# Patient Record
Sex: MALE | Race: BLACK OR AFRICAN AMERICAN | NOT HISPANIC OR LATINO | Employment: OTHER | URBAN - METROPOLITAN AREA
[De-identification: names, ages, dates, MRNs, and addresses within clinical notes are randomized per-mention and may not be internally consistent; named-entity substitution may affect disease eponyms.]

---

## 2017-06-02 ENCOUNTER — TELEPHONE (OUTPATIENT)
Dept: FAMILY MEDICINE | Facility: CLINIC | Age: 42
End: 2017-06-02

## 2017-06-02 NOTE — TELEPHONE ENCOUNTER
Reason for call:  Form  Reason for Call:  Form, our goal is to have forms completed with 72 hours, however, some forms may require a visit or additional information.    Type of letter, form or note:  medical    Who is the form from?: Rx form    Where did the form come from: form was faxed in    What clinic location was the form placed at?: [facility] [address] - [phone]    Where the form was placed: [facility]    What number is listed as a contact on the form?:  N/a        Additional comments:  Fax to [phone]    created by Jennifer Brookstravis

## 2017-06-02 NOTE — TELEPHONE ENCOUNTER
Received forms to sign for various medications containing butalbital for headaches. Multiple topical agents for neuropathy with no definite diagnosis currently in place.      Declined due to non-compliance.    Sudeep Short MD

## 2017-09-21 NOTE — PROGRESS NOTES
SUBJECTIVE:   Archie Wallace is a 42 year old male who presents to clinic today for the following health issues:      Diabetes Follow-up--    Has essentially been off any diabetic medications. Metformin was begun in the past but the patient could not tolerate due to gastrointestinal upset. He has morbid obesity. Last A1c was 7% approximately 1 year ago. He has not been compliant with follow-up visits.      Patient is checking blood sugars: not at all    Diabetic concerns: Eye exam     Symptoms of hypoglycemia (low blood sugar): shaky, dizzy, weak, sometimes     Paresthesias (numbness or burning in feet) or sores: No     Date of last diabetic eye exam: unknown    Hyperlipidemia Follow-Up- Previously prescribed Atorvastatin for hyperlipidemia and cardiovascular protection. Patient has apparently taken this. He did not bring his medications in today and it does possibly questionable lead is not taking it.      Rate your low fat/cholesterol diet?: not monitoring fat    Taking statin?  Yes, no muscle aches from statin    Other lipid medications/supplements?:  none    Hypertension Follow-up- only taking one combination medication at this time. We'll be adding Amlodipine to Losartan/hydrochlorothiazide. Blood pressure not under control.      Outpatient blood pressures are not being checked.    Low Salt Diet: not monitoring salt    Vascular Disease Follow-up:  Coronary Artery Disease (CAD)- by history and some MI potential number of years ago. He is not seeing a cardiologist regularly. Denies any anginal type symptoms. Has some atypical chest discomfort but more likely from asthma.      Chest pain or pressure, left side neck or arm pain: Yes and tingling in right arm    Shortness of breath/increased sweats/nausea with exertion: Yes     Pain in calves walking 1-2 blocks: No    Worsened or new symptoms since last visit: Yes right arm    Nitroglycerin use: no    Daily aspirin use: 4 times a week    Depression Follow up- not on  medication.    Status since last visit: Improved     See PHQ-9 for current symptoms.  Other associated symptoms: None    Complicating factors:   Significant life event:  No   Current substance abuse:  None  Anxiety or Panic symptoms:  Yes-    PHQ-9  English  PHQ-9   Any Language  Asthma Follow-Up- reports that he is using Advair on a regular basis. Has some coughing and wheezing. Not requiring Albuterol regularly. Obesity causing shortness of breath as well.    Was ACT completed today?    Yes    ACT Total Scores 9/22/2017   ACT TOTAL SCORE -   ASTHMA ER VISITS -   ASTHMA HOSPITALIZATIONS -   ACT TOTAL SCORE (Goal Greater than or Equal to 20) 9   In the past 12 months, how many times did you visit the emergency room for your asthma without being admitted to the hospital? 0   In the past 12 months, how many times were you hospitalized overnight because of your asthma? 0       Recent asthma triggers that patient is dealing with: Patient is unaware of triggers                Amount of exercise or physical activity: 6-7 days/week for an average of 15-30 minutes    Problems taking medications regularly: No    Medication side effects: none  Diet: regular (no restrictions)    Joint Pain -right arm tingling --mainly the right hand--symptoms about one month    Onset: 1 month    Description:   Location: right arm  Character: tingling    Intensity: moderate    Progression of Symptoms: worse    Accompanying Signs & Symptoms:  Other symptoms: tingling    History:   Previous similar pain: no       Precipitating factors:   Trauma or overuse: no     Alleviating factors:  Improved by: holding arm up    Therapies Tried and outcome: None                Problem list and histories reviewed & adjusted, as indicated.  Additional history: as documented        Reviewed and updated as needed this visit by clinical staffTobacco  Allergies  Med Hx  Surg Hx  Fam Hx  Soc Hx      Reviewed and updated as needed this visit by Provider      "    ROS:  C: NEGATIVE for fever, chills, change in weight  CONSTITUTIONAL: Morbidly obese  I: NEGATIVE for worrisome rashes, moles or lesions  E: NEGATIVE for vision changes or irritation  E/M: NEGATIVE for ear, mouth and throat problems  RESP: An ongoing asthma management with Advair. Obesity also compromises respiratory status.  CV: NEGATIVE for chest pain, palpitations or peripheral edema  CV: Past history of apparent acute MI. History of coronary artery disease. On aspirin therapy. Will improve blood pressure control with additional medication. Appears to be taking Atorvastatin.  GI: NEGATIVE for nausea, abdominal pain, heartburn, or change in bowel habits  : NEGATIVE for frequency, dysuria, or hematuria  M: NEGATIVE for significant arthralgias or myalgia  NEURO: Over the last month, the patient was noted what appears to be paresthesias involving his hand. There may possibly be some radicular pain from the neck distally to the right hand. He is right-hand dominant. No definite loss of motor function. No neck pain or injury.  ENDOCRINE: Type 2 diabetes with morbid obesity, currently not on medication. Noncompliant with follow-ups. No apparent polyuria or polydipsia. No foot symptoms of neuropathy.  H: NEGATIVE for bleeding problems  P: NEGATIVE for changes in mood or affect  PSYCHIATRIC: History of depression, currently not on medication and the patient feels he is \"doing all right\".    OBJECTIVE:                                                    /88  Pulse 94  Temp 97.4  F (36.3  C) (Temporal)  Resp 20  Ht 5' 10.28\" (1.785 m)  Wt (!) 353 lb 8 oz (160.3 kg)  SpO2 96%  BMI 50.32 kg/m2  Body mass index is 50.32 kg/(m^2).  GENERAL: alert, no distress, cooperative and morbidly obese  EYES: Eyes grossly normal to inspection, extraocular movements - intact, and PERRL  HENT: ear canals- normal; TMs- normal; Nose- normal; Mouth- no ulcers, no lesions  NECK: no tenderness, no adenopathy, no asymmetry, no " masses, no stiffness; thyroid- normal to palpation  RESP: lungs clear to auscultation - no rales, no rhonchi, no wheezes  CV: regular rates and rhythm, normal S1 S2, no S3 or S4 and no murmur, no click or rub -  ABDOMEN: soft, no tenderness, no  hepatosplenomegaly, no masses, normal bowel sounds  ABDOMEN: Significant truncal obesity.  MS: Mild edema both lower extremities. Examination of the right upper extremity shows negative Tinel sign, negative compression test of the median nerve on the right hand. Motor function appears intact. No tenderness or limitations at the elbow or shoulder. Good range of motion of the cervical spine.  SKIN: no suspicious lesions, no rashes  NEURO: strength and tone- normal, sensory exam- grossly normal, mentation- intact, speech- normal, reflexes- symmetric  NEURO: Normal strength and tone, sensory exam grossly normal, mentation intact, speech normal, mentation intact and normal strength throughout  BACK: no CVA tenderness, no paralumbar tenderness  PSYCH: Alert and oriented times 3; speech- coherent , normal rate and volume; able to articulate logical thoughts, able to abstract reason, no tangential thoughts, no hallucinations or delusions, affect- normal  LYMPHATICS: ant. cervical- normal, post. cervical- normal, axillary- normal, supraclavicular- normal, inguinal- normal    Diagnostic test results:  Diagnostic Test Results:  Patient is to have fasting blood studies done within the next 7-10 days.       ASSESSMENT/PLAN:                                                    1. Right hand paresthesia   At his current weight, the patient is on a candidate for an MRI of the cervical spine. CT scan may be considered. Patient appears to be possibly claustrophobic as well. We'll begin with a neurologic consultation and possible consideration of EMG/nerve consumption studies of the right upper extremity to better define the cause of the paresthesias. Suspect not related to diabetes. Possibly  carpal tunnel syndrome but also could be related to cervical radiculopathy. Does not appear to be significantly painful.  - DEPRESSION ACTION PLAN (DAP)  - Asthma Action Plan (AAP)  - DIABETES EDUCATOR REFERRAL  - NEUROLOGY ADULT REFERRAL  - amLODIPine (NORVASC) 10 MG tablet; Take 1 tablet (10 mg) by mouth daily  Dispense: 90 tablet; Refill: 1  - BARIATRIC ADULT REFERRAL  - CBC with platelets; Future  - Lipid panel reflex to direct LDL; Future  - Comprehensive metabolic panel (BMP + Alb, Alk Phos, ALT, AST, Total. Bili, TP); Future  - Hemoglobin A1c; Future  - Albumin Random Urine Quantitative with Creat Ratio; Future  - TSH with free T4 reflex; Future    2. Hypertension goal BP (blood pressure) < 140/90   Not adequately controlled. Adding Amlodipine 10 mg daily with follow-up in 4 months.  - DEPRESSION ACTION PLAN (DAP)  - Asthma Action Plan (AAP)  - DIABETES EDUCATOR REFERRAL  - NEUROLOGY ADULT REFERRAL  - amLODIPine (NORVASC) 10 MG tablet; Take 1 tablet (10 mg) by mouth daily  Dispense: 90 tablet; Refill: 1  - BARIATRIC ADULT REFERRAL  - CBC with platelets; Future  - Lipid panel reflex to direct LDL; Future  - Comprehensive metabolic panel (BMP + Alb, Alk Phos, ALT, AST, Total. Bili, TP); Future  - Hemoglobin A1c; Future  - Albumin Random Urine Quantitative with Creat Ratio; Future  - TSH with free T4 reflex; Future    3. Hyperlipidemia with target LDL less than 100   Rechecking labs. To continue Atorvastatin 10 mg daily.  - DEPRESSION ACTION PLAN (DAP)  - Asthma Action Plan (AAP)  - DIABETES EDUCATOR REFERRAL  - NEUROLOGY ADULT REFERRAL  - amLODIPine (NORVASC) 10 MG tablet; Take 1 tablet (10 mg) by mouth daily  Dispense: 90 tablet; Refill: 1  - BARIATRIC ADULT REFERRAL  - CBC with platelets; Future  - Lipid panel reflex to direct LDL; Future  - Comprehensive metabolic panel (BMP + Alb, Alk Phos, ALT, AST, Total. Bili, TP); Future  - Hemoglobin A1c; Future  - Albumin Random Urine Quantitative with Creat Ratio;  Future  - TSH with free T4 reflex; Future    4. Type 2 diabetes mellitus with hyperglycemia, without long-term current use of insulin (H)   Checking labs for current status. Needs to be seen through diabetic education for assistance in management. Patient needs good deal of education. Fasting labs pending  - DEPRESSION ACTION PLAN (DAP)  - Asthma Action Plan (AAP)  - DIABETES EDUCATOR REFERRAL  - NEUROLOGY ADULT REFERRAL  - amLODIPine (NORVASC) 10 MG tablet; Take 1 tablet (10 mg) by mouth daily  Dispense: 90 tablet; Refill: 1  - BARIATRIC ADULT REFERRAL  - CBC with platelets; Future  - Lipid panel reflex to direct LDL; Future  - Comprehensive metabolic panel (BMP + Alb, Alk Phos, ALT, AST, Total. Bili, TP); Future  - Hemoglobin A1c; Future  - Albumin Random Urine Quantitative with Creat Ratio; Future  - TSH with free T4 reflex; Future    5. Morbid obesity due to excess calories (H)   Patient is off until late management referral for assistance in weight loss and possible bariatric surgery.  - DEPRESSION ACTION PLAN (DAP)  - Asthma Action Plan (AAP)  - DIABETES EDUCATOR REFERRAL  - NEUROLOGY ADULT REFERRAL  - amLODIPine (NORVASC) 10 MG tablet; Take 1 tablet (10 mg) by mouth daily  Dispense: 90 tablet; Refill: 1  - BARIATRIC ADULT REFERRAL  - CBC with platelets; Future  - Lipid panel reflex to direct LDL; Future  - Comprehensive metabolic panel (BMP + Alb, Alk Phos, ALT, AST, Total. Bili, TP); Future  - Hemoglobin A1c; Future  - Albumin Random Urine Quantitative with Creat Ratio; Future  - TSH with free T4 reflex; Future    6. Moderate persistent asthma without complication  Continue current medications for asthma.  - DEPRESSION ACTION PLAN (DAP)  - Asthma Action Plan (AAP)  - DIABETES EDUCATOR REFERRAL  - NEUROLOGY ADULT REFERRAL  - amLODIPine (NORVASC) 10 MG tablet; Take 1 tablet (10 mg) by mouth daily  Dispense: 90 tablet; Refill: 1  - BARIATRIC ADULT REFERRAL  - CBC with platelets; Future  - Lipid panel reflex to  direct LDL; Future  - Comprehensive metabolic panel (BMP + Alb, Alk Phos, ALT, AST, Total. Bili, TP); Future  - Hemoglobin A1c; Future  - Albumin Random Urine Quantitative with Creat Ratio; Future  - TSH with free T4 reflex; Future      Follow up with Provider - Follow-up in 4 months. He is to have fasting blood studies done in the near future.   See Patient Instructions    The patient understood the rational for the diagnosis and treatment plan. All questions were answered to best of my ability and the patient's satisfaction.    Note: Chart documentation done in part with Dragon Voice Recognition software. Although reviewed after completion, some word and grammatical errors may remain.  Please consider this when interpreting information in this chart.      Sudeep Short MD  [facility]    Answers for HPI/ROS submitted by the patient on 9/22/2017   If you checked off any problems, how difficult have these problems made it for you to do your work, take care of things at home, or get along with other people?: Somewhat difficult  PHQ9 TOTAL SCORE: 11  MEGAN 7 TOTAL SCORE: 11

## 2017-09-22 ENCOUNTER — OFFICE VISIT (OUTPATIENT)
Dept: FAMILY MEDICINE | Facility: CLINIC | Age: 42
End: 2017-09-22

## 2017-09-22 VITALS
WEIGHT: 315 LBS | SYSTOLIC BLOOD PRESSURE: 158 MMHG | OXYGEN SATURATION: 96 % | RESPIRATION RATE: 20 BRPM | TEMPERATURE: 97.4 F | BODY MASS INDEX: 45.1 KG/M2 | DIASTOLIC BLOOD PRESSURE: 88 MMHG | HEART RATE: 94 BPM | HEIGHT: 70 IN

## 2017-09-22 DIAGNOSIS — E78.5 HYPERLIPIDEMIA WITH TARGET LDL LESS THAN 100: ICD-10-CM

## 2017-09-22 DIAGNOSIS — E11.65 TYPE 2 DIABETES MELLITUS WITH HYPERGLYCEMIA, WITHOUT LONG-TERM CURRENT USE OF INSULIN (H): ICD-10-CM

## 2017-09-22 DIAGNOSIS — E66.01 MORBID OBESITY DUE TO EXCESS CALORIES (H): ICD-10-CM

## 2017-09-22 DIAGNOSIS — J45.40 MODERATE PERSISTENT ASTHMA WITHOUT COMPLICATION: ICD-10-CM

## 2017-09-22 DIAGNOSIS — I10 HYPERTENSION GOAL BP (BLOOD PRESSURE) < 140/90: ICD-10-CM

## 2017-09-22 DIAGNOSIS — R20.2 RIGHT HAND PARESTHESIA: Primary | ICD-10-CM

## 2017-09-22 PROCEDURE — 99207 C FOOT EXAM  NO CHARGE: CPT | Performed by: FAMILY MEDICINE

## 2017-09-22 PROCEDURE — 99214 OFFICE O/P EST MOD 30 MIN: CPT | Performed by: FAMILY MEDICINE

## 2017-09-22 RX ORDER — AMLODIPINE BESYLATE 10 MG/1
10 TABLET ORAL DAILY
Qty: 90 TABLET | Refills: 1 | Status: SHIPPED | OUTPATIENT
Start: 2017-09-22 | End: 2020-04-01

## 2017-09-22 ASSESSMENT — PAIN SCALES - GENERAL: PAINLEVEL: NO PAIN (0)

## 2017-09-22 ASSESSMENT — PATIENT HEALTH QUESTIONNAIRE - PHQ9
10. IF YOU CHECKED OFF ANY PROBLEMS, HOW DIFFICULT HAVE THESE PROBLEMS MADE IT FOR YOU TO DO YOUR WORK, TAKE CARE OF THINGS AT HOME, OR GET ALONG WITH OTHER PEOPLE: SOMEWHAT DIFFICULT
SUM OF ALL RESPONSES TO PHQ QUESTIONS 1-9: 11
SUM OF ALL RESPONSES TO PHQ QUESTIONS 1-9: 11

## 2017-09-22 ASSESSMENT — ANXIETY QUESTIONNAIRES
1. FEELING NERVOUS, ANXIOUS, OR ON EDGE: SEVERAL DAYS
7. FEELING AFRAID AS IF SOMETHING AWFUL MIGHT HAPPEN: MORE THAN HALF THE DAYS
GAD7 TOTAL SCORE: 11
7. FEELING AFRAID AS IF SOMETHING AWFUL MIGHT HAPPEN: MORE THAN HALF THE DAYS
4. TROUBLE RELAXING: MORE THAN HALF THE DAYS
3. WORRYING TOO MUCH ABOUT DIFFERENT THINGS: SEVERAL DAYS
GAD7 TOTAL SCORE: 11
5. BEING SO RESTLESS THAT IT IS HARD TO SIT STILL: MORE THAN HALF THE DAYS
GAD7 TOTAL SCORE: 11
2. NOT BEING ABLE TO STOP OR CONTROL WORRYING: SEVERAL DAYS
6. BECOMING EASILY ANNOYED OR IRRITABLE: MORE THAN HALF THE DAYS

## 2017-09-22 NOTE — LETTER
My Depression Action Plan  Name: Archie Wallace   Date of Birth 1975  Date: 9/21/2017    My doctor: Sudeep Short   [facility]  [id number] [place] [address], [address]  [place] [id number] [phone]          GREEN    ZONE   Good Control    What it looks like:     Things are going generally well. You have normal up s and down s. You may even feel depressed from time to time, but bad moods usually last less than a day.   What you need to do:  1. Continue to care for yourself (see self care plan)  2. Check your depression survival kit and update it as needed  3. Follow your physician s recommendations including any medication.  4. Do not stop taking medication unless you consult with your physician first.           YELLOW         ZONE Getting Worse    What it looks like:     Depression is starting to interfere with your life.     It may be hard to get out of bed; you may be starting to isolate yourself from others.    Symptoms of depression are starting to last most all day and this has happened for several days.     You may have suicidal thoughts but they are not constant.   What you need to do:     1. Call your care team, your response to treatment will improve if you keep your care team informed of your progress. Yellow periods are signs an adjustment may need to be made.     2. Continue your self-care, even if you have to fake it!    3. Talk to someone in your support network    4. Open up your depression survival kit           RED    ZONE Medical Alert - Get Help    What it looks like:     Depression is seriously interfering with your life.     You may experience these or other symptoms: You can t get out of bed most days, can t work or engage in other necessary activities, you have trouble taking care of basic hygiene, or basic responsibilities, thoughts of suicide or death that will not go away, self-injurious behavior.     What you need to do:  1. Call your care team  and request a same-day appointment. If they are not available (weekends or after hours) call your local crisis line, emergency room or 911.      Electronically signed by: Elías Luther, September 21, 2017    Depression Self Care Plan / Survival Kit    Self-Care for Depression  Here s the deal. Your body and mind are really not as separate as most people think.  What you do and think affects how you feel and how you feel influences what you do and think. This means if you do things that people who feel good do, it will help you feel better.  Sometimes this is all it takes.  There is also a place for medication and therapy depending on how severe your depression is, so be sure to consult with your medical provider and/ or Behavioral Health Consultant if your symptoms are worsening or not improving.     In order to better manage my stress, I will:    Exercise  Get some form of exercise, every day. This will help reduce pain and release endorphins, the  feel good  chemicals in your brain. This is almost as good as taking antidepressants!  This is not the same as joining a gym and then never going! (they count on that by the way ) It can be as simple as just going for a walk or doing some gardening, anything that will get you moving.      Hygiene   Maintain good hygiene (Get out of bed in the morning, Make your bed, Brush your teeth, Take a shower, and Get dressed like you were going to work, even if you are unemployed).  If your clothes don't fit try to get ones that do.    Diet  I will strive to eat foods that are good for me, drink plenty of water, and avoid excessive sugar, caffeine, alcohol, and other mood-altering substances.  Some foods that are helpful in depression are: complex carbohydrates, B vitamins, flaxseed, fish or fish oil, fresh fruits and vegetables.    Psychotherapy  I agree to participate in Individual Therapy (if recommended).    Medication  If prescribed medications, I agree to take them.  Missing  doses can result in serious side effects.  I understand that drinking alcohol, or other illicit drug use, may cause potential side effects.  I will not stop my medication abruptly without first discussing it with my provider.    Staying Connected With Others  I will stay in touch with my friends, family members, and my primary care provider/team.    Use your imagination  Be creative.  We all have a creative side; it doesn t matter if it s oil painting, sand castles, or mud pies! This will also kick up the endorphins.    Witness Beauty  (AKA stop and smell the roses) Take a look outside, even in mid-winter. Notice colors, textures. Watch the squirrels and birds.     Service to others  Be of service to others.  There is always someone else in need.  By helping others we can  get out of ourselves  and remember the really important things.  This also provides opportunities for practicing all the other parts of the program.    Humor  Laugh and be silly!  Adjust your TV habits for less news and crime-drama and more comedy.    Control your stress  Try breathing deep, massage therapy, biofeedback, and meditation. Find time to relax each day.     My support system    Clinic Contact:  Phone number:    Contact 1:  Phone number:    Contact 2:  Phone number:    Jainism/:  Phone number:    Therapist:  Phone number:    Local crisis center:    Phone number:    Other community support:  Phone number:

## 2017-09-22 NOTE — MR AVS SNAPSHOT
After Visit Summary   9/22/2017    Archie Wallace    MRN: [id number]           Patient Information     Date Of Birth          1975        Visit Information        Provider Department      9/22/2017 11:20 AM Sudeep Short MD [facility]        Today's Diagnoses     Right hand paresthesia    -  1    Hypertension goal BP (blood pressure) < 140/90        Hyperlipidemia with target LDL less than 100        Type 2 diabetes mellitus with hyperglycemia, without long-term current use of insulin (H)        Morbid obesity due to excess calories (H)        Moderate persistent asthma without complication          Care Instructions    These are new changes to your current plan of care based on today's visit:    Medications stopped    Medications to be started Amlodipine 10 mg daily for hypertension    Diabetic medications eventually   Change dose of this medication    New treatments        Follow up appointments:    1.  FOLLOW UP WITH YOUR PRIMARY CARE PROVIDER: 4 months    2. FOLLOW UP WITH SPECIALIST: Diabetic educator, Neurologist, Weight management spcialist    3. FOLLOW UP WITH NURSE VISIT:     4. IMAGING STUDIES TO BE SCHEDULED:     5. PROCEDURES TO BE SCHEDULED:     6. LABS TO BE COMPLETED PRIOR TO NEXT VISIT: fasting lab work to be done    Sudeep Short MD                Follow-ups after your visit        Additional Services     BARIATRIC ADULT REFERRAL       Your provider has referred you to: [facility]: [facility] [phone]   [email or web address]    Please be aware that coverage of these services is subject to the terms and limitations of your health insurance plan.  Call member services at your health plan with any benefit or coverage questions.      Please bring the following with you to your appointment:      (1) List of current medications   (2) This referral request   (3) Any documents/labs given to you  for this referral            DIABETES EDUCATOR REFERRAL       DIABETES SELF MANAGEMENT TRAINING (DSMT)      Your provider has referred you to Diabetes Education: [facility]: Diabetes Education - All [facility] [phone]   [email or web address]    Type of training and number of hours: New Diagnosis: Initial group DSMT - 10 hours.      Medicare covers: 10 hours of initial DSMT in 12 month period from the time of first visit, plus 2 hours of follow-up DSMT annually, and additional hours as requested for insulin training.    Diabetes Type: Type 2 with morbid obesity             Diabetes Co-Morbidities: atherosclerotic cardiovascular disease, hypertension and obesity               A1C Goal:  <7.0       A1C is: Lab Results       Component                Value               Date                       A1C                      7.0                 10/07/2016              If an urgent visit is needed or A1C is above 12, Care Team to call the Diabetes Education Team at [phone] or send an In Basket message to the Diabetes Education Pool (P DIAB ED-PATIENT CARE).    Diabetes Education Topics: Comprehensive Knowledge Assessment and Instruction, Blood glucose meter instruction  and Medication Start: GLP-1: Type/Dose/Timing: Victoza with increasing doses    Special Educational Needs Requiring Individual DSMT: None       MEDICAL NUTRITION THERAPY (MNT) for Diabetes    Medical Nutrition Therapy with a Registered Dietitian can be provided in coordination with Diabetes Self-Management Training to assist in achieving optimal diabetes management.    MNT Type and Hours: New diagnosis: Initial MNT - 3 hours                       Medicare will cover: 3 hours initial MNT in 12 month period after first visit, plus 2 hours of follow-up MNT annually    Please be aware that coverage of these services is subject to the terms and limitations of your health insurance plan.  Call member services  at your health plan to determine Diabetes Self-Management Training benefits and ask which blood glucose monitor brands are covered by your plan.      Please bring the following with you to your appointment:    (1)  List of current medications   (2)  List of Blood Glucose Monitor brands that are covered by your insurance plan  (3)  Blood Glucose Monitor and log book  (4)   Food records for the 3 days prior to your visit    The Certified Diabetes Educator may make diabetes medication adjustments per the CDE Protocol and Collaborative Practice Agreement.            NEUROLOGY ADULT REFERRAL       Your provider has referred you for the following:   Consult at [facility]: [facility] [phone]   [email or web address]    Please be aware that coverage of these services is subject to the terms and limitations of your health insurance plan.  Call member services at your health plan with any benefit or coverage questions.      Please bring the following with you to your appointment:    (1) Any X-Rays, CTs or MRIs which have been performed.  Contact the facility where they were done to arrange for  prior to your scheduled appointment.    (2) List of current medications  (3) This referral request   (4) Any documents/labs given to you for this referral                  Who to contact     If you have questions or need follow up information about today's clinic visit or your schedule please contact [facility] directly at [phone].  Normal or non-critical lab and imaging results will be communicated to you by MyChart, letter or phone within 4 business days after the clinic has received the results. If you do not hear from us within 7 days, please contact the clinic through MyChart or phone. If you have a critical or abnormal lab result, we will notify you by phone as soon as possible.  Submit refill requests through [employer] or call your pharmacy and they  "will forward the refill request to us. Please allow 3 business days for your refill to be completed.          Additional Information About Your Visit        [employer] Information     [employer] lets you send messages to your doctor, view your test results, renew your prescriptions, schedule appointments and more. To sign up, go to [email or web address] . Click on \"Log in\" on the left side of the screen, which will take you to the Welcome page. Then click on \"Sign up Now\" on the right side of the page.     You will be asked to enter the access code listed below, as well as some personal information. Please follow the directions to create your username and password.     Your access code is: [email or web address]  Expires: 2017 11:56 AM     Your access code will  in 90 days. If you need help or a new code, please call your [facility] clinic or [phone].        Care EveryWhere ID     This is your Care EveryWhere ID. This could be used by other organizations to access your [facility] medical records  [id number]        Your Vitals Were     Pulse Temperature Respirations Height Pulse Oximetry BMI (Body Mass Index)    94 97.4  F (36.3  C) (Temporal) 20 5' 10.28\" (1.785 m) 96% 50.32 kg/m2       Blood Pressure from Last 3 Encounters:   17 158/88   10/10/16 (!) 190/110   06/10/15 (!) 150/94    Weight from Last 3 Encounters:   17 (!) 353 lb 8 oz (160.3 kg)   10/10/16 (!) 346 lb 3.2 oz (157 kg)   06/10/15 (!) 349 lb 9.6 oz (158.6 kg)              We Performed the Following     Albumin Random Urine Quantitative with Creat Ratio     Asthma Action Plan (AAP)     BARIATRIC ADULT REFERRAL     CBC with platelets     Comprehensive metabolic panel     DEPRESSION ACTION PLAN (DAP)     DIABETES EDUCATOR REFERRAL     Hemoglobin A1c     Lipid panel reflex to direct LDL     NEUROLOGY ADULT REFERRAL     TSH with free T4 reflex          Today's Medication Changes          These changes are accurate as of: 17 11:56 AM.  If " you have any questions, ask your nurse or doctor.               Start taking these medicines.        Dose/Directions    amLODIPine 10 MG tablet   Commonly known as:  NORVASC   Used for:  Hypertension goal BP (blood pressure) < 140/90   Started by:  Sudeep Sohrt MD        Dose:  10 mg   Take 1 tablet (10 mg) by mouth daily   Quantity:  90 tablet   Refills:  1         Stop taking these medicines if you haven't already. Please contact your care team if you have questions.     triamcinolone 0.5 % cream   Commonly known as:  KENALOG   Stopped by:  Sudeep Short MD                Where to get your medicines      [facility] [address] [facility], [place] [address] [facility]  [address] [facility] [address]     Phone:  [phone]     amLODIPine 10 MG tablet                Primary Care Provider Office Phone # Fax #    Sudeep Short [phone] [address] [facility] [address]        Equal Access to Services     [facility] AH: Hadii aad ku hadasho Soomaali, waaxda luqadaha, qaybta kaalmada adeegyada, lashay apodacain hayaan adesury sloan . So [facility] [phone].    ATENCIÓN: Si habla español, tiene a brooks disposición servicios gratuitos de asistencia lingüística. [facility] [phone].    We comply with applicable federal civil rights laws and [place] laws. We do not discriminate on the basis of race, color, national origin, age, disability sex, sexual orientation or gender identity.            Thank you!     Thank you for choosing [facility]  for your care. Our goal is always to provide you with excellent care. Hearing back from our patients is one way we can continue to improve our services. Please take a few minutes to complete the written survey that you may receive in the mail after your visit with us. Thank you!             Your Updated Medication List - Protect  others around you: Learn how to safely use, store and throw away your medicines at www.disposemymeds.org.          This list is accurate as of: 9/22/17 11:56 AM.  Always use your most recent med list.                   Brand Name Dispense Instructions for use Diagnosis    albuterol 108 (90 BASE) MCG/ACT Inhaler    PROAIR HFA    1 Inhaler    Inhale 2 puffs into the lungs every 6 hours as needed    Moderate persistent asthma, with acute exacerbation       amLODIPine 10 MG tablet    NORVASC    90 tablet    Take 1 tablet (10 mg) by mouth daily    Hypertension goal BP (blood pressure) < 140/90       aspirin 81 MG tablet      Take 1 tablet by mouth daily.    Type 2 diabetes, HbA1c goal < 7% (H), Hypertension goal BP (blood pressure) < 140/90, Hyperlipidemia LDL goal < 100       atorvastatin 10 MG tablet    LIPITOR    30 tablet    Take 1 tablet (10 mg) by mouth daily    Hyperlipidemia with target LDL less than 100       fluticasone-salmeterol 250-50 MCG/DOSE diskus inhaler    ADVAIR    1 Inhaler    Inhale 1 puff into the lungs 2 times daily        losartan-hydrochlorothiazide 100-12.5 MG per tablet    HYZAAR    30 tablet    Take 1 tablet by mouth daily    Essential hypertension with goal blood pressure less than 140/90       * order for DME     1 Device    CPAP    Obstructive sleep apnea       * order for DME     1 Units    CPAP setting 19 cm H2O        order for DME     1 Units    Equipment being ordered: CPAP--replacement with using prior settings. Additional supplies as needed with replacement machine.  Will need to contact the patient for information needed about the prior CPAP settings and use.    Obstructive sleep apnea       * Notice:  This list has 2 medication(s) that are the same as other medications prescribed for you. Read the directions carefully, and ask your doctor or other care provider to review them with you.

## 2017-09-22 NOTE — PATIENT INSTRUCTIONS
These are new changes to your current plan of care based on today's visit:    Medications stopped    Medications to be started Amlodipine 10 mg daily for hypertension    Diabetic medications eventually   Change dose of this medication    New treatments        Follow up appointments:    1.  FOLLOW UP WITH YOUR PRIMARY CARE PROVIDER: 4 months    2. FOLLOW UP WITH SPECIALIST: Diabetic educator, Neurologist, Weight management spcialist    3. FOLLOW UP WITH NURSE VISIT:     4. IMAGING STUDIES TO BE SCHEDULED:     5. PROCEDURES TO BE SCHEDULED:     6. LABS TO BE COMPLETED PRIOR TO NEXT VISIT: fasting lab work to be done    Sudeep Short MD

## 2017-09-22 NOTE — NURSING NOTE
"Chief Complaint   Patient presents with     Hypertension     Lipids     Diabetes     Panel Management     TSH, foot, microalbumin, ACT, AAP, flu shot, lipid        Initial /88  Pulse 94  Temp 97.4  F (36.3  C) (Temporal)  Resp 20  Ht 5' 10.28\" (1.785 m)  Wt (!) 353 lb 8 oz (160.3 kg)  SpO2 96%  BMI 50.32 kg/m2 Estimated body mass index is 50.32 kg/(m^2) as calculated from the following:    Height as of this encounter: 5' 10.28\" (1.785 m).    Weight as of this encounter: 353 lb 8 oz (160.3 kg).  Medication Reconciliation: complete   April ИВАН Jackson      "

## 2017-09-23 ASSESSMENT — ANXIETY QUESTIONNAIRES: GAD7 TOTAL SCORE: 11

## 2017-09-23 ASSESSMENT — ASTHMA QUESTIONNAIRES: ACT_TOTALSCORE: 9

## 2017-09-23 ASSESSMENT — PATIENT HEALTH QUESTIONNAIRE - PHQ9: SUM OF ALL RESPONSES TO PHQ QUESTIONS 1-9: 11

## 2017-10-11 ENCOUNTER — TELEPHONE (OUTPATIENT)
Dept: FAMILY MEDICINE | Facility: CLINIC | Age: 42
End: 2017-10-11

## 2017-10-11 NOTE — TELEPHONE ENCOUNTER
Summary:    Patient is due/failing the following:   BP CHECK    Action needed:   Patient needs nurse only appointment.    Type of outreach:    Sent letter.    Questions for provider review:    None                                                                                                                                    Yvrose Diallo       Chart routed to Care Team .    Panel Management Review      Patient has the following on his problem list:     Depression / Dysthymia review    Measure:  Needs PHQ-9 score of 4 or less during index window.  Administer PHQ-9 and if score is 5 or more, send encounter to provider for next steps.      PHQ-9 SCORE 6/10/2015 10/10/2016 9/22/2017   Total Score 22 - -   Total Score MyChart - - 11 (Moderate depression)   Total Score - 15 11       If PHQ-9 recheck is 5 or more, route to provider for next steps.    Patient is due for:  None    Asthma review     ACT Total Scores 9/22/2017   ACT TOTAL SCORE -   ASTHMA ER VISITS -   ASTHMA HOSPITALIZATIONS -   ACT TOTAL SCORE (Goal Greater than or Equal to 20) 9   In the past 12 months, how many times did you visit the emergency room for your asthma without being admitted to the hospital? 0   In the past 12 months, how many times were you hospitalized overnight because of your asthma? 0      1. Is Asthma diagnosis on the Problem List? Yes    2. Is Asthma listed on Health Maintenance? Yes    3. Patient is due for:  none    Diabetes    ASA: Passed    Last A1C  Lab Results   Component Value Date    A1C 7.0 10/07/2016    A1C 6.8 06/10/2015    A1C 6.8 07/21/2014    A1C 6.4 11/04/2011    A1C 6.7 07/18/2011     A1C tested: Passed    Last LDL:    Lab Results   Component Value Date    CHOL 170 10/07/2016     Lab Results   Component Value Date    HDL 51 10/07/2016     Lab Results   Component Value Date     10/07/2016     Lab Results   Component Value Date    TRIG 64 10/07/2016     Lab Results   Component Value Date    CHOLHDLRATIO 3.3  11/04/2011     Lab Results   Component Value Date    NHDL 119 10/07/2016       Is the patient on a Statin? YES             Is the patient on Aspirin? YES    Medications     HMG CoA Reductase Inhibitors    atorvastatin (LIPITOR) 10 MG tablet    Salicylates    aspirin 81 MG tablet          Last three blood pressure readings:  BP Readings from Last 3 Encounters:   09/22/17 158/88   10/10/16 (!) 190/110   06/10/15 (!) 150/94          Tobacco History:     History   Smoking Status     Former Smoker     Types: Cigars   Smokeless Tobacco     Never Used     Comment: Smokes intermittently         Hypertension   Last three blood pressure readings:  BP Readings from Last 3 Encounters:   09/22/17 158/88   10/10/16 (!) 190/110   06/10/15 (!) 150/94     Blood pressure: FAILED    HTN Guidelines:  Age 18-59 BP range:  Less than 140/90  Age 60-85 with Diabetes:  Less than 140/90  Age 60-85 without Diabetes:  less than 150/90              Composite cancer screening  Chart review shows that this patient is due/due soon for the following None

## 2017-10-11 NOTE — LETTER
[facility]  [address]  [place] [address]  Phone: [phone]  Fax: [phone]  October 11, 2017      Archie Wallace  [address]  [place] [address]      Dear Archie,    We care about your health and have reviewed your health plan including your medical conditions, medications, and lab results.  Based on this review, it is recommended that you follow up regarding the following health topic(s):  -High Blood Pressure    We recommend you take the following action(s):  -schedule a FREE FLOAT MA-ONLY BLOOD PRESSURE APPOINTMENT within the next 1-4 weeks.     Please call us at the [facility] - [phone] (or use [employer]¢) to address the above recommendations.     Thank you for trusting [facility] and we appreciate the opportunity to serve you.  We look forward to supporting your healthcare needs in the future.    Healthy Regards,    Your Health Care Team  [facility]

## 2017-10-18 ENCOUNTER — TELEPHONE (OUTPATIENT)
Dept: FAMILY MEDICINE | Facility: CLINIC | Age: 42
End: 2017-10-18

## 2017-10-18 NOTE — LETTER
[facility]  [address]  [place] [address]  [phone]      October 18, 2017    Archie Wallace                                                                                                                     [address] [facility] [facility] [address]          Dear Archie,    We received a notice that you are to be scheduled with a specialty clinic. The referral has been placed by your provider and you can call to schedule an appointment directly.     Enclosed, you will find the referral with the phone number to call to schedule an appointment.  If you have already scheduled this, you may disregard this letter.    Please call us if you have any questions or concerns.      Sincerely,   [facility] Support Staff / ozzie

## 2017-10-18 NOTE — TELEPHONE ENCOUNTER
You placed a referral for patient to Bariatrics on 9/22/17.  Patient has not scheduled as of yet.      Please review and forward to team if follow up with the patient is needed.     Thank you!  Edda/Clinic Referrals Dyad II

## 2017-12-07 ENCOUNTER — TELEPHONE (OUTPATIENT)
Dept: FAMILY MEDICINE | Facility: CLINIC | Age: 42
End: 2017-12-07

## 2017-12-07 NOTE — LETTER
[facility]  [address]  [place] [address]  Phone: [phone]  Fax: [phone]  December 7, 2017      Archie Wallace  [address] [place] [address]      Dear Archie,    We care about your health and have reviewed your health plan including your medical conditions, medications, and lab results.  Based on this review, it is recommended that you follow up regarding the following health topic(s):  -Asthma    We recommend you take the following action(s):   -Complete and return the attached ASTHMA CONTROL TEST.  If your total score is 19 or less or you have been to the ER or urgent care for your asthma, then please schedule an asthma followup appointment.     Please call us at the [facility] - [phone] (or use [employer]) to address the above recommendations.     Thank you for trusting [facility] and we appreciate the opportunity to serve you.  We look forward to supporting your healthcare needs in the future.    Healthy Regards,    Your Health Care Team  [facility]

## 2017-12-07 NOTE — TELEPHONE ENCOUNTER
Summary:    Patient is due/failing the following:   Eye exam, TSH, CMP, Microalbumin, A1C, ACT, FOLLOW UP and LDL in January     Action needed:   Patient needs office visit for Diabetic follow up and ACT update. and Patient needs fasting lab only appointment    Type of outreach:    Phone, left message for patient to call back.   Letter with attached ACT sent to patient.  Questions for provider review:    None                                                                                                                                    Yvrose Diallo     Chart routed to Care Team .  Panel Management Review      Patient has the following on his problem list:     Depression / Dysthymia review    Measure:  Needs PHQ-9 score of 4 or less during index window.  Administer PHQ-9 and if score is 5 or more, send encounter to provider for next steps.        PHQ-9 SCORE 6/10/2015 10/10/2016 9/22/2017   Total Score 22 - -   Total Score MyChart - - 11 (Moderate depression)   Total Score - 15 11       If PHQ-9 recheck is 5 or more, route to provider for next steps.    Patient is due for:  PHQ9    Diabetes    ASA: Passed    Last A1C  Lab Results   Component Value Date    A1C 7.0 10/07/2016    A1C 6.8 06/10/2015    A1C 6.8 07/21/2014    A1C 6.4 11/04/2011    A1C 6.7 07/18/2011     A1C tested: Passed    Last LDL:    Lab Results   Component Value Date    CHOL 170 10/07/2016     Lab Results   Component Value Date    HDL 51 10/07/2016     Lab Results   Component Value Date     10/07/2016     Lab Results   Component Value Date    TRIG 64 10/07/2016     Lab Results   Component Value Date    CHOLHDLRATIO 3.3 11/04/2011     Lab Results   Component Value Date    NHDL 119 10/07/2016       Is the patient on a Statin? YES             Is the patient on Aspirin? YES    Medications     HMG CoA Reductase Inhibitors    atorvastatin (LIPITOR) 10 MG tablet    Salicylates    aspirin 81 MG tablet          Last three blood pressure  readings:  BP Readings from Last 3 Encounters:   09/22/17 158/88   10/10/16 (!) 190/110   06/10/15 (!) 150/94          Tobacco History:     History   Smoking Status     Former Smoker     Types: Cigars   Smokeless Tobacco     Never Used     Comment: Smokes intermittently         Hypertension   Last three blood pressure readings:  BP Readings from Last 3 Encounters:   09/22/17 158/88   10/10/16 (!) 190/110   06/10/15 (!) 150/94     Blood pressure: FAILED    HTN Guidelines:  Age 18-59 BP range:  Less than 140/90  Age 60-85 with Diabetes:  Less than 140/90  Age 60-85 without Diabetes:  less than 150/90            Composite cancer screening  Chart review shows that this patient is due/due soon for the following None

## 2018-01-02 ENCOUNTER — TELEPHONE (OUTPATIENT)
Dept: FAMILY MEDICINE | Facility: CLINIC | Age: 43
End: 2018-01-02

## 2018-01-02 NOTE — TELEPHONE ENCOUNTER
Left message for pt. Please inform pt with following message below and help him schedule fasting lab.

## 2018-01-02 NOTE — TELEPHONE ENCOUNTER
Reviewing chart shows that this patient has not returned for fasting blood work ordered in September 2017-    Needing to be contacted and schedule for fasting blood work in the near future.    Addendum:    A request for topical and oral medications received form [facility]--signature requested. As not signed as these medications were not ordered when the patient was seen--denied until the patient is sen again and can be discussed.    Sudeep Short MD  Please close encounter when call/work completed.

## 2018-01-03 NOTE — TELEPHONE ENCOUNTER
Called pt to discuss 's message below. Pt stated that he would like to have these done at the  location. Pt was transferred to schedule at .

## 2018-03-27 ENCOUNTER — TELEPHONE (OUTPATIENT)
Dept: FAMILY MEDICINE | Facility: CLINIC | Age: 43
End: 2018-03-27

## 2018-03-27 NOTE — LETTER
[facility]  [address], [address]  [place] [address]  Phone: [phone]  Fax: [phone]  March 27, 2018      Archie Madison  [address]  [facility] [address]      Dear Archie,    We care about your health and have reviewed your health plan including your medical conditions, medications, and lab results.  Based on this review, it is recommended that you follow up regarding the following health topic(s):  -Wellness (Physical) Visit     We recommend you take the following action(s):  -schedule a WELLNESS (Physical) APPOINTMENT.  We will perform the following labs: A1c, Lipids (fasting cholesterol - nothing to eat except water and/or meds for 8-10 hours), BMP (basic metabolic panel), ALT/AST, Microablumin, TSH (thyroid test) and CBC (complete blood cell counts).     Please call us at the [facility] - [phone] (or use Dopplr) to address the above recommendations.     Thank you for trusting [facility] and we appreciate the opportunity to serve you.  We look forward to supporting your healthcare needs in the future.    Healthy Regards,    Your Health Care Team  [facility]

## 2018-03-27 NOTE — TELEPHONE ENCOUNTER
Panel Management Review      Patient has the following on his problem list:     Diabetes    ASA: Passed    Last A1C  Lab Results   Component Value Date    A1C 7.0 10/07/2016    A1C 6.8 06/10/2015    A1C 6.8 07/21/2014    A1C 6.4 11/04/2011    A1C 6.7 07/18/2011     A1C tested: Passed    Last LDL:    Lab Results   Component Value Date    CHOL 170 10/07/2016     Lab Results   Component Value Date    HDL 51 10/07/2016     Lab Results   Component Value Date     10/07/2016     Lab Results   Component Value Date    TRIG 64 10/07/2016     Lab Results   Component Value Date    CHOLHDLRATIO 3.3 11/04/2011     Lab Results   Component Value Date    NHDL 119 10/07/2016       Is the patient on a Statin? YES             Is the patient on Aspirin? YES    Medications     HMG CoA Reductase Inhibitors    atorvastatin (LIPITOR) 10 MG tablet    Salicylates    aspirin 81 MG tablet          Last three blood pressure readings:  BP Readings from Last 3 Encounters:   09/22/17 158/88   10/10/16 (!) 190/110   06/10/15 (!) 150/94       Date of last diabetes office visit: 10/10/16     Tobacco History:     History   Smoking Status     Former Smoker     Types: Cigars   Smokeless Tobacco     Never Used     Comment: Smokes intermittently         Hypertension   Last three blood pressure readings:  BP Readings from Last 3 Encounters:   09/22/17 158/88   10/10/16 (!) 190/110   06/10/15 (!) 150/94     Blood pressure: FAILED    HTN Guidelines:  Age 18-59 BP range:  Less than 140/90  Age 60-85 with Diabetes:  Less than 140/90  Age 60-85 without Diabetes:  less than 150/90      Composite cancer screening  Chart review shows that this patient is due/due soon for the following None  Summary:    Patient is due/failing the following:   TSH, A1C, ACT, PHQ9 and PHYSICAL    Action needed:   Patient needs office visit for physical.    Type of outreach:    Sent letter.    Questions for provider review:    None                                                                                                                                     [facility]       Chart routed to Care Team .

## 2018-06-07 ENCOUNTER — TELEPHONE (OUTPATIENT)
Dept: FAMILY MEDICINE | Facility: CLINIC | Age: 43
End: 2018-06-07

## 2018-06-07 NOTE — TELEPHONE ENCOUNTER
Patient is scheduled for physical and fasting lab work on 6/22/2018.      Reminder letter sent to patient.

## 2018-07-03 ENCOUNTER — TELEPHONE (OUTPATIENT)
Dept: FAMILY MEDICINE | Facility: CLINIC | Age: 43
End: 2018-07-03

## 2018-07-03 NOTE — LETTER
[facility]  [address], [address]  [place] [address]  Phone: [phone]  Fax: [phone]  July 3, 2018      Archie Wallace  [address] [place] [address]      Dear Archie,    We care about your health and have reviewed your health plan including your medical conditions, medications, and lab results.  Based on this review, it is recommended that you follow up regarding the following health topic(s):  -Asthma    We recommend you take the following action(s):   -Complete and return the attached ASTHMA CONTROL TEST.  If your total score is 19 or less or you have been to the ER or urgent care for your asthma, then please schedule an asthma followup appointment.     Please call us at the [facility] - [phone] (or use Sonocine) to address the above recommendations.     Thank you for trusting [facility] and we appreciate the opportunity to serve you.  We look forward to supporting your healthcare needs in the future.    Healthy Regards,    Your Health Care Team  [facility]

## 2018-07-03 NOTE — TELEPHONE ENCOUNTER
Summary:    Patient is due/failing the following:   Physical, TSH, CMP, Microalbumin, A1C, ACT, BP CHECK, LDL and PHQ9    Action needed:   Patient needs office visit for Physical., Patient needs to do ACT., Patient needs to do PHQ9. and Patient needs fasting lab only appointment    Type of outreach:    Phone, spoke to patient.  patient states he will have to call back .  Letter with attached ACT mailed to patient   Questions for provider review:    None                                                                                                                                    Yvrose Diallo       Chart routed to Care Team .      Panel Management Review      Patient has the following on his problem list:     Depression / Dysthymia review    Measure:  Needs PHQ-9 score of 4 or less during index window.  Administer PHQ-9 and if score is 5 or more, send encounter to provider for next steps.        PHQ-9 SCORE 6/10/2015 10/10/2016 9/22/2017   Total Score 22 - -   Total Score MyChart - - 11 (Moderate depression)   Total Score - 15 11       If PHQ-9 recheck is 5 or more, route to provider for next steps.    Patient is due for:  PHQ9    Asthma review     ACT Total Scores 9/22/2017   ACT TOTAL SCORE -   ASTHMA ER VISITS -   ASTHMA HOSPITALIZATIONS -   ACT TOTAL SCORE (Goal Greater than or Equal to 20) 9   In the past 12 months, how many times did you visit the emergency room for your asthma without being admitted to the hospital? 0   In the past 12 months, how many times were you hospitalized overnight because of your asthma? 0      1. Is Asthma diagnosis on the Problem List? Yes    2. Is Asthma listed on Health Maintenance? Yes    3. Patient is due for:  ACT    Diabetes    ASA:     Last A1C  Lab Results   Component Value Date    A1C 7.0 10/07/2016    A1C 6.8 06/10/2015    A1C 6.8 07/21/2014    A1C 6.4 11/04/2011    A1C 6.7 07/18/2011     A1C tested: Passed    Last LDL:    Lab Results   Component Value Date    CHOL  170 10/07/2016     Lab Results   Component Value Date    HDL 51 10/07/2016     Lab Results   Component Value Date     10/07/2016     Lab Results   Component Value Date    TRIG 64 10/07/2016     Lab Results   Component Value Date    CHOLHDLRATIO 3.3 11/04/2011     Lab Results   Component Value Date    NHDL 119 10/07/2016       Is the patient on a Statin? YES             Is the patient on Aspirin? YES    Medications     HMG CoA Reductase Inhibitors    atorvastatin (LIPITOR) 10 MG tablet    Salicylates    aspirin 81 MG tablet          Last three blood pressure readings:  BP Readings from Last 3 Encounters:   09/22/17 158/88   10/10/16 (!) 190/110   06/10/15 (!) 150/94            Tobacco History:     History   Smoking Status     Former Smoker     Types: Cigars   Smokeless Tobacco     Never Used     Comment: Smokes intermittently         Hypertension   Last three blood pressure readings:  BP Readings from Last 3 Encounters:   09/22/17 158/88   10/10/16 (!) 190/110   06/10/15 (!) 150/94     Blood pressure: FAILED    HTN Guidelines:  Age 18-59 BP range:  Less than 140/90  Age 60-85 with Diabetes:  Less than 140/90  Age 60-85 without Diabetes:  less than 150/90      Composite cancer screening  Chart review shows that this patient is due/due soon for the following None

## 2018-09-07 ENCOUNTER — TELEPHONE (OUTPATIENT)
Dept: FAMILY MEDICINE | Facility: CLINIC | Age: 43
End: 2018-09-07

## 2018-09-07 NOTE — LETTER
[facility]  [address]  [place] [address]  Phone: [phone]  Fax: [phone]  September 25, 2018      Archie Madison  [address] [place] [address]      Dear Archie,    We care about your health and have reviewed your health plan including your medical conditions, medications, and lab results.  Based on this review, it is recommended that you follow up regarding the following health topic(s):  -Asthma  -Diabetes    We recommend you take the following action(s):  -schedule a FOLLOWUP OFFICE APPOINTMENT.  We will perform the following labs:  A1c, Lipids (fasting cholesterol - nothing to eat except water and/or meds for 8-10 hours), CMP(complete metabolic panel) and Microablumin.   -Complete and return the attached ASTHMA CONTROL TEST.  If your total score is 19 or less or you have been to the ER or urgent care for your asthma, then please schedule an asthma followup appointment.     Please call us at the [facility] - [phone] (or use GetNotes) to address the above recommendations.     Thank you for trusting [facility] and we appreciate the opportunity to serve you.  We look forward to supporting your healthcare needs in the future.    Healthy Regards,    Your Health Care Team  [facility]

## 2018-09-07 NOTE — TELEPHONE ENCOUNTER
Summary:    Patient is due/failing the following:   ACT update, Diabetic follow up, CMP, microalbumin, TSH, A1C and LDL    Action needed:   Patient needs office visit for diabetic follow  up. and Patient needs fasting lab only appointment    Type of outreach:    Phone, spoke to patient.  patient will need a call back on monday.     Questions for provider review:    None                                                                                                                                    Yvrose Diallo     Chart routed to Care Team .          Panel Management Review      Patient has the following on his problem list:     Depression / Dysthymia review    Measure:  Needs PHQ-9 score of 4 or less during index window.  Administer PHQ-9 and if score is 5 or more, send encounter to provider for next steps.        PHQ-9 SCORE 6/10/2015 10/10/2016 9/22/2017   Total Score 22 - -   Total Score MyChart - - 11 (Moderate depression)   Total Score - 15 11       If PHQ-9 recheck is 5 or more, route to provider for next steps.    Patient is due for:  PHQ9    Asthma review     ACT Total Scores 9/22/2017   ACT TOTAL SCORE -   ASTHMA ER VISITS -   ASTHMA HOSPITALIZATIONS -   ACT TOTAL SCORE (Goal Greater than or Equal to 20) 9   In the past 12 months, how many times did you visit the emergency room for your asthma without being admitted to the hospital? 0   In the past 12 months, how many times were you hospitalized overnight because of your asthma? 0      1. Is Asthma diagnosis on the Problem List? Yes    2. Is Asthma listed on Health Maintenance? Yes    3. Patient is due for:  ACT    Diabetes    ASA: Passed    Last A1C  Lab Results   Component Value Date    A1C 7.0 10/07/2016    A1C 6.8 06/10/2015    A1C 6.8 07/21/2014    A1C 6.4 11/04/2011    A1C 6.7 07/18/2011     A1C tested: Passed    Last LDL:    Lab Results   Component Value Date    CHOL 170 10/07/2016     Lab Results   Component Value Date    HDL 51 10/07/2016      Lab Results   Component Value Date     10/07/2016     Lab Results   Component Value Date    TRIG 64 10/07/2016     Lab Results   Component Value Date    CHOLHDLRATIO 3.3 11/04/2011     Lab Results   Component Value Date    NHDL 119 10/07/2016       Is the patient on a Statin? YES             Is the patient on Aspirin? YES    Medications     HMG CoA Reductase Inhibitors    atorvastatin (LIPITOR) 10 MG tablet    Salicylates    aspirin 81 MG tablet          Last three blood pressure readings:  BP Readings from Last 3 Encounters:   09/22/17 158/88   10/10/16 (!) 190/110   06/10/15 (!) 150/94            Tobacco History:     History   Smoking Status     Former Smoker     Types: Cigars   Smokeless Tobacco     Never Used     Comment: Smokes intermittently         Hypertension   Last three blood pressure readings:  BP Readings from Last 3 Encounters:   09/22/17 158/88   10/10/16 (!) 190/110   06/10/15 (!) 150/94     Blood pressure: FAILED    HTN Guidelines:  Age 18-59 BP range:  Less than 140/90  Age 60-85 with Diabetes:  Less than 140/90  Age 60-85 without Diabetes:  less than 150/90      Composite cancer screening  Chart review shows that this patient is due/due soon for the following None

## 2018-11-01 ENCOUNTER — TELEPHONE (OUTPATIENT)
Dept: FAMILY MEDICINE | Facility: OTHER | Age: 43
End: 2018-11-01

## 2018-11-01 NOTE — LETTER
[facility]  [address] [place]   [address]  Tel. [phone]   Fax [phone]   [facility]  [address] [place]   [address]  Tel. [phone]    Fax [phone]   [facility]  [address] [place]   [address]  Tel. [phone]   Fax [phone]  [facility]  [address] [place]  [address]  [phone]    Fax [phone]   [facility]  [address]  [address]  Tel [phone]  Fax [phone]     January 11, 2019        Archie Wallace  [address]  [place] [address]      Dear Archie Wallace,    Your health is important to us! We have been trying to reach you regarding your future labs or cancer screenings that were ordered by Dr. Short, who is no longer with [facility].  These tests will need to be reordered under your new provider.   Please call your clinic to make an appointment to establish with a new provider.  [facility] [phone]  [facility]   [phone]  [facility] [phone]  For your convenience we also offer online appointment scheduling at [email or web address] or [email or web address].   Please inform us if you have established care with a provider outside of [facility].    Healthy Regards,    Your Health Care Team  [facility]

## 2018-11-01 NOTE — TELEPHONE ENCOUNTER
Panel Management Review      Patient has the following on his problem list:     Asthma review     ACT Total Scores 9/22/2017   ACT TOTAL SCORE -   ASTHMA ER VISITS -   ASTHMA HOSPITALIZATIONS -   ACT TOTAL SCORE (Goal Greater than or Equal to 20) 9   In the past 12 months, how many times did you visit the emergency room for your asthma without being admitted to the hospital? 0   In the past 12 months, how many times were you hospitalized overnight because of your asthma? 0      1. Is Asthma diagnosis on the Problem List? Yes    2. Is Asthma listed on Health Maintenance? Yes    3. Patient is due for:  ACT and AAP    Diabetes    ASA: Passed    Last A1C  Lab Results   Component Value Date    A1C 7.0 10/07/2016    A1C 6.8 06/10/2015    A1C 6.8 07/21/2014    A1C 6.4 11/04/2011    A1C 6.7 07/18/2011     A1C tested: Passed    Last LDL:    Lab Results   Component Value Date    CHOL 170 10/07/2016     Lab Results   Component Value Date    HDL 51 10/07/2016     Lab Results   Component Value Date     10/07/2016     Lab Results   Component Value Date    TRIG 64 10/07/2016     Lab Results   Component Value Date    CHOLHDLRATIO 3.3 11/04/2011     Lab Results   Component Value Date    NHDL 119 10/07/2016       Is the patient on a Statin? YES             Is the patient on Aspirin? YES    Medications     HMG CoA Reductase Inhibitors    atorvastatin (LIPITOR) 10 MG tablet    Salicylates    aspirin 81 MG tablet          Last three blood pressure readings:  BP Readings from Last 3 Encounters:   09/22/17 158/88   10/10/16 (!) 190/110   06/10/15 (!) 150/94       Date of last diabetes office visit: there were no diabetes follow up visit. Pt last OV was 9/25/17     Tobacco History:     History   Smoking Status     Former Smoker     Types: Cigars   Smokeless Tobacco     Never Used     Comment: Smokes intermittently         Hypertension   Last three blood pressure readings:  BP Readings from Last 3 Encounters:   09/22/17 158/88    10/10/16 (!) 190/110   06/10/15 (!) 150/94     Blood pressure: FAILED    HTN Guidelines:  Age 18-59 BP range:  Less than 140/90  Age 60-85 with Diabetes:  Less than 140/90  Age 60-85 without Diabetes:  less than 150/90      Composite cancer screening  Chart review shows that this patient is due/due soon for the following None  Summary:    Patient is due/failing the following:   AAp, ACT, albumin, LDL, a1c, cbc, cmp, LDL, tsh     Action needed:   Patient needs office visit for diabetes follow up and needs to est care with new provider., Patient needs to do ACT., Patient needs to do PHQ9. and Patient needs fasting lab only appointment    Type of outreach:    Phone, left message for patient to call back.  Letter also mailed to pt.     Questions for provider review:    None                                                                                                                                    Elías Luther MA       Chart routed to Care Team .

## 2020-04-01 ENCOUNTER — VIRTUAL VISIT (OUTPATIENT)
Dept: FAMILY MEDICINE | Facility: CLINIC | Age: 45
End: 2020-04-01
Payer: MEDICAID

## 2020-04-01 DIAGNOSIS — E78.5 HYPERLIPIDEMIA WITH TARGET LDL LESS THAN 100: ICD-10-CM

## 2020-04-01 DIAGNOSIS — G47.33 OBSTRUCTIVE SLEEP APNEA: ICD-10-CM

## 2020-04-01 DIAGNOSIS — E66.01 MORBID OBESITY (H): ICD-10-CM

## 2020-04-01 DIAGNOSIS — I10 HYPERTENSION GOAL BP (BLOOD PRESSURE) < 140/90: Primary | ICD-10-CM

## 2020-04-01 DIAGNOSIS — E11.65 TYPE 2 DIABETES MELLITUS WITH HYPERGLYCEMIA, WITHOUT LONG-TERM CURRENT USE OF INSULIN (H): ICD-10-CM

## 2020-04-01 DIAGNOSIS — F32.1 MODERATE MAJOR DEPRESSION (H): ICD-10-CM

## 2020-04-01 PROCEDURE — 99214 OFFICE O/P EST MOD 30 MIN: CPT | Mod: TEL | Performed by: NURSE PRACTITIONER

## 2020-04-01 RX ORDER — ATORVASTATIN CALCIUM 10 MG/1
10 TABLET, FILM COATED ORAL DAILY
Qty: 30 TABLET | Refills: 1 | Status: SHIPPED | OUTPATIENT
Start: 2020-04-01 | End: 2020-07-17

## 2020-04-01 RX ORDER — AMLODIPINE BESYLATE 10 MG/1
10 TABLET ORAL DAILY
Qty: 30 TABLET | Refills: 1 | Status: SHIPPED | OUTPATIENT
Start: 2020-04-01 | End: 2020-07-17

## 2020-04-01 RX ORDER — LOSARTAN POTASSIUM AND HYDROCHLOROTHIAZIDE 12.5; 1 MG/1; MG/1
1 TABLET ORAL DAILY
Qty: 30 TABLET | Refills: 1 | Status: SHIPPED | OUTPATIENT
Start: 2020-04-01 | End: 2020-07-17

## 2020-04-01 ASSESSMENT — PATIENT HEALTH QUESTIONNAIRE - PHQ9: SUM OF ALL RESPONSES TO PHQ QUESTIONS 1-9: 19

## 2020-04-01 NOTE — PROGRESS NOTES
"Subjective     Archie Wallace is a 44 year old male who is being evaluated via a billable telephone visit.      The patient has been notified of following:     \"This telephone visit will be conducted via a call between you and your physician/provider. We have found that certain health care needs can be provided without the need for a physical exam.  This service lets us provide the care you need with a short phone conversation.  If a prescription is necessary we can send it directly to your pharmacy.  If lab work is needed we can place an order for that and you can then stop by our lab to have the test done at a later time.    If during the course of the call the physician/provider feels a telephone visit is not appropriate, you will not be charged for this service.\"     Patient has given verbal consent for Telephone visit?  Yes    Archie Wallace complains of   Chief Complaint   Patient presents with     Medication Request     Blood Pressure & Diabetes patient has been off medication for a few years.       ALLERGIES  Biaxin [clarithromycin]; Metformin; Robafen dm cgh-chest [robitussin cough-chest dm]; and Tamiflu [oseltamivir]    44 year old male initiated virtual visit to establish care and restart his medications for his chronic disease management. He has a past medical history significant for HTN, type 2 diabetes, depression, hyperlipidemia, CAD, prolonged QT, BRONWYN and morbid obesity. He has been off of his medications for 3-4 years.    He was recently seen in the emergency department x2 for shoulder pain. His BP was quite elevated at that time - 180/112 and 169/118 on 2 separate visits over the last week. He does not check his BP at home. No chest pain. Reports shortness of breath - states it's normal for him and has had for several years. He has previously had a stress test and angiogram. It's present both at daytime and at night. Denies other cough or cold symptoms. No fever. No swelling in the legs. No palpitations, " "lightheadedness or headaches. No abdominal pain, nausea or vomiting.    Has a history of diabetes. Last a1c 7.0 in 2016. He was taking metformin but stopped it due to \"skin breakdown.\" Denies polyuria, polydipsia and polyphagia. He does not check his BG at home.    His PHQ9 was elevated today at 19 with several days in the last couple of weeks that he might be better off dead or hurting himself in some way. Upon further discussion, patient denies plan. He states he has been feeling down recently due to health concerns. States he would not hurt himself. Contracts for safety. He previously took medication which he didn't feel worked for him. He is agreeable to therapy. He has never been hospitalized for mental health.    He has a history of BRONWYN. He uses a CPAP but doesn't think it's working as well as it could. Snores with possible apnea if he doesn't use machine.    He reported his weight to be 317 lb today. He does not watch his diet or exercise.    Patient Active Problem List   Diagnosis     Atopic rhinitis     Low back pain     Obstructive sleep apnea     Morbid obesity (H)     Hypertension goal BP (blood pressure) < 140/90     Hyperlipidemia with target LDL less than 100     CAD (coronary artery disease)     Moderate persistent asthma     Long QT interval syndrome     Type 2 diabetes mellitus with hyperglycemia, without long-term current use of insulin (H)     Moderate major depression (H)     Past Surgical History:   Procedure Laterality Date     ANGIOGRAM       ANGIOPLASTY  2001    Coronary artery angioplasty//associated with mild AMI     TONSILLECTOMY  2005    With uvuloectomy       Social History     Tobacco Use     Smoking status: Former Smoker     Types: Cigars     Smokeless tobacco: Never Used     Tobacco comment: Smokes intermittently   Substance Use Topics     Alcohol use: Yes     Comment: occ/ mod     Family History   Problem Relation Age of Onset     Diabetes Mother      Hypertension Mother      " Diabetes Father      Diabetes Sister          Current Outpatient Medications   Medication Sig Dispense Refill     albuterol (ALBUTEROL) 108 (90 BASE) MCG/ACT inhaler Inhale 2 puffs into the lungs every 6 hours as needed 1 Inhaler 5     amLODIPine (NORVASC) 10 MG tablet Take 1 tablet (10 mg) by mouth daily 30 tablet 1     aspirin 81 MG tablet Take 1 tablet by mouth daily.  3     atorvastatin (LIPITOR) 10 MG tablet Take 1 tablet (10 mg) by mouth daily 30 tablet 1     fluticasone-salmeterol (ADVAIR) 250-50 MCG/DOSE diskus inhaler Inhale 1 puff into the lungs 2 times daily 1 Inhaler 1     losartan-hydrochlorothiazide (HYZAAR) 100-12.5 MG tablet Take 1 tablet by mouth daily 30 tablet 1     order for DME Equipment being ordered: CPAP--replacement with using prior settings. Additional supplies as needed with replacement machine.    Will need to contact the patient for information needed about the prior CPAP settings and use. 1 Units o     ORDER FOR DME CPAP setting 19 cm H2O 1 Units 0     ORDER FOR DME CPAP 1 Device 0     Allergies   Allergen Reactions     Biaxin [Clarithromycin]      Metformin      Reported breaking out with a skin reaction     Robafen Dm Cgh-Chest [Robitussin Cough-Chest Dm] Difficulty breathing     Tamiflu [Oseltamivir]      Recent Labs   Lab Test 10/07/16  0720 03/20/16 06/10/15  1501 07/21/14  1734   A1C 7.0*  --  6.8* 6.8*   *  --  96  --    HDL 51  --   --   --    TRIG 64  --   --   --    ALT 28 150 28 25   CR 0.67 0.7 0.69 0.83   GFRESTIMATED >90  Non  GFR Calc    --  >90  Non  GFR Calc   >90   GFRESTBLACK >90   GFR Calc    --  >90   GFR Calc   >90   POTASSIUM 3.4 3.8 3.3* 3.2*   TSH  --   --  0.68 0.76      BP Readings from Last 3 Encounters:   09/22/17 158/88   10/10/16 (!) 190/110   06/10/15 (!) 150/94    Wt Readings from Last 3 Encounters:   09/22/17 (!) 160.3 kg (353 lb 8 oz)   10/10/16 (!) 157 kg (346 lb 3.2 oz)   06/10/15  (!) 158.6 kg (349 lb 9.6 oz)                    Reviewed and updated as needed this visit by Provider         Review of Systems   ROS COMP: Constitutional, HEENT, cardiovascular, pulmonary, GI, , musculoskeletal, neuro, skin, endocrine and psych systems are negative, except as otherwise noted.       Objective   Reported vitals:  There were no vitals taken for this visit.   healthy, alert and no distress  Psych: Alert and oriented times 3; coherent speech, normal   rate and volume, able to articulate logical thoughts, able   to abstract reason, no tangential thoughts, no hallucinations   or delusions  His affect is normal.     Diagnostic Test Results:  Labs reviewed in Epic        Assessment/Plan:  1. Hypertension goal BP (blood pressure) < 140/90  Off medications for a couple of years and uncontrolled in the emergency department last week. Restarting previous regimen. Will get labs in 2 weeks. Had normal BMP last week.  - **Comprehensive metabolic panel FUTURE anytime; Future  - CBC with platelets differential; Future  - Albumin Random Urine Quantitative with Creat Ratio; Future  - **TSH with free T4 reflex FUTURE anytime; Future  - amLODIPine (NORVASC) 10 MG tablet; Take 1 tablet (10 mg) by mouth daily  Dispense: 30 tablet; Refill: 1  - losartan-hydrochlorothiazide (HYZAAR) 100-12.5 MG tablet; Take 1 tablet by mouth daily  Dispense: 30 tablet; Refill: 1    2. Type 2 diabetes mellitus with hyperglycemia, without long-term current use of insulin (H)  Off medications for a couple of years. He states he does not tolerate metformin. He needs labs - last a1c in 2016. He was going to come into clinic today but due to COVID 19, unable to get them today. We will get a1c in 2 weeks when he comes to get labs for BP medication. Discussed oral medication vs injectables vs insulin. He is hesitant about anything injectable right now. He is agreeable to diabetes educator referral.  - **Comprehensive metabolic panel FUTURE  anytime; Future  - CBC with platelets differential; Future  - **A1C FUTURE anytime; Future  - Albumin Random Urine Quantitative with Creat Ratio; Future  - **TSH with free T4 reflex FUTURE anytime; Future  - AMBULATORY ADULT DIABETES EDUCATOR REFERRAL    3. Hyperlipidemia with target LDL less than 100  He will do fasting labs in 2 weeks.  - **Comprehensive metabolic panel FUTURE anytime; Future  - CBC with platelets differential; Future  - Lipid panel reflex to direct LDL Fasting; Future  - amLODIPine (NORVASC) 10 MG tablet; Take 1 tablet (10 mg) by mouth daily  Dispense: 30 tablet; Refill: 1  - atorvastatin (LIPITOR) 10 MG tablet; Take 1 tablet (10 mg) by mouth daily  Dispense: 30 tablet; Refill: 1    4. Moderate major depression (H)  Will refer for therapy. Declines therapy at this time. Denies current thoughts to harm self. States more passive thoughts about what if he wasn't here. No plan. Contracts for safety.  - **TSH with free T4 reflex FUTURE anytime; Future  - MENTAL HEALTH REFERRAL  - Adult; Outpatient Treatment; Individual/Couples/Family/Group Therapy/Health Psychology; G: [facility] [phone]; We will contact you to schedule the appointment or please call with any questions    5. Obstructive sleep apnea  He is requesting new sleep study because he doesn't feel like it's   - SLEEP EVALUATION & MANAGEMENT REFERRAL - ADULT -[facility] [place]  [phone] (Age 15 and up); Future    6. Morbid obesity (H)  Diet/exercise.     There was initially some concern regarding possible COVID 19 due to shortness of breath; however this has been ongoing for several years. He denies other upper respiratory infection symptoms and I do not think he has COVID 19 infection at this time. Regardless, we will get labs in 2 weeks once he has restarted his BP medication and will be due for recheck at that time (had normal BMP last week, including glucose which was 92).     Return in  about 2 weeks (around 4/15/2020) for Lab Work, BP Recheck.      Phone call duration:  25 minutes    CECE Lopez CNP

## 2020-04-01 NOTE — PATIENT INSTRUCTIONS
At [facility], we strive to deliver an exceptional experience to you, every time we see you. If you receive a survey, please complete it as we do value your feedback.  If you have MyChart, you can expect to receive results automatically within 24 hours of their completion.  Your provider will send a note interpreting your results as well.   If you do not have MyChart, you should receive your results in about a week by mail.    Your care team:                            Family Medicine Internal Medicine   MD Tristan Gary MD Shantel Branch-Fleming, MD Katya Georgiev PA-C Megan Hill, APRLINDA Murguia, MD Pediatrics   Cheo Moreno, PAEMMANUELLE Rosales, MD Lillian Blankenship APRN CNP   MD No Waite MD Deborah Mielke, MD Kim Thein, APRN [facility]      Clinic hours: Monday - Thursday 7 am-7 pm; Fridays 7 am-5 pm.   Urgent care: Monday - Friday 11 am-9 pm; Saturday and Sunday 9 am-5 pm.    Clinic: [phone]       [facility]: Monday - Thursday 8 am - 7 pm; Friday 8 am - 6 pm  [facility]: [phone]     Use [email or web address] for 24/7 diagnosis and treatment of dozens of conditions.

## 2020-04-02 ASSESSMENT — ASTHMA QUESTIONNAIRES: ACT_TOTALSCORE: 5

## 2020-04-09 ENCOUNTER — VIRTUAL VISIT (OUTPATIENT)
Dept: EDUCATION SERVICES | Facility: CLINIC | Age: 45
End: 2020-04-09
Payer: MEDICAID

## 2020-04-09 DIAGNOSIS — Z53.9 NO SHOW: ICD-10-CM

## 2020-04-09 DIAGNOSIS — E11.65 TYPE 2 DIABETES MELLITUS WITH HYPERGLYCEMIA, WITHOUT LONG-TERM CURRENT USE OF INSULIN (H): Primary | ICD-10-CM

## 2020-04-09 NOTE — PROGRESS NOTES
Tried to call patient's cell phone x2 but no answer and VM is full so unable to leave a message and did not send a text.  There was another phone number listed, [phone] and left message trying to reach Archie and would call back at 11:15am.    At 11:15am, tried both phone numbers again.  The [phone] number picked up but denied any Archie at that number so removed it from his chart.    Will have [facility] try to reach out to see if Archie would like to reschedule since unable to leave him a message; he does not have [employer].    Will also route to Chantell so she is aware.    Stephanie Boss,  JEFF, LD, [facility]   Time: 0 minutes since unable to reach patient.

## 2020-04-09 NOTE — Clinical Note
Brian Abdul,   Just SAGRARIO- was not able to reach Archie today x 2 tries and was not able to leave a message on his preferred phone due to full VM box and I deleted other number as was told no Archie at that phone.    I know you had planned to touch base with him next week- if he is interested and not yet rescheduled, can you please give him our scheduling line at [phone] and let him know VM box full?    Thanks so much!  Stephanie Boss,  JEFF, LD, [facility]

## 2020-04-10 ENCOUNTER — TELEPHONE (OUTPATIENT)
Dept: FAMILY MEDICINE | Facility: CLINIC | Age: 45
End: 2020-04-10

## 2020-04-10 NOTE — TELEPHONE ENCOUNTER
Called, patient is scheduled for 4/19/2020 at 9am, told to come in fasting.  Anders Darnell,  For 1st Floor Primary Care ([facility])

## 2020-04-14 ENCOUNTER — PATIENT OUTREACH (OUTPATIENT)
Dept: EDUCATION SERVICES | Facility: CLINIC | Age: 45
End: 2020-04-14
Payer: MEDICAID

## 2020-04-14 DIAGNOSIS — E11.65 TYPE 2 DIABETES MELLITUS WITH HYPERGLYCEMIA, WITHOUT LONG-TERM CURRENT USE OF INSULIN (H): Primary | ICD-10-CM

## 2020-04-14 PROCEDURE — 98968 PH1 ASSMT&MGMT NQHP 21-30: CPT

## 2020-04-14 RX ORDER — BLOOD SUGAR DIAGNOSTIC
STRIP MISCELLANEOUS
Qty: 50 STRIP | Refills: 3 | Status: SHIPPED | OUTPATIENT
Start: 2020-04-14 | End: 2022-04-27

## 2020-04-14 RX ORDER — LANCETS
1 EACH MISCELLANEOUS DAILY
Qty: 102 EACH | Refills: 3 | Status: SHIPPED | OUTPATIENT
Start: 2020-04-14 | End: 2022-04-27

## 2020-04-14 RX ORDER — BLOOD-GLUCOSE METER
1 EACH MISCELLANEOUS DAILY
Qty: 1 KIT | Refills: 0 | Status: SHIPPED | OUTPATIENT
Start: 2020-04-14 | End: 2020-10-09

## 2020-04-14 NOTE — ADDENDUM NOTE
Addended by: GLENNY CARRILLO on: 4/14/2020 12:16 PM     Modules accepted: Level of Service, SmartSet

## 2020-04-14 NOTE — PATIENT INSTRUCTIONS
1. Call your insurance to let them know you did not receive your card and need this for medication and diabetes supplies.    2. Options for medications to discuss with Chantell if needed and A1C comes back above 7% on Monday:     Weekly injectable GLP-1: Bydureon Pen is the most common with the [facility] insurance plans  SGLT-2 Inhibitor: We need to make sure kidney function is good but this oral medication is taken every morning.  Sulfonylurea: this oral medication is taken 1-2 times daily with meals.  You would want to watch for low blood glucose     3. Will order in testing supplies for the Accu-chek Guide Me meter and supplies (Guide strips and Fast-Clix lancets).  Used the attached voucher for a free meter but this may be a way to check blood glucose until your insurance is ready.   Goal for blood glucose before meals:  mg/dL   Before bed:  mg/dL  Good to check at least 2-3 times a week (when you wake up, before dinner and before bed)      FOLLOW UP: Tuesday, July 7th at 10am at [facility].  Please bring your meter.    Stephanie Boss RDN, LD, [facility]   [phone]

## 2020-04-14 NOTE — ADDENDUM NOTE
Addended by: GLENNY CARRILLO on: 4/14/2020 10:42 AM     Modules accepted: Orders, Level of Service

## 2020-04-14 NOTE — PROGRESS NOTES
"Diabetes Self-Management Education & Support    Presents for: Individual review    Patient verbally consented to the telephone visit service today: yes      SUBJECTIVE/OBJECTIVE:  Presents for: Individual review  Present on call: Self  Diabetes education in the past 24mo: No  Focus of Visit: Taking Medication, Assistance w/ making life changes  Diabetes type: Type 2  Date of diagnosis: 2005  Diabetes management related comments/concerns: Says no questions, he was told to talk to CDE. Was only on Metformin in the past for diabetes but did not tolerate so it was stopped.  Says has insurance but waiting for his card.  Has One Touch meter at home but not currently check BG due to cost of suppies without insurance.    Willing to check blood glucose once has insurance.     Says not have card for insurance but says he was approved for insurance.  Open to once weekly injection.     Says he was on a diet on and off for 8 months.  Says did keto diet for awhile and now not eating as much.  Says not as hungry anymore so now only eats 1 main meal most days and tries to stop eating after 6pm.    Difficulty affording diabetes testing supplies?: Yes  Cultural Influences/Ethnic Background:  -AMERICAN    Diabetes Symptoms & Complications:     Complications assessed today?: Yes  Autonomic neuropathy: No  CVA: No  Heart disease: Yes  Nephropathy: No  Peripheral neuropathy: No  Retinopathy: No  Sexual dysfunction: No    Patient Problem List and Family Medical History reviewed for relevant medical history, current medical status, and diabetes risk factors.    Vitals:  There were no vitals taken for this visit.  Estimated body mass index is 50.32 kg/m  as calculated from the following:    Height as of 9/22/17: 1.785 m (5' 10.28\").    Weight as of 9/22/17: 160.3 kg (353 lb 8 oz).   Last 3 BP:   BP Readings from Last 3 Encounters:   09/22/17 158/88   10/10/16 (!) 190/110   06/10/15 (!) 150/94       History   Smoking Status     Former " Smoker     Types: Cigars   Smokeless Tobacco     Never Used     Comment: Smokes intermittently       Labs:  Lab Results   Component Value Date    A1C 7.0 10/07/2016     Lab Results   Component Value Date     10/07/2016     Lab Results   Component Value Date     10/07/2016     HDL Cholesterol   Date Value Ref Range Status   10/07/2016 51 >39 mg/dL Final   ]  GFR Estimate   Date Value Ref Range Status   10/07/2016 >90  Non  GFR Calc   >60 mL/min/1.7m2 Final     GFR Estimate If Black   Date Value Ref Range Status   10/07/2016 >90   GFR Calc   >60 mL/min/1.7m2 Final     Lab Results   Component Value Date    CR 0.67 10/07/2016     No results found for: MICROALBUMIN    Healthy Eating:  Healthy Eating Assessed Today: Yes  Cultural/Spiritism diet restrictions?: No  Meal planning/habits: Other, Smaller portions(Did keto in the past and tries to stop eating after 6pm.  Not as hungry anymore after following keto diet.)  How many times a week on average do you eat food made away from home (restaurant/take-out)?: 2  Meals include: Dinner, Afternoon Snack, Morning Snack  Breakfast: none or breakfast sandwich and coffee  Lunch: burger  Dinner: 2 chicken wings, couple of fries and water  Snacks: crackers  Beverages: Coffee, Water  Has patient met with a dietitian in the past?: Yes    Being Active:  Being Active Assessed Today: No    Monitoring:  Monitoring Assessed Today: Yes  Blood Glucose Meter: One Touch  Times checking blood sugar at home (number): Never      Taking Medications:  Taking Medication Assessed Today: Yes  Current Treatments: Diet(Not tolerate Metformin in the past)    Problem Solving:  Problem Solving Assessed Today: No              Reducing Risks:  Reducing Risks Assessed Today: No    Healthy Coping:  Healthy Coping Assessed Today: No  Patient Activation Measure Survey Score:  NUPUR Score (Last Two) 5/18/2011   NUPUR Raw Score 39   Activation Score 56.4   NUPUR Level 3        Diabetes knowledge and skills assessment:   Patient is knowledgeable in diabetes management concepts related to: Healthy Eating and Healthy Coping    Patient needs further education on the following diabetes management concepts: Healthy Eating, Being Active, Monitoring, Taking Medication, Problem Solving and Reducing Risks    Based on learning assessment above, most appropriate setting for further diabetes education would be: Group class or Individual setting.      INTERVENTIONS:    Education provided today on:  AADE Self-Care Behaviors:  Healthy Eating: carbohydrate counting, consistency in amount, composition, and timing of food intake, weight reduction, heart healthy diet and portion control  Monitoring: purpose, individual blood glucose targets and frequency of monitoring  Taking Medication: medication options if blood glucose uncontrolled    Opportunities for ongoing education and support in diabetes-self management were discussed.    Pt verbalized understanding of concepts discussed and recommendations provided today.       Education Materials Provided:  Accu-chek Guide Voucher (emailed)      ASSESSMENT:  Archie is not currently checking his blood glucose and there is no recent A1C but does plan to have labs on 4/19/20. Discussed purpose of checking blood glucose and target values to help reach A1C goal of <7%.  He seems open to checking blood glucose 2-3 x/week but not currently checking due to cost of supplies (no insurance).  Says he did apply and was approved but waiting for his card.  He is willing to call and check on this.  In the meantime, will order Accu-chek Guide Me supplies as patient is able to get a free meter (will email voucher) and cost of supplies is low incase patient wants to get started before able to get insurance card.     Reviewed diet recall but patient reports low calorie food intake and small portions for 1-2 meals/snacks a day.  Uncertain if diet changes may be helping with  blood glucose control.      Since Metformin not tolerated in the past, discussed some other options incase A1C comes back elevated.  Talked about weekly GLP-1's, daily SGLT-2 inhibitor or SANDOVAL.  Patient seems open to a once weekly injection so will email these medication options for him to check out, especially Bydureon Pen.  If patient has county insurance/Medicaid, they usually only cover Victoza, Byetta or Bydureon Pen so can try for this pen if patient feels comfortable using.  Pt verbalized understanding of concepts discussed and recommendations provided.        Patient's most recent   Lab Results   Component Value Date    A1C 7.0 10/07/2016    is not meeting goal of <7.0    PLAN  See Patient Instructions for co-developed, patient-stated behavior change goals.  AVS emailed per patient request to: [email or web address]   See Follow-Up section for recommended follow-up.  Appt: 7/7/20    Stephanie Boss RDN, KYRA, ONDINA   Time Spent: 35 minutes  Encounter Type: Individual TELEPHONE visit    Any diabetes medication dose changes were made via the CDE Protocol and Collaborative Practice Agreement with the patient's referring provider. A copy of this encounter was shared with the provider.    Email to patient:     Brian Almanza,    It was great to talk to you this morning.  Here is a summary of what was discussed:     1. Call your insurance to let them know you did not receive your card and need this for medication and diabetes supplies.    2. Options for medications to discuss with Chantell if needed and A1C comes back above 7% on Monday:     Weekly injectable GLP-1: Bydureon Pen is the most common with the [facility] insurance plans   Link to Bydureon Pen use: [email or web address]://www.[email or web address]/pen/taking-[email or web address]-first-[email or web address].html    SGLT-2 Inhibitor: We need to make sure kidney function is good but this oral medication is taken every morning (either Jardiance, Farxiga or Invokana).    Sulfonylurea: this oral medication is  taken 1-2 times daily with meals.  You would want to watch for low blood glucose (Glipizide or Glimepiride)    3. Will order in testing supplies for the Accu-chek Guide Me meter and supplies (Guide strips and Fast-Clix lancets).  Used the attached voucher for a free meter but this may be a way to check blood glucose until your insurance is ready.   Goal for blood glucose before meals:  mg/dL   Before bed:  mg/dL  Good to check at least 2-3 times a week (when you wake up, before dinner and before bed)  Sent this prescription to [facility] [address] in [place].    FOLLOW UP: Tuesday, July 7th at 10am at [facility].  Please bring your meter.      Please let me know if you have any questions or concerns.

## 2020-04-20 ENCOUNTER — TELEPHONE (OUTPATIENT)
Dept: FAMILY MEDICINE | Facility: CLINIC | Age: 45
End: 2020-04-20

## 2020-04-20 NOTE — TELEPHONE ENCOUNTER
Left message for patient to call in regards to 24 hour pre-appointment COVID screening. Patient has an appointment at 0915 lab on 04.21.2020. Please ask infection screening questions and update appointment notes on [facility] lab schedule.

## 2020-04-20 NOTE — TELEPHONE ENCOUNTER
Called, patient is scheduled for a lab only appointment.  Anders Darnell,  For 1st Floor Primary Care ([facility])

## 2020-04-21 ENCOUNTER — TELEPHONE (OUTPATIENT)
Dept: FAMILY MEDICINE | Facility: CLINIC | Age: 45
End: 2020-04-21

## 2020-04-21 DIAGNOSIS — E11.65 TYPE 2 DIABETES MELLITUS WITH HYPERGLYCEMIA, WITHOUT LONG-TERM CURRENT USE OF INSULIN (H): ICD-10-CM

## 2020-04-21 DIAGNOSIS — I10 HYPERTENSION GOAL BP (BLOOD PRESSURE) < 140/90: ICD-10-CM

## 2020-04-21 DIAGNOSIS — E78.5 HYPERLIPIDEMIA WITH TARGET LDL LESS THAN 100: ICD-10-CM

## 2020-04-21 DIAGNOSIS — F32.1 MODERATE MAJOR DEPRESSION (H): ICD-10-CM

## 2020-04-21 LAB
ALBUMIN SERPL-MCNC: 3.5 G/DL (ref 3.4–5)
ALP SERPL-CCNC: 48 U/L (ref 40–150)
ALT SERPL W P-5'-P-CCNC: 26 U/L (ref 0–70)
ANION GAP SERPL CALCULATED.3IONS-SCNC: 2 MMOL/L (ref 3–14)
AST SERPL W P-5'-P-CCNC: 15 U/L (ref 0–45)
BASOPHILS # BLD AUTO: 0 10E9/L (ref 0–0.2)
BASOPHILS NFR BLD AUTO: 0.4 %
BILIRUB SERPL-MCNC: 0.5 MG/DL (ref 0.2–1.3)
BUN SERPL-MCNC: 10 MG/DL (ref 7–30)
CALCIUM SERPL-MCNC: 8.8 MG/DL (ref 8.5–10.1)
CHLORIDE SERPL-SCNC: 103 MMOL/L (ref 94–109)
CHOLEST SERPL-MCNC: 158 MG/DL
CO2 SERPL-SCNC: 33 MMOL/L (ref 20–32)
CREAT SERPL-MCNC: 0.73 MG/DL (ref 0.66–1.25)
CREAT UR-MCNC: 78 MG/DL
DIFFERENTIAL METHOD BLD: NORMAL
EOSINOPHIL # BLD AUTO: 0.1 10E9/L (ref 0–0.7)
EOSINOPHIL NFR BLD AUTO: 2.8 %
ERYTHROCYTE [DISTWIDTH] IN BLOOD BY AUTOMATED COUNT: 12.4 % (ref 10–15)
GFR SERPL CREATININE-BSD FRML MDRD: >90 ML/MIN/{1.73_M2}
GLUCOSE SERPL-MCNC: 121 MG/DL (ref 70–99)
HBA1C MFR BLD: 6.6 % (ref 0–5.6)
HCT VFR BLD AUTO: 45.9 % (ref 40–53)
HDLC SERPL-MCNC: 64 MG/DL
HGB BLD-MCNC: 14.5 G/DL (ref 13.3–17.7)
LDLC SERPL CALC-MCNC: 85 MG/DL
LYMPHOCYTES # BLD AUTO: 1.6 10E9/L (ref 0.8–5.3)
LYMPHOCYTES NFR BLD AUTO: 32.3 %
MCH RBC QN AUTO: 27.8 PG (ref 26.5–33)
MCHC RBC AUTO-ENTMCNC: 31.6 G/DL (ref 31.5–36.5)
MCV RBC AUTO: 88 FL (ref 78–100)
MICROALBUMIN UR-MCNC: 8 MG/L
MICROALBUMIN/CREAT UR: 10.67 MG/G CR (ref 0–17)
MONOCYTES # BLD AUTO: 0.7 10E9/L (ref 0–1.3)
MONOCYTES NFR BLD AUTO: 13.9 %
NEUTROPHILS # BLD AUTO: 2.5 10E9/L (ref 1.6–8.3)
NEUTROPHILS NFR BLD AUTO: 50.6 %
NONHDLC SERPL-MCNC: 94 MG/DL
PLATELET # BLD AUTO: 294 10E9/L (ref 150–450)
POTASSIUM SERPL-SCNC: 3.6 MMOL/L (ref 3.4–5.3)
PROT SERPL-MCNC: 7.7 G/DL (ref 6.8–8.8)
RBC # BLD AUTO: 5.22 10E12/L (ref 4.4–5.9)
SODIUM SERPL-SCNC: 138 MMOL/L (ref 133–144)
TRIGL SERPL-MCNC: 45 MG/DL
TSH SERPL DL<=0.005 MIU/L-ACNC: 0.92 MU/L (ref 0.4–4)
WBC # BLD AUTO: 5 10E9/L (ref 4–11)

## 2020-04-21 PROCEDURE — 36415 COLL VENOUS BLD VENIPUNCTURE: CPT | Performed by: NURSE PRACTITIONER

## 2020-04-21 PROCEDURE — 80061 LIPID PANEL: CPT | Performed by: NURSE PRACTITIONER

## 2020-04-21 PROCEDURE — 83036 HEMOGLOBIN GLYCOSYLATED A1C: CPT | Performed by: NURSE PRACTITIONER

## 2020-04-21 PROCEDURE — 82043 UR ALBUMIN QUANTITATIVE: CPT | Performed by: NURSE PRACTITIONER

## 2020-04-21 PROCEDURE — 80050 GENERAL HEALTH PANEL: CPT | Performed by: NURSE PRACTITIONER

## 2020-04-21 NOTE — TELEPHONE ENCOUNTER
I called patient to discuss lab results from today with him.   No answer and mailbox is full.  Will send snail mail letter to him with results and recommendations.

## 2020-04-22 NOTE — RESULT ENCOUNTER NOTE
Please send letter:    Archie,  Your lab results are attached. They are normal except for your a1c, which is a little elevated; however, still below the goal of <7. I think you're doing a good job with diet and exercise control right now. Keep up the good work and let's check again in 3 months. Please let us know if you have any questions.  Thank you for allowing us to participate in your care.  CECE Lopez CNP

## 2020-04-24 ENCOUNTER — TELEPHONE (OUTPATIENT)
Dept: FAMILY MEDICINE | Facility: CLINIC | Age: 45
End: 2020-04-24

## 2020-04-24 ENCOUNTER — TELEPHONE (OUTPATIENT)
Dept: SLEEP MEDICINE | Facility: CLINIC | Age: 45
End: 2020-04-24

## 2020-04-24 DIAGNOSIS — J45.40 MODERATE PERSISTENT ASTHMA WITHOUT COMPLICATION: Primary | ICD-10-CM

## 2020-04-24 NOTE — TELEPHONE ENCOUNTER
Called and spoke with patient he is requesting new DME order for Cpap be sent to [facility], they are saying the old order has . I see this order was placed by Sudeep Short MD do you want us to roue this to him?    Chantell Baker MA on 2020 at 1:59 PM

## 2020-04-24 NOTE — TELEPHONE ENCOUNTER
Archie returned call.    Best number to reach caller: Cell number on file:    Telephone Information:   Mobile [phone]       Is it ok to leave a detailed message: YES

## 2020-04-24 NOTE — TELEPHONE ENCOUNTER
Unfortunately I do not know any of his setting or severity of his sleep apnea so I cannot place orders.  He needs to schedule with sleep center. I gave him a referral a couple of weeks ago

## 2020-04-24 NOTE — TELEPHONE ENCOUNTER
.Reason for Call:   requesting c pap supplies/[facility]    Detailed comments: has not been seen by a sleep center in 1 1/2 - 2 years. *advised and transferred to sleep center/Pt wanted to leave message regardless.     Phone Number Patient can be reached at: Home number on file [phone] (home)    Best Time: anytime    Can we leave a detailed message on this number? YES    Call taken on 4/24/2020 at 12:22 PM by Marjorie Magallon

## 2020-04-24 NOTE — TELEPHONE ENCOUNTER
This writer attempted to contact pt on 04/24/20      Reason for call cpap and left detailed message.      If patient calls back:   Inform pt of message below per Chantell Ross, CMA

## 2020-04-24 NOTE — TELEPHONE ENCOUNTER
Returned patients phone call and then had to leave him a message.    Patient wanted to get a prescription for his c-pap supplies, but he hasn't been seen here at least five years. I left him a message that he would have to schedule a consult appointment to establish care.     Ethel Powell

## 2020-04-27 ENCOUNTER — TELEPHONE (OUTPATIENT)
Dept: PSYCHOLOGY | Facility: CLINIC | Age: 45
End: 2020-04-27

## 2020-04-27 NOTE — TELEPHONE ENCOUNTER
Called patient at designated appt time. He was busy with work and not ready to have a session. We rescheduled at the next open session slot in 2 days.

## 2020-04-28 RX ORDER — ALBUTEROL SULFATE 90 UG/1
2 AEROSOL, METERED RESPIRATORY (INHALATION) EVERY 6 HOURS PRN
Qty: 1 INHALER | Refills: 5 | Status: SHIPPED | OUTPATIENT
Start: 2020-04-28

## 2020-04-28 NOTE — TELEPHONE ENCOUNTER
albuterol (ALBUTEROL) 108 (90 BASE) MCG/ACT inhaler     Last Written Prescription Date:  6/10/2015  Last Fill Quantity: 1,  # refills: 5   Last office visit: 4/1/2020 with prescribing provider:     Future Office Visit:   Next 5 appointments (look out 90 days)    Apr 29, 2020  1:30 PM CDT  Telephone Visit with Isabella Holloway LP  [facility] [address] [place] [id number]  [phone]   May 04, 2020  9:00 AM CDT  Telephone Visit with Isabella Holloway LP  [facility] ([facility]) [address] [place] [id number]  [phone]         Routing refill request to provider for review/approval because:  A break in medication: last written in 2015  Failing ACT score (less than 20)    ACT Total Scores 10/10/2016 9/22/2017 4/1/2020   ACT TOTAL SCORE - - -   ASTHMA ER VISITS - - -   ASTHMA HOSPITALIZATIONS - - -   ACT TOTAL SCORE (Goal Greater than or Equal to 20) 21 9 5   In the past 12 months, how many times did you visit the emergency room for your asthma without being admitted to the hospital? 0 0 0   In the past 12 months, how many times were you hospitalized overnight because of your asthma? 0 0 0

## 2020-04-29 ENCOUNTER — VIRTUAL VISIT (OUTPATIENT)
Dept: PSYCHOLOGY | Facility: CLINIC | Age: 45
End: 2020-04-29
Attending: NURSE PRACTITIONER

## 2020-04-29 DIAGNOSIS — F32.1 MODERATE MAJOR DEPRESSION (H): Primary | ICD-10-CM

## 2020-04-29 PROCEDURE — 99207 ZZC NO BILLABLE SERVICE THIS VISIT: CPT | Mod: TEL | Performed by: PSYCHOLOGIST

## 2020-04-29 ASSESSMENT — COLUMBIA-SUICIDE SEVERITY RATING SCALE - C-SSRS
REASONS FOR IDEATION LIFETIME: EQUALLY TO GET ATTENTION, REVENGE OR A REACTION FROM OTHERS AND TO END/STOP THE PAIN
4. HAVE YOU HAD THESE THOUGHTS AND HAD SOME INTENTION OF ACTING ON THEM?: NO
LETHALITY/MEDICAL DAMAGE CODE MOST RECENT POTENTIAL ATTEMPT: BEHAVIOR NOT LIKELY TO RESULT IN INJURY
REASONS FOR IDEATION PAST MONTH: DOES NOT APPLY
6. HAVE YOU EVER DONE ANYTHING, STARTED TO DO ANYTHING, OR PREPARED TO DO ANYTHING TO END YOUR LIFE?: NO
1. IN THE PAST MONTH, HAVE YOU WISHED YOU WERE DEAD OR WISHED YOU COULD GO TO SLEEP AND NOT WAKE UP?: YES
ATTEMPT PAST THREE MONTHS: NO
5. HAVE YOU STARTED TO WORK OUT OR WORKED OUT THE DETAILS OF HOW TO KILL YOURSELF? DO YOU INTEND TO CARRY OUT THIS PLAN?: YES
LETHALITY/MEDICAL DAMAGE CODE FIRST POTENTIAL ATTEMPT: BEHAVIOR NOT LIKELY TO RESULT IN INJURY
LETHALITY/MEDICAL DAMAGE CODE MOST LETHAL ACTUAL ATTEMPT: NO PHYSICAL DAMAGE OR VERY MINOR PHYSICAL DAMAGE
LETHALITY/MEDICAL DAMAGE CODE FIRST ACTUAL ATTEMPT: NO PHYSICAL DAMAGE OR VERY MINOR PHYSICAL DAMAGE
TOTAL  NUMBER OF INTERRUPTED ATTEMPTS PAST 3 MONTHS: NO
1. IN THE PAST MONTH, HAVE YOU WISHED YOU WERE DEAD OR WISHED YOU COULD GO TO SLEEP AND NOT WAKE UP?: NO
TOTAL  NUMBER OF INTERRUPTED ATTEMPTS LIFETIME: NO
2. HAVE YOU ACTUALLY HAD ANY THOUGHTS OF KILLING YOURSELF?: NO
LETHALITY/MEDICAL DAMAGE CODE FIRST PROTENTIAL ATTEMPT: BEHAVIOR NOT LIKELY TO RESULT IN INJURY
2. HAVE YOU ACTUALLY HAD ANY THOUGHTS OF KILLING YOURSELF LIFETIME?: YES
TOTAL  NUMBER OF ABORTED OR SELF INTERRUPTED ATTEMPTS PAST LIFETIME: NO
5. HAVE YOU STARTED TO WORK OUT OR WORKED OUT THE DETAILS OF HOW TO KILL YOURSELF? DO YOU INTEND TO CARRY OUT THIS PLAN?: NO
6. HAVE YOU EVER DONE ANYTHING, STARTED TO DO ANYTHING, OR PREPARED TO DO ANYTHING TO END YOUR LIFE?: NO
4. HAVE YOU HAD THESE THOUGHTS AND HAD SOME INTENTION OF ACTING ON THEM?: YES
TOTAL  NUMBER OF ABORTED OR SELF INTERRUPTED ATTEMPTS PAST 3 MONTHS: NO
LETHALITY/MEDICAL DAMAGE CODE MOST RECENT ACTUAL ATTEMPT: NO PHYSICAL DAMAGE OR VERY MINOR PHYSICAL DAMAGE
ATTEMPT LIFETIME: YES
TOTAL  NUMBER OF ACTUAL ATTEMPTS LIFETIME: 2

## 2020-04-29 ASSESSMENT — PATIENT HEALTH QUESTIONNAIRE - PHQ9: SUM OF ALL RESPONSES TO PHQ QUESTIONS 1-9: 17

## 2020-04-29 NOTE — PROGRESS NOTES
Progress Note - Initial Session    Client Name:  Archie Wallace Date: 4/29/2020       Service Type: Phone Visit     Session Start Time: 1:40  Session End Time: 2:00     Session Length: 20 min because his phone ran out of batteries.    Session #: 1    Attendees: Client attended alone and noise in the background    Telemedicine Visit: The patient's condition can be safely assessed and treated via synchronous audio and visual telemedicine encounter.      Reason for Telemedicine Visit: Services only offered telehealth    Originating Site (Patient Location): Patient's home    Distant Site (Provider Location):   Providers home office    Consent:  The patient/guardian has verbally consented to: the potential risks and benefits of telemedicine (video visit) versus in person care; bill my insurance or make self-payment for services provided; and responsibility for payment of non-covered services.      Mode of Communication:  Video Conference via [employer]oom[employer]    As the provider I attest to compliance with applicable laws and regulations related to telemedicine.     DATA:  Diagnostic Assessment in progress.  Unable to complete documentation at the conclusion of the first session due to Archie's phone running out of batteries      Interactive Complexity: No  Crisis: No    Intervention:  Interpersonal Therapy: Started building rapport andeducating about treatment and loss  Solution Focused: confirmed next appt and encourged thinking about what he wants to be connected to.    ASSESSMENT:  Mental Status Assessment:  Appearance:   on phone   Eye Contact:   on phone   Psychomotor Behavior: Retarded (Slowed)  on phone   Attitude:   Cooperative  Friendly disinterested, letheragic   Orientation:   Person Place Time  Speech   Rate / Production: Monotone  Mumbled   Volume:  Normal   Mood:    Depressed  Irritable  Anhedonia Grieving  Affect:    Blunted  Lethargic  unhappy   Thought Content:  Clear   Thought Form:  Coherent    Insight:    Fair       Safety Issues and Plan for Safety and Risk Management:     [place] Suicide Severity Rating Scale (Lifetime/Recent)  [place] Suicide Severity Rating (Lifetime/Recent) 4/29/2020   1. Wish to be Dead (Lifetime) Yes   Wish to be Dead Description (Lifetime) About 1 year ago after a break up.   1. Wish to be Dead (Recent) No   2. Non-Specific Active Suicidal Thoughts (Lifetime) Yes   Non-Specific Active Suicidal Thought Description (Lifetime) About 1 year ago after a break up. He took some pills.   2. Non-Specific Active Suicidal Thoughts (Recent) No   3. Active Sucidal Ideation with any Methods (Not Plan) Without Intent to Act (Recent) No   4. Active Suicidal Ideation with Some Intent to Act, Without Specific Plan (Lifetime) Yes   Active Suicidal Ideation with Some Intent to Act, Without Specific Plan Description (Lifetime) About 1 year ago after a break up. He took pills 2 times and they didn't work.   4. Active Suicidal Ideation with Some Intent to Act, Without Specific Plan (Recent) No   5. Active Suicidal Ideation with Specific Plan and Intent (Lifetime) Yes   Active Suicidal Ideation with Specific Plan and Intent Description (Lifetime) About 1 year ago after a break up. He took pills 2 times and they didn't work.   5. Active Suicidal Ideation with Specific Plan and Intent (Recent) No   Most Severe Ideation Rating (Lifetime) 4   Most Severe Ideation Description (Lifetime) About 1 year ago after a break up. He took pills 2 times and they didn't work. He didn't see areason to live.   Frequency (Lifetime) 1   Duration (Lifetime) 2   Controllability (Lifetime) 2   Protective Factors  (Lifetime) 4   Reasons for Ideation (Lifetime) 3   Most Severe Ideation Rating (Past Month) NA   Frequency (Past Month) NA   Duration (Past Month) NA   Controllability (Past Month) NA   Protective Factors (Past Month) NA   Reasons for Ideation (Past Month) 0   Actual Attempt (Lifetime) Yes   Actual Attempt  "Description (Lifetime) About 1 year ago after a break up. He \"took pills 2 times and they didn't work.\"   Total Number of Actual Attempts (Lifetime) 2   Actual Attempt (Past 3 Months) No   Has subject engaged in non-suicidal self-injurious behavior? (Lifetime) No   Has subject engaged in non-suicidal self-injurious behavior? (Past 3 Months) No   Interrupted Attempts (Lifetime) No   Interrupted Attempts (Past 3 Months) No   Aborted or Self-Interrupted Attempt (Lifetime) No   Aborted or Self-Interrupted Attempt (Past 3 Months) No   Preparatory Acts or Behavior (Lifetime) No   Preparatory Acts or Behavior (Past 3 Months) No   Most Recent Attempt Actual Lethality Code 0   Most Recent Attempt Potential Lethality Code 0   Most Lethal Attempt Actual Lethality Code 0   Most Lethal Attemplt Potential Lethality Code 0   Initial/First Attempt Actual Lethality Code 0   Initial/First Attempt Potential Lethality Code 0     Patient denies current fears or concerns for personal safety.  Patient denies current or recent suicidal ideation or behaviors.  Patient denies current or recent homicidal ideation or behaviors.  Patient denies current or recent self injurious behavior or ideation.  Patient denies other safety concerns.  Recommended that patient call 911 or go to the local ED should there be a change in any of these risk factors.  Patient reports there are no firearms in the house.     Diagnostic Criteria:  CRITERIA (A-C) REPRESENT A MAJOR DEPRESSIVE EPISODE - SELECT THESE CRITERIA  A) Recurrent episode(s) - symptoms have been present during the same 2-week period and represent a change from previous functioning 5 or more symptoms (required for diagnosis)   - Depressed mood. Note: In children and adolescents, can be irritable mood.     - Diminished interest or pleasure in all, or almost all, activities.    - Significant weight gaindecrease in appetite.    - Increased sleep.    - Psychomotor activity retardation.    - Fatigue or " "loss of energy.    - Feelings of worthlessness or inappropriate and excessive guilt.    - Diminished ability to think or concentrate, or indecisiveness.       DSM5 Diagnoses: (Sustained by DSM5 Criteria Listed Above)  Diagnoses: 296.32 (F33.1) Major Depressive Disorder, Recurrent Episode, Moderate _  Psychosocial & Contextual Factors: parents dies about 2-3 years ago. Obsese  WHODAS 2.0 (12 item): No flowsheet data found.    Collateral Reports Completed:  Routed note to PCP      PLAN: (Homework, other):  Patient stated that he may follow up for ongoing services with [facility].  We will continue there intake session. He will work on understanding how he want to be \"connected.\"      Isabella Holloway LP          "

## 2020-05-04 ENCOUNTER — VIRTUAL VISIT (OUTPATIENT)
Dept: PSYCHOLOGY | Facility: CLINIC | Age: 45
End: 2020-05-04
Attending: NURSE PRACTITIONER
Payer: MEDICAID

## 2020-05-04 VITALS — BODY MASS INDEX: 40.23 KG/M2 | HEIGHT: 72 IN | WEIGHT: 297 LBS

## 2020-05-04 DIAGNOSIS — F32.1 MODERATE MAJOR DEPRESSION (H): Primary | ICD-10-CM

## 2020-05-04 PROCEDURE — 90791 PSYCH DIAGNOSTIC EVALUATION: CPT | Mod: TEL | Performed by: PSYCHOLOGIST

## 2020-05-04 ASSESSMENT — MIFFLIN-ST. JEOR: SCORE: 2270.18
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
## 2020-05-04 NOTE — PATIENT INSTRUCTIONS
Your BMI is Body mass index is 40.28 kg/m .  Weight management is a personal decision.  If you are interested in exploring weight loss strategies, the following discussion covers the approaches that may be successful. Body mass index (BMI) is one way to tell whether you are at a healthy weight, overweight, or obese. It measures your weight in relation to your height.  A BMI of 18.5 to 24.9 is in the healthy range. A person with a BMI of 25 to 29.9 is considered overweight, and someone with a BMI of 30 or greater is considered obese. More than two-thirds of American adults are considered overweight or obese.  Being overweight or obese increases the risk for further weight gain. Excess weight may lead to heart disease and diabetes.  Creating and following plans for healthy eating and physical activity may help you improve your health.  Weight control is part of healthy lifestyle and includes exercise, emotional health, and healthy eating habits. Careful eating habits lifelong are the mainstay of weight control. Though there are significant health benefits from weight loss, long-term weight loss with diet alone may be very difficult to achieve- studies show long-term success with dietary management in less than 10% of people. Attaining a healthy weight may be especially difficult to achieve in those with severe obesity. In some cases, medications, devices and surgical management might be considered.  What can you do?  If you are overweight or obese and are interested in methods for weight loss, you should discuss this with your provider.     Consider reducing daily calorie intake by 500 calories.     Keep a food journal.     Avoiding skipping meals, consider cutting portions instead.    Diet combined with exercise helps maintain muscle while optimizing fat loss. Strength training is particularly important for building and maintaining muscle mass. Exercise helps reduce stress, increase energy, and improves fitness.  Increasing exercise without diet control, however, may not burn enough calories to loose weight.       Start walking three days a week 10-20 minutes at a time    Work towards walking thirty minutes five days a week     Eventually, increase the speed of your walking for 1-2 minutes at time    In addition, we recommend that you review healthy lifestyles and methods for weight loss available through the National Institutes of Health patient information sites:  http://win.niddk.nih.gov/publications/index.htm    And look into health and wellness programs that may be available through your health insurance provider, employer, local community center, or samra club.    Weight management plan: Patient was referred to their PCP to discuss a diet and exercise plan.
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
## 2020-05-05 ENCOUNTER — VIRTUAL VISIT (OUTPATIENT)
Dept: SLEEP MEDICINE | Facility: CLINIC | Age: 45
End: 2020-05-05
Attending: NURSE PRACTITIONER
Payer: MEDICAID

## 2020-05-05 DIAGNOSIS — G47.10 HYPERSOMNIA: ICD-10-CM

## 2020-05-05 DIAGNOSIS — I25.10 CORONARY ARTERY DISEASE INVOLVING NATIVE HEART, ANGINA PRESENCE UNSPECIFIED, UNSPECIFIED VESSEL OR LESION TYPE: ICD-10-CM

## 2020-05-05 DIAGNOSIS — I10 HYPERTENSION GOAL BP (BLOOD PRESSURE) < 140/90: ICD-10-CM

## 2020-05-05 DIAGNOSIS — E66.01 MORBID OBESITY (H): ICD-10-CM

## 2020-05-05 DIAGNOSIS — F32.1 MODERATE MAJOR DEPRESSION (H): ICD-10-CM

## 2020-05-05 DIAGNOSIS — G47.33 OBSTRUCTIVE SLEEP APNEA: Primary | ICD-10-CM

## 2020-05-05 PROCEDURE — 99443 ZZC PHYSICIAN TELEPHONE EVALUATION 21-30 MIN: CPT | Performed by: PHYSICIAN ASSISTANT

## 2020-05-12 ENCOUNTER — DOCUMENTATION ONLY (OUTPATIENT)
Dept: SLEEP MEDICINE | Facility: CLINIC | Age: 45
End: 2020-05-12

## 2020-05-12 NOTE — PROGRESS NOTES
5/12/2020-COLLIN- SPOKE WITH MANPREET AND PER HIS REQUEST NEW DREAMWEAR MASK/HEADGEAR MEDIUM CUSHION, TUBING, FILTERS AND WATER CHAMBER FOR DREAMSTATION WILL BE SENT OUT BY [facility]  PT ALSO REQUESTED DELUXE CHINSTRAP.  HE ISN'T ELIGIBLE FOR NEW CPAP UNTIL 2021.

## 2020-05-18 ENCOUNTER — VIRTUAL VISIT (OUTPATIENT)
Dept: PSYCHOLOGY | Facility: CLINIC | Age: 45
End: 2020-05-18
Payer: MEDICAID

## 2020-05-18 DIAGNOSIS — F32.1 MODERATE MAJOR DEPRESSION (H): Primary | ICD-10-CM

## 2020-05-18 PROCEDURE — 90832 PSYTX W PT 30 MINUTES: CPT | Mod: 95 | Performed by: PSYCHOLOGIST

## 2020-05-18 NOTE — PROGRESS NOTES
"                                           Progress Note    Patient Name: Archie Wallace  Date: 5/18/2020       Service Type: Individual      Session Start Time: Archie was scheduled at 9 am but he was sleeping.so we rescheduled for 1:00 pm. He didn't answer until 1:22  Session End Time: 1:57     Session Length: 35 min    Session #: 3    Attendees: Client attended alone    Telemedicine Visit: The patient's condition can be safely assessed and treated via synchronous audio and visual telemedicine encounter.      Reason for Telemedicine Visit: Services only offered telehealth and and he was only available for phone session    Originating Site (Patient Location): Patient's home    Distant Site (Provider Location): Provider's home office    The patient has been notified of the following:      \"We have found that certain health care needs can be provided without the need for a face to face visit.  This service lets us provide the care you need with a phone conversation.       I will have full access to your [facility] medical record during this entire phone call.   I will be taking notes for your medical record.      Since this is like an office visit, we will bill your insurance company for this service.       There are potential benefits and risks of telephone visits (e.g. limits to patient confidentiality) that differ from in-person visits.?  Confidentiality still applies for telephone services, and nobody will record the visit.  It is important to be in a quiet, private space that is free of distractions (including cell phone or other devices) during the visit.??      If during the course of the call I believe a telephone visit is not appropriate, you will not be charged for this service\"     Consent has been obtained for this service by care team member: Yes         Treatment Plan Last Reviewed: today  PHQ-9 / MEGAN-7 : 4/29/20 for PHQ-9    DATA  Interactive Complexity: No  Crisis: No       Progress Since Last Session " (Related to Symptoms / Goals / Homework):   Symptoms: he's trying to open us and snapped at woman and lies.    Homework: Partially completed      Episode of Care Goals: Minimal progress - PREPARATION (Decided to change - considering how); Intervened by negotiating a change plan and determining options / strategies for behavior change, identifying triggers, exploring social supports, and working towards setting a date to begin behavior change     Current / Ongoing Stressors and Concerns:   Grief of parents' death.  Relationship problems  Feels disconnected     Treatment Objective(s) Addressed in This Session:   identify trust strategies to more effectively address stressors  increase length and frequency of contact with others and tell about reaction  track and record at least daily pleasant exchanges with Marialuisa  Goals     Intervention:   Emotion Focused Therapy: how to build emotional conncetion, not money and words  Interpersonal Therapy: building trust and closeness  goals        ASSESSMENT: Current Emotional / Mental Status (status of significant symptoms):   Risk status (Self / Other harm or suicidal ideation)  Client reported a history of suicidal ideation.  Onset: 3 years ago and frequency: a couple of times.  Client identified the following triggers to suicidal ideation: taking pills  Client reported a history of suicide attempt(s): number of previous suicide attempts: 2, when were suicide attempt(s): 2 years ago, methods: pills, interventions for suicide attempts: 2.   Client denied a history of homicidal ideation.    Client denied a history of self-injurious ideation and behaviors.    Client denied a history of personal safety concerns.    Client denied a history of assaultive behaviors.          Current Safety Concerns:  Client denies current suicidal ideation.    Client denies current homicidal ideation and behaviors.  Client denies current self-injurious ideation and behaviors.    Client denies current  concerns for personal safety.    Client reports the following protective factors: positive relationships positive social network, dedication to family/friends, safe and stable environment, effectively controls impulses, regular physical activity, agreement to use safety plan, living with other people, daily obligations and positive social skills   Patient reports there has been no change in protective factors since their last session.     Recommended that patient call 911 or go to the local ED should there be a change in any of these risk factors.     Mental Status Assessment:  Appearance:                            on phone   Eye Contact:                           on phone   Psychomotor Behavior:          Retarded (Slowed)  on phone   Attitude:                                   Cooperative  Friendly disinterested, letheragic   Orientation:                             Person Place Time  Speech              Rate / Production:       Monotone  Mumbled              Volume:                       Normal   Mood:                                      Depressed  Irritable  Anhedonia Grieving  Affect:                                      Blunted  Lethargic  unhappy   Thought Content:                    Clear   Thought Form:                        Coherent   Insight:                                     Fair      Medication Review:   No current psychiatric medications prescribed     Medication Compliance:   NA     Changes in Health Issues:   Yes: asthma, high BP, Diabetes     Chemical Use Review:   Substance Use: Chemical use reviewed, no active concerns identified      Tobacco Use: No current tobacco use.      Diagnosis:  No diagnosis found.    Collateral Reports Completed:   Not Applicable    PLAN: (Patient Tasks / Therapist Tasks / Other)  Try to build a connection. Talk from the observer.        Isabella Holloway LP                                                          ______________________________________________________________________    Treatment Plan    Patient's Name: Archie Wallace  YOB: 1975    Date: 5/18/2020    DSM5 Diagnoses: 296.23 (F32.2) Major Depressive Disorder, Single Episode, Severe _  Psychosocial & Contextual Factors: Relationship stress, Financial stress  WHODAS 2.0 (12 item): No flowsheet data found.    Referral / Collaboration:  couples therapy may make sense.    Anticipated number of session or this episode of care: 8      MeasurableTreatment Goal(s) related to diagnosis / functional impairment(s)  Goal 1: Patient will feel more connected and build trust    I will know I've met my goal when I can clear up my head. I feel disconnected.      Objective #A (Patient Action)    Patient will identify feeling connected strategies to more effectively address stressors  initiate 2 social contact(s) per day for increasing lengths of time  track and record at least 2 pleasant exchanges with partner.  Status: New - Date: 5/14/20     Intervention(s)  Therapist will assign homework take steps to closer  role-play effective communication skills.    Objective #B  Patient will participate in calming activities to improve mood  Decrease frequency and intensity of feeling down, depressed, hopeless  Identify negative self-talk and behaviors: challenge core beliefs, myths, and actions  track and record at least 2 pleasant exchanges with partner.  Status: New - Date: 4/18/20     Intervention(s)  Therapist will assign homework calming and regulating her mood  teach emotional regulation skills. and communication skills, CBT.    Patient has reviewed and agreed to the above plan.    Isabella Holloway, CATRACHO  May 18, 2020

## 2020-06-03 ENCOUNTER — TELEPHONE (OUTPATIENT)
Dept: PSYCHOLOGY | Facility: CLINIC | Age: 45
End: 2020-06-03

## 2020-06-03 NOTE — TELEPHONE ENCOUNTER
Called back to schedule. No answer so I left a message for him to call the office and get an appt or get of wait list.

## 2020-06-03 NOTE — TELEPHONE ENCOUNTER
Called Archie at the start of his appt. He said it was not a good time. He is not feeling will. He asked to reschedule for tomorrow but no openings are available until 2 weeks. I said I would call back to reschedule at another time.    His voice was groggy like he was sleeping.

## 2020-07-07 ENCOUNTER — VIRTUAL VISIT (OUTPATIENT)
Dept: EDUCATION SERVICES | Facility: CLINIC | Age: 45
End: 2020-07-07
Payer: COMMERCIAL

## 2020-07-07 DIAGNOSIS — E11.65 TYPE 2 DIABETES MELLITUS WITH HYPERGLYCEMIA, WITHOUT LONG-TERM CURRENT USE OF INSULIN (H): Primary | ICD-10-CM

## 2020-07-07 PROCEDURE — G0108 DIAB MANAGE TRN  PER INDIV: HCPCS | Mod: 95

## 2020-07-07 NOTE — PATIENT INSTRUCTIONS
1.  new test strips and check blood glucose 2 times a day (morning and evening).  If you are seeing blood glucose below 130mg/dL before meals and below 180 mg/dL after meals, this is good blood glucose control.  If you see it above 200 mg/dL, this could cause some of your symptoms.     2. Make follow up with Chantell to check on your symptoms.  It is not normal to use the bathroom every 20 minutes.    3. Make your diabetes eye exam to check on the vision and ring around the eye.  Good to do this yearly.    4. Try to eat 3-5 smaller meals or snacks a day and try to keep healthy choices (fruits, vegetables, lean meat and whole grains).  Sometimes when trying to lose weight, going too lower in calories, often below 1200 a day, may limit weight loss.    5. Other helpful maintaince with diabetes and having insurance: dental cleaning every 6 months and daily foot checks- watch for any cuts, sores, redness or swelling that does not improve within a couple of days and reach out to your provider to assess.  Foot infections can sometimes spread.  Wear hard soled shoes and slippers at all times.     FOLLOW UP: Video Visit follow up on Tuesday, August 11th at 10am.  You can sign in by Tesaris to meet me. I will send you a text invite if I don't see you in there by 10:05am.    Stephanie Boss RDN, LD, [facility]   [phone]

## 2020-07-07 NOTE — Clinical Note
Brian Abdul,   Today Archie reports symptoms of hyperglycemia (urinating every 20 minutes, blurred vision, feeling more tired easily and drinking more fluids) but states seeing fasting blood glucose in the 120's.  Suggested he check in the evening and to make a follow up with you but was not sure if someone from your team can follow up with him as well to see if he'd like to schedule a visit with you to discuss?  Looks like he is also close to being due for an A1C so may be best way to tell if may be blood glucose related.    Thanks,  Stephanie Boss,  JUWANN, LD, [facility]

## 2020-07-07 NOTE — PROGRESS NOTES
Patient is not in room at 10:10am.  Phone call at 10:11am to see if he is having connection issues but no answer and mail box was full so unable to leave a message.    Will try calling again at 10:20pm if he does not reply to the text invite sent at 10:13am.      He picked up at 10:15am from text message.    Diabetes Self-Management Education & Support    Patient has given verbal consent for Video visit? Yes  Patient would like the video invitation sent by: Text to cell phone: on file       Type of service:  Video Visit  Originating Location (pt. Location): Other in car  Distant Location (provider location):  Home  Mode of Communication:  Video Conference via Connect [employer]  Video Start Time: 10:15am  Video End Time (time video stopped): 10:53am  Patient would like to obtain AVS? Josephine    Presents for: Follow-up    SUBJECTIVE/OBJECTIVE:  Presents for: Follow-up  Accompanied by: Self  Diabetes education in the past 24mo: Yes  Focus of Visit: Taking Medication, Assistance w/ making life changes  Diabetes type: Type 2  Date of diagnosis: 2005  Diabetes management related comments/concerns: Says having trouble with blurred vision and more frequent urination.    Says blood glucose in the 120's. Not have blood glucose meter with him to look at actual values from past 1-2 weeks.  Normally checks in the morning only.     Says  so always up and down, when he can work. Says Covid and using bathroom every 20 minutes is limiting his work ability.     Difficulty affording diabetes testing supplies?: No(Says cost was only $1-2)  Cultural Influences/Ethnic Background:  -AMERICAN    Diabetes Symptoms & Complications:  Fatigue: Sometimes  Neuropathy: No  Polydipsia: Yes  Polyphagia: No  Polyuria: Yes  Visual change: Yes  Slow healing wounds: No  Complications assessed today?: Yes  Autonomic neuropathy: No  CVA: No  Heart disease: Yes  Nephropathy: No  Peripheral neuropathy: No  Peripheral Vascular Disease:  No  Retinopathy: No  Sexual dysfunction: No    Patient Problem List and Family Medical History reviewed for relevant medical history, current medical status, and diabetes risk factors.    Vitals:  There were no vitals taken for this visit.  Estimated body mass index is 40.28 kg/m  as calculated from the following:    Height as of 5/4/20: 1.829 m (6').    Weight as of 5/4/20: 134.7 kg (297 lb).   Last 3 BP:   BP Readings from Last 3 Encounters:   09/22/17 158/88   10/10/16 (!) 190/110   06/10/15 (!) 150/94       History   Smoking Status     Former Smoker     Types: Cigars   Smokeless Tobacco     Never Used     Comment: Smokes intermittently       Labs:  Lab Results   Component Value Date    A1C 6.6 04/21/2020     Lab Results   Component Value Date     04/21/2020     Lab Results   Component Value Date    LDL 85 04/21/2020     HDL Cholesterol   Date Value Ref Range Status   04/21/2020 64 >39 mg/dL Final   ]  GFR Estimate   Date Value Ref Range Status   04/21/2020 >90 >60 mL/min/[1.73_m2] Final     Comment:     Non  GFR Calc  Starting 12/18/2018, serum creatinine based estimated GFR (eGFR) will be   calculated using the Chronic Kidney Disease Epidemiology Collaboration   (CKD-EPI) equation.       GFR Estimate If Black   Date Value Ref Range Status   04/21/2020 >90 >60 mL/min/[1.73_m2] Final     Comment:      GFR Calc  Starting 12/18/2018, serum creatinine based estimated GFR (eGFR) will be   calculated using the Chronic Kidney Disease Epidemiology Collaboration   (CKD-EPI) equation.       Lab Results   Component Value Date    CR 0.73 04/21/2020     No results found for: MICROALBUMIN    Healthy Eating:  Healthy Eating Assessed Today: Yes  Cultural/Presybeterian diet restrictions?: No  Meal planning/habits: Other, Smaller portions(Did keto in the past and tries to stop eating after 6pm.  Not as hungry anymore after following keto diet.)  How many times a week on average do you eat food  made away from home (restaurant/take-out)?: 2  Meals include: Afternoon Snack, Morning Snack, Breakfast  Breakfast: fruit OR breakfast sandwich and lemonade(Wakes: 8am; B: 9:45 am)  Lunch: chicken leg (1-2 pc) OR hot dog(11am)  Dinner: None OR sometimes meal with a wife  Snacks: crackers  Beverages: Coffee, Water, Other  Has patient met with a dietitian in the past?: Yes    Being Active:  Being Active Assessed Today: Yes  Exercise:: Currently not exercising    Monitoring:  Monitoring Assessed Today: Yes  Did patient bring glucose meter to appointment? : No  Blood Glucose Meter: One Touch(Thinks he has the Ultra. Got it 2 months ago)  Times checking blood sugar at home (number): 1  Times checking blood sugar at home (per): Day      Taking Medications:  Taking Medication Assessed Today: Yes  Current Treatments: Diet(Not tolerate Metformin in the past)    Problem Solving:  Problem Solving Assessed Today: Yes              Reducing Risks:  Reducing Risks Assessed Today: Yes  Has dilated eye exam at least once a year?: No  Sees dentist every 6 months?: No    Healthy Coping:  Healthy Coping Assessed Today: Yes  Emotional response to diabetes: Ready to learn  Informal Support system:: Spouse  Stage of change: ACTION (Actively working towards change)  Patient Activation Measure Survey Score:  NUPUR Score (Last Two) 5/18/2011   NUPUR Raw Score 39   Activation Score 56.4   NUPUR Level 3       Diabetes knowledge and skills assessment:   Patient is knowledgeable in diabetes management concepts related to: Healthy Eating, Being Active, Monitoring, Taking Medication, Problem Solving, Reducing Risks and Healthy Coping    Patient needs further education on the following diabetes management concepts: Healthy Eating, Monitoring and Reducing Risks    Based on learning assessment above, most appropriate setting for further diabetes education would be: Group class or Individual setting.      INTERVENTIONS:    Education provided today on:  MARIA VICTORIA  Self-Care Behaviors:  Healthy Eating: consistency in amount, composition, and timing of food intake, weight reduction, heart healthy diet, portion control and plate planning method  Being Active: relationship to blood glucose and describe appropriate activity program  Monitoring: purpose, log and interpret results, individual blood glucose targets and frequency of monitoring  Reducing Risks: major complications of diabetes, prevention, early diagnostic measures and treatment of complications, foot care, appropriate dental care, annual eye exam and A1C - goals, relating to blood glucose levels, how often to check    Opportunities for ongoing education and support in diabetes-self management were discussed.    Pt verbalized understanding of concepts discussed and recommendations provided today.       Education Materials Provided:  No new materials provided today      ASSESSMENT:  Patient mentions symptoms of hyperglycemia with blurred vision, urinating every 2 minutes, feeling tired faster and drinking more water, but reports blood glucose in the 120's when he checks in the morning.  He is currently in his car so reviewed making sure testing supplies not too hot, which may damage strips, but his meter is at home.  He is almost due for a refill so suggested he  new strips and check 2x/day, including both morning and evening to see if missing elevated blood glucose later in the day.  Also encouraged him to schedule a follow up with Chantell to discuss since may be other issues.    Reviewed healthy eating to help with weight loss and making healthier food choices with more fruits and vegetables, whole grains and lean meat and trying to eat more meals at home to reduce sodium intake.  Discussed how being too restrictive in calories ma hinder weight loss progress.  Reviewed reducing risks today since he mentions blurred vision and has been a few years since last eye and dental exams due to no insurance.  Encouraged him  to get these visits scheduled, especially eye exam if possible. Reviewed to get eye exam yearly and dental cleanings every 6 months ideally.  Also reviewed foot care and checking over feet daily for injury and wearing hard soled shoes/slippers at all times to protect feet.  Pt verbalized understanding of concepts discussed and recommendations provided.         Patient's most recent   Lab Results   Component Value Date    A1C 6.6 04/21/2020    is meeting goal of <7.0    PLAN  See Patient Instructions for co-developed, patient-stated behavior change goals.  AVS sent by Roamler to patient today. See Follow-Up section for recommended follow-up (8/11/20).    Stephanie Boss RDN, LD, [facility]   Time Spent: 38 minutes  Encounter Type: Individual    Any diabetes medication dose changes were made via the CDE Protocol and Collaborative Practice Agreement with the patient's referring provider. A copy of this encounter was shared with the provider.

## 2020-07-17 ENCOUNTER — OFFICE VISIT (OUTPATIENT)
Dept: FAMILY MEDICINE | Facility: CLINIC | Age: 45
End: 2020-07-17
Payer: COMMERCIAL

## 2020-07-17 VITALS
HEIGHT: 72 IN | TEMPERATURE: 96.8 F | WEIGHT: 315 LBS | BODY MASS INDEX: 42.66 KG/M2 | DIASTOLIC BLOOD PRESSURE: 102 MMHG | SYSTOLIC BLOOD PRESSURE: 166 MMHG | HEART RATE: 80 BPM | RESPIRATION RATE: 20 BRPM | OXYGEN SATURATION: 96 %

## 2020-07-17 DIAGNOSIS — E11.65 TYPE 2 DIABETES MELLITUS WITH HYPERGLYCEMIA, WITHOUT LONG-TERM CURRENT USE OF INSULIN (H): Primary | ICD-10-CM

## 2020-07-17 DIAGNOSIS — I25.2 HISTORY OF MI (MYOCARDIAL INFARCTION): ICD-10-CM

## 2020-07-17 DIAGNOSIS — G89.29 CHRONIC LOW BACK PAIN WITHOUT SCIATICA, UNSPECIFIED BACK PAIN LATERALITY: ICD-10-CM

## 2020-07-17 DIAGNOSIS — J45.40 MODERATE PERSISTENT ASTHMA WITHOUT COMPLICATION: ICD-10-CM

## 2020-07-17 DIAGNOSIS — E78.5 HYPERLIPIDEMIA WITH TARGET LDL LESS THAN 100: ICD-10-CM

## 2020-07-17 DIAGNOSIS — I10 HYPERTENSION GOAL BP (BLOOD PRESSURE) < 140/90: ICD-10-CM

## 2020-07-17 DIAGNOSIS — M54.50 CHRONIC LOW BACK PAIN WITHOUT SCIATICA, UNSPECIFIED BACK PAIN LATERALITY: ICD-10-CM

## 2020-07-17 LAB
ALBUMIN SERPL-MCNC: 3.4 G/DL (ref 3.4–5)
ALP SERPL-CCNC: 45 U/L (ref 40–150)
ALT SERPL W P-5'-P-CCNC: 22 U/L (ref 0–70)
ANION GAP SERPL CALCULATED.3IONS-SCNC: 4 MMOL/L (ref 3–14)
AST SERPL W P-5'-P-CCNC: 11 U/L (ref 0–45)
BILIRUB SERPL-MCNC: 0.6 MG/DL (ref 0.2–1.3)
BUN SERPL-MCNC: 10 MG/DL (ref 7–30)
CALCIUM SERPL-MCNC: 8.5 MG/DL (ref 8.5–10.1)
CHLORIDE SERPL-SCNC: 105 MMOL/L (ref 94–109)
CHOLEST SERPL-MCNC: 169 MG/DL
CO2 SERPL-SCNC: 30 MMOL/L (ref 20–32)
CREAT SERPL-MCNC: 0.75 MG/DL (ref 0.66–1.25)
GFR SERPL CREATININE-BSD FRML MDRD: >90 ML/MIN/{1.73_M2}
GLUCOSE SERPL-MCNC: 111 MG/DL (ref 70–99)
HBA1C MFR BLD: 6.6 % (ref 0–5.6)
HDLC SERPL-MCNC: 55 MG/DL
LDLC SERPL CALC-MCNC: 104 MG/DL
NONHDLC SERPL-MCNC: 114 MG/DL
POTASSIUM SERPL-SCNC: 3.6 MMOL/L (ref 3.4–5.3)
PROT SERPL-MCNC: 7.7 G/DL (ref 6.8–8.8)
SODIUM SERPL-SCNC: 139 MMOL/L (ref 133–144)
TRIGL SERPL-MCNC: 50 MG/DL

## 2020-07-17 PROCEDURE — 80053 COMPREHEN METABOLIC PANEL: CPT | Performed by: NURSE PRACTITIONER

## 2020-07-17 PROCEDURE — 80061 LIPID PANEL: CPT | Performed by: NURSE PRACTITIONER

## 2020-07-17 PROCEDURE — 36415 COLL VENOUS BLD VENIPUNCTURE: CPT | Performed by: NURSE PRACTITIONER

## 2020-07-17 PROCEDURE — 99214 OFFICE O/P EST MOD 30 MIN: CPT | Performed by: NURSE PRACTITIONER

## 2020-07-17 PROCEDURE — 99207 C FOOT EXAM  NO CHARGE: CPT | Performed by: NURSE PRACTITIONER

## 2020-07-17 PROCEDURE — 83036 HEMOGLOBIN GLYCOSYLATED A1C: CPT | Performed by: NURSE PRACTITIONER

## 2020-07-17 RX ORDER — LANCETS
EACH MISCELLANEOUS
Qty: 100 EACH | Refills: 6 | Status: SHIPPED | OUTPATIENT
Start: 2020-07-17 | End: 2022-04-27

## 2020-07-17 RX ORDER — METHOCARBAMOL 500 MG/1
500 TABLET, FILM COATED ORAL 3 TIMES DAILY PRN
Qty: 30 TABLET | Refills: 1 | Status: SHIPPED | OUTPATIENT
Start: 2020-07-17 | End: 2021-06-02

## 2020-07-17 RX ORDER — AMLODIPINE BESYLATE 10 MG/1
10 TABLET ORAL DAILY
Qty: 90 TABLET | Refills: 1 | Status: SHIPPED | OUTPATIENT
Start: 2020-07-17 | End: 2021-01-13

## 2020-07-17 RX ORDER — GLUCOSAMINE HCL/CHONDROITIN SU 500-400 MG
CAPSULE ORAL
Qty: 100 EACH | Refills: 3 | Status: SHIPPED | OUTPATIENT
Start: 2020-07-17 | End: 2022-04-27

## 2020-07-17 RX ORDER — ASPIRIN 81 MG/1
81 TABLET ORAL DAILY
Qty: 90 TABLET | Refills: 3 | Status: SHIPPED | OUTPATIENT
Start: 2020-07-17 | End: 2021-06-02

## 2020-07-17 RX ORDER — LOSARTAN POTASSIUM 100 MG/1
100 TABLET ORAL DAILY
Qty: 90 TABLET | Refills: 1 | Status: SHIPPED | OUTPATIENT
Start: 2020-07-17 | End: 2021-01-13

## 2020-07-17 RX ORDER — ATORVASTATIN CALCIUM 10 MG/1
10 TABLET, FILM COATED ORAL DAILY
Qty: 90 TABLET | Refills: 1 | Status: SHIPPED | OUTPATIENT
Start: 2020-07-17 | End: 2020-08-18 | Stop reason: DRUGHIGH

## 2020-07-17 ASSESSMENT — MIFFLIN-ST. JEOR: SCORE: 2464.32

## 2020-07-17 ASSESSMENT — PAIN SCALES - GENERAL: PAINLEVEL: MODERATE PAIN (4)

## 2020-07-17 ASSESSMENT — PATIENT HEALTH QUESTIONNAIRE - PHQ9: SUM OF ALL RESPONSES TO PHQ QUESTIONS 1-9: 21

## 2020-07-17 NOTE — PROGRESS NOTES
Subjective     Archie Wallace is a 44 year old male who presents to clinic today for the following health issues:    HPI       Diabetes Follow-up    How often are you checking your blood sugar? A few times a week  What time of day are you checking your blood sugars (select all that apply)?  in moring   Have you had any blood sugars above 200?  No  Have you had any blood sugars below 70?  No    What symptoms do you notice when your blood sugar is low?  Shaky, Dizzy, Weak and Confusion    What concerns do you have today about your diabetes?Vision      Do you have any of these symptoms? (Select all that apply)  Excessive thirst and Blurry vision    Have you had a diabetic eye exam in the last 12 months? No        BP Readings from Last 2 Encounters:   07/17/20 (!) 166/102   09/22/17 158/88     Hemoglobin A1C (%)   Date Value   04/21/2020 6.6 (H)   10/07/2016 7.0 (H)     LDL Cholesterol Calculated (mg/dL)   Date Value   04/21/2020 85   10/07/2016 106 (H)               Hypertension Follow-up      Do you check your blood pressure regularly outside of the clinic? Yes     Are you following a low salt diet? No    Are your blood pressures ever more than 140 on the top number (systolic) OR more   than 90 on the bottom number (diastolic), for example 140/90?  N/A       How many servings of fruits and vegetables do you eat daily?  0-1    On average, how many sweetened beverages do you drink each day (Examples: soda, juice, sweet tea, etc.  Do NOT count diet or artificially sweetened beverages)?   6    How many days per week do you exercise enough to make your heart beat faster? Yes     How many minutes a day do you exercise enough to make your heart beat faster? 4-6   How many days per week do you miss taking your medication? 2    What makes it hard for you to take your medications?  remembering to take      Didn't take BP medication this morning because in a hurry  Doesn't want to take losartan-hydrochlorothiazide because of the  "diuretic - it makes him go to the bathroom too much    Had MI when in 20s. Went to [facility]  \"had a stent and an angiogram\"  Denies chest pain. Sometimes has shortness of breath - states has asthma and thinks it's related to that. Not really exertional     Has low back pain at times with muscle spasms. No loss of bowel or bladder. No saddle anesthesia. Requesting muscle relaxer.     Went to diabetic educator and feels like it's helping  BG always less than 200  Trying to go to gym and watch diet but hard with COVID 19      Patient Active Problem List   Diagnosis     Atopic rhinitis     Low back pain     Obstructive sleep apnea     Morbid obesity (H)     Hypertension goal BP (blood pressure) < 140/90     Hyperlipidemia with target LDL less than 100     CAD (coronary artery disease)     Moderate persistent asthma     Long QT interval syndrome     Type 2 diabetes mellitus with hyperglycemia, without long-term current use of insulin (H)     Moderate major depression (H)     History of MI (myocardial infarction)     Past Surgical History:   Procedure Laterality Date     ANGIOGRAM       ANGIOPLASTY  2001    Coronary artery angioplasty//associated with mild AMI     TONSILLECTOMY  2005    With uvuloectomy       Social History     Tobacco Use     Smoking status: Former Smoker     Types: Cigars     Smokeless tobacco: Never Used     Tobacco comment: Smokes intermittently   Substance Use Topics     Alcohol use: Yes     Comment: occ/ mod     Family History   Problem Relation Age of Onset     Diabetes Mother      Hypertension Mother      Diabetes Father      Diabetes Sister          Current Outpatient Medications   Medication Sig Dispense Refill     albuterol (PROAIR HFA) 108 (90 Base) MCG/ACT inhaler Inhale 2 puffs into the lungs every 6 hours as needed 1 Inhaler 5     alcohol swab prep pads Use to swab area of injection/jessy as directed. 100 each 3     amLODIPine (NORVASC) 10 MG tablet Take 1 tablet (10 mg) by mouth daily 90 " tablet 1     aspirin 81 MG EC tablet Take 1 tablet (81 mg) by mouth daily 90 tablet 3     atorvastatin (LIPITOR) 10 MG tablet Take 1 tablet (10 mg) by mouth daily 90 tablet 1     blood glucose (ACCU-CHEK GUIDE) test strip Use to test blood sugar 1 time daily (vary the time before meals and bed). 50 strip 3     blood glucose (NO BRAND SPECIFIED) test strip Use to test blood sugar 1 times daily or as directed. To accompany: Blood Glucose Monitor Brands: per insurance. 100 strip 6     blood glucose calibration (NO BRAND SPECIFIED) solution To accompany: Blood Glucose Monitor Brands: per insurance. 1 Bottle 3     blood glucose monitoring (ACCU-CHEK FASTCLIX) lancets 1 each daily Use to test blood sugar 1 time daily. 102 each 3     blood glucose monitoring (NO BRAND SPECIFIED) meter device kit Use to test blood sugar 1 times daily or as directed. Preferred blood glucose meter OR supplies to accompany: Blood Glucose Monitor Brands: per insurance. 1 kit 0     Blood Glucose Monitoring Suppl (ACCU-CHEK GUIDE ME) w/Device KIT 1 Device daily 1 kit 0     fluticasone-salmeterol (ADVAIR) 250-50 MCG/DOSE diskus inhaler Inhale 1 puff into the lungs 2 times daily 1 Inhaler 1     losartan (COZAAR) 100 MG tablet Take 1 tablet (100 mg) by mouth daily 90 tablet 1     methocarbamol (ROBAXIN) 500 MG tablet Take 1 tablet (500 mg) by mouth 3 times daily as needed for muscle spasms 30 tablet 1     order for DME Equipment being ordered: CPAP--replacement with using prior settings. Additional supplies as needed with replacement machine.    Will need to contact the patient for information needed about the prior CPAP settings and use. 1 Units o     ORDER FOR DME CPAP setting 19 cm H2O 1 Units 0     ORDER FOR DME CPAP 1 Device 0     thin (NO BRAND SPECIFIED) lancets Use with lanceting device. To accompany: Blood Glucose Monitor Brands: per insurance. 100 each 6     Allergies   Allergen Reactions     Biaxin [Clarithromycin]      Metformin       Reported breaking out with a skin reaction     Robafen Dm Cgh-Chest [Robitussin Cough-Chest Dm] Difficulty breathing     Tamiflu [Oseltamivir]        Reviewed and updated as needed this visit by Provider         Review of Systems   Constitutional, HEENT, cardiovascular, pulmonary, GI, , musculoskeletal, neuro, skin, endocrine and psych systems are negative, except as otherwise noted.      Objective    BP (!) 166/102 (BP Location: Left arm, Patient Position: Chair, Cuff Size: Adult Large)   Pulse 80   Temp 96.8  F (36  C) (Oral)   Resp 20   Ht 1.829 m (6')   Wt (!) 154.1 kg (339 lb 12.8 oz)   SpO2 96%   BMI 46.09 kg/m    Body mass index is 46.09 kg/m .  Physical Exam   GENERAL: healthy, alert and no distress  EYES: Eyes grossly normal to inspection, PERRL and conjunctivae and sclerae normal  HENT: ear canals and TM's normal, nose and mouth without ulcers or lesions  NECK: no adenopathy, no asymmetry, masses, or scars and thyroid normal to palpation  RESP: lungs clear to auscultation - no rales, rhonchi or wheezes  CV: regular rate and rhythm, normal S1 S2, no S3 or S4, no murmur, click or rub, no peripheral edema and peripheral pulses strong  ABDOMEN: soft, nontender, no hepatosplenomegaly, no masses and bowel sounds normal  MS: no gross musculoskeletal defects noted, no edema  NEURO: Normal strength and tone, mentation intact and speech normal  PSYCH: mentation appears normal, affect normal/bright  Foot exam: WNL. Good sensation. CMS intact. DP and PD 2+. Normal monofilament test    Diagnostic Test Results:  Labs reviewed in Epic  Results for orders placed or performed in visit on 07/17/20 (from the past 24 hour(s))   Hemoglobin A1c   Result Value Ref Range    Hemoglobin A1C 6.6 (H) 0 - 5.6 %   Lipid panel reflex to direct LDL Fasting   Result Value Ref Range    Cholesterol 169 <200 mg/dL    Triglycerides 50 <150 mg/dL    HDL Cholesterol 55 >39 mg/dL    LDL Cholesterol Calculated 104 (H) <100 mg/dL    Non  HDL Cholesterol 114 <130 mg/dL   Comprehensive metabolic panel (BMP + Alb, Alk Phos, ALT, AST, Total. Bili, TP)   Result Value Ref Range    Sodium 139 133 - 144 mmol/L    Potassium 3.6 3.4 - 5.3 mmol/L    Chloride 105 94 - 109 mmol/L    Carbon Dioxide 30 20 - 32 mmol/L    Anion Gap 4 3 - 14 mmol/L    Glucose 111 (H) 70 - 99 mg/dL    Urea Nitrogen 10 7 - 30 mg/dL    Creatinine 0.75 0.66 - 1.25 mg/dL    GFR Estimate >90 >60 mL/min/[1.73_m2]    GFR Estimate If Black >90 >60 mL/min/[1.73_m2]    Calcium 8.5 8.5 - 10.1 mg/dL    Bilirubin Total 0.6 0.2 - 1.3 mg/dL    Albumin 3.4 3.4 - 5.0 g/dL    Protein Total 7.7 6.8 - 8.8 g/dL    Alkaline Phosphatase 45 40 - 150 U/L    ALT 22 0 - 70 U/L    AST 11 0 - 45 U/L           Assessment & Plan     1. Type 2 diabetes mellitus with hyperglycemia, without long-term current use of insulin (H)  Diet controlled. Doing well.   - amLODIPine (NORVASC) 10 MG tablet; Take 1 tablet (10 mg) by mouth daily  Dispense: 90 tablet; Refill: 1  - blood glucose monitoring (NO BRAND SPECIFIED) meter device kit; Use to test blood sugar 1 times daily or as directed. Preferred blood glucose meter OR supplies to accompany: Blood Glucose Monitor Brands: per insurance.  Dispense: 1 kit; Refill: 0  - blood glucose (NO BRAND SPECIFIED) test strip; Use to test blood sugar 1 times daily or as directed. To accompany: Blood Glucose Monitor Brands: per insurance.  Dispense: 100 strip; Refill: 6  - blood glucose calibration (NO BRAND SPECIFIED) solution; To accompany: Blood Glucose Monitor Brands: per insurance.  Dispense: 1 Bottle; Refill: 3  - thin (NO BRAND SPECIFIED) lancets; Use with lanceting device. To accompany: Blood Glucose Monitor Brands: per insurance.  Dispense: 100 each; Refill: 6  - alcohol swab prep pads; Use to swab area of injection/jessy as directed.  Dispense: 100 each; Refill: 3  - OPTOMETRY REFERRAL  - Hemoglobin A1c  - Comprehensive metabolic panel (BMP + Alb, Alk Phos, ALT, AST, Total. Bili,  TP)  - FOOT EXAM    2. Hypertension goal BP (blood pressure) < 140/90  Didn't take BP medication today. He will check BP at home and report back with readings for the next week.   - amLODIPine (NORVASC) 10 MG tablet; Take 1 tablet (10 mg) by mouth daily  Dispense: 90 tablet; Refill: 1  - losartan (COZAAR) 100 MG tablet; Take 1 tablet (100 mg) by mouth daily  Dispense: 90 tablet; Refill: 1  - aspirin 81 MG EC tablet; Take 1 tablet (81 mg) by mouth daily  Dispense: 90 tablet; Refill: 3  - Comprehensive metabolic panel (BMP + Alb, Alk Phos, ALT, AST, Total. Bili, TP)    3. Hyperlipidemia with target LDL less than 100  Fasting today  - amLODIPine (NORVASC) 10 MG tablet; Take 1 tablet (10 mg) by mouth daily  Dispense: 90 tablet; Refill: 1  - atorvastatin (LIPITOR) 10 MG tablet; Take 1 tablet (10 mg) by mouth daily  Dispense: 90 tablet; Refill: 1  - Lipid panel reflex to direct LDL Fasting  - Comprehensive metabolic panel (BMP + Alb, Alk Phos, ALT, AST, Total. Bili, TP)    4. History of MI (myocardial infarction)  He has a history of MI at age <29. He was supposed to follow up with cardiology but hasn't in several years. Will refer   - Lipid panel reflex to direct LDL Fasting    5. Moderate persistent asthma without complication  Controlled. Doing well.     6. Chronic low back pain without sciatica, unspecified back pain laterality  Neuro intact. No red flags.   - methocarbamol (ROBAXIN) 500 MG tablet; Take 1 tablet (500 mg) by mouth 3 times daily as needed for muscle spasms  Dispense: 30 tablet; Refill: 1       See Patient Instructions    Return in about 3 months (around 10/17/2020).     The benefits, risks and potential side effects were discussed in detail. Black box warnings discussed as relevant. All patient questions were answered. The patient was instructed to follow up immediately if any adverse reactions develop.    Return precautions discussed, including when to seek urgent/emergent care.    Patient verbalizes  understanding and agrees with plan of care. Patient stable for discharge.      CECE [facility]    This visit took place during the COVID 19 global pandemic.   PPE worn during the visit included: surgical mask and face shield by me and mask by patient

## 2020-07-17 NOTE — PATIENT INSTRUCTIONS
Take your blood pressure medication and check your blood pressure - let me know what you're running in the next week. If it's still >140/90, we will need to add another medication

## 2020-08-05 ENCOUNTER — TELEPHONE (OUTPATIENT)
Dept: CARDIOLOGY | Facility: CLINIC | Age: 45
End: 2020-08-05

## 2020-08-05 NOTE — TELEPHONE ENCOUNTER
Left message for pt to return call regarding appt with Dr. Rocha on 8/17. Pt is new to clinic so he needs to do a video visit or rescheduled to a Monday when Dr. Rocha is in clinic.     Also please find out if patient has ever seen a cardiologist in the past.     Alberta Owen CMA......August 5, 2020     3:08 PM

## 2020-08-11 ENCOUNTER — VIRTUAL VISIT (OUTPATIENT)
Dept: EDUCATION SERVICES | Facility: CLINIC | Age: 45
End: 2020-08-11
Payer: COMMERCIAL

## 2020-08-11 DIAGNOSIS — E11.65 TYPE 2 DIABETES MELLITUS WITH HYPERGLYCEMIA, WITHOUT LONG-TERM CURRENT USE OF INSULIN (H): Primary | ICD-10-CM

## 2020-08-11 NOTE — PROGRESS NOTES
Patient not in room by 10:05am. Will send text invite.  10:10am: Patient still not in room.  Will try to resend.  10:25am: patient has not answered text invite.  Will try to reach by phone since not join video room.    Was able to reach Archie by phone at 10:25am.  Sounds like he just woke up; says things are fine and wanted to reschedule today's follow up.    Was able to find a time next week Wed. 8/19 at 10am.  Instructed patient to sign in through [employer] invite.  Pt verbalized understanding of concepts discussed and recommendations provided.       Stephanie Boss RDN, LD, CDCES

## 2020-08-18 ENCOUNTER — VIRTUAL VISIT (OUTPATIENT)
Dept: CARDIOLOGY | Facility: CLINIC | Age: 45
End: 2020-08-18
Attending: INTERNAL MEDICINE
Payer: COMMERCIAL

## 2020-08-18 DIAGNOSIS — I25.118 CORONARY ARTERY DISEASE OF NATIVE ARTERY OF NATIVE HEART WITH STABLE ANGINA PECTORIS (H): Primary | ICD-10-CM

## 2020-08-18 PROCEDURE — 99203 OFFICE O/P NEW LOW 30 MIN: CPT | Mod: 95 | Performed by: INTERNAL MEDICINE

## 2020-08-18 RX ORDER — ATORVASTATIN CALCIUM 40 MG/1
40 TABLET, FILM COATED ORAL DAILY
Qty: 90 TABLET | Refills: 3 | Status: SHIPPED | OUTPATIENT
Start: 2020-08-18 | End: 2021-06-02

## 2020-08-18 NOTE — LETTER
"8/18/2020      RE: Archie Wallace  [address]  [place] [address]       Dear Colleague,    Thank you for the opportunity to participate in the care of your patient, Archie Wallace, at the [facility]. Please see a copy of my visit note below.    [facility] [place] CARDIOLOGY CONSULTATION VIDEO ENCOUNTER:    Referring Provider:  Chantell Olivera  Primary Care Provider:   No Ref-Primary, Physician  Indication for Consultation:  CAD    Archie Wallace is a 44 year old male who is being evaluated via a billable video visit.      The patient has been notified of following:   \"This video visit will be conducted via a call between you and your physician/provider. We have found that certain health care needs can be provided without the need for an in-person physical exam.  This service lets us provide the care you need with a video conversation.  If a prescription is necessary we can send it directly to your pharmacy.  If lab work is needed we can place an order for that and you can then stop by our lab to have the test done at a later time. Video visits are billed at different rates depending on your insurance coverage.  Please reach out to your insurance provider with any questions. If during the course of the call the physician/provider feels a video visit is not appropriate, you will not be charged for this service.\"    Patient has given verbal consent for Video visit? Yes    Video-Visit Details  Type of service:  Video Visit  Video start time: 3:32 PM  Video end time: 4:04 PM  Total video time: 32 minutes  Originating Location (pt. Location): Home  Distant Location (provider location):  Outpatient clinic: [facility] [place]  Mode of Communication: Video Conference via [email or web address]    HPI: Archie Wallace is a 44 year old male being seen today for evaluation of CAD.     The patient's risk factor profile is: (+) HTN, Type II DM [10 years], (+) hypercholesterolemia, (+) prior cigar use [quit 15 years " ago], (+) fam Hx premature CAD.    The patient has a history of cardiovascular disease.  He reports having an MI in 2004 and believes he had a coronary angiogram and PCI but does not recall having a stent placed.  He had a stress nuclear study in 2005 that showed no focal fixed or reversible perfusion defects.  The LVEF was 54%.  He had an ECHO in 2014 showing LVEF 60% with borderline concentric LVH.  He has not history of CHF, arrhythmia, valvular heart disease.  The patient has no Hx of PAD or cerebrovascular disease.  The patient has not had a cardiovascular study in the past 6 years.    The patient denies chest discomfort.  He reports exertional dyspnea as well as dyspnea while lying down. It typically lasts up to 30 minutes.  He awakens at night with dyspnea.  He has sleep apnea.  He uses a CPAP.  He notes pedal edema.  He reports palpitations which involves skipped beats and slowing and speeding of his heart. He has had occasional lightheadedness but denies syncope.  The palpitations only occur once or twice a months    He works as a .    He had lost 84 lbs but gained back 12 lbs.  Currently, he reports a weight of 317 lbs.  His diet is high in fruit but low in vegetables.        PAST MEDICAL HISTORY:  Past Medical History:   Diagnosis Date     CAD (coronary artery disease)     Premature artery disease noted     Hyperlipidaemia LDL goal <100      Hypertension 2007     Hypertension goal BP (blood pressure) < 140/90      Long QT interval syndrome      MEDICAL HISTORY OF -     History of rhabdomyolosis, from strenuous exercise     Mild persistent asthma      Sleep apnea syndrome      Type 2 diabetes, HbA1c goal < 7% (H)        CURRENT MEDICATIONS:  Current Outpatient Medications   Medication Sig     albuterol (PROAIR HFA) 108 (90 Base) MCG/ACT inhaler Inhale 2 puffs into the lungs every 6 hours as needed     alcohol swab prep pads Use to swab area of injection/jessy as directed.     amLODIPine (NORVASC)  10 MG tablet Take 1 tablet (10 mg) by mouth daily     aspirin 81 MG EC tablet Take 1 tablet (81 mg) by mouth daily     atorvastatin (LIPITOR) 10 MG tablet Take 1 tablet (10 mg) by mouth daily     blood glucose (ACCU-CHEK GUIDE) test strip Use to test blood sugar 1 time daily (vary the time before meals and bed).     blood glucose (NO BRAND SPECIFIED) test strip Use to test blood sugar 1 times daily or as directed. To accompany: Blood Glucose Monitor Brands: per insurance.     blood glucose calibration (NO BRAND SPECIFIED) solution To accompany: Blood Glucose Monitor Brands: per insurance.     blood glucose monitoring (ACCU-CHEK FASTCLIX) lancets 1 each daily Use to test blood sugar 1 time daily.     blood glucose monitoring (NO BRAND SPECIFIED) meter device kit Use to test blood sugar 1 times daily or as directed. Preferred blood glucose meter OR supplies to accompany: Blood Glucose Monitor Brands: per insurance.     Blood Glucose Monitoring Suppl (ACCU-CHEK GUIDE ME) w/Device KIT 1 Device daily     losartan (COZAAR) 100 MG tablet Take 1 tablet (100 mg) by mouth daily     methocarbamol (ROBAXIN) 500 MG tablet Take 1 tablet (500 mg) by mouth 3 times daily as needed for muscle spasms     order for DME Equipment being ordered: CPAP--replacement with using prior settings. Additional supplies as needed with replacement machine.    Will need to contact the patient for information needed about the prior CPAP settings and use.     ORDER FOR DME CPAP setting 19 cm H2O     ORDER FOR DME CPAP     thin (NO BRAND SPECIFIED) lancets Use with lanceting device. To accompany: Blood Glucose Monitor Brands: per insurance.     fluticasone-salmeterol (ADVAIR) 250-50 MCG/DOSE diskus inhaler Inhale 1 puff into the lungs 2 times daily     No current facility-administered medications for this visit.        PAST SURGICAL HISTORY:  Past Surgical History:   Procedure Laterality Date     ANGIOGRAM       ANGIOPLASTY  2001    Coronary artery  angioplasty//associated with mild AMI     TONSILLECTOMY  2005    With uvuloectomy       ALLERGIES  Biaxin [clarithromycin]; Metformin; Robafen dm cgh-chest [robitussin cough-chest dm]; and Tamiflu [oseltamivir]    FAMILY HX:  Family History   Problem Relation Age of Onset     Diabetes Mother      Hypertension Mother      Diabetes Father      Diabetes Sister        SOCIAL HX:  Social History     Socioeconomic History     Marital status:      Spouse name: Not on file     Number of children: Not on file     Years of education: Not on file     Highest education level: Not on file   Occupational History     Not on file   Social Needs     Financial resource strain: Not on file     Food insecurity     Worry: Not on file     Inability: Not on file     Transportation needs     Medical: Not on file     Non-medical: Not on file   Tobacco Use     Smoking status: Former Smoker     Types: Cigars     Smokeless tobacco: Never Used     Tobacco comment: Smokes intermittently   Substance and Sexual Activity     Alcohol use: Yes     Comment: occ/ mod     Drug use: No     Sexual activity: Not Currently     Partners: Female   Lifestyle     Physical activity     Days per week: Not on file     Minutes per session: Not on file     Stress: Not on file   Relationships     Social connections     Talks on phone: Not on file     Gets together: Not on file     Attends Hoahaoism service: Not on file     Active member of club or organization: Not on file     Attends meetings of clubs or organizations: Not on file     Relationship status: Not on file     Intimate partner violence     Fear of current or ex partner: Not on file     Emotionally abused: Not on file     Physically abused: Not on file     Forced sexual activity: Not on file   Other Topics Concern     Parent/sibling w/ CABG, MI or angioplasty before 65F 55M? No      Service No     Blood Transfusions No     Caffeine Concern No     Occupational Exposure No     Hobby Hazards No      Sleep Concern Yes     Comment: BRONWYN     Stress Concern No     Weight Concern Yes     Comment: Morbidly obese     Special Diet No     Back Care Yes     Exercise Yes     Comment: Active at work in maintenance     Bike Helmet Not Asked     Seat Belt Yes     Self-Exams Yes   Social History Narrative     Not on file       ROS:  Constitutional: No fever, chills, or sweats. No weight gain/loss.   HEENT: No visual disturbance, ear ache, epistaxis, sore throat.   Allergies/Immunologic: Negative.   Respiratory: No cough, hemoptysis.   Cardiovascular: As per HPI.   GI: No nausea, vomiting, hematemesis, melena, or hematochezia.   : No urinary frequency, dysuria, or hematuria.   Integument: No rash.   Psychiatric: No anxiety / depression.   Neuro: No speech disturbance, focal sensory or motor deficit.   Endocrinology: No polyuria / polyphagia.   Musculoskeletal: No myalgia.    VITAL SIGNS:  There were no vitals taken for this visit.  There is no height or weight on file to calculate BMI.  Wt Readings from Last 2 Encounters:   07/17/20 (!) 154.1 kg (339 lb 12.8 oz)   05/04/20 134.7 kg (297 lb)       PHYSICAL EXAM  GENERAL: Healthy, alert and no distress  EYES: Eyes grossly normal to inspection.  No discharge or erythema, or obvious scleral/conjunctival abnormalities.  RESP: No audible wheeze, cough, or visible cyanosis.  No visible retractions or increased work of breathing.  SKIN: Visible skin clear. No significant rash, abnormal pigmentation or lesions.  NEURO: Cranial nerves grossly intact.  Mentation and speech appropriate for age.  PSYCH: Mentation appears normal, affect normal/bright, judgement and insight intact, normal speech and appearance well-groomed.  The rest of a comprehensive physical examination is deferred due to PHE (public health emergency) video visit restrictions.      LABS  Recent Labs   Lab Test 04/21/20  0917 06/10/15  1501   WBC 5.0 7.0   HGB 14.5 14.1   HCT 45.9 43.8    303     Recent Labs    Lab Test 07/17/20  1035 04/21/20  0917    138   POTASSIUM 3.6 3.6   CHLORIDE 105 103   CO2 30 33*   * 121*   BUN 10 10   CR 0.75 0.73   JOSHUA 8.5 8.8     Recent Labs   Lab Test 07/17/20  1035 04/21/20  0917   CHOL 169 158   HDL 55 64   * 85   TRIG 50 45   NHDL 114 94        EKG:  None    ECHO:  1/20/2014  Summary:                                                                           1. LV function is normal. The visually estimated ejection fraction is 60%.        2. Borderline concentric left ventricular hypertrophy.                                                                                           PHYSICIAN INTERPRETATION: Left Ventricle: The left ventricular size is normal.    LV function is normal. The visually estimated ejection fraction is 60%. LV septal wall thickness is severely increased. LV posterior wall thickness is mildly increased. Borderline concentric left ventricular hypertrophy. Normal diastolic function.                                                               Left Atrium: The left atrium is normal in size.                                   Right Ventricle: The right ventricle was not well visualized. The right ventricular size is normal. Global RV systolic function is normal.                Right Atrium: The right atrium is normal in size.                                 Mitral Valve: Sclerotic mitral valve leaflets. Trace mitral valve regurgitation.  Aortic Valve: The aortic valve is trileaflet. No evidence of significant aortic  valve regurgitation.                             Tricuspid Valve: The tricuspid valve is not well seen. Trace tricuspid regurgitation. The estimated pulmonary artery systolic pressure is normal at 22.5 mmHg plus right atrial pressure. Right atrial pressure is estimated at 5 mmHg.  Pulmonic Valve: The pulmonic valve was not well visualized. Trace pulmonic valve regurgitation.                       Pericardium: No pericardial effusion.                                              Aorta: Normal aortic root size and normal proximal ascending aortic size.         Venous: The inferior vena cava is normal size, and collapses normally with respiration.                                         CARDIAC MRI: None    CORONARY CTA: None    ADENOSINE NUCLEAR STRESS TEST:  11/30/2005  Impression:   1. Abnormal study with a moderate degree of certainty.   2. There is mild globally decreased perfusion on the stress images compared with the rest images. There are no focal fixed or reversible perfusion defects.   3. Left ventricular size is dilated at rest.  Transient ischemic dilation of the left ventricle did occur to 1.3.   4. The left ventricular ejection fraction is 54% and there are no regional wall motion abnormalities.     CARDIAC CATH: 2004  Unknown    ASSESSMENT AND PLAN:   1. Coronary artery disease.   Mr. Wallace has multiple cardiac risk factors and a history of CAD with possible PCI.  His current symptoms, particularly the MACK, may reflect angina but I suspect exercise deconditioning is a major factor.   I would like the patient to come into clinic and have an ECG and an exercise ECHO.  Continue ASA and Norvasc.  Defer on beta blocker and nitrates until we have additional information.    2. HTN.  Check BP and report back to us.  Continue Losartan 100 mg every day and Norvasc 10 mg every day. Goal -120 / 60-70 mm Hg.    3. Hypercholesterolemia.  Increase Lipitor to 40 mg every day.    FOLLOW UP:  1 year      Pierre Rohca MD    Divisions of Cardiology  [facility]  Patient Care Team:  No Ref-Primary, Physician as PCP - General  Chantell Jara APRN CNP as Assigned PCP  Stephanie Boss RD as Diabetes Educator (Dietitian, Registered)  CHANTELL JARA

## 2020-08-18 NOTE — PROGRESS NOTES
"[facility] [place] CARDIOLOGY CONSULTATION VIDEO ENCOUNTER:    Referring Provider:  Chantell Olivera  Primary Care Provider:   No Ref-Primary, Physician  Indication for Consultation:  CAD    Archie Wallace is a 44 year old male who is being evaluated via a billable video visit.      The patient has been notified of following:   \"This video visit will be conducted via a call between you and your physician/provider. We have found that certain health care needs can be provided without the need for an in-person physical exam.  This service lets us provide the care you need with a video conversation.  If a prescription is necessary we can send it directly to your pharmacy.  If lab work is needed we can place an order for that and you can then stop by our lab to have the test done at a later time. Video visits are billed at different rates depending on your insurance coverage.  Please reach out to your insurance provider with any questions. If during the course of the call the physician/provider feels a video visit is not appropriate, you will not be charged for this service.\"    Patient has given verbal consent for Video visit? Yes    Video-Visit Details  Type of service:  Video Visit  Video start time: 3:32 PM  Video end time: 4:04 PM  Total video time: 32 minutes  Originating Location (pt. Location): Home  Distant Location (provider location):  Outpatient clinic: [facility] [place]  Mode of Communication: Video Conference via Doxy[employer]me    HPI: Archie Wallace is a 44 year old male being seen today for evaluation of CAD.     The patient's risk factor profile is: (+) HTN, Type II DM [10 years], (+) hypercholesterolemia, (+) prior cigar use [quit 15 years ago], (+) fam Hx premature CAD.    The patient has a history of cardiovascular disease.  He reports having an MI in 2004 and believes he had a coronary angiogram and PCI but does not recall having a stent placed.  He had a stress nuclear study in 2005 that showed no focal fixed or reversible perfusion " defects.  The LVEF was 54%.  He had an ECHO in 2014 showing LVEF 60% with borderline concentric LVH.  He has not history of CHF, arrhythmia, valvular heart disease.  The patient has no Hx of PAD or cerebrovascular disease.  The patient has not had a cardiovascular study in the past 6 years.    The patient denies chest discomfort.  He reports exertional dyspnea as well as dyspnea while lying down. It typically lasts up to 30 minutes.  He awakens at night with dyspnea.  He has sleep apnea.  He uses a CPAP.  He notes pedal edema.  He reports palpitations which involves skipped beats and slowing and speeding of his heart. He has had occasional lightheadedness but denies syncope.  The palpitations only occur once or twice a months    He works as a .    He had lost 84 lbs but gained back 12 lbs.  Currently, he reports a weight of 317 lbs.  His diet is high in fruit but low in vegetables.        PAST MEDICAL HISTORY:  Past Medical History:   Diagnosis Date     CAD (coronary artery disease)     Premature artery disease noted     Hyperlipidaemia LDL goal <100      Hypertension 2007     Hypertension goal BP (blood pressure) < 140/90      Long QT interval syndrome      MEDICAL HISTORY OF -     History of rhabdomyolosis, from strenuous exercise     Mild persistent asthma      Sleep apnea syndrome      Type 2 diabetes, HbA1c goal < 7% (H)        CURRENT MEDICATIONS:  Current Outpatient Medications   Medication Sig     albuterol (PROAIR HFA) 108 (90 Base) MCG/ACT inhaler Inhale 2 puffs into the lungs every 6 hours as needed     alcohol swab prep pads Use to swab area of injection/jessy as directed.     amLODIPine (NORVASC) 10 MG tablet Take 1 tablet (10 mg) by mouth daily     aspirin 81 MG EC tablet Take 1 tablet (81 mg) by mouth daily     atorvastatin (LIPITOR) 10 MG tablet Take 1 tablet (10 mg) by mouth daily     blood glucose (ACCU-CHEK GUIDE) test strip Use to test blood sugar 1 time daily (vary the time before meals  and bed).     blood glucose (NO BRAND SPECIFIED) test strip Use to test blood sugar 1 times daily or as directed. To accompany: Blood Glucose Monitor Brands: per insurance.     blood glucose calibration (NO BRAND SPECIFIED) solution To accompany: Blood Glucose Monitor Brands: per insurance.     blood glucose monitoring (ACCU-CHEK FASTCLIX) lancets 1 each daily Use to test blood sugar 1 time daily.     blood glucose monitoring (NO BRAND SPECIFIED) meter device kit Use to test blood sugar 1 times daily or as directed. Preferred blood glucose meter OR supplies to accompany: Blood Glucose Monitor Brands: per insurance.     Blood Glucose Monitoring Suppl (ACCU-CHEK GUIDE ME) w/Device KIT 1 Device daily     losartan (COZAAR) 100 MG tablet Take 1 tablet (100 mg) by mouth daily     methocarbamol (ROBAXIN) 500 MG tablet Take 1 tablet (500 mg) by mouth 3 times daily as needed for muscle spasms     order for DME Equipment being ordered: CPAP--replacement with using prior settings. Additional supplies as needed with replacement machine.    Will need to contact the patient for information needed about the prior CPAP settings and use.     ORDER FOR DME CPAP setting 19 cm H2O     ORDER FOR DME CPAP     thin (NO BRAND SPECIFIED) lancets Use with lanceting device. To accompany: Blood Glucose Monitor Brands: per insurance.     fluticasone-salmeterol (ADVAIR) 250-50 MCG/DOSE diskus inhaler Inhale 1 puff into the lungs 2 times daily     No current facility-administered medications for this visit.        PAST SURGICAL HISTORY:  Past Surgical History:   Procedure Laterality Date     ANGIOGRAM       ANGIOPLASTY  2001    Coronary artery angioplasty//associated with mild AMI     TONSILLECTOMY  2005    With uvuloectomy       ALLERGIES  Biaxin [clarithromycin]; Metformin; Robafen dm cgh-chest [robitussin cough-chest dm]; and Tamiflu [oseltamivir]    FAMILY HX:  Family History   Problem Relation Age of Onset     Diabetes Mother       Hypertension Mother      Diabetes Father      Diabetes Sister        SOCIAL HX:  Social History     Socioeconomic History     Marital status:      Spouse name: Not on file     Number of children: Not on file     Years of education: Not on file     Highest education level: Not on file   Occupational History     Not on file   Social Needs     Financial resource strain: Not on file     Food insecurity     Worry: Not on file     Inability: Not on file     Transportation needs     Medical: Not on file     Non-medical: Not on file   Tobacco Use     Smoking status: Former Smoker     Types: Cigars     Smokeless tobacco: Never Used     Tobacco comment: Smokes intermittently   Substance and Sexual Activity     Alcohol use: Yes     Comment: occ/ mod     Drug use: No     Sexual activity: Not Currently     Partners: Female   Lifestyle     Physical activity     Days per week: Not on file     Minutes per session: Not on file     Stress: Not on file   Relationships     Social connections     Talks on phone: Not on file     Gets together: Not on file     Attends Hoahaoism service: Not on file     Active member of club or organization: Not on file     Attends meetings of clubs or organizations: Not on file     Relationship status: Not on file     Intimate partner violence     Fear of current or ex partner: Not on file     Emotionally abused: Not on file     Physically abused: Not on file     Forced sexual activity: Not on file   Other Topics Concern     Parent/sibling w/ CABG, MI or angioplasty before 65F 55M? No      Service No     Blood Transfusions No     Caffeine Concern No     Occupational Exposure No     Hobby Hazards No     Sleep Concern Yes     Comment: BRONWYN     Stress Concern No     Weight Concern Yes     Comment: Morbidly obese     Special Diet No     Back Care Yes     Exercise Yes     Comment: Active at work in maintenance     Bike Helmet Not Asked     Seat Belt Yes     Self-Exams Yes   Social History  Narrative     Not on file       ROS:  Constitutional: No fever, chills, or sweats. No weight gain/loss.   HEENT: No visual disturbance, ear ache, epistaxis, sore throat.   Allergies/Immunologic: Negative.   Respiratory: No cough, hemoptysis.   Cardiovascular: As per HPI.   GI: No nausea, vomiting, hematemesis, melena, or hematochezia.   : No urinary frequency, dysuria, or hematuria.   Integument: No rash.   Psychiatric: No anxiety / depression.   Neuro: No speech disturbance, focal sensory or motor deficit.   Endocrinology: No polyuria / polyphagia.   Musculoskeletal: No myalgia.    VITAL SIGNS:  There were no vitals taken for this visit.  There is no height or weight on file to calculate BMI.  Wt Readings from Last 2 Encounters:   07/17/20 (!) 154.1 kg (339 lb 12.8 oz)   05/04/20 134.7 kg (297 lb)       PHYSICAL EXAM  GENERAL: Healthy, alert and no distress  EYES: Eyes grossly normal to inspection.  No discharge or erythema, or obvious scleral/conjunctival abnormalities.  RESP: No audible wheeze, cough, or visible cyanosis.  No visible retractions or increased work of breathing.  SKIN: Visible skin clear. No significant rash, abnormal pigmentation or lesions.  NEURO: Cranial nerves grossly intact.  Mentation and speech appropriate for age.  PSYCH: Mentation appears normal, affect normal/bright, judgement and insight intact, normal speech and appearance well-groomed.  The rest of a comprehensive physical examination is deferred due to PHE (public health emergency) video visit restrictions.      LABS  Recent Labs   Lab Test 04/21/20  0917 06/10/15  1501   WBC 5.0 7.0   HGB 14.5 14.1   HCT 45.9 43.8    303     Recent Labs   Lab Test 07/17/20  1035 04/21/20  0917    138   POTASSIUM 3.6 3.6   CHLORIDE 105 103   CO2 30 33*   * 121*   BUN 10 10   CR 0.75 0.73   JOSHUA 8.5 8.8     Recent Labs   Lab Test 07/17/20  1035 04/21/20  0917   CHOL 169 158   HDL 55 64   * 85   TRIG 50 45   NHDL 114 94         EKG:  None    ECHO:  1/20/2014  Summary:                                                                           1. LV function is normal. The visually estimated ejection fraction is 60%.        2. Borderline concentric left ventricular hypertrophy.                                                                                           PHYSICIAN INTERPRETATION: Left Ventricle: The left ventricular size is normal.    LV function is normal. The visually estimated ejection fraction is 60%. LV septal wall thickness is severely increased. LV posterior wall thickness is mildly increased. Borderline concentric left ventricular hypertrophy. Normal diastolic function.                                                               Left Atrium: The left atrium is normal in size.                                   Right Ventricle: The right ventricle was not well visualized. The right ventricular size is normal. Global RV systolic function is normal.                Right Atrium: The right atrium is normal in size.                                 Mitral Valve: Sclerotic mitral valve leaflets. Trace mitral valve regurgitation.  Aortic Valve: The aortic valve is trileaflet. No evidence of significant aortic  valve regurgitation.                             Tricuspid Valve: The tricuspid valve is not well seen. Trace tricuspid regurgitation. The estimated pulmonary artery systolic pressure is normal at 22.5 mmHg plus right atrial pressure. Right atrial pressure is estimated at 5 mmHg.  Pulmonic Valve: The pulmonic valve was not well visualized. Trace pulmonic valve regurgitation.                       Pericardium: No pericardial effusion.                                             Aorta: Normal aortic root size and normal proximal ascending aortic size.         Venous: The inferior vena cava is normal size, and collapses normally with respiration.                                         CARDIAC MRI: None    CORONARY CTA:  None    ADENOSINE NUCLEAR STRESS TEST:  11/30/2005  Impression:   1. Abnormal study with a moderate degree of certainty.   2. There is mild globally decreased perfusion on the stress images compared with the rest images. There are no focal fixed or reversible perfusion defects.   3. Left ventricular size is dilated at rest.  Transient ischemic dilation of the left ventricle did occur to 1.3.   4. The left ventricular ejection fraction is 54% and there are no regional wall motion abnormalities.     CARDIAC CATH: 2004  Unknown    ASSESSMENT AND PLAN:   1. Coronary artery disease.   Mr. Wallace has multiple cardiac risk factors and a history of CAD with possible PCI.  His current symptoms, particularly the MACK, may reflect angina but I suspect exercise deconditioning is a major factor.   I would like the patient to come into clinic and have an ECG and an exercise ECHO.  Continue ASA and Norvasc.  Defer on beta blocker and nitrates until we have additional information.    2. HTN.  Check BP and report back to us.  Continue Losartan 100 mg every day and Norvasc 10 mg every day. Goal -120 / 60-70 mm Hg.    3. Hypercholesterolemia.  Increase Lipitor to 40 mg every day.    FOLLOW UP:  1 year    EXERCISE ECHO  (12/9/2020)  Normal, low-risk exercise echocardiogram without evidence of ischemia. While heart rate was slightly submaximal (74% MPHR), a diagnostic workload was achieved based on rate-pressure product (RPP 30,444.)     The target heart rate was not achieved.   Exercise was terminated after 6:46 (m:ss) at a HR of 129 bpm (74% MPHR) due to hypertensive BP response.   No angina was elicited.  Normal heart rate and hypertensive BP response (baseline /98->236/98) to exercise.  Normal biventricular size, thickness, and global systolic function at baseline, LVEF=60-65%.  With exercise, LVEF increased to >70% and LV cavity size decreased appropriately.  No regional wall motion abnormalities are present at rest or  with exercise.  No ECG evidence of ischemia.  Functional capacity is reduced for age.  No significant valvular abnormalities are noted on screening Doppler exam.  The aortic root and visualized ascending aorta are normal.    Pierre Rocha MD    Divisions of Cardiology  [facility]  Patient Care Team:  No Ref-Primary, Physician as PCP - General  Chantell Jara APRN CNP as Assigned PCP  Stephanie Boss RD as Diabetes Educator (Dietitian, Registered)  CHANTELL JARA

## 2020-08-18 NOTE — PATIENT INSTRUCTIONS
It was a pleasure to see you in the cardiology clinic today.    If you have any questions, you can reach my nurse, Jesica Ortez, at [phone].  Press Option #1 for the [facility], and then press Option #f 4 or nursing.    Note the new medications: None    Change the Lipitor to 40 mg a day.    Stop the following medications: None    The results from today include: None    Tests ordered today:   1. ECG at [facility]  2. Exercise ECHO at [facility]      I would like you to follow up with Dr. Rocha at [facility] in one year.    Sincerely,      Pierre Rocha MD     [facility]

## 2020-08-19 ENCOUNTER — VIRTUAL VISIT (OUTPATIENT)
Dept: NUTRITION | Facility: CLINIC | Age: 45
End: 2020-08-19
Payer: COMMERCIAL

## 2020-08-19 DIAGNOSIS — E66.01 MORBID OBESITY (H): ICD-10-CM

## 2020-08-19 DIAGNOSIS — E11.65 TYPE 2 DIABETES MELLITUS WITH HYPERGLYCEMIA, WITHOUT LONG-TERM CURRENT USE OF INSULIN (H): Primary | ICD-10-CM

## 2020-08-19 PROCEDURE — 97802 MEDICAL NUTRITION INDIV IN: CPT | Mod: 95

## 2020-08-19 NOTE — PATIENT INSTRUCTIONS
1. If you do all veggies next week, try to include some peas, corn, starchy beans, winter squash or potatoes for carbs to avoid going too low in calories.    2. Good to have some protein with meals - nut butter, lean meat such as chicken without the skin or low-fat cheese/cottage cheese are good options (helps with muscle mass)    3. As you work back to balancing meals, plate method is helpful: 1/2 plate fruits and vegetables, 1/4 plate lean meat (3-6 oz) and 1/4 plate whole grains (1/2-1 cup)    4. Continue to build activity (walking) as your leg feels better    FOLLOW UP: Video visit follow up on Wednesday, October 14th at 11am    Stephanie Boss RDN, LD, [facility]   [phone]

## 2020-08-19 NOTE — PROGRESS NOTES
Patient was not in the room by 10:09am so sent a text invite to join. Sent another invite at 10:14am.   Patient was able to join at 10:16am.      Medical Nutrition Therapy for Diabetes  Visit Type:Initial assessment and intervention  Patient has given verbal consent for Video visit? Yes  Patient would like the video invitation sent by: Text to cell phone: [phone]      Type of service:  Video Visit  Originating Location (pt. Location): Home  Distant Location (provider location):  Home  Mode of Communication:  Video Conference via [employer]  Video Start Time: 10:16am  Video End Time (time video stopped): 10:49am  Patient would like to obtain AVS? Josephine      Archie Wallace presents today for MNT and education related to type 2 diabetes and weight management.   He is accompanied by self.     ASSESSMENT:   Patient comments/concerns relating to nutrition: Says went to an all fruit diet.  Eats 3-4 fruit a day. Says started his all fruit diet this week - on day 3 of it. Plans to add in veggies.  Says dong this on his own. Thinking about all veggies next week and then a mix of fruits and vegetables.  Says open to adding more protein  Likes eating broccoli, cauliflower, corn, green beans, lettuce, tomatoes, squash, cucumber.     Says plans to do [facility] for eye exam.   Says cut back on the blood pressure medication and says noticed bathroom trips not as frequent.       NUTRITION HISTORY:    Breakfast: banana  Lunch: none  Dinner: peach and banana  Snacks: fruit   Beverages: Water all day and Energy drinks 1.5 Gatorade (28oz bottle)/day    Misses meals? Lunch or dinner-usually only eats 2 meals a day  Eats out:  seldom     Previous diet education:  Yes with DSMT    Food allergies/intolerances: none noted    Diet is high in: fruits  Diet is low in: calories, protein and vegetables    EXERCISE: sporadic or irregular exercise - says trying to walk 1 mile, 3 days a week.  Recent leg injury.    SOCIO/ECONOMIC:   Lives  with: spouse    BLOOD GLUCOSE MONITORING:  Patient glucose self monitoring as follows: 1-2 times daily.   BG meter: One Touch Ultra meter  BG results: not available.  Says blood glucose mostly in the 120's.  Says trying to it in the morning and later in the evening. Says feeling ok.      BG values are: unable to assess  Patient's most recent   Lab Results   Component Value Date    A1C 6.6 07/17/2020    is meeting goal of <7.0    MEDICATIONS:  Current Outpatient Medications   Medication     albuterol (PROAIR HFA) 108 (90 Base) MCG/ACT inhaler     alcohol swab prep pads     amLODIPine (NORVASC) 10 MG tablet     aspirin 81 MG EC tablet     atorvastatin (LIPITOR) 40 MG tablet     blood glucose (ACCU-CHEK GUIDE) test strip     blood glucose (NO BRAND SPECIFIED) test strip     blood glucose calibration (NO BRAND SPECIFIED) solution     blood glucose monitoring (ACCU-CHEK FASTCLIX) lancets     blood glucose monitoring (NO BRAND SPECIFIED) meter device kit     Blood Glucose Monitoring Suppl (ACCU-CHEK GUIDE ME) w/Device KIT     losartan (COZAAR) 100 MG tablet     methocarbamol (ROBAXIN) 500 MG tablet     order for DME     ORDER FOR DME     ORDER FOR DME     thin (NO BRAND SPECIFIED) lancets     No current facility-administered medications for this visit.        LABS:  Lab Results   Component Value Date    A1C 6.6 07/17/2020     Lab Results   Component Value Date     07/17/2020     Lab Results   Component Value Date     07/17/2020     HDL Cholesterol   Date Value Ref Range Status   07/17/2020 55 >39 mg/dL Final   ]  GFR Estimate   Date Value Ref Range Status   07/17/2020 >90 >60 mL/min/[1.73_m2] Final     Comment:     Non  GFR Calc  Starting 12/18/2018, serum creatinine based estimated GFR (eGFR) will be   calculated using the Chronic Kidney Disease Epidemiology Collaboration   (CKD-EPI) equation.       GFR Estimate If Black   Date Value Ref Range Status   07/17/2020 >90 >60 mL/min/[1.73_m2]  Final     Comment:      GFR Calc  Starting 12/18/2018, serum creatinine based estimated GFR (eGFR) will be   calculated using the Chronic Kidney Disease Epidemiology Collaboration   (CKD-EPI) equation.       Lab Results   Component Value Date    CR 0.75 07/17/2020     No results found for: MICROALBUMIN    ANTHROPOMETRICS:  Vitals: There were no vitals taken for this visit.  Estimated body mass index is 46.09 kg/m  as calculated from the following:    Height as of 7/17/20: 1.829 m (6').    Weight as of 7/17/20: 154.1 kg (339 lb 12.8 oz).      Wt Readings from Last 5 Encounters:   07/17/20 (!) 154.1 kg (339 lb 12.8 oz)   05/04/20 134.7 kg (297 lb)   09/22/17 (!) 160.3 kg (353 lb 8 oz)   10/10/16 (!) 157 kg (346 lb 3.2 oz)   06/10/15 (!) 158.6 kg (349 lb 9.6 oz)       Weight Change: gained 42.8 lbs in the past 2 months    ESTIMATED KCAL REQUIREMENTS:  2958 kcal per day (based on last clinic weight on 7/17/20)    NUTRITION DIAGNOSIS: Overweight/Obesity related to excessive energy intake as evidenced by 40 lbs weight gain in the past 2 months and BMI >40    NUTRITION INTERVENTION:  Nutrition Prescription: Carbohydrate Intake: 45-60 grams/meal and 15-30 grams/snacks  Education given to support: general nutrition guidelines, weight reduction, consistent meals, free foods, carb counting, fat modification, exercise, fiber, behavior modification, portion control and heart healthy diet  Motivational Interviewing    Discussion: Tyreseended Archie about trying to make healhier food choices to help with weight loss.  Diabetes control continues to look good with last A1C at 6.6% on 7/17/20. Did encourage him to not spend much time on a very low calorie diet since hard to sustain but discussed ideas if he'd like to spend next week eating mostly veggies and then work into a more balanced eating plan.  Encouraged him to add some corn, peas, potatoes, winter squash and/or beans at meals for some carbs and suggested beans  next week with vegggloria heavy meals for both carbs and protein.  Otherwise, reviewed plate method as easy way to balance meals.  Commended Archie on trying to walk more and encouraged him to build his activity after his leg heals.      Did follow up on goals from last time and Archie says plans to check into either [facility] for eye exam or [facility].  He spoke to Chantell about frequent urination and feels this improved.  He continues to indicate good blood glucose control with most pre-meal blood glucose in the 120's and indicates continues to vary the times he checks.  Pt verbalized understanding of concepts discussed and recommendations provided.         PATIENT'S BEHAVIOR CHANGE GOALS:   See Patient Instructions for patient stated behavior change goals. AVS was printed and given to patient at today's appointment.    MONITOR / EVALUATE:  RD will monitor/evaluate:  Blood Glucose / A1c  Food / Beverage / Nutrient intake   Weight change    FOLLOW-UP:  Call RD with questions/concerns.   Follow-up appointment scheduled on 10/14/20.  Patient says will call to reschedule if he will be out of town.    Stephanie Boss RDN, LD, [facility]   Time spent in minutes: 33 minutes  Encounter: Individual

## 2020-10-06 DIAGNOSIS — E11.65 TYPE 2 DIABETES MELLITUS WITH HYPERGLYCEMIA, WITHOUT LONG-TERM CURRENT USE OF INSULIN (H): ICD-10-CM

## 2020-10-09 RX ORDER — BLOOD-GLUCOSE METER
1 EACH MISCELLANEOUS DAILY
Qty: 1 KIT | Refills: 0 | Status: SHIPPED | OUTPATIENT
Start: 2020-10-09 | End: 2022-04-27

## 2020-10-09 NOTE — TELEPHONE ENCOUNTER
5/10/2017    CHIEF COMPLAINT:   Coronary artery disease. Congestive heart failure. Hyperlipidemia. Atrial fibrillation. Hypertension. Diabetes Mellitus. Chronic kidney disease. Peripheral vascular disease.    HISTORY OF PRESENT ILLNESS:   75 year old  white male comes for followup. A caregiver is present.     He has had no further problems with shortness of breath.  His baseline dyspnea on exertion is stable.  The edema to his lower extremity is resolved.    His GFR has increased from 53 to 71.     He was admitted June 2, 2016 with dyspnea and atrial fibrillation with RVR. He was found to be in congestive heart failure. He diuresed 4,749 cc of fluid. He was discharged July 1, 201    On August 17, 2015 his GFR was 26 and his blood pressure was 94/60. Because of that the lisinopril, Toprol, amlodipine, and Lasix were discontinued.   Repeat GFR shows his kidney function had improved from 26-60.    He was admitted in April 2015 with anemia. Hemoglobin 7.1. He was transfused 3 units of blood and given one injection of Epogen.   He was also found to be in congestive heart failure with diastolic dysfunction and he  diuresed 3300 cc of fluid.  He was discharged from the hospital April 20, 2015.    History of Hemolytic anemia. Was seeing Dr. Aguirre. Last Hemoglobin 10.9.      Patient Active Problem List   Diagnosis   • Memory loss   • Mild cognitive impairment with memory loss   • Impacted cerumen   • Episode of syncope   • Atrial fibrillation   • Coronary artery disease   • HTN (hypertension)   • Chronic kidney disease   • PVD (peripheral vascular disease)   • Hemolytic anemia   • Leukopenia   • Unspecified hypothyroidism   • S/P BKA (below knee amputation)   • DM (diabetes mellitus), type 2, uncontrolled   • Bilateral sensorineural hearing loss   • Acute respiratory distress   • Hypertensive emergency   • Chronic diastolic congestive heart failure   • Hyperlipidemia   • Cor pulmonale   • Stenosing tenosynovitis RRF,  Prescription approved per [facility] Refill Protocol.  Rama Garg RN  [facility]     RSF   • Ganglion cyst of flexor tendon sheath       Past Medical History:   Diagnosis Date   • Acute respiratory distress    • Atrial fibrillation    • Below knee amputation status     right   • Bilateral sensorineural hearing loss 6/23/2016   • Chronic back pain    • Chronic diastolic (congestive) heart failure    • Chronic disease anemia     3./8/16.  H/H9.5/37.8   • Chronic kidney disease, stage III (moderate)     5./17/16, creatinine 1.4, GFR 49   • Cor pulmonale (chronic)     Echo: 3/24/15. EF 60%. LVH. DD. PA pressure 60 mmHg cw/ Mod.-Sev. P.Htn   • Coronary artery diseaseS/P CABGx3     Amado stress: 3/27/15.EF64% . No perfusion defect.   • Diabetes mellitus, type 2    • Do not resuscitate status    • Episode of syncope    • Episode of syncope    • Hearing deficit     bilateral hearing aids   • Hemolytic anemia    • History of congestive heart failure     A. Fib:[id number] 4.. Paroxsymal. Rate controlled w/Toprol,decision risk of Coumadin outweigh benefits, with dropping hgb and etiology not found.Echo: 3/24/15. EF 60%. LVH. DD. PA pressure 60 mmHg cw/ Mod.-Sev. P.Htn.  8/15 DC'd meds -> low BP   • HTN (hypertension)    • Hx Leukopenia 8/15/2013    Pancytopenia-Dr. Aguirre   • Hx Pneumonia    • Hyperlipidemia    • Hypertensive emergency    • Impacted cerumen    • Incontinent of feces     depends   • Insulin-requiring or dependent type II diabetes mellitus     11./24/15 A1c 5   • Leukopenia    • Mild cognitive impairment with memory loss 3/28/2013   • Osteoarthritis     right foot   • Paroxysmal atrial fibrillation     A. Fib:[id number] 4.. Paroxsymal. Rate controlled w/Toprol,decision risk of Coumadin outweigh benefits, with dropping hgb and etiology not found.Echo: 3/24/15. EF 60%. LVH. DD. PA pressure 60 mmHg cw/ Mod.-Sev. P.Htn   • Physical debility     Assisted at [facility]  Since 2012   • Pulmonary hypertension, moderate to severe     Echo: 3/24/15. EF 60%. LVH. DD. PA pressure 60 mmHg cw/ Mod.-Sev.  P.Htn   • PVD (peripheral vascular disease)     S/P R BKA   • S/P BKA (below knee amputation) 5/5/2015    Hx: of right leg BKA with prosthetic      • Stenosing tenosynovitis    • Unspecified hypothyroidism 11/20/2013   • Unspecified hypothyroidism    • Uses wheelchair    • Wears dentures     upper and lower   • Wears eyeglasses        Current Outpatient Prescriptions   Medication Sig Dispense Refill   • Multiple Vitamins-Minerals (THERA-M) Tab TAKE (1) TABLET ORALLY ONCE DAILY. 31 tablet 5   • calcium carbonate-vitamin D (CALTRATE+D) 600-400 MG-UNIT per tablet TAKE (1) TABLET ORALLY TWICE DAILY. 62 tablet 5   • LEVEMIR FLEXTOUCH 100 UNIT/ML pen-injector INJECT SUBCUTANEOUSLY: 55 UNITS IN THE MORNING INJECT SUBCUTANEOUSLY:55 UNITS AT BEDTIME 15 mL 1   • HYDROcodone-acetaminophen (NORCO) 5-325 MG per tablet 1-2 po q 4-6 prn pain 40 tablet 0   • insulin lispro (HUMALOG KWIKPEN) 100 UNIT/ML pen-injector 20 units QAM with breakfast, 18 units with lunch and dinner and sliding scale, dx E11.69 15 mL 6   • gabapentin (NEURONTIN) 300 MG capsule TAKE (1) CAPSULE ORALLY THREE TIMES DAILY. 90 capsule 5   • glimepiride (AMARYL) 4 MG tablet TAKE (1) TABLET ORALLY ONCE DAILY. 31 tablet 1   • levothyroxine (SYNTHROID, LEVOTHROID) 125 MCG tablet TAKE (1) TABLET ORALLY DAILY ON AN EMPTY STOMACH. 31 tablet 1   • metoPROLOL (TOPROL-XL) 50 MG 24 hr tablet TAKE (1) TABLET ORALLY ONCE DAILY. 31 tablet 1   • BYETTA 10 MCG PEN 10 MCG/0.04ML pen-injector INJECT SUBCUTANEOUSLY: 10 MCG IN THE MORNING (Patient taking differently: INJECT SUBCUTANEOUSLY: 10 MCG IN THE MORNING and 10 mcg in the evening (per [facility] list)) 2.4 mL 14   • Multiple Vitamins-Minerals (THERA-M PO) Take by mouth daily.     • Dextromethorphan-Guaifenesin (Q-TUSSIN DM PO) Take 10 mLs by mouth every 4 hours. Every 4 hours as needed to loosen chest congestion     • Albuterol Sulfate (VENTOLIN HFA IN) Inhale 90 mcg into the lungs 2 times daily as needed. 2 puffs twice daily as  needed     • omeprazole (PRILOSEC) 20 MG capsule TAKE (1) CAPSULE ORALLY ONCE DAILY. 28 capsule 5   • allopurinol (ZYLOPRIM) 100 MG tablet TAKE (1) TABLET ORALLY EVERY OTHER DAY. 14 tablet 6   • Insulin Pen Needle (B-D U/F PEN NEEDLE 5/16\") 31G X 8 MM Misc Use to inject insulin 5 times daily, or as directed.  Dx: E11.65 500 each 3   • aspirin 81 MG tablet Take 1 tablet by mouth daily. 30 tablet 11   • Sennosides (SENNA) 8.6 MG Tab Take 2 tablets by mouth nightly. 56 tablet 8   • ferrous sulfate 324 (65 FE) MG EC tablet Take 1 tablet by mouth 2 times daily. 56 tablet 11   • Zinc Oxide 40 % Paste Apply to irritated skin 3 to 4 times daily as needed     • polyvinyl alcohol (LIQUIFILM TEARS) 1.4 % ophthalmic solution 1 drop as needed. 1-2 drops in each eye as needed for dry eyes     • Neomycin-Bacitracin-Polymyxin (TRIPLE ANTIBIOTIC EX) Apply small amount to affected area 1-3 tomes daily prn     • Sodium Phosphates (PHOSPHATE ENEMA RE) Use rectally daily prn extreme constipation     • bisacodyl (DULCOLAX) 10 MG suppository Place 10 mg rectally daily as needed for Constipation. Unwrap and insert 1 suppository rectally daily pen extreme constipation.     • Alum & Mag Hydroxide-Simeth (BLU-LANTA PO) 10 cc po between meals and at bed time prn indigestion     • diphenhydrAMINE (BENADRYL) 2 % cream Apply topically 3 times daily as needed for Itching. Apply to affected areas no more than 4 times daily for itching     • Menthol (RICOLA) Lozenge Use as needed for sore throat     • Alcohol Swabs (SM ALCOHOL PREP) 70 % Pads 1 patch as needed (Prn, use as directed). 200 patch 1   • muscle rub (THERA-GESIC PLUS) 10-15 % Cream Apply to affected area tid prn. 113 g 1   • DISPENSE Repair of Wheel Chair  Replace Bi-Lateral Clothing Guards 1 each 0   • guaiFENesin (DIABETIC TUSSIN EX) 100 MG/5ML syrup Take 200 mg by mouth every 4 hours as needed for Cough. 10 mLs orally q 4 hours as needed to loosen congestion     • Vitamins A & D  (VITAMIN A & D) ointment Apply  topically as needed for Dry Skin.     • albuterol-ipratropium 2.5 mg/0.5 mg (DUONEB) 0.5-2.5 (3) MG/3ML nebulizer solution Take 3 mLs by nebulization every 4 hours as needed for Shortness of Breath. 180 mL 0   • acetaminophen (TYLENOL) 325 MG tablet Take 2 tablets by mouth every 4 hours as needed for Pain. For pain and/or fever. 30 tablet 11   • nitroGLYcerin (NITROSTAT) 0.4 MG SL tablet Place 1 tablet under the tongue every 5 minutes as needed for Chest pain. 25 tablet 11   • glucagon (GLUCAGON EMERGENCY) 1 MG injection Inject 1 mg as directed as needed (hypoglycemia). 1 each 12   • loperamide (LOPERAMIDE A-D) 2 MG tablet Take 1 tablet by mouth 4 times daily as needed for Diarrhea. 30 tablet 0   • Magnesium Hydroxide (MILK OF MAGNESIA CONCENTRATE PO) Take 30 mLs by mouth as needed.     • Glucose Blood (ONE TOUCH ULTRA TEST) strip Use for glucose testing 3 times daily Dia.02 100 each 3     No current facility-administered medications for this visit.      ALLERGIES:  No Known Allergies  Social History     Social History   • Marital status:      Spouse name: N/A   • Number of children: 3   • Years of education: N/A     Occupational History   • factory      Social History Main Topics   • Smoking status: Former Smoker     Packs/day: 1.00     Years: 33.00     Types: Cigarettes     Quit date: 1988   • Smokeless tobacco: Never Used   • Alcohol use No   • Drug use: No   • Sexual activity: No     Other Topics Concern   • Not on file     Social History Narrative    HEME HX:    1.  Pancytopenia.  Bone marrow biopsy was done which was unremarkable.    2.  Nonimmune mediated hemolytic anemia, REUBEN negative, PNH also negative.        Social History     .  He is assisted with his ADLs and  Lives at [facility] for 4 years , since after amputation of right leg.    • Number of Children:3 , 2alive-1 son in [place].  A daughter in [place]    Occupational History      • [email or web address]         Former Smoker -- 1.00 packs/day for 33 years         Types:  Cigarettes         Quit date:  11/20/1988     •    •  Alcohol Use:  No     •  Drug Use:  No                          Family History   Problem Relation Age of Onset   • Diabetes Mother    • Diabetes Father    • Heart disease Father    • Diabetes Sister    • Diabetes Sister    • Diabetes Sister    • Diabetes Sister    • Diabetes Brother    • Diabetes Brother    • Diabetes Brother    • Diabetes Brother    • Diabetes Brother          REVIEW OF SYSTEMS:   GENERAL: Decreased activity. Decrease energy. No fever, chills, night sweats.   HEENT: No change in vision. Denies hearing loss. Denies hoarseness or swallowing problems.   CARDIAC: Mild, chronic shortness of breath. No chest pain. No palpitations, PND, orthopnea, 2+ edema.   RESPIRATORY: No cough or wheezing.   GASTROINTESTINAL: Denies abdominal pain, heartburn, nausea, emesis. Denies changes in bowel habits.   MUSCULOSKELETAL: Right BKA.  NEUROLOGIC: Denies headache, weakness, seizures, syncope.   PSYCHOLOGIC: Denies depression, memory loss, inappropriate mood swings.   SKIN: Denies easy bruisability, hair loss or rashes.     PHYSICAL EXAMINATION:      Visit Vitals   • /60 (BP Location: [facility], Patient Position: Sitting, Cuff Size: Regular)   • Pulse 82  Comment: apical,abnormal   • Resp 16   • Ht 5' 7\" (1.702 m)   • SpO2 99%  Comment: room air       GENERAL APPEARANCE: Chronically ill white male.  The patient is alert, oriented, sitting in the wheelchair in the exam room. Recent and remote memory are intact. Mood and affect are appropriate.   HEENT: Pupils are equal and reactive to light and accommodation. Conjunctiva clear. Sclera are non-red and anicteric. Extraocular movements are intact.   NECK: Carotid revealed no bruit or JVD. No thyromegaly or masses noted. Trachea is midline.   HEART: Apical regular rate and rhythm. S1 and S2 auscultated. No murmurs, gallops, or  rubs noted.   LUNGS: Lung sounds clear to auscultation throughout. Respirations even and nonlabored at rest. No rales, rhonchi, or wheezes noted.   EXTREMITIES: Show no clubbing, or cyanosis. 2+ edema. +2/4 radial pulses bilaterally, +2/4 posterior tibial pulses on left, right BKA.   SKIN: Color is pink, warm to touch, turgor is good, free from rashes.       TESTS REVIEWED:  EKG May 10, 2017:Atrial flutter with variable AV block, rate controlled.   EKG January 18, 2017: Atrial flutter, rate controlled.   EKG November 21, 2016: atrial flutter with variable AV block, rate controlled. Nonspecific ST changes.   EKG: November 24, 2015. Sinus rhythm with sinus arrhythmia.  EKG: August 24, 2015. Normal sinus rhythm. Heart rate 85 BPM. Normal.  EKG: August 17, 2015: Sinus bradycardia, ST abnormality.   EKG: April 22, 2015. Atrial flutter. ST abnormality.  EKG: September 15, 2014. Sinus rhythm. First-degree AV block.    Echocardiogram:June 30, 2016: EF 64%. Moderate LVH. PA pressure 55.4 mmHg. Mildly thickened mitral valve with no regurgitation. Mild aortic valve calcification and no regurgitation.     Lexiscan nuclear stress test: March 27, 2015. 45% ejection fraction. Inferior wall hypokinesia. No perfusion defect noted but inferior wall is not optimally seen. 64% ejection fraction. No perfusion defect.    Chest x-ray: June 29, 2016: Moderate Cardiomegaly. Moderate Vascular congestion..    Pertinent laboratory tests:  Lab Results   Component Value Date    SODIUM 139 05/10/2017    POTASSIUM 4.4 05/10/2017    CHLORIDE 100 05/10/2017    CO2 27 05/10/2017    BUN 25 (H) 05/10/2017    CREATININE 1.02 05/10/2017    GLUCOSE 173 (H) 05/10/2017     GFR Estimate, Non  (no units)   Date Value   05/10/2017 71      Lab Results   Component Value Date    WBC 5.0 05/10/2017    HCT 32.4 (L) 05/10/2017    HGB 10.9 (L) 05/10/2017     (L) 05/10/2017     TSH (mcUnits/mL)   Date Value   06/29/2016 0.257 (L)     Lab Results    Component Value Date    CHOLESTEROL 116 11/15/2016    HDL 20 (L) 11/15/2016    CALCLDL UNABLE TO CALCULATE LDL. 11/15/2016    TRIGLYCERIDE 476 (H) 11/15/2016     Lab Results   Component Value Date    AST 19 11/15/2016    GPT 53 11/15/2016    ALKPT 94 11/15/2016    BILIRUBIN 0.9 11/15/2016     Hemoglobin A1C (%)   Date Value   11/15/2016 5.2     GFR Estimate, Non  (no units)   Date Value   05/10/2017 71       IMPRESSION:   1. Atrial fibrillation: Paroxsymal. [id number] 5. Currently atrial flutter with controlled rate. Toprol has been restarted because of his atrial fibrillation. The decision has been made that the risk of Coumadin outweigh the benefits. Cannot use Coumadin with dropping hemoglobins and etiology not found.  2. Coronary artery disease. Status post CABG. Stable.  3. Cor pulmonale. Moderate. PA pressure 55.4 mmHg.  4. Hypertension: Stable.   5. Peripheral vascular disease. Status post left BKA.   6. Chronic kidney disease: Stage III. GFR 53-71.   7. Diabetes. A1c 5.2. Stable.  8. Congestive heart failure. Chronic diastolic. Compensated. Continue Toprol. Restart Lasix 40 mg  daily and potassium chloride 20 MEQ once daily. No aldactone related to chronic kidney disease.   9. Hyperlipidemia. Non-HDL cholesterol 96.. Triglycerides 76.    PREVENTIVE COUNSELING.  Discussed with patient and caregiver.  Discussed daily weights: Call with a weight gain greater than 3 pounds in one day or 5 pounds in one week.      TEST ORDERED:   BMP and CBC. 3 months    FOLLOW UP: 3 months    CC: [facility]. FAX: [phone].      On 5/10/2017, Ana SOLIMAN PA-S scribed the services personally performed by Ai Petersen MD  The documentation recorded by the scribe accurately and completely reflects the service(s) I personally performed and the decisions made by me.

## 2020-10-14 ENCOUNTER — VIRTUAL VISIT (OUTPATIENT)
Dept: NUTRITION | Facility: CLINIC | Age: 45
End: 2020-10-14
Payer: COMMERCIAL

## 2020-10-14 DIAGNOSIS — E11.65 TYPE 2 DIABETES MELLITUS WITH HYPERGLYCEMIA, WITHOUT LONG-TERM CURRENT USE OF INSULIN (H): Primary | ICD-10-CM

## 2020-10-14 PROCEDURE — 97803 MED NUTRITION INDIV SUBSEQ: CPT | Mod: 95

## 2020-10-14 NOTE — PATIENT INSTRUCTIONS
1. If you have a high blood glucose here and there, walking/movement is the best way to lower your blood glucose. Try going for a 5-10 minute walk.    2. To help control the fasting blood glucose, try to include some carbs with dinner and watch alcohol consumption to see if any particular foods, meals or beverages may be raising your blood glucose.  Sometimes too little carbs at night or alcohol and contribute to elevated morning blood glucose.    3. Continue to eat fruits and vegetables with each meal.  Eating more plant based foods, whole grains and lean meats helps to keep you healthy and aids weight loss.    4.  You should not have any trouble with very low blood glucose as you are not taking medication for diabetes but sometimes if we are more active and go a long time between meals, may feel shaky, hungry or see a mood change- usually eating food (fruit) will help raise your blood glucose so you feel better.    5. As far as your blurred vision, would recommend seeing the eye doctor for your yearly diabetes eye exam.    May also be good to follow up with [facility] and check A1C again.      If blood glucose is rising and we are missing it, can try the weekly GLP-1 and may be helpful to check blood glucose 2 hours after you start eating (should be <180 mg/dL).     FOLLOW UP: Call for a follow up as needed or if you are struggling or blood glucose is rising on you.    Stephanie Boss RDN, LD, [facility]   [phone]

## 2020-10-14 NOTE — Clinical Note
Brian Abdul,   Just SAGRARIO- had follow up with Archie today and he mentions he is having issues with blurred vision the past 1-2 months.  I had recommended he make his yearly diabetes eye exam and follow up with both eye doctor and you to possibly check the A1C again.  He did not report blood glucose >200 mg/dL but he was checking mostly before meals.    Thanks,  Stephanie Boss,  JEFF, LD, [facility]

## 2020-10-14 NOTE — PROGRESS NOTES
Patient not in room by 10:08am so will send text invite as this was the only way patient has joined in the past.      Patient in room by 10:14pm but could not hear him x2 so called by phone instead.    Patient verbally consented to the telephone visit service today: yes    Medical Nutrition Therapy for Diabetes  Visit Type:Reassessment and intervention    Archie Wallace presents today for MNT and education related to type 2 diabetes and weight management.   He is accompanied by self.     ASSESSMENT:   Patient comments/concerns relating to nutrition: Wanted to know what he can do when blood glucose is high or low.  Says blood glucose has been 168-180's. Says no changes.     Worried about blurred vision - has been occurring the past couple of months.   NUTRITION HISTORY:    Breakfast: fried fish and 2 slices bread   Lunch: none  Dinner: 8-8:30pm: Burger and fries OR fried fish and 2 slices of bread   Snacks: none OR   Beverages: Alcohol 2 drinks on Saturday night and Water/flavored water all day    Misses meals? lunch  Eats out:  2-3 meals/per month     Previous diet education:  Yes     Food allergies/intolerances: none    Diet is high in: fat (unsaturated)  Diet is low in: calcium, fiber, fruits and vegetables    EXERCISE: Walking with a cane and now walking around the house.     SOCIO/ECONOMIC:   Lives with: spouse    BLOOD GLUCOSE MONITORING:  Patient glucose self monitoring as follows: one time daily.   BG meter: Accu-Chek Guide Me meter  BG results: Patient reports 168-180 in the morning and 's in the evening.   10/13: 121  10/12: 113, -, -/116  10/11: 138, -, -, 168  10/10: 119  10/9: 106  10/8: 111  10/7: 171, -, -, 122  10/6: 109, -, -, 90  10/5: 123  10/4: 110, -, -, 99  10/3: 142, -, 121, -  10/2: 173, -, - 117    BG values are: Not in goal  Patient's most recent   Lab Results   Component Value Date    A1C 6.6 07/17/2020    is meeting goal of <7.0    MEDICATIONS:  Current Outpatient Medications    Medication     albuterol (PROAIR HFA) 108 (90 Base) MCG/ACT inhaler     alcohol swab prep pads     amLODIPine (NORVASC) 10 MG tablet     aspirin 81 MG EC tablet     atorvastatin (LIPITOR) 40 MG tablet     blood glucose (ACCU-CHEK GUIDE) test strip     blood glucose (NO BRAND SPECIFIED) test strip     blood glucose calibration (NO BRAND SPECIFIED) solution     blood glucose monitoring (ACCU-CHEK FASTCLIX) lancets     blood glucose monitoring (NO BRAND SPECIFIED) meter device kit     Blood Glucose Monitoring Suppl (ACCU-CHEK GUIDE ME) w/Device KIT     losartan (COZAAR) 100 MG tablet     methocarbamol (ROBAXIN) 500 MG tablet     order for DME     ORDER FOR DME     ORDER FOR DME     thin (NO BRAND SPECIFIED) lancets     No current facility-administered medications for this visit.        LABS:  Lab Results   Component Value Date    A1C 6.6 07/17/2020     Lab Results   Component Value Date     07/17/2020     Lab Results   Component Value Date     07/17/2020     HDL Cholesterol   Date Value Ref Range Status   07/17/2020 55 >39 mg/dL Final   ]  GFR Estimate   Date Value Ref Range Status   07/17/2020 >90 >60 mL/min/[1.73_m2] Final     Comment:     Non  GFR Calc  Starting 12/18/2018, serum creatinine based estimated GFR (eGFR) will be   calculated using the Chronic Kidney Disease Epidemiology Collaboration   (CKD-EPI) equation.       GFR Estimate If Black   Date Value Ref Range Status   07/17/2020 >90 >60 mL/min/[1.73_m2] Final     Comment:      GFR Calc  Starting 12/18/2018, serum creatinine based estimated GFR (eGFR) will be   calculated using the Chronic Kidney Disease Epidemiology Collaboration   (CKD-EPI) equation.       Lab Results   Component Value Date    CR 0.75 07/17/2020     No results found for: MICROALBUMIN    ANTHROPOMETRICS:  Vitals: There were no vitals taken for this visit.  Estimated body mass index is 46.09 kg/m  as calculated from the following:    Height as  of 7/17/20: 1.829 m (6').    Weight as of 7/17/20: 154.1 kg (339 lb 12.8 oz).        Wt Readings from Last 5 Encounters:   07/17/20 (!) 154.1 kg (339 lb 12.8 oz)   05/04/20 134.7 kg (297 lb)   09/22/17 (!) 160.3 kg (353 lb 8 oz)   10/10/16 (!) 157 kg (346 lb 3.2 oz)   06/10/15 (!) 158.6 kg (349 lb 9.6 oz)       Weight Change: unable to assess- no recent weight since last visit    ESTIMATED KCAL REQUIREMENTS:  2958 kcal per day (based on last clinic weight on 7/17/20)    NUTRITION DIAGNOSIS: Overweight/Obesity related to excessive energy intake as evidenced by low fruits and vegetables intake per diet recall and BMI>40.    NUTRITION INTERVENTION:  Nutrition Prescription: Carbohydrate Intake: 45-60 grams/meal and 15-30 grams/snacks  Education given to support: general nutrition guidelines, weight reduction, consistent meals, carb counting, sodium, fat modification, exercise, fiber, behavior modification, portion control and heart healthy diet  Motivational Interviewing    Discussion: In the past 2 weeks, patient has seen 67% fasting and 86% evening blood glucose in target.  He is currently not on medication and last A1C in target (6.6%) so no changes recommended today.  Encouraged Archie to keep working on eating fruits and vegetables at meals, including whole grains, lean meats and healthy preparation methods to stay healthy with diabetes.      Answered his questions relating to high and low blood glucose.  Suggested he walk around/exercise when blood glucose high to help lower and also problem solve possible reasons for elevated blood glucose (food or snacks, not feeling well, alcohol related, etc).  If he notices more elevated blood glucose, encouraged to reach out since can check into medication options to improve control since currently not taking any diabetes medication.  Reviewed symptoms of hypoglycemia but not expected in patient since not on medications.  If long time without meal and more active, snack would  be ok to help him feel better.     In regard to blurred vision, recommended seeing eye doctor for annual diabetes comprehensive eye exam as well as follow up with [facility] to check A1C to ensure not missing elevated blood glucose. He did not report any blood glucose >200 mg/dL but most were before meals.  Also suggested he could try checking blood glucose more often after meals as this would be time blood glucose is highest.  Pt verbalized understanding of concepts discussed and recommendations provided.       PATIENT'S BEHAVIOR CHANGE GOALS:   See Patient Instructions for patient stated behavior change goals. AVS sent by email.    MONITOR / EVALUATE:  RD will monitor/evaluate:  Blood Glucose / A1c  Food / Beverage / Nutrient intake   Readiness to change nutrition-related behaviors    FOLLOW-UP:  Follow up with RD as needed.  Call RD with questions/concerns. Patient declines follow up as he feels he is on the right track. He was encouraged to call if struggling or seeing more elevated blood glucose.    Stephanie Boss RDN, LD, CDCES   Time spent in minutes: 30 minutes  Encounter: Individual telephone visit    Email requested by patient for AVS ([facility] not read ones [facility] past couple [facility]):    Brian Almanza,    Here is a summary of what we discussed today:     1. If you have a high blood glucose here and there, walking/movement is the best way to lower your blood glucose. Try going for a 5-10 minute walk.    2. To help control the fasting blood glucose, try to include some carbs with dinner and watch alcohol consumption to see if any particular foods, meals or beverages may be raising your blood glucose.  Sometimes too little carbs at night or alcohol and contribute to elevated morning blood glucose.    3. Continue to eat fruits and vegetables with each meal.  Eating more plant based foods, whole grains and lean meats helps to keep you healthy and aids weight loss.    4.  You should not have any trouble with  very low blood glucose as you are not taking medication for diabetes but sometimes if we are more active and go a long time between meals, may feel shaky, hungry or see a mood change- usually eating food (fruit) will help raise your blood glucose so you feel better.    5. As far as your blurred vision, would recommend seeing the eye doctor for your yearly diabetes eye exam.    Would also be good to follow up with [facility] and check A1C again.      If blood glucose is rising and we are missing it, can try the weekly GLP-1 and may be helpful to check blood glucose 2 hours after you start eating (should be <180 mg/dL).     FOLLOW UP: Call for a follow up as needed or if you are struggling or blood glucose is rising on you.    Thanks

## 2020-11-09 NOTE — PROGRESS NOTES
Chief Complaint - Nasal obstruction    History of Present Illness - Archie Wallace is a 45 year old male who presents for evaluation of nasal obstruction. The patient describes symptoms of troubles breathing through both sides of the nose for the past many years. His wife is with and agrees it has been for years. He has a poor sense of smell. He sometimes gets sinus infections. He uses a CPAP for BRONWYN, but still snores. The patient notes a h/o allergies. Treatments have included nasal steroids and oral antihistamines. The treatments seem to not help. some history of smoking. no epistaxis. He has a h/o of a UPPP. No hearing loss or ear infections. Some blurry vision. He has never worn glasses.    Past Medical History -   Patient Active Problem List   Diagnosis     Atopic rhinitis     Low back pain     Obstructive sleep apnea     Morbid obesity (H)     Hypertension goal BP (blood pressure) < 140/90     Hyperlipidemia with target LDL less than 100     CAD (coronary artery disease)     Moderate persistent asthma     Long QT interval syndrome     Type 2 diabetes mellitus with hyperglycemia, without long-term current use of insulin (H)     Moderate major depression (H)     History of MI (myocardial infarction)       Current Medications -   Current Outpatient Medications:      albuterol (PROAIR HFA) 108 (90 Base) MCG/ACT inhaler, Inhale 2 puffs into the lungs every 6 hours as needed, Disp: 1 Inhaler, Rfl: 5     alcohol swab prep pads, Use to swab area of injection/jessy as directed., Disp: 100 each, Rfl: 3     amLODIPine (NORVASC) 10 MG tablet, Take 1 tablet (10 mg) by mouth daily, Disp: 90 tablet, Rfl: 1     aspirin 81 MG EC tablet, Take 1 tablet (81 mg) by mouth daily, Disp: 90 tablet, Rfl: 3     atorvastatin (LIPITOR) 40 MG tablet, Take 1 tablet (40 mg) by mouth daily, Disp: 90 tablet, Rfl: 3     blood glucose (ACCU-CHEK GUIDE) test strip, Use to test blood sugar 1 time daily (vary the time before meals and bed)., Disp: 50  strip, Rfl: 3     blood glucose (NO BRAND SPECIFIED) test strip, Use to test blood sugar 1 times daily or as directed. To accompany: Blood Glucose Monitor Brands: per insurance., Disp: 100 strip, Rfl: 6     blood glucose calibration (NO BRAND SPECIFIED) solution, To accompany: Blood Glucose Monitor Brands: per insurance., Disp: 1 Bottle, Rfl: 3     blood glucose monitoring (ACCU-CHEK FASTCLIX) lancets, 1 each daily Use to test blood sugar 1 time daily., Disp: 102 each, Rfl: 3     blood glucose monitoring (NO BRAND SPECIFIED) meter device kit, Use to test blood sugar 1 times daily or as directed. Preferred blood glucose meter OR supplies to accompany: Blood Glucose Monitor Brands: per insurance., Disp: 1 kit, Rfl: 0     Blood Glucose Monitoring Suppl (ACCU-CHEK GUIDE ME) w/Device KIT, 1 Device daily Use to test blood sugar 1 times daily or as directed. Preferred blood glucose meter: per insurance., Disp: 1 kit, Rfl: 0     losartan (COZAAR) 100 MG tablet, Take 1 tablet (100 mg) by mouth daily, Disp: 90 tablet, Rfl: 1     methocarbamol (ROBAXIN) 500 MG tablet, Take 1 tablet (500 mg) by mouth 3 times daily as needed for muscle spasms, Disp: 30 tablet, Rfl: 1     order for DME, Equipment being ordered: CPAP--replacement with using prior settings. Additional supplies as needed with replacement machine.  Will need to contact the patient for information needed about the prior CPAP settings and use., Disp: 1 Units, Rfl: o     ORDER FOR DME, CPAP setting 19 cm H2O, Disp: 1 Units, Rfl: 0     ORDER FOR DME, CPAP, Disp: 1 Device, Rfl: 0     thin (NO BRAND SPECIFIED) lancets, Use with lanceting device. To accompany: Blood Glucose Monitor Brands: per insurance., Disp: 100 each, Rfl: 6    Allergies -   Allergies   Allergen Reactions     Biaxin [Clarithromycin]      Metformin      Reported breaking out with a skin reaction     Robafen Dm Cgh-Chest [Robitussin Cough-Chest Dm] Difficulty breathing     Tamiflu [Oseltamivir]         Social History -   Social History     Socioeconomic History     Marital status:      Spouse name: Not on file     Number of children: Not on file     Years of education: Not on file     Highest education level: Not on file   Occupational History     Not on file   Social Needs     Financial resource strain: Not on file     Food insecurity     Worry: Not on file     Inability: Not on file     Transportation needs     Medical: Not on file     Non-medical: Not on file   Tobacco Use     Smoking status: Former Smoker     Types: Cigars     Smokeless tobacco: Never Used     Tobacco comment: Smokes intermittently   Substance and Sexual Activity     Alcohol use: Yes     Comment: occ/ mod     Drug use: No     Sexual activity: Not Currently     Partners: Female   Lifestyle     Physical activity     Days per week: Not on file     Minutes per session: Not on file     Stress: Not on file   Relationships     Social connections     Talks on phone: Not on file     Gets together: Not on file     Attends Religion service: Not on file     Active member of club or organization: Not on file     Attends meetings of clubs or organizations: Not on file     Relationship status: Not on file     Intimate partner violence     Fear of current or ex partner: Not on file     Emotionally abused: Not on file     Physically abused: Not on file     Forced sexual activity: Not on file   Other Topics Concern     Parent/sibling w/ CABG, MI or angioplasty before 65F 55M? No      Service No     Blood Transfusions No     Caffeine Concern No     Occupational Exposure No     Hobby Hazards No     Sleep Concern Yes     Comment: BRONWYN     Stress Concern No     Weight Concern Yes     Comment: Morbidly obese     Special Diet No     Back Care Yes     Exercise Yes     Comment: Active at work in maintenance     Bike Helmet Not Asked     Seat Belt Yes     Self-Exams Yes   Social History Narrative     Not on file       Family History -   Family  History   Problem Relation Age of Onset     Diabetes Mother      Hypertension Mother      Diabetes Father      Diabetes Sister        Review of Systems - As per HPI and PMHx, otherwise 10+ comprehensive system review is negative.    Physical Exam  Ht 1.829 m (6')   Wt 136.5 kg (301 lb)   BMI 40.82 kg/m    General - The patient is in no distress. Alert and oriented to person and place, answers questions and cooperates with examination appropriately.   Neurologic - CN II-XII are grossly intact. No focal neurologic deficits.   Voice and Breathing - The patient was breathing comfortably without the use of accessory muscles. There was no wheezing, stridor, or stertor.  The patients voice was clear and strong.  Eyes - Extraocular movements intact. PERRL. Peripheral vision intact. Sclera were not icteric or injected, conjunctiva were pink and moist.  Mouth - Examination of the oral cavity showed pink, healthy oral mucosa. No lesions or ulcerations noted.  The tongue was mobile and midline, and the dentition were in good condition.    Throat - The walls of the oropharynx were smooth, pink, moist, symmetric, and had no lesions or ulcerations.  The tonsillar pillars and soft palate were symmetric.  The uvula was midline on elevation.  Neck -  Soft, non-tender. Palpation of the occipital, submental, submandibular, internal jugular chain, and supraclavicular nodes did not demonstrate any abnormal lymph nodes or masses. No parotid masses. Palpation of the thyroid was soft and smooth, with no nodules or goiter appreciated.  The trachea was mobile and midline.  Cardiovascular - carotid pulses are 2+ bilaterally, regular rhythm  Nose - External contour is symmetric, no gross deflection or scars.  Nasal mucosa is pink and moist with clear mucus. However the turbinates are moderately hypertrophic. The septum was midline and non-obstructive.  No polyps, masses, or purulence noted on examination anteriorly.    NASAL ENDOSCOPY - To  further evaluate the nasal cavity, I performed flexible nasal endoscopy, and color photographs were taken for the permanent medical record.  I first sprayed both sides of the nasal cavity with a mix of lidocaine and neosynephrine.  I then began on the right side using an adult flexible fiberoptic endoscope.  The septum was fairly straight and the nasal airway was open after decongestion of the the turbinates. The right middle turbinate and middle meatus were clearly visualized and normal in appearance.  Looking up, the olfactory cleft was unobstructed.  Going further back, the posterior nasal cavity was filled with either a mass or severe adenoid hypertrophy. There was clear or white mucous, no pus.     I then turned my attention to the left side.  Once again, the septum was fairly straight, and the airway was open. The left middle turbinate and middle meatus were clearly visualized and normal in appearance.  Looking up, the olfactory cleft was unobstructed.  Going further back again the nasopharynx had either a mass or severe adenoid hypertrophy.         A/P - Archie Wallace is a 45 year old male with nasal obstruction due to either adenoid hypertrophy, or possibly a nasopharyngeal mass. This seems centered on the nasopharynx and not coming from the sinuses or nasal cavity. I recommend surgical removal in the OR for diagnosis and treatment. We did discuss the possibility of a benign or malignant neoplasm. We discussed other risks including general anesthesia, bleeding, infection, regrowth, the need for additional procedures. He understands and wishes to proceed. We will call him to schedule.    Dong Worley MD  Otolaryngology  [facility]

## 2020-11-10 ENCOUNTER — OFFICE VISIT (OUTPATIENT)
Dept: OTOLARYNGOLOGY | Facility: CLINIC | Age: 45
End: 2020-11-10
Payer: COMMERCIAL

## 2020-11-10 ENCOUNTER — TELEPHONE (OUTPATIENT)
Dept: OTOLARYNGOLOGY | Facility: CLINIC | Age: 45
End: 2020-11-10

## 2020-11-10 VITALS — BODY MASS INDEX: 40.77 KG/M2 | WEIGHT: 301 LBS | HEIGHT: 72 IN

## 2020-11-10 DIAGNOSIS — J39.2 NASOPHARYNGEAL MASS: Primary | ICD-10-CM

## 2020-11-10 PROCEDURE — 31231 NASAL ENDOSCOPY DX: CPT | Performed by: OTOLARYNGOLOGY

## 2020-11-10 PROCEDURE — 99204 OFFICE O/P NEW MOD 45 MIN: CPT | Mod: 25 | Performed by: OTOLARYNGOLOGY

## 2020-11-10 ASSESSMENT — MIFFLIN-ST. JEOR: SCORE: 2283.33

## 2020-11-10 NOTE — TELEPHONE ENCOUNTER
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 Worley  Pre-Op Appt Date: 12-3-20  Post-Op Appt Date: 12-30-20   Packet sent out: Yes  Pre-cert/Authorization completed:   No prior auth needed, per [employer] online list  Date: 11/10/2020    Thank you,   Pam Sales   Prior Authorization [facility]  [phone]

## 2020-11-10 NOTE — LETTER
11/10/2020         RE: Archie Wallace  [address]  [place] [address]        Dear Colleague,    Thank you for referring your patient, Archie Wallace, to the [facility]. Please see a copy of my visit note below.    Chief Complaint - Nasal obstruction    History of Present Illness - Archie Wallace is a 45 year old male who presents for evaluation of nasal obstruction. The patient describes symptoms of troubles breathing through both sides of the nose for the past many years. His wife is with and agrees it has been for years. He has a poor sense of smell. He sometimes gets sinus infections. He uses a CPAP for BRONWYN, but still snores. The patient notes a h/o allergies. Treatments have included nasal steroids and oral antihistamines. The treatments seem to not help. some history of smoking. no epistaxis. He has a h/o of a UPPP. No hearing loss or ear infections. Some blurry vision. He has never worn glasses.    Past Medical History -   Patient Active Problem List   Diagnosis     Atopic rhinitis     Low back pain     Obstructive sleep apnea     Morbid obesity (H)     Hypertension goal BP (blood pressure) < 140/90     Hyperlipidemia with target LDL less than 100     CAD (coronary artery disease)     Moderate persistent asthma     Long QT interval syndrome     Type 2 diabetes mellitus with hyperglycemia, without long-term current use of insulin (H)     Moderate major depression (H)     History of MI (myocardial infarction)       Current Medications -   Current Outpatient Medications:      albuterol (PROAIR HFA) 108 (90 Base) MCG/ACT inhaler, Inhale 2 puffs into the lungs every 6 hours as needed, Disp: 1 Inhaler, Rfl: 5     alcohol swab prep pads, Use to swab area of injection/jessy as directed., Disp: 100 each, Rfl: 3     amLODIPine (NORVASC) 10 MG tablet, Take 1 tablet (10 mg) by mouth daily, Disp: 90 tablet, Rfl: 1     aspirin 81 MG EC tablet, Take 1 tablet (81 mg) by mouth daily, Disp: 90 tablet,  Rfl: 3     atorvastatin (LIPITOR) 40 MG tablet, Take 1 tablet (40 mg) by mouth daily, Disp: 90 tablet, Rfl: 3     blood glucose (ACCU-CHEK GUIDE) test strip, Use to test blood sugar 1 time daily (vary the time before meals and bed)., Disp: 50 strip, Rfl: 3     blood glucose (NO BRAND SPECIFIED) test strip, Use to test blood sugar 1 times daily or as directed. To accompany: Blood Glucose Monitor Brands: per insurance., Disp: 100 strip, Rfl: 6     blood glucose calibration (NO BRAND SPECIFIED) solution, To accompany: Blood Glucose Monitor Brands: per insurance., Disp: 1 Bottle, Rfl: 3     blood glucose monitoring (ACCU-CHEK FASTCLIX) lancets, 1 each daily Use to test blood sugar 1 time daily., Disp: 102 each, Rfl: 3     blood glucose monitoring (NO BRAND SPECIFIED) meter device kit, Use to test blood sugar 1 times daily or as directed. Preferred blood glucose meter OR supplies to accompany: Blood Glucose Monitor Brands: per insurance., Disp: 1 kit, Rfl: 0     Blood Glucose Monitoring Suppl (ACCU-CHEK GUIDE ME) w/Device KIT, 1 Device daily Use to test blood sugar 1 times daily or as directed. Preferred blood glucose meter: per insurance., Disp: 1 kit, Rfl: 0     losartan (COZAAR) 100 MG tablet, Take 1 tablet (100 mg) by mouth daily, Disp: 90 tablet, Rfl: 1     methocarbamol (ROBAXIN) 500 MG tablet, Take 1 tablet (500 mg) by mouth 3 times daily as needed for muscle spasms, Disp: 30 tablet, Rfl: 1     order for DME, Equipment being ordered: CPAP--replacement with using prior settings. Additional supplies as needed with replacement machine.  Will need to contact the patient for information needed about the prior CPAP settings and use., Disp: 1 Units, Rfl: o     ORDER FOR DME, CPAP setting 19 cm H2O, Disp: 1 Units, Rfl: 0     ORDER FOR DME, CPAP, Disp: 1 Device, Rfl: 0     thin (NO BRAND SPECIFIED) lancets, Use with lanceting device. To accompany: Blood Glucose Monitor Brands: per insurance., Disp: 100 each, Rfl:  6    Allergies -   Allergies   Allergen Reactions     Biaxin [Clarithromycin]      Metformin      Reported breaking out with a skin reaction     Robafen Dm Cgh-Chest [Robitussin Cough-Chest Dm] Difficulty breathing     Tamiflu [Oseltamivir]        Social History -   Social History     Socioeconomic History     Marital status:      Spouse name: Not on file     Number of children: Not on file     Years of education: Not on file     Highest education level: Not on file   Occupational History     Not on file   Social Needs     Financial resource strain: Not on file     Food insecurity     Worry: Not on file     Inability: Not on file     Transportation needs     Medical: Not on file     Non-medical: Not on file   Tobacco Use     Smoking status: Former Smoker     Types: Cigars     Smokeless tobacco: Never Used     Tobacco comment: Smokes intermittently   Substance and Sexual Activity     Alcohol use: Yes     Comment: occ/ mod     Drug use: No     Sexual activity: Not Currently     Partners: Female   Lifestyle     Physical activity     Days per week: Not on file     Minutes per session: Not on file     Stress: Not on file   Relationships     Social connections     Talks on phone: Not on file     Gets together: Not on file     Attends Sabianism service: Not on file     Active member of club or organization: Not on file     Attends meetings of clubs or organizations: Not on file     Relationship status: Not on file     Intimate partner violence     Fear of current or ex partner: Not on file     Emotionally abused: Not on file     Physically abused: Not on file     Forced sexual activity: Not on file   Other Topics Concern     Parent/sibling w/ CABG, MI or angioplasty before 65F 55M? No      Service No     Blood Transfusions No     Caffeine Concern No     Occupational Exposure No     Hobby Hazards No     Sleep Concern Yes     Comment: BRONWYN     Stress Concern No     Weight Concern Yes     Comment: Morbidly obese      Special Diet No     Back Care Yes     Exercise Yes     Comment: Active at work in maintenance     Bike Helmet Not Asked     Seat Belt Yes     Self-Exams Yes   Social History Narrative     Not on file       Family History -   Family History   Problem Relation Age of Onset     Diabetes Mother      Hypertension Mother      Diabetes Father      Diabetes Sister        Review of Systems - As per HPI and PMHx, otherwise 10+ comprehensive system review is negative.    Physical Exam  Ht 1.829 m (6')   Wt 136.5 kg (301 lb)   BMI 40.82 kg/m    General - The patient is in no distress. Alert and oriented to person and place, answers questions and cooperates with examination appropriately.   Neurologic - CN II-XII are grossly intact. No focal neurologic deficits.   Voice and Breathing - The patient was breathing comfortably without the use of accessory muscles. There was no wheezing, stridor, or stertor.  The patients voice was clear and strong.  Eyes - Extraocular movements intact. PERRL. Peripheral vision intact. Sclera were not icteric or injected, conjunctiva were pink and moist.  Mouth - Examination of the oral cavity showed pink, healthy oral mucosa. No lesions or ulcerations noted.  The tongue was mobile and midline, and the dentition were in good condition.    Throat - The walls of the oropharynx were smooth, pink, moist, symmetric, and had no lesions or ulcerations.  The tonsillar pillars and soft palate were symmetric.  The uvula was midline on elevation.  Neck -  Soft, non-tender. Palpation of the occipital, submental, submandibular, internal jugular chain, and supraclavicular nodes did not demonstrate any abnormal lymph nodes or masses. No parotid masses. Palpation of the thyroid was soft and smooth, with no nodules or goiter appreciated.  The trachea was mobile and midline.  Cardiovascular - carotid pulses are 2+ bilaterally, regular rhythm  Nose - External contour is symmetric, no gross deflection or scars.   Nasal mucosa is pink and moist with clear mucus. However the turbinates are moderately hypertrophic. The septum was midline and non-obstructive.  No polyps, masses, or purulence noted on examination anteriorly.    NASAL ENDOSCOPY - To further evaluate the nasal cavity, I performed flexible nasal endoscopy, and color photographs were taken for the permanent medical record.  I first sprayed both sides of the nasal cavity with a mix of lidocaine and neosynephrine.  I then began on the right side using an adult flexible fiberoptic endoscope.  The septum was fairly straight and the nasal airway was open after decongestion of the the turbinates. The right middle turbinate and middle meatus were clearly visualized and normal in appearance.  Looking up, the olfactory cleft was unobstructed.  Going further back, the posterior nasal cavity was filled with either a mass or severe adenoid hypertrophy. There was clear or white mucous, no pus.     I then turned my attention to the left side.  Once again, the septum was fairly straight, and the airway was open. The left middle turbinate and middle meatus were clearly visualized and normal in appearance.  Looking up, the olfactory cleft was unobstructed.  Going further back again the nasopharynx had either a mass or severe adenoid hypertrophy.         A/P - Archie Wallace is a 45 year old male with nasal obstruction due to either adenoid hypertrophy, or possibly a nasopharyngeal mass. This seems centered on the nasopharynx and not coming from the sinuses or nasal cavity. I recommend surgical removal in the OR for diagnosis and treatment. We did discuss the possibility of a benign or malignant neoplasm. We discussed other risks including general anesthesia, bleeding, infection, regrowth, the need for additional procedures. He understands and wishes to proceed. We will call him to schedule.    Dong Worley MD  Otolaryngology  [facility]        Again, thank you for allowing me to  participate in the care of your patient.        Sincerely,        Dong Worley MD

## 2020-11-25 ENCOUNTER — TELEPHONE (OUTPATIENT)
Dept: FAMILY MEDICINE | Facility: CLINIC | Age: 45
End: 2020-11-25

## 2020-11-25 NOTE — TELEPHONE ENCOUNTER
reliable medical supply forms placed in [facility] for completion.    Guerita MITCHELL, Patient Care

## 2020-11-29 ENCOUNTER — HEALTH MAINTENANCE LETTER (OUTPATIENT)
Age: 45
End: 2020-11-29

## 2020-11-30 NOTE — TELEPHONE ENCOUNTER
Signed and in TC basket for 1st floor located on 2nd floor.   Also, please remind him he's due to follow up with sleep medicine (he was due in July according to sleep specialist note)

## 2020-12-03 ENCOUNTER — OFFICE VISIT (OUTPATIENT)
Dept: FAMILY MEDICINE | Facility: CLINIC | Age: 45
End: 2020-12-03
Payer: COMMERCIAL

## 2020-12-03 VITALS
HEART RATE: 84 BPM | HEIGHT: 72 IN | BODY MASS INDEX: 42.66 KG/M2 | SYSTOLIC BLOOD PRESSURE: 176 MMHG | WEIGHT: 315 LBS | DIASTOLIC BLOOD PRESSURE: 108 MMHG | TEMPERATURE: 98.3 F | OXYGEN SATURATION: 99 %

## 2020-12-03 DIAGNOSIS — I45.81 LONG QT INTERVAL SYNDROME: ICD-10-CM

## 2020-12-03 DIAGNOSIS — I10 HYPERTENSION GOAL BP (BLOOD PRESSURE) < 140/90: ICD-10-CM

## 2020-12-03 DIAGNOSIS — E78.5 HYPERLIPIDEMIA WITH TARGET LDL LESS THAN 100: ICD-10-CM

## 2020-12-03 DIAGNOSIS — I25.2 HISTORY OF MI (MYOCARDIAL INFARCTION): ICD-10-CM

## 2020-12-03 DIAGNOSIS — E66.01 MORBID OBESITY (H): ICD-10-CM

## 2020-12-03 DIAGNOSIS — I25.10 CORONARY ARTERY DISEASE INVOLVING NATIVE HEART, ANGINA PRESENCE UNSPECIFIED, UNSPECIFIED VESSEL OR LESION TYPE: ICD-10-CM

## 2020-12-03 DIAGNOSIS — G47.33 OBSTRUCTIVE SLEEP APNEA: ICD-10-CM

## 2020-12-03 DIAGNOSIS — Z01.818 PREOP GENERAL PHYSICAL EXAM: Primary | ICD-10-CM

## 2020-12-03 DIAGNOSIS — E11.65 TYPE 2 DIABETES MELLITUS WITH HYPERGLYCEMIA, WITHOUT LONG-TERM CURRENT USE OF INSULIN (H): ICD-10-CM

## 2020-12-03 LAB
ANION GAP SERPL CALCULATED.3IONS-SCNC: <1 MMOL/L (ref 3–14)
BUN SERPL-MCNC: 12 MG/DL (ref 7–30)
CALCIUM SERPL-MCNC: 9 MG/DL (ref 8.5–10.1)
CHLORIDE SERPL-SCNC: 106 MMOL/L (ref 94–109)
CO2 SERPL-SCNC: 36 MMOL/L (ref 20–32)
CREAT SERPL-MCNC: 0.71 MG/DL (ref 0.66–1.25)
GFR SERPL CREATININE-BSD FRML MDRD: >90 ML/MIN/{1.73_M2}
GLUCOSE SERPL-MCNC: 119 MG/DL (ref 70–99)
HBA1C MFR BLD: 5.8 % (ref 0–5.6)
HGB BLD-MCNC: 14.4 G/DL (ref 13.3–17.7)
POTASSIUM SERPL-SCNC: 3.7 MMOL/L (ref 3.4–5.3)
SODIUM SERPL-SCNC: 141 MMOL/L (ref 133–144)

## 2020-12-03 PROCEDURE — 85018 HEMOGLOBIN: CPT | Performed by: NURSE PRACTITIONER

## 2020-12-03 PROCEDURE — 36415 COLL VENOUS BLD VENIPUNCTURE: CPT | Performed by: NURSE PRACTITIONER

## 2020-12-03 PROCEDURE — 99215 OFFICE O/P EST HI 40 MIN: CPT | Performed by: NURSE PRACTITIONER

## 2020-12-03 PROCEDURE — 83036 HEMOGLOBIN GLYCOSYLATED A1C: CPT | Performed by: NURSE PRACTITIONER

## 2020-12-03 PROCEDURE — 80048 BASIC METABOLIC PNL TOTAL CA: CPT | Performed by: NURSE PRACTITIONER

## 2020-12-03 PROCEDURE — 93000 ELECTROCARDIOGRAM COMPLETE: CPT | Performed by: NURSE PRACTITIONER

## 2020-12-03 RX ORDER — CLONIDINE HYDROCHLORIDE 0.1 MG/1
0.1 TABLET ORAL 2 TIMES DAILY
Qty: 60 TABLET | Refills: 1 | Status: SHIPPED | OUTPATIENT
Start: 2020-12-03 | End: 2021-06-02

## 2020-12-03 ASSESSMENT — MIFFLIN-ST. JEOR: SCORE: 2387.65

## 2020-12-03 NOTE — PROGRESS NOTES
[address] [address]  [phone]  Dept: [phone]    PRE-OP EVALUATION:  Today's date: 12/3/2020    Archie Wallace (: 1975) presents for pre-operative evaluation assessment as requested by Dr. Worley.  He requires evaluation and anesthesia risk assessment prior to undergoing surgery/procedure for treatment of nasal polyp.    Proposed Surgery/ Procedure: NASAL ENDOSCOPY  Date of Surgery/ Procedure: 2020  Time of Surgery/ Procedure: [facility]   Hospital/Surgical Facility: [facility]    Surgery Fax Number: [phone]  Primary Physician: No Ref-Primary, Physician  Type of Anesthesia Anticipated: General    Preoperative Questionnaire:   YES - HAVE YOU EVER HAD A HEART ATTACK OR STROKE? Heart attack  YES - HAVE YOU EVER HAD SURGERY ON YOUR HEART OR BLOOD VESSELS, SUCH AS A STENT, CORONARY (HEART) BYPASS, OR SURGERY ON AN ARTERY IN THE HEAD, NECK, HEART, OR LEGS? Angioplasty   No - Do you have chest pain when you are physically active?  No - Do you have a history of heart failure?  No - Do you currently have a cold, bronchitis, or symptoms of other respiratory (head and chest) infections?  No - Do you have a cough, shortness of breath, or wheezing?  No - Do you or anyone in your family have a history of blood clots?  No - Do you or anyone in your family have a serious bleeding problem, such as long-lasting bleeding after surgeries or cuts?  No - Have you ever had anemia or been told to take iron pills?  No - Have you had any abnormal blood loss such as black, tarry or bloody stools, or abnormal vaginal bleeding?  No - Have you ever had a blood transfusion?  Yes - Are you willing to have a blood transfusion if it is medically needed before, during, or after your surgery?  No - Have you or anyone in your family ever had problems with anesthesia (sedation for surgery)?  YES - DO YOU HAVE SLEEP APNEA, EXCESSIVE SNORING, OR DAYTIME  DROWSINESS? Sleep apnea  DO YOU HAVE A CPAP MACHINE? YES  No - Do you have any artifical heart valves or other implanted medical devices, such as a pacemaker, defibrillator, or continuous glucose monitor?  No - Do you have any artifical joints?  No - Are you allergic to latex?  No - Is there any chance that you may be pregnant?    Patient has a Health Care Directive or Living Will:  NO    HPI:     HPI related to upcoming procedure: 45 year old male presents for pre-operative evaluation for nasal endoscopy for nasal polyp. Mr. Wallace reports that he has had difficulty breathing out of his nose for several years.       ASTHMA - Patient has a longstanding history of moderate-severe Asthma . Patient has been doing well overall noting MACK and continues on medication regimen consisting of albuterol without adverse reactions or side effects.     CAD - Patient has a longstanding history of moderate-severe CAD. Patient denies recent chest pain or NTG use, denies exercise induced dyspnea or PND. Saw cardiology August 2020 who recommended EKG and stress test. Stress test 12/9/2020 - normal. EKG 12/3/20 in clinic - normal.     DIABETES - Patient has a longstanding history of DiabetesType Type II . Patient is being treated with diet and denies significant side effects. Control has been good. Complicating factors include but are not limited to: hypertension, hyperlipidemia, CAD/PVD and morbid obesity .     HYPERLIPIDEMIA - Patient has a long history of significant Hyperlipidemia requiring medication for treatment with recent good control. Patient reports no problems or side effects with the medication.     HYPERTENSION - Patient has longstanding history of HTN , currently denies any symptoms referable to elevated blood pressure. Specifically denies chest pain, palpitations, dyspnea, orthopnea, PND or peripheral edema. Blood pressure readings have not been in normal range. Current medication regimen is as listed below. Patient  denies any side effects of medication. He previously was on losartan-hydrochlorothiazide but did not like the frequent urination that came with the diuretic so he stopped taking the combination pill all together.  BP 160s/101-105 at home, HR he can't remember    SLEEP APNEA - uses CPAP    Leaving on 12/10-14 for [facility]ph[facility]campo    MEDICAL HISTORY:     Patient Active Problem List    Diagnosis Date Noted     History of MI (myocardial infarction) 07/17/2020     Priority: Medium     Type 2 diabetes mellitus with hyperglycemia, without long-term current use of insulin (H) 10/10/2016     Priority: Medium     Moderate major depression (H) 10/10/2016     Priority: Medium     Long QT interval syndrome      Priority: Medium     Moderate persistent asthma 04/19/2011     Priority: Medium     Hyperlipidemia with target LDL less than 100      Priority: Medium     Diagnosis updated by automated process. Provider to review and confirm.       CAD (coronary artery disease)      Priority: Medium     Hypertension goal BP (blood pressure) < 140/90 03/18/2011     Priority: Medium     Morbid obesity (H) 02/18/2010     Priority: Medium     Atopic rhinitis 02/09/2010     Priority: Medium     (Problem list name updated by automated process. Provider to review and confirm.)       Low back pain 02/09/2010     Priority: Medium     Obstructive sleep apnea 02/09/2010     Priority: Medium      Past Medical History:   Diagnosis Date     CAD (coronary artery disease)     Premature artery disease noted     Hyperlipidaemia LDL goal <100      Hypertension 2007     Hypertension goal BP (blood pressure) < 140/90      Long QT interval syndrome      MEDICAL HISTORY OF -     History of rhabdomyolosis, from strenuous exercise     Mild persistent asthma      Sleep apnea syndrome      Type 2 diabetes, HbA1c goal < 7% (H)      Past Surgical History:   Procedure Laterality Date     ANGIOGRAM       ANGIOPLASTY  2001    Coronary artery angioplasty//associated with mild  AMI     TONSILLECTOMY  2005    With uvuloectomy     Current Outpatient Medications   Medication Sig Dispense Refill     albuterol (PROAIR HFA) 108 (90 Base) MCG/ACT inhaler Inhale 2 puffs into the lungs every 6 hours as needed 1 Inhaler 5     alcohol swab prep pads Use to swab area of injection/jessy as directed. 100 each 3     amLODIPine (NORVASC) 10 MG tablet Take 1 tablet (10 mg) by mouth daily 90 tablet 1     aspirin 81 MG EC tablet Take 1 tablet (81 mg) by mouth daily 90 tablet 3     atorvastatin (LIPITOR) 40 MG tablet Take 1 tablet (40 mg) by mouth daily 90 tablet 3     blood glucose (ACCU-CHEK GUIDE) test strip Use to test blood sugar 1 time daily (vary the time before meals and bed). 50 strip 3     blood glucose (NO BRAND SPECIFIED) test strip Use to test blood sugar 1 times daily or as directed. To accompany: Blood Glucose Monitor Brands: per insurance. 100 strip 6     blood glucose calibration (NO BRAND SPECIFIED) solution To accompany: Blood Glucose Monitor Brands: per insurance. 1 Bottle 3     blood glucose monitoring (ACCU-CHEK FASTCLIX) lancets 1 each daily Use to test blood sugar 1 time daily. 102 each 3     blood glucose monitoring (NO BRAND SPECIFIED) meter device kit Use to test blood sugar 1 times daily or as directed. Preferred blood glucose meter OR supplies to accompany: Blood Glucose Monitor Brands: per insurance. 1 kit 0     Blood Glucose Monitoring Suppl (ACCU-CHEK GUIDE ME) w/Device KIT 1 Device daily Use to test blood sugar 1 times daily or as directed. Preferred blood glucose meter: per insurance. 1 kit 0     losartan (COZAAR) 100 MG tablet Take 1 tablet (100 mg) by mouth daily 90 tablet 1     methocarbamol (ROBAXIN) 500 MG tablet Take 1 tablet (500 mg) by mouth 3 times daily as needed for muscle spasms 30 tablet 1     order for DME Equipment being ordered: CPAP--replacement with using prior settings. Additional supplies as needed with replacement machine.    Will need to contact the  patient for information needed about the prior CPAP settings and use. 1 Units o     ORDER FOR DME CPAP setting 19 cm H2O 1 Units 0     ORDER FOR DME CPAP 1 Device 0     thin (NO BRAND SPECIFIED) lancets Use with lanceting device. To accompany: Blood Glucose Monitor Brands: per insurance. 100 each 6     OTC products: NSAIDS - ibuprofen. Last use about 1 week ago.     Allergies   Allergen Reactions     Biaxin [Clarithromycin]      Metformin      Reported breaking out with a skin reaction     Robafen Dm Cgh-Chest [Robitussin Cough-Chest Dm] Difficulty breathing     Tamiflu [Oseltamivir]       Latex Allergy: NO    Social History     Tobacco Use     Smoking status: Former Smoker     Types: Cigars     Smokeless tobacco: Never Used     Tobacco comment: Smokes intermittently   Substance Use Topics     Alcohol use: Yes     Comment: occ/ mod     History   Drug Use No       REVIEW OF SYSTEMS:   Except as noted above:  CONSTITUTIONAL: NEGATIVE for fever, chills, change in weight  INTEGUMENTARY/SKIN: NEGATIVE for worrisome rashes, moles or lesions  EYES: NEGATIVE for vision changes or irritation  ENT/MOUTH: NEGATIVE for ear, mouth and throat problems  RESP: NEGATIVE for significant cough or SOB  BREAST: NEGATIVE for masses, tenderness or discharge  CV: NEGATIVE for chest pain, palpitations or peripheral edema  GI: NEGATIVE for nausea, abdominal pain, heartburn, or change in bowel habits  : NEGATIVE for frequency, dysuria, or hematuria  MUSCULOSKELETAL: NEGATIVE for significant arthralgias or myalgia  NEURO: NEGATIVE for weakness, dizziness or paresthesias  ENDOCRINE: NEGATIVE for temperature intolerance, skin/hair changes  HEME: NEGATIVE for bleeding problems  PSYCHIATRIC: NEGATIVE for changes in mood or affect    EXAM:   BP (!) 176/108   Pulse 84   Temp 98.3  F (36.8  C) (Oral)   Ht 1.829 m (6')   Wt 147 kg (324 lb)   SpO2 99%   BMI 43.94 kg/m      GENERAL APPEARANCE: healthy, alert and no distress     EYES: EOMI,   PERRL     HENT: ear canals and TM's normal and nose and mouth without ulcers or lesions     NECK: no adenopathy, no asymmetry, masses, or scars and thyroid normal to palpation     RESP: lungs clear to auscultation - no rales, rhonchi or wheezes     CV: regular rates and rhythm, normal S1 S2, no S3 or S4 and no murmur, click or rub     ABDOMEN:  soft, nontender, no HSM or masses and bowel sounds normal     MS: extremities normal- no gross deformities noted, no evidence of inflammation in joints, FROM in all extremities.     SKIN: no suspicious lesions or rashes     NEURO: Normal strength and tone, sensory exam grossly normal, mentation intact and speech normal     PSYCH: mentation appears normal. and affect normal/bright     LYMPHATICS: No cervical adenopathy    DIAGNOSTICS:     Labs Resulted Today:   Results for orders placed or performed in visit on 12/03/20   Basic metabolic panel  (Ca, Cl, CO2, Creat, Gluc, K, Na, BUN)     Status: Abnormal   Result Value Ref Range    Sodium 141 133 - 144 mmol/L    Potassium 3.7 3.4 - 5.3 mmol/L    Chloride 106 94 - 109 mmol/L    Carbon Dioxide 36 (H) 20 - 32 mmol/L    Anion Gap <1 (L) 3 - 14 mmol/L    Glucose 119 (H) 70 - 99 mg/dL    Urea Nitrogen 12 7 - 30 mg/dL    Creatinine 0.71 0.66 - 1.25 mg/dL    GFR Estimate >90 >60 mL/min/[1.73_m2]    GFR Estimate If Black >90 >60 mL/min/[1.73_m2]    Calcium 9.0 8.5 - 10.1 mg/dL   Hemoglobin     Status: None   Result Value Ref Range    Hemoglobin 14.4 13.3 - 17.7 g/dL   Hemoglobin A1c     Status: Abnormal   Result Value Ref Range    Hemoglobin A1C 5.8 (H) 0 - 5.6 %   EKG 12-lead complete w/read - Clinics     Status: None    Narrative    Sinus rhythm  Rate 69      QTcH 433       Recent Labs   Lab Test 07/17/20  1035 04/21/20  0917 06/10/15  1501 06/10/15  1501 01/06/14  0000 01/06/14   HGB  --  14.5  --  14.1  --   --    PLT  --  294  --  303  --   --    INR  --   --   --   --   --  1.1    138   < > 140   < >  --     POTASSIUM 3.6 3.6   < > 3.3*   < > 3.3   CR 0.75 0.73   < > 0.69   < >  --    A1C 6.6* 6.6*   < > 6.8*   < >  --     < > = values in this interval not displayed.        IMPRESSION:   Reason for surgery/procedure: nasal polyp biopsy  Diagnosis/reason for consult: pre-operative evaluation    The proposed surgical procedure is considered INTERMEDIATE risk.    REVISED CARDIAC RISK INDEX  The patient has the following serious cardiovascular risks for perioperative complications such as (MI, PE, VFib and 3  AV Block):  Coronary Artery Disease (MI, positive stress test, angina, Qs on EKG)  INTERPRETATION: 1 risks: Class II (low risk - 0.9% complication rate)    The patient has the following additional risks for perioperative complications:  The ASCVD Risk score (Gisele ARCE Jr., et al., 2013) failed to calculate for the following reasons:    The patient has a prior MI or stroke diagnosis  Morbid obesity      ICD-10-CM    1. Preop general physical exam  Z01.818 EKG 12-lead complete w/read - Clinics     Basic metabolic panel  (Ca, Cl, CO2, Creat, Gluc, K, Na, BUN)     Hemoglobin     Hemoglobin A1c   2. History of MI (myocardial infarction)  I25.2    3. Coronary artery disease involving native heart, angina presence unspecified, unspecified vessel or lesion type  I25.10 Stress test done 12/9/2020 - normal per cardiology   4. Hyperlipidemia with target LDL less than 100  E78.5    5. Hypertension goal BP (blood pressure) < 140/90  I10 Added clonidine.   6. Long QT interval syndrome  I45.81    7. Morbid obesity (H)  E66.01    8. Obstructive sleep apnea  G47.33    9. Type 2 diabetes mellitus with hyperglycemia, without long-term current use of insulin (H)  E11.65          RECOMMENDATIONS:     --Consult hospital rounder / IM to assist post-op medical management    Cardiovascular Risk  Patient had pre-op stress test 12/9/20 and was interpreted by cardiologist as normal.      Obstructive Sleep Apnea (or suspected sleep apnea)  Patient  is to bring their home CPAP with them on the day of surgery      --Patient is to take all scheduled medications on the day of surgery EXCEPT for modifications listed below.    Anticoagulant or Antiplatelet Medication Use  ASPIRIN: Discontinue ASA 7-10 days prior to procedure to reduce bleeding risk.  It should be resumed post-operatively.        APPROVAL GIVEN to proceed with proposed procedure, without further diagnostic evaluation       Signed Electronically by: CECE Lopez CNP    Copy of this evaluation report is provided to requesting physician.    [facility] Preop Guidelines    Revised Cardiac Risk Index    This visit took place during the COVID 19 global pandemic.   PPE worn during the visit included: surgical mask and face shield by me and mask by patient

## 2020-12-03 NOTE — PATIENT INSTRUCTIONS
"Schedule stress echo [phone] ASAP - must be done and cleared by cardiology before you can have surgery    Patient Education    Preparing for Your Surgery  Getting started  A surgery nurse will call you to review your health history and instructions. They will give you an arrival time based on your scheduled surgery time.  Please be ready to share the following:    Your doctor's clinic name and phone number    Your medical, surgical and anesthesia history    A list of allergies and sensitivities    A list of medicines, including herbal treatments and over-the-counter drugs    Whether the patient has a legal guardian (ask how to send us the papers in advance)  If your child is having surgery, please ask for a copy of Preparing for Your Child's Surgery.    Preparing for surgery    Within 30 days of surgery: Have an exam at your family clinic (primary care clinic), or go to a pre-operative clinic. This exam is called a \"History and Physical,\" or H&P.    At your H&P exam, talk to your care team about all medicines you take. If you need to stop any medicines before surgery, ask when to start taking them again.  ? We do this for your safety. Many medicines can make you bleed too much during surgery. Some change how well surgery (anesthesia) drugs work.    Call your insurance company to see what it will and won't pay for. Ask if they need to pre-approve the surgery. (If no insurance, call [phone].)    Call your surgeon's clinic if there's any change in your health. This includes signs of a cold or flu (sore throat, runny nose, cough, rash, fever). It also includes a scrape or scratch near the surgery site.    If you have questions on the day of surgery, call your surgery center.  Eating and drinking guidelines  For your safety: Unless your surgeon tells you otherwise, follow the guidelines below.    Eat and drink as usual until 8 hours before surgery. After that, no food or milk.    Drink clear liquids until 2 " hours before surgery. These are liquids you can see through, like water, Gatorade and Propel Water. You may also have black coffee and tea (no cream or milk).    Nothing by mouth within 2 hours of surgery. This includes gum, candy and breath mints.    Stop alcohol the midnight before surgery.    If your family clinic tells you to take medicine on the morning of surgery, it's okay to take it with a sip of water.  Preventing infection    Shower or bathe the night before and morning of your surgery. Follow the instructions your clinic gave you. (If no instructions, use regular soap.)    Don't shave or clip hair near your surgery site. This can lead to skin infection.    Don't smoke the morning of surgery. Smoking increases the risk of infection. You may chew nicotine gum up to 2 hours before surgery. A nicotine patch is okay.  ? Note: Some surgeries require you to completely quit smoking and nicotine. Check with your surgeon.    Your care team will make every effort to keep you safe from infection. We will:  ? Clean our hands often with soap and water (or an alcohol-based hand rub).  ? Clean the skin at your surgery site with a special soap that kills germs. We'll also remove hair from the site as needed.  ? Wear special hair covers, masks, gowns and gloves during surgery.  ? Give antibiotic medicine, if prescribed. Not all surgeries need antibiotics.  What to bring on the day of surgery    Photo ID and insurance card    Copy of your health care directive, if you have one    Glasses and hearing aides (bring cases)  ? You can't wear contacts during surgery    Inhaler and eye drops, if you use them (tell us about these when you arrive)    CPAP machine or breathing device, if you use them    A few personal items, if spending the night    If you have . . .  ? A pacemaker or ICD (cardiac defibrillator): Bring the ID card.  ? An implanted stimulator: Bring the remote control.  ? A legal guardian: Bring a copy of the  certified (court-stamped) guardianship papers.  Please remove any jewelry, including body piercings. Leave jewelry and other valuables at home.  If you're going home the day of surgery  Important: If you don't follow the rules below, we must cancel your surgery.     Arrange for someone to drive you home after surgery. You may not drive, take a taxi or take public transportation by yourself (unless you'll have local anesthesia only).    Arrange for a responsible adult to stay with you overnight. If you don't, we may keep you in the hospital overnight, and you may need to pay the costs yourself.  Questions?   If you have any questions for your care team, list them here: _________________________________________________________________________________________________________________________________________________________________________________________________________________________________________________________________________________________________________________________  For informational purposes only. Not to replace the advice of your health care provider. Copyright   [address] [employer]. All rights reserved. Clinically reviewed by Nan Teran MD. [employer] [id number] - REV 07/19.

## 2020-12-04 ENCOUNTER — APPOINTMENT (OUTPATIENT)
Dept: OPTOMETRY | Facility: CLINIC | Age: 45
End: 2020-12-04
Payer: COMMERCIAL

## 2020-12-04 ENCOUNTER — OFFICE VISIT (OUTPATIENT)
Dept: OPTOMETRY | Facility: CLINIC | Age: 45
End: 2020-12-04
Payer: COMMERCIAL

## 2020-12-04 DIAGNOSIS — H53.021 REFRACTIVE AMBLYOPIA OF RIGHT EYE: ICD-10-CM

## 2020-12-04 DIAGNOSIS — H52.221 REGULAR ASTIGMATISM OF RIGHT EYE: ICD-10-CM

## 2020-12-04 DIAGNOSIS — Z01.01 ENCOUNTER FOR EXAMINATION OF EYES AND VISION WITH ABNORMAL FINDINGS: Primary | ICD-10-CM

## 2020-12-04 DIAGNOSIS — E11.9 TYPE 2 DIABETES MELLITUS WITHOUT RETINOPATHY (H): ICD-10-CM

## 2020-12-04 DIAGNOSIS — H52.13 MYOPIA OF BOTH EYES: ICD-10-CM

## 2020-12-04 DIAGNOSIS — H52.4 PRESBYOPIA: ICD-10-CM

## 2020-12-04 PROCEDURE — V2200 LENS SPHER BIFOC PLANO 4.00D: HCPCS | Mod: LT | Performed by: OPTOMETRIST

## 2020-12-04 PROCEDURE — V2020 VISION SVCS FRAMES PURCHASES: HCPCS | Performed by: OPTOMETRIST

## 2020-12-04 PROCEDURE — 92015 DETERMINE REFRACTIVE STATE: CPT | Performed by: OPTOMETRIST

## 2020-12-04 PROCEDURE — V2203 LENS SPHCYL BIFOCAL 4.00D/.1: HCPCS | Mod: RT | Performed by: OPTOMETRIST

## 2020-12-04 PROCEDURE — 92004 COMPRE OPH EXAM NEW PT 1/>: CPT | Performed by: OPTOMETRIST

## 2020-12-04 ASSESSMENT — VISUAL ACUITY
OS_SC: 20/30
OS_SC: 20/50
OD_PH_SC: 20/30
OD_SC: 20/50
OD_PH_SC+: -2
OD_SC+: -2
METHOD: SNELLEN - LINEAR
OD_SC: 20/40

## 2020-12-04 ASSESSMENT — REFRACTION_MANIFEST
OS_SPHERE: -1.00
OD_AXIS: 104
OD_CYLINDER: +1.75
METHOD_AUTOREFRACTION: 1
OD_SPHERE: -3.00
OS_CYLINDER: SPHERE
OD_SPHERE: -2.50
OD_AXIS: 082
OS_CYLINDER: SPHERE
OS_ADD: +2.00
OS_SPHERE: -1.00
OD_CYLINDER: +2.25
OD_ADD: +2.00

## 2020-12-04 ASSESSMENT — KERATOMETRY
OD_K2POWER_DIOPTERS: 48.00
OS_AXISANGLE2_DEGREES: 180
OS_K2POWER_DIOPTERS: 46.00
OD_K1POWER_DIOPTERS: 44.75
OS_K1POWER_DIOPTERS: 43.00
OD_AXISANGLE2_DEGREES: 3

## 2020-12-04 ASSESSMENT — TONOMETRY
IOP_METHOD: APPLANATION
OS_IOP_MMHG: 20
OD_IOP_MMHG: 22

## 2020-12-04 ASSESSMENT — CONF VISUAL FIELD
OD_NORMAL: 1
METHOD: COUNTING FINGERS
OS_NORMAL: 1

## 2020-12-04 ASSESSMENT — CUP TO DISC RATIO
OS_RATIO: 0.2
OD_RATIO: 0.2

## 2020-12-04 ASSESSMENT — SLIT LAMP EXAM - LIDS
COMMENTS: NORMAL
COMMENTS: NORMAL

## 2020-12-04 ASSESSMENT — EXTERNAL EXAM - LEFT EYE: OS_EXAM: NORMAL

## 2020-12-04 ASSESSMENT — EXTERNAL EXAM - RIGHT EYE: OD_EXAM: NORMAL

## 2020-12-04 NOTE — PATIENT INSTRUCTIONS
Patient educated on importance of good blood sugar control.  Letter sent to primary care provider with diabetic eye exam report.     Archie was advised of today's exam findings.  Fill glasses prescription  Copy of glasses Rx provided today. Discussed options of lined bifocal, no-line bifocal, distance-only and reading-only glasses.     Return in 1 year for eye exam, or sooner if needed.    The effects of the dilating drops last for 4- 6 hours.  You will be more sensitive to light and vision will be blurry up close.  Mydriatic sunglasses were given if needed.      Rogerio Mcallister O.D.  [address] [place]  [address]    [phone]

## 2020-12-04 NOTE — LETTER
"    12/4/2020         RE: Archie Wallace  [address]  [place] [address]        Dear Colleague,    Thank you for referring your patient, Archie Wallace, to the [facility]. Please see a copy of my visit note below.    Chief Complaint   Patient presents with     Diabetic Eye Exam     New to Eye Dept         Hemoglobin A1C   Date Value Ref Range Status   12/03/2020 5.8 (H) 0 - 5.6 % Final     Comment:     Normal <5.7% Prediabetes 5.7-6.4%  Diabetes 6.5% or higher - adopted from ADA   consensus guidelines.     07/17/2020 6.6 (H) 0 - 5.6 % Final     Comment:     Normal <5.7% Prediabetes 5.7-6.4%  Diabetes 6.5% or higher - adopted from ADA   consensus guidelines.     04/21/2020 6.6 (H) 0 - 5.6 % Final     Comment:     Normal <5.7% Prediabetes 5.7-6.4%  Diabetes 6.5% or higher - adopted from ADA   consensus guidelines.         Last Eye Exam: Not sure   Dilated Previously: Yes    What are you currently using to see?  does not use glasses or contacts    Distance Vision Acuity: Noticed gradual change in both eyes, some blurriness at times off in the distance     Near Vision Acuity: Not satisfied, trouble seeing small print. Adjusts the font on the phone     Eye Comfort: Usually ok, some times he feels \"pressure\" and sometime he is sensitive to sunlight   Do you use eye drops? : No  Occupation or Hobbies: [employer]     [employer] Optometric Assistant      Medical, surgical and family histories reviewed and updated 12/4/2020.       OBJECTIVE: See Ophthalmology exam    ASSESSMENT:    ICD-10-CM    1. Encounter for examination of eyes and vision with abnormal findings  Z01.01    2. Type 2 diabetes mellitus without retinopathy (H)  E11.9    3. Refractive amblyopia of right eye  H53.021    4. Myopia of both eyes  H52.13    5. Regular astigmatism of right eye  H52.221    6. Presbyopia  H52.4       PLAN:    Archie Wallace aware  eye exam results will be sent to No Ref-Primary, Physician.  Patient Instructions "   Patient educated on importance of good blood sugar control.  Letter sent to primary care provider with diabetic eye exam report.     Archie was advised of today's exam findings.  Fill glasses prescription  Copy of glasses Rx provided today. Discussed options of lined bifocal, no-line bifocal, distance-only and reading-only glasses.     Return in 1 year for eye exam, or sooner if needed.    The effects of the dilating drops last for 4- 6 hours.  You will be more sensitive to light and vision will be blurry up close.  Mydriatic sunglasses were given if needed.      Rogerio Mcallister O.D.  [address] [place]  [address]    [phone]               Again, thank you for allowing me to participate in the care of your patient.        Sincerely,        Rogerio Mcallister OD

## 2020-12-04 NOTE — PROGRESS NOTES
"Chief Complaint   Patient presents with     Diabetic Eye Exam     New to Eye Dept         Hemoglobin A1C   Date Value Ref Range Status   12/03/2020 5.8 (H) 0 - 5.6 % Final     Comment:     Normal <5.7% Prediabetes 5.7-6.4%  Diabetes 6.5% or higher - adopted from ADA   consensus guidelines.     07/17/2020 6.6 (H) 0 - 5.6 % Final     Comment:     Normal <5.7% Prediabetes 5.7-6.4%  Diabetes 6.5% or higher - adopted from ADA   consensus guidelines.     04/21/2020 6.6 (H) 0 - 5.6 % Final     Comment:     Normal <5.7% Prediabetes 5.7-6.4%  Diabetes 6.5% or higher - adopted from ADA   consensus guidelines.         Last Eye Exam: Not sure   Dilated Previously: Yes    What are you currently using to see?  does not use glasses or contacts    Distance Vision Acuity: Noticed gradual change in both eyes, some blurriness at times off in the distance     Near Vision Acuity: Not satisfied, trouble seeing small print. Adjusts the font on the phone     Eye Comfort: Usually ok, some times he feels \"pressure\" and sometime he is sensitive to sunlight   Do you use eye drops? : No  Occupation or Hobbies: [employer]     Grazyna Apple Optometric Assistant      Medical, surgical and family histories reviewed and updated 12/4/2020.       OBJECTIVE: See Ophthalmology exam    ASSESSMENT:    ICD-10-CM    1. Encounter for examination of eyes and vision with abnormal findings  Z01.01    2. Type 2 diabetes mellitus without retinopathy (H)  E11.9    3. Refractive amblyopia of right eye  H53.021    4. Myopia of both eyes  H52.13    5. Regular astigmatism of right eye  H52.221    6. Presbyopia  H52.4       PLAN:    Archie Wallace aware  eye exam results will be sent to No Ref-Primary, Physician.  Patient Instructions   Patient educated on importance of good blood sugar control.  Letter sent to primary care provider with diabetic eye exam report.     Archie was advised of today's exam findings.  Fill glasses prescription  Copy of glasses Rx provided today. " Discussed options of lined bifocal, no-line bifocal, distance-only and reading-only glasses.     Return in 1 year for eye exam, or sooner if needed.    The effects of the dilating drops last for 4- 6 hours.  You will be more sensitive to light and vision will be blurry up close.  Mydriatic sunglasses were given if needed.      Rogerio Mcallister O.D.  [facility]  [place]  [address]    [phone]

## 2020-12-09 ENCOUNTER — ANCILLARY PROCEDURE (OUTPATIENT)
Dept: CARDIOLOGY | Facility: CLINIC | Age: 45
End: 2020-12-09
Attending: INTERNAL MEDICINE
Payer: COMMERCIAL

## 2020-12-09 DIAGNOSIS — I25.118 CORONARY ARTERY DISEASE OF NATIVE ARTERY OF NATIVE HEART WITH STABLE ANGINA PECTORIS (H): ICD-10-CM

## 2020-12-09 PROCEDURE — 93018 CV STRESS TEST I&R ONLY: CPT | Performed by: INTERNAL MEDICINE

## 2020-12-09 PROCEDURE — 93352 ADMIN ECG CONTRAST AGENT: CPT | Performed by: INTERNAL MEDICINE

## 2020-12-09 PROCEDURE — 93325 DOPPLER ECHO COLOR FLOW MAPG: CPT | Performed by: INTERNAL MEDICINE

## 2020-12-09 PROCEDURE — 93321 DOPPLER ECHO F-UP/LMTD STD: CPT | Performed by: INTERNAL MEDICINE

## 2020-12-09 PROCEDURE — 93016 CV STRESS TEST SUPVJ ONLY: CPT | Performed by: INTERNAL MEDICINE

## 2020-12-09 PROCEDURE — 93350 STRESS TTE ONLY: CPT | Performed by: INTERNAL MEDICINE

## 2020-12-09 PROCEDURE — 93017 CV STRESS TEST TRACING ONLY: CPT | Performed by: INTERNAL MEDICINE

## 2020-12-09 RX ADMIN — Medication 3 ML: at 11:30

## 2020-12-10 ENCOUNTER — APPOINTMENT (OUTPATIENT)
Dept: OPTOMETRY | Facility: CLINIC | Age: 45
End: 2020-12-10
Payer: COMMERCIAL

## 2020-12-10 PROCEDURE — 92341 FIT SPECTACLES BIFOCAL: CPT | Performed by: OPTOMETRIST

## 2020-12-11 ENCOUNTER — TELEPHONE (OUTPATIENT)
Dept: FAMILY MEDICINE | Facility: CLINIC | Age: 45
End: 2020-12-11

## 2020-12-11 NOTE — TELEPHONE ENCOUNTER
This writer attempted to contact Archie on 12/11/20    Reason for call prescription  and left message to return call.    When patient calls back, please contact [facility]. routine priority.        Shun Cortez MA

## 2020-12-11 NOTE — TELEPHONE ENCOUNTER
Please call patient and be sure he picked up and started the clonidine ([employer] message sent 12/3 but it doesn't appear that he read it? Although we did discuss adding it during his pre-op appointment).  Please ask him to  Monitor his BP daily and if BP >140/90 to let us know.  Also, I believe he is in [place] right now so there might be a time change.  Thanks

## 2020-12-14 NOTE — TELEPHONE ENCOUNTER
Called and spoke with patient. He states he already picked up the clonidine and started taking it. He will monitor bp readings daily and contact clinic if any readings above 140/90.    Kelly Lockhart MA

## 2020-12-16 DIAGNOSIS — J39.2 NASOPHARYNGEAL MASS: ICD-10-CM

## 2020-12-16 LAB
LABORATORY COMMENT REPORT: NORMAL
SARS-COV-2 RNA SPEC QL NAA+PROBE: NEGATIVE
SARS-COV-2 RNA SPEC QL NAA+PROBE: NORMAL
SPECIMEN SOURCE: NORMAL
SPECIMEN SOURCE: NORMAL

## 2020-12-16 PROCEDURE — U0003 INFECTIOUS AGENT DETECTION BY NUCLEIC ACID (DNA OR RNA); SEVERE ACUTE RESPIRATORY SYNDROME CORONAVIRUS 2 (SARS-COV-2) (CORONAVIRUS DISEASE [COVID-19]), AMPLIFIED PROBE TECHNIQUE, MAKING USE OF HIGH THROUGHPUT TECHNOLOGIES AS DESCRIBED BY CMS-2020-01-R: HCPCS | Performed by: OTOLARYNGOLOGY

## 2020-12-17 ENCOUNTER — TRANSFERRED RECORDS (OUTPATIENT)
Dept: HEALTH INFORMATION MANAGEMENT | Facility: CLINIC | Age: 45
End: 2020-12-17

## 2020-12-29 NOTE — PROGRESS NOTES
History of Present Illness - Archie Wallace is a 45 year old male who is status post nasal endoscopy and adenoidectomy at [facility] on 12/17/2020. This was severely enlarged and I was concerned for possible cyst or tumor. Fortunately, the pathology was benign lymphoid and adenoid tissue. There was the expected amount of discomfort in the postoperative period, but at this point the patient is back to a regular diet, and not needing pain medication.  There was some bleeding, and he still gets this intermittently. He is breathing much better through his nose.    Exam -   General - The patient is in no acute distress.  They are alert and answer questions and cooperates with examination appropriately.   Mouth - Examination of the oral cavity shows pink, healthy, moist mucosa.  No lesions or ulceration noted. The tongue is mobile and midline. No blood. His oropharynx is scarred from prior UPPP.    NASAL ENDOSCOPY - To further evaluate the nasal cavity, I performed flexible nasal endoscopy.  I first sprayed both sides of the nasal cavity with a mix of lidocaine and neosynephrine.  I then began on the right side using a rigid pediatric fiberoptic endoscope.  The septum was fairly straight and the nasal airway was open.  No abnormal secretions, purulence, or polyps were noted. The right middle turbinate and middle meatus were clearly visualized and normal in appearance.  Looking up, the olfactory cleft was unobstructed. The nasopharynx was healing. No clots. I suction a couple spots, but nothing bled easily. No significant scarring. Overall he wouldn't sit still for the endoscopy. While I was able to see the nose on both sides I wasn't able to easily manipulate or suction to check all spots for potential bleeders.     I then turned my attention to the left side.  Once again, the septum was fairly straight, and the airway was open.  No abnormal secretions, purulence, polyps were noted.  The left middle turbinate and middle meatus were  clearly visualized and normal in appearance.  Looking up, the olfactory cleft was unobstructed.  Going further back the left sphenoethmoid recess was normal in appearance, and eustachian tube opening was unobstructed. Again the nasopharynx was healing well. No significant granulation or clots. He asked that I stop the scope procedure.           A/P - Archie Wallace has had nasal endoscopy and adenoidectomy. He has persistent epistaxis, he says both sides, out the front of nose and down the back. I didn't identify a bleeder today. He also wouldn't let me complete nasal endoscopy. I want him to try nasal saline irrigations to keep the nose moist. Return if bleeding fails to stop.

## 2020-12-30 ENCOUNTER — OFFICE VISIT (OUTPATIENT)
Dept: OTOLARYNGOLOGY | Facility: CLINIC | Age: 45
End: 2020-12-30
Payer: COMMERCIAL

## 2020-12-30 VITALS
SYSTOLIC BLOOD PRESSURE: 139 MMHG | WEIGHT: 301 LBS | RESPIRATION RATE: 16 BRPM | OXYGEN SATURATION: 97 % | BODY MASS INDEX: 40.77 KG/M2 | DIASTOLIC BLOOD PRESSURE: 75 MMHG | HEIGHT: 72 IN | HEART RATE: 104 BPM

## 2020-12-30 DIAGNOSIS — J39.2 NASOPHARYNGEAL MASS: Primary | ICD-10-CM

## 2020-12-30 PROCEDURE — 31231 NASAL ENDOSCOPY DX: CPT | Mod: 58 | Performed by: OTOLARYNGOLOGY

## 2020-12-30 PROCEDURE — 99024 POSTOP FOLLOW-UP VISIT: CPT | Performed by: OTOLARYNGOLOGY

## 2020-12-30 ASSESSMENT — PAIN SCALES - GENERAL: PAINLEVEL: NO PAIN (0)

## 2020-12-30 ASSESSMENT — MIFFLIN-ST. JEOR: SCORE: 2283.33

## 2020-12-30 NOTE — LETTER
12/30/2020         RE: Archie Wallace  [address]  [place] [address]        Dear Colleague,    Thank you for referring your patient, Archie Wallace, to the [facility]. Please see a copy of my visit note below.    History of Present Illness - Archie Wallace is a 45 year old male who is status post nasal endoscopy and adenoidectomy at [facility] on 12/17/2020. This was severely enlarged and I was concerned for possible cyst or tumor. Fortunately, the pathology was benign lymphoid and adenoid tissue. There was the expected amount of discomfort in the postoperative period, but at this point the patient is back to a regular diet, and not needing pain medication.  There was some bleeding, and he still gets this intermittently. He is breathing much better through his nose.    Exam -   General - The patient is in no acute distress.  They are alert and answer questions and cooperates with examination appropriately.   Mouth - Examination of the oral cavity shows pink, healthy, moist mucosa.  No lesions or ulceration noted. The tongue is mobile and midline. No blood. His oropharynx is scarred from prior UPPP.    NASAL ENDOSCOPY - To further evaluate the nasal cavity, I performed flexible nasal endoscopy.  I first sprayed both sides of the nasal cavity with a mix of lidocaine and neosynephrine.  I then began on the right side using a rigid pediatric fiberoptic endoscope.  The septum was fairly straight and the nasal airway was open.  No abnormal secretions, purulence, or polyps were noted. The right middle turbinate and middle meatus were clearly visualized and normal in appearance.  Looking up, the olfactory cleft was unobstructed. The nasopharynx was healing. No clots. I suction a couple spots, but nothing bled easily. No significant scarring. Overall he wouldn't sit still for the endoscopy. While I was able to see the nose on both sides I wasn't able to easily manipulate or suction to check all spots for  potential bleeders.     I then turned my attention to the left side.  Once again, the septum was fairly straight, and the airway was open.  No abnormal secretions, purulence, polyps were noted.  The left middle turbinate and middle meatus were clearly visualized and normal in appearance.  Looking up, the olfactory cleft was unobstructed.  Going further back the left sphenoethmoid recess was normal in appearance, and eustachian tube opening was unobstructed. Again the nasopharynx was healing well. No significant granulation or clots. He asked that I stop the scope procedure.           A/P - Archie Wallace has had nasal endoscopy and adenoidectomy. He has persistent epistaxis, he says both sides, out the front of nose and down the back. I didn't identify a bleeder today. He also wouldn't let me complete nasal endoscopy. I want him to try nasal saline irrigations to keep the nose moist. Return if bleeding fails to stop.           Again, thank you for allowing me to participate in the care of your patient.        Sincerely,        Dong Worley MD

## 2020-12-30 NOTE — PATIENT INSTRUCTIONS
General Scheduling Information  To schedule your CT/MRI scan, please contact [place] [facility] at [phone]   [address] [place] [address]    To schedule your Surgery, please contact our Specialty Schedulers at [phone]    ENT Clinic Locations Clinic Hours Telephone Number     Daniel Nava  [address] [place] [address]   Tuesday:       8:00am -- 4:00pm    Wednesday:  8:00am - 4:00pm   To schedule an appointment with   Dr. Worley,   please contact our   Specialty Scheduling Department at:     [phone]       Daniel Castro  [address] [place] [address]   Friday:          8:00am - 4:00pm         Urgent Care Locations Clinic Hours Telephone Numbers     Daniel Conte  [address] [place] [address]     Monday-Friday:     11:00pm - 9:00pm    Saturday-Sunday:  9:00am - 5:00pm   [phone]     Daniel Castro  [address] [place] [address]     Monday-Friday:      5:00pm - 9:00pm     Saturday-Sunday:  9:00am - 5:00pm   [phone]

## 2021-01-12 DIAGNOSIS — E11.65 TYPE 2 DIABETES MELLITUS WITH HYPERGLYCEMIA, WITHOUT LONG-TERM CURRENT USE OF INSULIN (H): ICD-10-CM

## 2021-01-12 DIAGNOSIS — I10 HYPERTENSION GOAL BP (BLOOD PRESSURE) < 140/90: ICD-10-CM

## 2021-01-12 DIAGNOSIS — E78.5 HYPERLIPIDEMIA WITH TARGET LDL LESS THAN 100: ICD-10-CM

## 2021-01-13 RX ORDER — AMLODIPINE BESYLATE 10 MG/1
10 TABLET ORAL DAILY
Qty: 90 TABLET | Refills: 2 | Status: SHIPPED | OUTPATIENT
Start: 2021-01-13 | End: 2021-06-02

## 2021-01-13 RX ORDER — LOSARTAN POTASSIUM 100 MG/1
100 TABLET ORAL DAILY
Qty: 90 TABLET | Refills: 2 | Status: SHIPPED | OUTPATIENT
Start: 2021-01-13 | End: 2021-06-02

## 2021-01-13 NOTE — TELEPHONE ENCOUNTER
Prescription approved per [facility] Refill Protocol.      Ellis Luis RN, BSN, PHN

## 2021-04-10 ENCOUNTER — TRANSFERRED RECORDS (OUTPATIENT)
Dept: HEALTH INFORMATION MANAGEMENT | Facility: CLINIC | Age: 46
End: 2021-04-10

## 2021-05-23 ENCOUNTER — TRANSFERRED RECORDS (OUTPATIENT)
Dept: HEALTH INFORMATION MANAGEMENT | Facility: CLINIC | Age: 46
End: 2021-05-23

## 2021-06-02 ENCOUNTER — ANCILLARY PROCEDURE (OUTPATIENT)
Dept: GENERAL RADIOLOGY | Facility: CLINIC | Age: 46
End: 2021-06-02
Attending: NURSE PRACTITIONER
Payer: COMMERCIAL

## 2021-06-02 ENCOUNTER — NURSE TRIAGE (OUTPATIENT)
Dept: NURSING | Facility: CLINIC | Age: 46
End: 2021-06-02

## 2021-06-02 ENCOUNTER — OFFICE VISIT (OUTPATIENT)
Dept: FAMILY MEDICINE | Facility: CLINIC | Age: 46
End: 2021-06-02
Payer: COMMERCIAL

## 2021-06-02 VITALS
HEART RATE: 86 BPM | TEMPERATURE: 97.6 F | SYSTOLIC BLOOD PRESSURE: 149 MMHG | DIASTOLIC BLOOD PRESSURE: 103 MMHG | OXYGEN SATURATION: 96 % | HEIGHT: 72 IN | BODY MASS INDEX: 40.82 KG/M2

## 2021-06-02 DIAGNOSIS — G89.29 CHRONIC LOW BACK PAIN WITHOUT SCIATICA, UNSPECIFIED BACK PAIN LATERALITY: ICD-10-CM

## 2021-06-02 DIAGNOSIS — I10 HYPERTENSION GOAL BP (BLOOD PRESSURE) < 140/90: ICD-10-CM

## 2021-06-02 DIAGNOSIS — M54.2 NECK PAIN: ICD-10-CM

## 2021-06-02 DIAGNOSIS — M54.12 CERVICAL RADICULOPATHY: ICD-10-CM

## 2021-06-02 DIAGNOSIS — M54.2 NECK PAIN: Primary | ICD-10-CM

## 2021-06-02 DIAGNOSIS — M54.50 CHRONIC LOW BACK PAIN WITHOUT SCIATICA, UNSPECIFIED BACK PAIN LATERALITY: ICD-10-CM

## 2021-06-02 DIAGNOSIS — F32.1 MODERATE MAJOR DEPRESSION (H): ICD-10-CM

## 2021-06-02 DIAGNOSIS — E78.5 HYPERLIPIDEMIA WITH TARGET LDL LESS THAN 100: ICD-10-CM

## 2021-06-02 DIAGNOSIS — E11.65 TYPE 2 DIABETES MELLITUS WITH HYPERGLYCEMIA, WITHOUT LONG-TERM CURRENT USE OF INSULIN (H): ICD-10-CM

## 2021-06-02 DIAGNOSIS — I25.118 CORONARY ARTERY DISEASE OF NATIVE ARTERY OF NATIVE HEART WITH STABLE ANGINA PECTORIS (H): ICD-10-CM

## 2021-06-02 DIAGNOSIS — E66.01 MORBID OBESITY (H): ICD-10-CM

## 2021-06-02 LAB — HBA1C MFR BLD: 6.7 % (ref 0–5.6)

## 2021-06-02 PROCEDURE — 82043 UR ALBUMIN QUANTITATIVE: CPT | Performed by: NURSE PRACTITIONER

## 2021-06-02 PROCEDURE — 83721 ASSAY OF BLOOD LIPOPROTEIN: CPT | Performed by: NURSE PRACTITIONER

## 2021-06-02 PROCEDURE — 96127 BRIEF EMOTIONAL/BEHAV ASSMT: CPT | Performed by: NURSE PRACTITIONER

## 2021-06-02 PROCEDURE — 99214 OFFICE O/P EST MOD 30 MIN: CPT | Performed by: NURSE PRACTITIONER

## 2021-06-02 PROCEDURE — 36415 COLL VENOUS BLD VENIPUNCTURE: CPT | Performed by: NURSE PRACTITIONER

## 2021-06-02 PROCEDURE — 80048 BASIC METABOLIC PNL TOTAL CA: CPT | Performed by: NURSE PRACTITIONER

## 2021-06-02 PROCEDURE — 83036 HEMOGLOBIN GLYCOSYLATED A1C: CPT | Performed by: NURSE PRACTITIONER

## 2021-06-02 PROCEDURE — 72040 X-RAY EXAM NECK SPINE 2-3 VW: CPT | Performed by: RADIOLOGY

## 2021-06-02 PROCEDURE — 84460 ALANINE AMINO (ALT) (SGPT): CPT | Performed by: NURSE PRACTITIONER

## 2021-06-02 RX ORDER — AMLODIPINE BESYLATE 10 MG/1
10 TABLET ORAL DAILY
Qty: 90 TABLET | Refills: 2 | Status: SHIPPED | OUTPATIENT
Start: 2021-06-02 | End: 2024-03-26

## 2021-06-02 RX ORDER — ASPIRIN 81 MG/1
81 TABLET ORAL DAILY
Qty: 90 TABLET | Refills: 3 | Status: ON HOLD | OUTPATIENT
Start: 2021-06-02 | End: 2021-07-18

## 2021-06-02 RX ORDER — METHOCARBAMOL 500 MG/1
500 TABLET, FILM COATED ORAL 3 TIMES DAILY PRN
Qty: 30 TABLET | Refills: 1 | Status: SHIPPED | OUTPATIENT
Start: 2021-06-02 | End: 2021-10-12

## 2021-06-02 RX ORDER — ATORVASTATIN CALCIUM 40 MG/1
40 TABLET, FILM COATED ORAL DAILY
Qty: 90 TABLET | Refills: 3 | Status: SHIPPED | OUTPATIENT
Start: 2021-06-02 | End: 2022-04-27

## 2021-06-02 RX ORDER — GABAPENTIN 100 MG/1
CAPSULE ORAL
Qty: 90 CAPSULE | Refills: 1 | Status: SHIPPED | OUTPATIENT
Start: 2021-06-02 | End: 2021-10-12

## 2021-06-02 RX ORDER — LOSARTAN POTASSIUM 100 MG/1
100 TABLET ORAL DAILY
Qty: 90 TABLET | Refills: 2 | Status: SHIPPED | OUTPATIENT
Start: 2021-06-02 | End: 2024-03-26

## 2021-06-02 RX ORDER — CLONIDINE HYDROCHLORIDE 0.1 MG/1
0.1 TABLET ORAL 2 TIMES DAILY
Qty: 60 TABLET | Refills: 1 | Status: SHIPPED | OUTPATIENT
Start: 2021-06-02 | End: 2022-04-27 | Stop reason: DRUGHIGH

## 2021-06-02 ASSESSMENT — PAIN SCALES - GENERAL: PAINLEVEL: EXTREME PAIN (9)

## 2021-06-02 NOTE — PROGRESS NOTES
Assessment & Plan     Neck pain  Will get imaging, start gabapentin and refer to ortho.   - XR Cervical Spine 2/3 Views; Future  - Orthopedic & Spine  Referral; Future  - gabapentin (NEURONTIN) 100 MG capsule; Take 1 tablet at night x3 nights then 1 tablet twice daily x3 days then 1 tablet 3 times daily    Cervical radiculopathy  As above.   - XR Cervical Spine 2/3 Views; Future  - Orthopedic & Spine  Referral; Future  - gabapentin (NEURONTIN) 100 MG capsule; Take 1 tablet at night x3 nights then 1 tablet twice daily x3 days then 1 tablet 3 times daily    Hypertension goal BP (blood pressure) < 140/90  High today but patient in significant pain. Labs today. Refilled.  - Basic metabolic panel  (Ca, Cl, CO2, Creat, Gluc, K, Na, BUN)  - aspirin 81 MG EC tablet; Take 1 tablet (81 mg) by mouth daily  - amLODIPine (NORVASC) 10 MG tablet; Take 1 tablet (10 mg) by mouth daily  - cloNIDine (CATAPRES) 0.1 MG tablet; Take 1 tablet (0.1 mg) by mouth 2 times daily  - losartan (COZAAR) 100 MG tablet; Take 1 tablet (100 mg) by mouth daily  - Albumin Random Urine Quantitative with Creat Ratio    Hyperlipidemia with target LDL less than 100  Not fasting. Refilled.  - atorvastatin (LIPITOR) 40 MG tablet; Take 1 tablet (40 mg) by mouth daily  - LDL cholesterol direct  - ALT    Type 2 diabetes mellitus with hyperglycemia, without long-term current use of insulin (H)  Diet controlled.   - Basic metabolic panel  (Ca, Cl, CO2, Creat, Gluc, K, Na, BUN)  - Hemoglobin A1c  - amLODIPine (NORVASC) 10 MG tablet; Take 1 tablet (10 mg) by mouth daily  - Albumin Random Urine Quantitative with Creat Ratio    Coronary artery disease of native artery of native heart with stable angina pectoris (H)  Refilled. Diet/exercise.  - atorvastatin (LIPITOR) 40 MG tablet; Take 1 tablet (40 mg) by mouth daily    Chronic low back pain without sciatica, unspecified back pain laterality  Refilled.  - methocarbamol (ROBAXIN) 500 MG tablet;  Take 1 tablet (500 mg) by mouth 3 times daily as needed for muscle spasms    Moderate major depression (H)  Currently controlled. No SI/HI.    Morbid obesity (H)  Diet/exercise.               BMI:   Estimated body mass index is 40.82 kg/m  as calculated from the following:    Height as of this encounter: 1.829 m (6').    Weight as of 12/30/20: 136.5 kg (301 lb).   Weight management plan: Discussed healthy diet and exercise guidelines    See Patient Instructions    No follow-ups on file.     The benefits, risks and potential side effects were discussed in detail. Black box warnings discussed as relevant. All patient questions were answered. The patient was instructed to follow up immediately if any adverse reactions develop.    Return precautions discussed, including when to seek urgent/emergent care.    Patient verbalizes understanding and agrees with plan of care. Patient stable for discharge.      CECE Lopez CNP  [facility]    Caleb Almanza is a 45 year old who presents for the following health issues  accompanied by his daughter:    HPI       Pleasant 45 year old male presents with his daughter with concerns for neck pain. He has been seen in the emergency department x2 on 5/23 and 6/2. He was given prednisone and lidoderm the first time without much relief. He went back the second time and they agreed with the first assessment that this is likely musculoskeletal. He reports the pain is severe. He has pain that radiates down both arms. No numbness, tingling or weakness.    He is also due for med check for his chronic disease management.     HTN: doesn't check BP. No headaches, chest pain, shortness of breath, edema, palpitations, dizziness. Feels like BP has been up since neck pain began.    Hyperlipidemia: On atorvastatin. Doesn't really watch diet. No myalgias.    DM: BG ok at home. Diet controlled.    Depression - stable right now. No thoughts to harm self/others. Pain  affects mental health        Review of Systems   Constitutional, HEENT, cardiovascular, pulmonary, GI, , musculoskeletal, neuro, skin, endocrine and psych systems are negative, except as otherwise noted.      Objective    BP (!) 149/103   Pulse 86   Temp 97.6  F (36.4  C) (Tympanic)   Ht 1.829 m (6')   SpO2 96%   BMI 40.82 kg/m    Body mass index is 40.82 kg/m .  Physical Exam   GENERAL: healthy, alert and no distress  NECK: no adenopathy  RESP: lungs clear to auscultation - no rales, rhonchi or wheezes  CV: regular rate and rhythm, normal S1 S2, no S3 or S4, no murmur, click or rub, no peripheral edema and peripheral pulses strong  ABDOMEN: obese  MS: no gross musculoskeletal defects noted, no edema. FROM of neck. Tenderness over posterior aspect.  PSYCH: mentation appears normal, affect normal/bright    Results for orders placed or performed in visit on 06/02/21   XR Cervical Spine 2/3 Views     Status: None    Narrative    CERVICAL SPINE TWO TO THREE VIEWS June 2, 2021 6:27 PM     HISTORY: Neck pain for three weeks with radiation to shoulders/fingers  bilaterally.    COMPARISON: None.       Impression    IMPRESSION: Straightening of the normal cervical lordosis. No obvious  loss of vertebral body height. Questionable prevertebral soft tissue  swelling in the upper cervical spine which is of uncertain clinical  significance. This could be better assessed with CT or MRI. Moderate  degenerative endplate changes and loss of disc height at C4-C5 and  C5-C6. Vertebral bodies are only well seen from C2 through C6 on the  lateral view.    LUIS CLINE MD   Results for orders placed or performed in visit on 06/02/21   Basic metabolic panel  (Ca, Cl, CO2, Creat, Gluc, K, Na, BUN)     Status: Abnormal   Result Value Ref Range    Sodium 140 133 - 144 mmol/L    Potassium 4.0 3.4 - 5.3 mmol/L    Chloride 105 94 - 109 mmol/L    Carbon Dioxide 31 20 - 32 mmol/L    Anion Gap 4 3 - 14 mmol/L    Glucose 103 (H) 70 - 99  mg/dL    Urea Nitrogen 14 7 - 30 mg/dL    Creatinine 0.87 0.66 - 1.25 mg/dL    GFR Estimate >90 >60 mL/min/[1.73_m2]    GFR Estimate If Black >90 >60 mL/min/[1.73_m2]    Calcium 8.9 8.5 - 10.1 mg/dL   Hemoglobin A1c     Status: Abnormal   Result Value Ref Range    Hemoglobin A1C 6.7 (H) 0 - 5.6 %   LDL cholesterol direct     Status: None   Result Value Ref Range    LDL Cholesterol Direct 99 <100 mg/dL   ALT     Status: None   Result Value Ref Range    ALT 38 0 - 70 U/L   Albumin Random Urine Quantitative with Creat Ratio     Status: None   Result Value Ref Range    Creatinine Urine 169 mg/dL    Albumin Urine mg/L 12 mg/L    Albumin Urine mg/g Cr 7.04 0 - 17 mg/g Cr

## 2021-06-02 NOTE — TELEPHONE ENCOUNTER
Patient calls with concerns regarding shoulder pain. Pain has been present for 3 weeks in both shoulders. Pain is constant and present when not moving, but movements make pain significantly worse. Patient did not have any injury he is aware of. Pain radiated into the neck, patient reports that his hands will tingle on and off. Patient says that he was just in the ER and told that he needs to see his primary care provider.    Patient is advised on office evaluation today. He is transferred to [facility] to see if there is an appointment, otherwise will use . He denies further questions at this time.    Marisela Cabrera RN  [facility]          Reason for Disposition    SEVERE pain (e.g., excruciating, unable to do any normal activities)    Additional Information    Negative: Shock suspected (e.g., cold/pale/clammy skin, too weak to stand, low BP, rapid pulse)    Negative: Similar pain previously and it was from 'heart attack'    Negative: Similar pain previously and it was from 'angina' and not relieved by nitroglycerin    Negative: Sounds like a life-threatening emergency to the triager    Negative: Chest pain    Negative: Followed an injury to shoulder    Negative: Difficulty breathing or unusual sweating (e.g., sweating without exertion)    Negative: Pain lasting > 5 minutes and pain also present in chest (Exception: pain is clearly made worse by movement)    Negative: Age > 40 and no obvious cause and pain even when not moving the arm (Exception: pain is clearly made worse by moving arm or bending neck)    Negative: Red area or streak and fever    Negative: Swollen joint and fever    Negative: Entire arm is swollen    Negative: Patient sounds very sick or weak to the triager    Protocols used: SHOULDER PAIN-A-OH    COVID 19 Nurse Triage Plan/Patient Instructions    Please be aware that novel coronavirus (COVID-19) may be circulating in the community. If you develop symptoms such as fever,  cough, or SOB or if you have concerns about the presence of another infection including coronavirus (COVID-19), please contact your health care provider or visit [email or web address].     Disposition/Instructions    In-Person Visit with provider recommended. Reference Visit Selection Guide.    Thank you for taking steps to prevent the spread of this virus.  o Limit your contact with others.  o Wear a simple mask to cover your cough.  o Wash your hands well and often.    Resources    [facility]: About COVID-19: [email or web address]    CDC: What to Do If You're Sick: www.cdc.gov/coronavirus/2019-ncov/about/steps-when-sick.html    CDC: Ending Home Isolation: www.cdc.gov/coronavirus/2019-ncov/hcp/disposition-in-home-patients.html     CDC: Caring for Someone: www.cdc.gov/coronavirus/2019-ncov/if-you-are-sick/care-for-someone.html     [facility]: Interim Guidance for Hospital Discharge to Home: www.[email or web address]./diseases/coronavirus/hcp/hospdischarge.pdf    [facility] clinical trials (COVID-19 research studies): [email or web address].[email or web address]-clinical-trials     Below are the COVID-19 hotlines at the [place] Department of [facility] ([facility]). Interpreters are available.   o For health questions: Call 533-553-3684 or 1-117.309.5902 (7 a.m. to 7 p.m.)  o For questions about schools and childcare: Call 755-333-8226 or 1-623.916.9827 (7 a.m. to 7 p.m.)

## 2021-06-03 LAB
ALT SERPL W P-5'-P-CCNC: 38 U/L (ref 0–70)
ANION GAP SERPL CALCULATED.3IONS-SCNC: 4 MMOL/L (ref 3–14)
BUN SERPL-MCNC: 14 MG/DL (ref 7–30)
CALCIUM SERPL-MCNC: 8.9 MG/DL (ref 8.5–10.1)
CHLORIDE SERPL-SCNC: 105 MMOL/L (ref 94–109)
CO2 SERPL-SCNC: 31 MMOL/L (ref 20–32)
CREAT SERPL-MCNC: 0.87 MG/DL (ref 0.66–1.25)
CREAT UR-MCNC: 169 MG/DL
GFR SERPL CREATININE-BSD FRML MDRD: >90 ML/MIN/{1.73_M2}
GLUCOSE SERPL-MCNC: 103 MG/DL (ref 70–99)
LDLC SERPL DIRECT ASSAY-MCNC: 99 MG/DL
MICROALBUMIN UR-MCNC: 12 MG/L
MICROALBUMIN/CREAT UR: 7.04 MG/G CR (ref 0–17)
POTASSIUM SERPL-SCNC: 4 MMOL/L (ref 3.4–5.3)
SODIUM SERPL-SCNC: 140 MMOL/L (ref 133–144)

## 2021-06-04 ASSESSMENT — ANXIETY QUESTIONNAIRES
3. WORRYING TOO MUCH ABOUT DIFFERENT THINGS: SEVERAL DAYS
2. NOT BEING ABLE TO STOP OR CONTROL WORRYING: NEARLY EVERY DAY
IF YOU CHECKED OFF ANY PROBLEMS ON THIS QUESTIONNAIRE, HOW DIFFICULT HAVE THESE PROBLEMS MADE IT FOR YOU TO DO YOUR WORK, TAKE CARE OF THINGS AT HOME, OR GET ALONG WITH OTHER PEOPLE: EXTREMELY DIFFICULT
5. BEING SO RESTLESS THAT IT IS HARD TO SIT STILL: NEARLY EVERY DAY
GAD7 TOTAL SCORE: 16
7. FEELING AFRAID AS IF SOMETHING AWFUL MIGHT HAPPEN: NEARLY EVERY DAY
1. FEELING NERVOUS, ANXIOUS, OR ON EDGE: NOT AT ALL
6. BECOMING EASILY ANNOYED OR IRRITABLE: NEARLY EVERY DAY

## 2021-06-04 ASSESSMENT — PATIENT HEALTH QUESTIONNAIRE - PHQ9
5. POOR APPETITE OR OVEREATING: NEARLY EVERY DAY
SUM OF ALL RESPONSES TO PHQ QUESTIONS 1-9: 11

## 2021-06-05 ASSESSMENT — ANXIETY QUESTIONNAIRES: GAD7 TOTAL SCORE: 16

## 2021-06-07 ENCOUNTER — TELEPHONE (OUTPATIENT)
Dept: FAMILY MEDICINE | Facility: CLINIC | Age: 46
End: 2021-06-07

## 2021-06-07 ENCOUNTER — HOSPITAL ENCOUNTER (OUTPATIENT)
Dept: MRI IMAGING | Facility: CLINIC | Age: 46
Discharge: HOME | End: 2021-06-07
Attending: NURSE PRACTITIONER | Admitting: NURSE PRACTITIONER
Payer: COMMERCIAL

## 2021-06-07 DIAGNOSIS — M54.2 NECK PAIN: Primary | ICD-10-CM

## 2021-06-07 DIAGNOSIS — R93.89 ABNORMAL X-RAY OF NECK: ICD-10-CM

## 2021-06-07 DIAGNOSIS — M54.12 CERVICAL RADICULOPATHY: ICD-10-CM

## 2021-06-07 DIAGNOSIS — M54.2 NECK PAIN: ICD-10-CM

## 2021-06-07 PROCEDURE — 72141 MRI NECK SPINE W/O DYE: CPT

## 2021-06-07 NOTE — TELEPHONE ENCOUNTER
Patient is calling and wanting the results of his x-ray.  He also is looking to see if Chantell will give him something for the pain.  Please call to advise.  Thank you  Gina Castro

## 2021-06-07 NOTE — TELEPHONE ENCOUNTER
"Archie called back and states he cant work, cant sleep, pain so bad cried \"real grown man tears\" Archie states he is already taking ibuprofen and tylenol with the gabapentin and it is still not helping, requesting something else for pain until he can see ortho and get MRI. Numbers for scheduling provided. pavan Forde RN, BSN, CMSRN  [facility]    "

## 2021-06-07 NOTE — TELEPHONE ENCOUNTER
I called Archie to discuss his XR results. I left message for call back. When he calls back, please let him know the following:     XR shows degenerative disease. It also shows possible prevertebral soft tissue  swelling in the upper cervical spine which is of uncertain clinical significance. The radiologist has recommended further imaging so I'd like to get an MRI. I will place the order. He should call to schedule. Tylenol or ibuprofen at this time for pain in addition to the gabapentin. Also, he should schedule with ortho for sometime after his MRI (referral already given)

## 2021-06-08 ENCOUNTER — TELEPHONE (OUTPATIENT)
Dept: FAMILY MEDICINE | Facility: CLINIC | Age: 46
End: 2021-06-08

## 2021-06-08 DIAGNOSIS — R93.89 ABNORMAL MRI, NECK: ICD-10-CM

## 2021-06-08 DIAGNOSIS — E07.9 THYROID MASS: Primary | ICD-10-CM

## 2021-06-08 RX ORDER — TRAMADOL HYDROCHLORIDE 50 MG/1
50 TABLET ORAL EVERY 6 HOURS PRN
Qty: 10 TABLET | Refills: 0 | Status: SHIPPED | OUTPATIENT
Start: 2021-06-08 | End: 2021-06-11

## 2021-06-08 NOTE — TELEPHONE ENCOUNTER
Copied message below in separate telephone encounter and closing this one to reduce confusion    Juana Forde RN, BSN, CMSRN  [facility]

## 2021-06-08 NOTE — TELEPHONE ENCOUNTER
When speak to patient please also give message below from provider regarding request for something for pain,    Juana Forde RN, BSN, CMSRN  [facility]    Message From Provider:  He should continue the tylenol, ibuprofen and gabapentin.  He may use tramadol sparingly and should not rely on it as sole treatment for pain. I will send it to the pharmacy now.  Chantell ZHAO CNP

## 2021-06-08 NOTE — TELEPHONE ENCOUNTER
Called patient, left message to call clinic back.    If patient calls back, please direct call to the [facility] RN team.        See that he did read the message in [employer] regarding results.    If patient calls back, can discuss findings and next step.    Tata Foster RN, [facility]        Jarod, Chantell Kari, APRN CNP  [facility]             Please call patient to be sure he got the message below:     Archie,   Your MRI shows disc protrusions and spinal stenosis which are likely contributing to your symptoms. It doesn't look like you have scheduled with the neck specialist yet. Please do so as soon as you're able. Also, the MRI found a large mass on your thyroid. We should have you follow up with Dr. Hernandez, a general surgeon who specializes in thyroid, to discuss further evaluation and possible biopsy.   Please let me know if questions,   CECE Lopez CNP

## 2021-06-08 NOTE — TELEPHONE ENCOUNTER
Patient returned call.  He states he did view the message from provider regarding his MRI results.  He has not called to schedule with the neck specialist yet or Dr. Hernandez (general surgeon)for the thyroid mass.  I gave him both of these scheduling phone numbers and instructed him to call for appointment.  I also gave him the message as per below regarding continuing tylenol, ibuprofen, and gabapentin.  Aware small prescription for tramadol sent to his pharmacy; may use this sparingly and should not rely on it as sole treatment for the pain per Chantell PHAN CNP.  He states understanding of instructions.  Provider did send this information in [employer] message as well.  Nelly Nails RN

## 2021-06-08 NOTE — TELEPHONE ENCOUNTER
He should continue the tylenol, ibuprofen and gabapentin.  He may use tramadol sparingly and should not rely on it as sole treatment for pain. I will send it to the pharmacy now.

## 2021-06-10 ENCOUNTER — OFFICE VISIT (OUTPATIENT)
Dept: NEUROSURGERY | Facility: CLINIC | Age: 46
End: 2021-06-10
Attending: NURSE PRACTITIONER
Payer: COMMERCIAL

## 2021-06-10 VITALS
DIASTOLIC BLOOD PRESSURE: 102 MMHG | HEIGHT: 72 IN | BODY MASS INDEX: 40.77 KG/M2 | HEART RATE: 92 BPM | WEIGHT: 301 LBS | OXYGEN SATURATION: 97 % | SYSTOLIC BLOOD PRESSURE: 160 MMHG

## 2021-06-10 DIAGNOSIS — M48.02 STENOSIS OF CERVICAL SPINE: Primary | ICD-10-CM

## 2021-06-10 DIAGNOSIS — M54.12 CERVICAL RADICULOPATHY: ICD-10-CM

## 2021-06-10 PROCEDURE — 99203 OFFICE O/P NEW LOW 30 MIN: CPT | Performed by: NURSE PRACTITIONER

## 2021-06-10 ASSESSMENT — MIFFLIN-ST. JEOR: SCORE: 2283.33

## 2021-06-10 ASSESSMENT — PAIN SCALES - GENERAL: PAINLEVEL: WORST PAIN (10)

## 2021-06-10 NOTE — PROGRESS NOTES
"Dr. Jaquan De Leon   [facility] Neurosurgery Clinic Visit      CC: neck pain    Reason For Visit:   I was asked by Chantell Olivera CNP to consult on the patient for:   M54.2 (ICD-10-CM) - Neck pain   M54.12 (ICD-10-CM) - Cervical radiculopathy       HPI: Archie Wallace is a 45 year old male who presents for evaluation of neck pain, arm pain, and hand numbness/weakness that started about 1 month ago. Denies any precipitating trauma or injuries but he does recall the symptoms started when he was walking and he heard a \"pop\" in his neck. Pain is located in the posterior neck and radiates to BUE with constant numbness/tingling in hands and fingers. He reports weakness in arms and hands. He recently noticed some leg weakness and had a fall the other day as well. He notes decreased range of motion in neck due to pain. Describes the pain as shooting. Pain is worsened with bending and lifting. He has been doing home PT exercises, massage, and taking NSAIDS for pain control. Denies any bladder/bowel incontinence.     Of note, a thyroid mass was found on the recent cervical spine MRI. PCP placed a general surgery referral to see Dr. Hernandez, but this appointment has not been scheduled yet.     Current pain: 10/10   At worst: 10/10    Past Medical History:   Diagnosis Date     CAD (coronary artery disease)     Premature artery disease noted     Hyperlipidaemia LDL goal <100      Hypertension 2007     Hypertension goal BP (blood pressure) < 140/90      Long QT interval syndrome      MEDICAL HISTORY OF -     History of rhabdomyolosis, from strenuous exercise     Mild persistent asthma      Sleep apnea syndrome      Type 2 diabetes, HbA1c goal < 7% (H)        Past Medical History reviewed with patient during visit.    Past Surgical History:   Procedure Laterality Date     ANGIOGRAM       ANGIOPLASTY  2001    Coronary artery angioplasty//associated with mild AMI     TONSILLECTOMY  2005    With uvuloectomy     Past Surgical History " reviewed with patient during visit.    Current Outpatient Medications   Medication     albuterol (PROAIR HFA) 108 (90 Base) MCG/ACT inhaler     alcohol swab prep pads     amLODIPine (NORVASC) 10 MG tablet     aspirin 81 MG EC tablet     atorvastatin (LIPITOR) 40 MG tablet     blood glucose (ACCU-CHEK GUIDE) test strip     blood glucose (NO BRAND SPECIFIED) test strip     blood glucose calibration (NO BRAND SPECIFIED) solution     blood glucose monitoring (ACCU-CHEK FASTCLIX) lancets     blood glucose monitoring (NO BRAND SPECIFIED) meter device kit     Blood Glucose Monitoring Suppl (ACCU-CHEK GUIDE ME) w/Device KIT     cloNIDine (CATAPRES) 0.1 MG tablet     gabapentin (NEURONTIN) 100 MG capsule     losartan (COZAAR) 100 MG tablet     methocarbamol (ROBAXIN) 500 MG tablet     order for DME     ORDER FOR DME     ORDER FOR DME     thin (NO BRAND SPECIFIED) lancets     traMADol (ULTRAM) 50 MG tablet     No current facility-administered medications for this visit.        Allergies   Allergen Reactions     Biaxin [Clarithromycin]      Metformin      Reported breaking out with a skin reaction     Robafen Dm Cgh-Chest [Robitussin Cough-Chest Dm] Difficulty breathing     Tamiflu [Oseltamivir]        Social History     Socioeconomic History     Marital status:      Spouse name: Not on file     Number of children: Not on file     Years of education: Not on file     Highest education level: Not on file   Occupational History     Not on file   Social Needs     Financial resource strain: Not on file     Food insecurity     Worry: Not on file     Inability: Not on file     Transportation needs     Medical: Not on file     Non-medical: Not on file   Tobacco Use     Smoking status: Former Smoker     Types: Cigars     Smokeless tobacco: Never Used     Tobacco comment: Smokes intermittently   Substance and Sexual Activity     Alcohol use: Yes     Comment: occ/ mod     Drug use: No     Sexual activity: Not Currently      Partners: Female   Lifestyle     Physical activity     Days per week: Not on file     Minutes per session: Not on file     Stress: Not on file   Relationships     Social connections     Talks on phone: Not on file     Gets together: Not on file     Attends Oriental orthodox service: Not on file     Active member of club or organization: Not on file     Attends meetings of clubs or organizations: Not on file     Relationship status: Not on file     Intimate partner violence     Fear of current or ex partner: Not on file     Emotionally abused: Not on file     Physically abused: Not on file     Forced sexual activity: Not on file   Other Topics Concern     Parent/sibling w/ CABG, MI or angioplasty before 65F 55M? 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    Vital Signs: BP (!) 160/102   Pulse 92   Ht 6' (1.829 m)   Wt 301 lb (136.5 kg)   SpO2 97%   BMI 40.82 kg/m      Examination:  Constitutional:  Alert, well nourished, NAD.  HEENT: Normocephalic, atraumatic.   Pulmonary:  Without shortness of breath, normal effort.   Cardiovascular:  No pitting edema of BLE.      Neurological:  Awake  Alert  Oriented x 3  Speech clear  Cranial nerves II - XII grossly intact  PERRL  EOMI  Face symmetric  Tongue midline  Motor exam   Shoulder  Abduction:  Right:  5/5   Left:  5/5  Biceps:                      Right:  5/5   Left:  5/5  Triceps:                     Right:  5/5   Left:  5/5  Wrist Extensors:        Right:  5/5   Left:  5/5  Wrist Flexors:            Right:  5/5   Left:  5/5  Intrinsics:                   Right:  5/5   Left:  5/5  Hip Flexor:                 Right: 5/5  Left:  5/5  Quadriceps:               Right:  5/5  Left:  5/5  Hamstrings:               Right:  5/5  Left:  5/5  Gastroc Soleus:         Right:  5/5  Left:  5/5  Tib/Ant:                      Right:  5/5  Left:  5/5  EHL:                          Right:  5/5  Left:  5/5         Sensation normal to bilateral upper and lower extremities.    Reflexes are 2+ in the brachial radialis and triceps. Negative Kati sign bilaterally.    Musculoskeletal:  Gait: Able to stand from a seated position. Normal non-antalgic, non-myelopathic gait. Able to heel/toe walk without loss of balance.    Cervical examination reveals decreased range of motion.   Tenderness to palpation of cervical spine.     Imaging:   MR CERVICAL SPINE WITHOUT CONTRAST 6/7/2021 8:29 PM                                                              IMPRESSION:  1. Congenitally narrow spinal canal.  2. Moderate to large disc protrusions at multiple levels producing severe central canal stenosis and cord deformity at C5-C6 and moderate to severe central canal stenosis at C4-C5.  3. Abnormal cord signal intensity in the left half of the cord at the C5 level consistent with edema, gliosis, or ischemic change.  4. 4.7 x 4.6 cm mass in right lobe of thyroid. This is most likely is a thyroid goiter or benign adenoma although it is difficult to exclude other etiologies. If indicated, ultrasound and fine needle biopsy might be helpful in further evaluation.     Assessment/Plan:   45 year old male who presents for evaluation of neck pain, arm pain, and hand numbness/weakness that started about 1 month ago. Denies any  "precipitating trauma or injuries but he does recall the symptoms started when he was walking and he heard a \"pop\" in his neck. Pain is located in the posterior neck and radiates to BUE with constant numbness/tingling in hands and fingers. He reports weakness in arms and hands. He recently noticed some leg weakness and had a fall the other day as well. He has been doing home PT exercises, massage, and taking NSAIDS but symptoms persist. Imaging reviewed and we discussed options at this time. Patient would like to meet with Dr. De Leon to discuss possible surgical intervention. Appointment scheduled for 6/17/21. Patient will call to schedule appt with Dr. Hernandez to further evaluate thyroid mass.     Advised patient to call our clinic with any questions or concerns. Discussed red flag symptoms and advised to seek medical attention if these develop. Patient voiced understanding and agreement.        Maria Luisa Bryant CNP  [facility] Neurosurgery  [address] [address]  Tel [phone]  Pager [phone]    "

## 2021-06-10 NOTE — Clinical Note
Brian Abdul! I saw Archie today for his cervical spine and we scheduled him to follow-up with our neurosurgeon Dr. De Leon. I noticed the general surgery referral was for Dr. Hernandez, but didn't see an appt with him yet. Is your team able to help patient coordinate this? Thanks!

## 2021-06-10 NOTE — LETTER
"    6/10/2021         RE: Archie Wallace  [address]  [place] [address]        Dear Colleague,    Thank you for referring your patient, Archie Wallace, to the [facility]. Please see a copy of my visit note below.    Dr. Jaquan De Leon   [facility] Neurosurgery Clinic Visit      CC: neck pain    Reason For Visit:   I was asked by Chantell Olivera CNP to consult on the patient for:   M54.2 (ICD-10-CM) - Neck pain   M54.12 (ICD-10-CM) - Cervical radiculopathy       HPI: Archie Wallace is a 45 year old male who presents for evaluation of neck pain, arm pain, and hand numbness/weakness that started about 1 month ago. Denies any precipitating trauma or injuries but he does recall the symptoms started when he was walking and he heard a \"pop\" in his neck. Pain is located in the posterior neck and radiates to BUE with constant numbness/tingling in hands and fingers. He reports weakness in arms and hands. He recently noticed some leg weakness and had a fall the other day as well. He notes decreased range of motion in neck due to pain. Describes the pain as shooting. Pain is worsened with bending and lifting. He has been doing home PT exercises, massage, and taking NSAIDS for pain control. Denies any bladder/bowel incontinence.     Of note, a thyroid mass was found on the recent cervical spine MRI. PCP placed a general surgery referral to see Dr. Hernandez, but this appointment has not been scheduled yet.     Current pain: 10/10   At worst: 10/10    Past Medical History:   Diagnosis Date     CAD (coronary artery disease)     Premature artery disease noted     Hyperlipidaemia LDL goal <100      Hypertension 2007     Hypertension goal BP (blood pressure) < 140/90      Long QT interval syndrome      MEDICAL HISTORY OF -     History of rhabdomyolosis, from strenuous exercise     Mild persistent asthma      Sleep apnea syndrome      Type 2 diabetes, HbA1c goal < 7% (H)        Past Medical " History reviewed with patient during visit.    Past Surgical History:   Procedure Laterality Date     ANGIOGRAM       ANGIOPLASTY  2001    Coronary artery angioplasty//associated with mild AMI     TONSILLECTOMY  2005    With uvuloectomy     Past Surgical History reviewed with patient during visit.    Current Outpatient Medications   Medication     albuterol (PROAIR HFA) 108 (90 Base) MCG/ACT inhaler     alcohol swab prep pads     amLODIPine (NORVASC) 10 MG tablet     aspirin 81 MG EC tablet     atorvastatin (LIPITOR) 40 MG tablet     blood glucose (ACCU-CHEK GUIDE) test strip     blood glucose (NO BRAND SPECIFIED) test strip     blood glucose calibration (NO BRAND SPECIFIED) solution     blood glucose monitoring (ACCU-CHEK FASTCLIX) lancets     blood glucose monitoring (NO BRAND SPECIFIED) meter device kit     Blood Glucose Monitoring Suppl (ACCU-CHEK GUIDE ME) w/Device KIT     cloNIDine (CATAPRES) 0.1 MG tablet     gabapentin (NEURONTIN) 100 MG capsule     losartan (COZAAR) 100 MG tablet     methocarbamol (ROBAXIN) 500 MG tablet     order for DME     ORDER FOR DME     ORDER FOR DME     thin (NO BRAND SPECIFIED) lancets     traMADol (ULTRAM) 50 MG tablet     No current facility-administered medications for this visit.        Allergies   Allergen Reactions     Biaxin [Clarithromycin]      Metformin      Reported breaking out with a skin reaction     Robafen Dm Cgh-Chest [Robitussin Cough-Chest Dm] Difficulty breathing     Tamiflu [Oseltamivir]        Social History     Socioeconomic History     Marital status:      Spouse name: Not on file     Number of children: Not on file     Years of education: Not on file     Highest education level: Not on file   Occupational History     Not on file   Social Needs     Financial resource strain: Not on file     Food insecurity     Worry: Not on file     Inability: Not on file     Transportation needs     Medical: Not on file     Non-medical: Not on file   Tobacco Use      Smoking status: Former Smoker     Types: Cigars     Smokeless tobacco: Never Used     Tobacco comment: Smokes intermittently   Substance and Sexual Activity     Alcohol use: Yes     Comment: occ/ mod     Drug use: No     Sexual activity: Not Currently     Partners: Female   Lifestyle     Physical activity     Days per week: Not on file     Minutes per session: Not on file     Stress: Not on file   Relationships     Social connections     Talks on phone: Not on file     Gets together: Not on file     Attends Holiness service: Not on file     Active member of club or organization: Not on file     Attends meetings of clubs or organizations: Not on file     Relationship status: Not on file     Intimate partner violence     Fear of current or ex partner: Not on file     Emotionally abused: Not on file     Physically abused: Not on file     Forced sexual activity: Not on file   Other Topics Concern     Parent/sibling w/ CABG, MI or angioplasty before 65F 55M? 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    Vital Signs: BP (!) 160/102   Pulse 92   Ht 6' (1.829 m)   Wt 301 lb (136.5 kg)   SpO2 97%   BMI 40.82 kg/m      Examination:  Constitutional:  Alert, well nourished, NAD.  HEENT: Normocephalic, atraumatic.   Pulmonary:  Without  shortness of breath, normal effort.   Cardiovascular:  No pitting edema of BLE.      Neurological:  Awake  Alert  Oriented x 3  Speech clear  Cranial nerves II - XII grossly intact  PERRL  EOMI  Face symmetric  Tongue midline  Motor exam   Shoulder Abduction:  Right:  5/5   Left:  5/5  Biceps:                      Right:  5/5   Left:  5/5  Triceps:                     Right:  5/5   Left:  5/5  Wrist Extensors:        Right:  5/5   Left:  5/5  Wrist Flexors:            Right:  5/5   Left:  5/5  Intrinsics:                   Right:  5/5   Left:  5/5  Hip Flexor:                 Right: 5/5  Left:  5/5  Quadriceps:               Right:  5/5  Left:  5/5  Hamstrings:               Right:  5/5  Left:  5/5  Gastroc Soleus:         Right:  5/5  Left:  5/5  Tib/Ant:                      Right:  5/5  Left:  5/5  EHL:                          Right:  5/5  Left:  5/5         Sensation normal to bilateral upper and lower extremities.    Reflexes are 2+ in the brachial radialis and triceps. Negative Kati sign bilaterally.    Musculoskeletal:  Gait: Able to stand from a seated position. Normal non-antalgic, non-myelopathic gait. Able to heel/toe walk without loss of balance.    Cervical examination reveals decreased range of motion.   Tenderness to palpation of cervical spine.     Imaging:   MR CERVICAL SPINE WITHOUT CONTRAST 6/7/2021 8:29 PM                                                              IMPRESSION:  1. Congenitally narrow spinal canal.  2. Moderate to large disc protrusions at multiple levels producing severe central canal stenosis and cord deformity at C5-C6 and moderate to severe central canal stenosis at C4-C5.  3. Abnormal cord signal intensity in the left half of the cord at the C5 level consistent with edema, gliosis, or ischemic change.  4. 4.7 x 4.6 cm mass in right lobe of thyroid. This is most likely is a thyroid goiter or benign adenoma although it is difficult to exclude other etiologies. If  "indicated, ultrasound and fine needle biopsy might be helpful in further evaluation.     Assessment/Plan:   45 year old male who presents for evaluation of neck pain, arm pain, and hand numbness/weakness that started about 1 month ago. Denies any precipitating trauma or injuries but he does recall the symptoms started when he was walking and he heard a \"pop\" in his neck. Pain is located in the posterior neck and radiates to BUE with constant numbness/tingling in hands and fingers. He reports weakness in arms and hands. He recently noticed some leg weakness and had a fall the other day as well. He has been doing home PT exercises, massage, and taking NSAIDS but symptoms persist. Imaging reviewed and we discussed options at this time. Patient would like to meet with Dr. De Leon to discuss possible surgical intervention. Appointment scheduled for 6/17/21. Patient will call to schedule appt with Dr. Hernandez to further evaluate thyroid mass.     Advised patient to call our clinic with any questions or concerns. Discussed red flag symptoms and advised to seek medical attention if these develop. Patient voiced understanding and agreement.        Maria Luisa Bryant CNP  [facility] Neurosurgery  [address] [address]  Tel [phone]  Pager [phone]        Again, thank you for allowing me to participate in the care of your patient.        Sincerely,        Maria Luisa Bryant, NP    "

## 2021-06-17 ENCOUNTER — OFFICE VISIT (OUTPATIENT)
Dept: NEUROSURGERY | Facility: CLINIC | Age: 46
End: 2021-06-17
Payer: COMMERCIAL

## 2021-06-17 VITALS — DIASTOLIC BLOOD PRESSURE: 102 MMHG | HEART RATE: 92 BPM | SYSTOLIC BLOOD PRESSURE: 176 MMHG | OXYGEN SATURATION: 98 %

## 2021-06-17 DIAGNOSIS — E04.1 THYROID NODULE: Primary | ICD-10-CM

## 2021-06-17 DIAGNOSIS — Z11.59 ENCOUNTER FOR SCREENING FOR OTHER VIRAL DISEASES: ICD-10-CM

## 2021-06-17 DIAGNOSIS — Z01.818 PRE-OP TESTING: ICD-10-CM

## 2021-06-17 DIAGNOSIS — M47.12 CERVICAL SPONDYLOSIS WITH MYELOPATHY: Primary | ICD-10-CM

## 2021-06-17 DIAGNOSIS — G95.9 CERVICAL MYELOPATHY (H): Primary | ICD-10-CM

## 2021-06-17 PROCEDURE — 99214 OFFICE O/P EST MOD 30 MIN: CPT | Performed by: NEUROLOGICAL SURGERY

## 2021-06-17 RX ORDER — OXYCODONE HYDROCHLORIDE 5 MG/1
5 TABLET ORAL EVERY 6 HOURS PRN
Qty: 30 TABLET | Refills: 0 | Status: SHIPPED | OUTPATIENT
Start: 2021-06-17 | End: 2021-08-31

## 2021-06-17 ASSESSMENT — PAIN SCALES - GENERAL: PAINLEVEL: WORST PAIN (10)

## 2021-06-17 NOTE — PROGRESS NOTES
"Archie Wallace is a 45 year old male who presents for evaluation of neck pain, arm pain, and hand numbness/weakness that started about 1 month ago. Denies any precipitating trauma or injuries but he does recall the symptoms started when he was walking and he heard a \"pop\" in his neck. Pain is located in the posterior neck and radiates to BUE with constant numbness/tingling in hands and fingers. He reports weakness in arms and hands. He recently noticed some leg weakness and had a fall the other day as well. He notes decreased range of motion in neck due to pain. Describes the pain as shooting. Pain is worsened with bending and lifting. He has been doing home PT exercises, massage, and taking NSAIDS for pain control. Denies any bladder/bowel incontinence.     Returns for follow up.  Significant progressive clinical myelopathy with pain and weakness.  MR shows multi-level severe cervical stenosis with cord signal change.    Past Medical History:   Diagnosis Date     CAD (coronary artery disease)     Premature artery disease noted     Hyperlipidaemia LDL goal <100      Hypertension 2007     Hypertension goal BP (blood pressure) < 140/90      Long QT interval syndrome      MEDICAL HISTORY OF -     History of rhabdomyolosis, from strenuous exercise     Mild persistent asthma      Sleep apnea syndrome      Type 2 diabetes, HbA1c goal < 7% (H)        Past Medical History reviewed with patient during visit.    Past Surgical History:   Procedure Laterality Date     ANGIOGRAM       ANGIOPLASTY  2001    Coronary artery angioplasty//associated with mild AMI     TONSILLECTOMY  2005    With uvuloectomy     Past Surgical History reviewed with patient during visit.    Current Outpatient Medications   Medication     albuterol (PROAIR HFA) 108 (90 Base) MCG/ACT inhaler     alcohol swab prep pads     amLODIPine (NORVASC) 10 MG tablet     aspirin 81 MG EC tablet     atorvastatin (LIPITOR) 40 MG tablet     blood glucose (ACCU-CHEK GUIDE) " test strip     blood glucose (NO BRAND SPECIFIED) test strip     blood glucose calibration (NO BRAND SPECIFIED) solution     blood glucose monitoring (ACCU-CHEK FASTCLIX) lancets     blood glucose monitoring (NO BRAND SPECIFIED) meter device kit     Blood Glucose Monitoring Suppl (ACCU-CHEK GUIDE ME) w/Device KIT     cloNIDine (CATAPRES) 0.1 MG tablet     gabapentin (NEURONTIN) 100 MG capsule     losartan (COZAAR) 100 MG tablet     methocarbamol (ROBAXIN) 500 MG tablet     order for DME     ORDER FOR DME     ORDER FOR DME     thin (NO BRAND SPECIFIED) lancets     No current facility-administered medications for this visit.        Allergies   Allergen Reactions     Biaxin [Clarithromycin]      Metformin      Reported breaking out with a skin reaction     Robafen Dm Cgh-Chest [Robitussin Cough-Chest Dm] Difficulty breathing     Tamiflu [Oseltamivir]        Social History     Socioeconomic History     Marital status:      Spouse name: Not on file     Number of children: Not on file     Years of education: Not on file     Highest education level: Not on file   Occupational History     Not on file   Social Needs     Financial resource strain: Not on file     Food insecurity     Worry: Not on file     Inability: Not on file     Transportation needs     Medical: Not on file     Non-medical: Not on file   Tobacco Use     Smoking status: Former Smoker     Types: Cigars     Smokeless tobacco: Never Used     Tobacco comment: Smokes intermittently   Substance and Sexual Activity     Alcohol use: Yes     Comment: occ/ mod     Drug use: No     Sexual activity: Not Currently     Partners: Female   Lifestyle     Physical activity     Days per week: Not on file     Minutes per session: Not on file     Stress: Not on file   Relationships     Social connections     Talks on phone: Not on file     Gets together: Not on file     Attends Holiness service: Not on file     Active member of club or organization: Not on file      Attends meetings of clubs or organizations: Not on file     Relationship status: Not on file     Intimate partner violence     Fear of current or ex partner: Not on file     Emotionally abused: Not on file     Physically abused: Not on file     Forced sexual activity: Not on file   Other Topics Concern     Parent/sibling w/ CABG, MI or angioplasty before 65F 55M? 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    Vital Signs: There were no vitals taken for this visit.    Examination:  Constitutional:  Alert, well nourished, NAD.  HEENT: Normocephalic, atraumatic.   Pulmonary:  Without shortness of breath, normal effort.   Cardiovascular:  No pitting edema of BLE.      Neurological:  Awake  Alert  Oriented x 3  Speech clear  Cranial nerves II - XII grossly intact  PERRL  EOMI  Face symmetric  Tongue midline  Motor exam   Shoulder Abduction:  Right:  4/5   Left:  4/5  Biceps:                      Right:  5/5   Left:  5/5  Triceps:                     Right:  5/5   Left:  5/5  Wrist Extensors:        Right:  5/5   Left:  5/5  Wrist Flexors:            Right:  5/5   Left:  5/5  Intrinsics:                   Right:  4/5   Left:  4/5  Hip Flexor:                 Right: 5/5  Left:  4/5  Quadriceps:               Right:  5/5  Left:   "5/5  Hamstrings:               Right:  5/5  Left:  5/5  Gastroc Soleus:         Right:  5/5  Left:  5/5  Tib/Ant:                      Right:  5/5  Left:  5/5  EHL:                          Right:  5/5  Left:  5/5         Numbness in left>right arms    Left sided hyper-reflexia, Positive bilateral Gonzales's    Musculoskeletal:  Gait: Able to stand from a seated position. Normal non-antalgic, non-myelopathic gait. Able to heel/toe walk without loss of balance.    Cervical examination reveals decreased range of motion.   Tenderness to palpation of cervical spine.     Imaging:   MR CERVICAL SPINE WITHOUT CONTRAST 6/7/2021 8:29 PM                                                              IMPRESSION:  1. Congenitally narrow spinal canal.  2. Moderate to large disc protrusions at multiple levels producing severe central canal stenosis and cord deformity at C5-C6 and moderate to severe central canal stenosis at C4-C5.  3. Abnormal cord signal intensity in the left half of the cord at the C5 level consistent with edema, gliosis, or ischemic change.  4. 4.7 x 4.6 cm mass in right lobe of thyroid. This is most likely is a thyroid goiter or benign adenoma although it is difficult to exclude other etiologies. If indicated, ultrasound and fine needle biopsy might be helpful in further evaluation.     Assessment/Plan:   45 year old male who presents for evaluation of neck pain, arm pain, and hand numbness/weakness that started about 1 month ago. Denies any precipitating trauma or injuries but he does recall the symptoms started when he was walking and he heard a \"pop\" in his neck. Pain is located in the posterior neck and radiates to BUE with constant numbness/tingling in hands and fingers. He reports weakness in arms and hands. He recently noticed some leg weakness and had a fall the other day as well. He has been doing home PT exercises, massage, and taking NSAIDS but symptoms persist.    Given progressive clinical " myelopathy and weakness with severe stenosis and cord signal change, will plan for C3-7 ACDF  CT Cervical pre-op  Risks and benefits discussed

## 2021-06-17 NOTE — NURSING NOTE
Archie Wallace is a 45 year old male who presents for:  Chief Complaint   Patient presents with     Neurologic Problem     Neck and Shoulder pain        Vitals:    Vitals:    06/17/21 1019   BP: (!) 187/102   BP Location: Left arm   Patient Position: Sitting   Cuff Size: Adult Large   Pulse: 92   SpO2: 98%       BMI:  Estimated body mass index is 40.82 kg/m  as calculated from the following:    Height as of 6/10/21: 6' (1.829 m).    Weight as of 6/10/21: 301 lb (136.5 kg).    Pain Score:  Worst Pain (10)        Gregorio Calderon, ATC

## 2021-06-17 NOTE — LETTER
"    6/17/2021         RE: Archie Wallace  [address]  [address]  [place] [address]        Dear Colleague,    Thank you for referring your patient, Archie Wallace, to the [facility]. Please see a copy of my visit note below.    Archie Wallace is a 45 year old male who presents for evaluation of neck pain, arm pain, and hand numbness/weakness that started about 1 month ago. Denies any precipitating trauma or injuries but he does recall the symptoms started when he was walking and he heard a \"pop\" in his neck. Pain is located in the posterior neck and radiates to BUE with constant numbness/tingling in hands and fingers. He reports weakness in arms and hands. He recently noticed some leg weakness and had a fall the other day as well. He notes decreased range of motion in neck due to pain. Describes the pain as shooting. Pain is worsened with bending and lifting. He has been doing home PT exercises, massage, and taking NSAIDS for pain control. Denies any bladder/bowel incontinence.     Returns for follow up.  Significant progressive clinical myelopathy with pain and weakness.  MR shows multi-level severe cervical stenosis with cord signal change.    Past Medical History:   Diagnosis Date     CAD (coronary artery disease)     Premature artery disease noted     Hyperlipidaemia LDL goal <100      Hypertension 2007     Hypertension goal BP (blood pressure) < 140/90      Long QT interval syndrome      MEDICAL HISTORY OF -     History of rhabdomyolosis, from strenuous exercise     Mild persistent asthma      Sleep apnea syndrome      Type 2 diabetes, HbA1c goal < 7% (H)        Past Medical History reviewed with patient during visit.    Past Surgical History:   Procedure Laterality Date     ANGIOGRAM       ANGIOPLASTY  2001    Coronary artery angioplasty//associated with mild AMI     TONSILLECTOMY  2005    With uvuloectomy     Past Surgical History reviewed with patient during " visit.    Current Outpatient Medications   Medication     albuterol (PROAIR HFA) 108 (90 Base) MCG/ACT inhaler     alcohol swab prep pads     amLODIPine (NORVASC) 10 MG tablet     aspirin 81 MG EC tablet     atorvastatin (LIPITOR) 40 MG tablet     blood glucose (ACCU-CHEK GUIDE) test strip     blood glucose (NO BRAND SPECIFIED) test strip     blood glucose calibration (NO BRAND SPECIFIED) solution     blood glucose monitoring (ACCU-CHEK FASTCLIX) lancets     blood glucose monitoring (NO BRAND SPECIFIED) meter device kit     Blood Glucose Monitoring Suppl (ACCU-CHEK GUIDE ME) w/Device KIT     cloNIDine (CATAPRES) 0.1 MG tablet     gabapentin (NEURONTIN) 100 MG capsule     losartan (COZAAR) 100 MG tablet     methocarbamol (ROBAXIN) 500 MG tablet     order for DME     ORDER FOR DME     ORDER FOR DME     thin (NO BRAND SPECIFIED) lancets     No current facility-administered medications for this visit.        Allergies   Allergen Reactions     Biaxin [Clarithromycin]      Metformin      Reported breaking out with a skin reaction     Robafen Dm Cgh-Chest [Robitussin Cough-Chest Dm] Difficulty breathing     Tamiflu [Oseltamivir]        Social History     Socioeconomic History     Marital status:      Spouse name: Not on file     Number of children: Not on file     Years of education: Not on file     Highest education level: Not on file   Occupational History     Not on file   Social Needs     Financial resource strain: Not on file     Food insecurity     Worry: Not on file     Inability: Not on file     Transportation needs     Medical: Not on file     Non-medical: Not on file   Tobacco Use     Smoking status: Former Smoker     Types: Cigars     Smokeless tobacco: Never Used     Tobacco comment: Smokes intermittently   Substance and Sexual Activity     Alcohol use: Yes     Comment: occ/ mod     Drug use: No     Sexual activity: Not Currently     Partners: Female   Lifestyle     Physical activity     Days per week:  Not on file     Minutes per session: Not on file     Stress: Not on file   Relationships     Social connections     Talks on phone: Not on file     Gets together: Not on file     Attends Druze service: Not on file     Active member of club or organization: Not on file     Attends meetings of clubs or organizations: Not on file     Relationship status: Not on file     Intimate partner violence     Fear of current or ex partner: Not on file     Emotionally abused: Not on file     Physically abused: Not on file     Forced sexual activity: Not on file   Other Topics Concern     Parent/sibling w/ CABG, MI or angioplasty before 65F 55M? 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    Vital Signs: There were no vitals taken for this visit.    Examination:  Constitutional:  Alert, well nourished, NAD.  HEENT: Normocephalic, atraumatic.   Pulmonary:  Without shortness of breath, normal effort.   Cardiovascular:  No pitting edema of BLE.      Neurological:  Awake  Alert  Oriented x 3  Speech clear  Cranial nerves II - XII grossly intact  PERRL  EOMI  Face symmetric  Tongue midline  Motor exam   Shoulder Abduction:  Right:  4/5   Left:  4/5  Biceps:                      Right:  5/5   Left:  5/5  Triceps:                     Right:  5/5    "Left:  5/5  Wrist Extensors:        Right:  5/5   Left:  5/5  Wrist Flexors:            Right:  5/5   Left:  5/5  Intrinsics:                   Right:  4/5   Left:  4/5  Hip Flexor:                 Right: 5/5  Left:  4/5  Quadriceps:               Right:  5/5  Left:  5/5  Hamstrings:               Right:  5/5  Left:  5/5  Gastroc Soleus:         Right:  5/5  Left:  5/5  Tib/Ant:                      Right:  5/5  Left:  5/5  EHL:                          Right:  5/5  Left:  5/5         Numbness in left>right arms    Left sided hyper-reflexia, Positive bilateral Gonzales's    Musculoskeletal:  Gait: Able to stand from a seated position. Normal non-antalgic, non-myelopathic gait. Able to heel/toe walk without loss of balance.    Cervical examination reveals decreased range of motion.   Tenderness to palpation of cervical spine.     Imaging:   MR CERVICAL SPINE WITHOUT CONTRAST 6/7/2021 8:29 PM                                                              IMPRESSION:  1. Congenitally narrow spinal canal.  2. Moderate to large disc protrusions at multiple levels producing severe central canal stenosis and cord deformity at C5-C6 and moderate to severe central canal stenosis at C4-C5.  3. Abnormal cord signal intensity in the left half of the cord at the C5 level consistent with edema, gliosis, or ischemic change.  4. 4.7 x 4.6 cm mass in right lobe of thyroid. This is most likely is a thyroid goiter or benign adenoma although it is difficult to exclude other etiologies. If indicated, ultrasound and fine needle biopsy might be helpful in further evaluation.     Assessment/Plan:   45 year old male who presents for evaluation of neck pain, arm pain, and hand numbness/weakness that started about 1 month ago. Denies any precipitating trauma or injuries but he does recall the symptoms started when he was walking and he heard a \"pop\" in his neck. Pain is located in the posterior neck and radiates to BUE with constant " numbness/tingling in hands and fingers. He reports weakness in arms and hands. He recently noticed some leg weakness and had a fall the other day as well. He has been doing home PT exercises, massage, and taking NSAIDS but symptoms persist.    Given progressive clinical myelopathy and weakness with severe stenosis and cord signal change, will plan for C3-7 ACDF  CT Cervical pre-op  Risks and benefits discussed          Again, thank you for allowing me to participate in the care of your patient.        Sincerely,        Jaquan De Leon MD

## 2021-06-17 NOTE — PATIENT INSTRUCTIONS
Patient Instructions    Surgery scheduled at [facility] for Anterior Cervical Discectomy and Fusion with Dr. De Leon     Pre-Operative    Surgical risks: blood clots in the leg or lung, problems urinating, nerve damage, drainage from the incision, infection,stiffness    Pre-operative physical with primary care physician within 30 days of surgical date.     2-4 night hospitalization stay    Shower procedure  o Please shower with antimicrobial soap the night before surgery and morning of surgery. Please refer to showering instruction sheet in folder.    Eating/Drinking  o Stop all solid foods 8 hours before surgery.  o Keep drinking clear liquids until 4 hours before surgery  - Clear liquids include water, clear juice, black coffee, or clear tea without milk, Gatorade, clear soda.     Medications  o Hold Aspirin, NSAIDs (Advil/Ibuprofen, Indocin, Naproxen,Nuprin,Relafen/Nabumetone, Diclofenac,Meloxicam, Aleve, Celebrex) x 7 days prior to surgical date  o Hold methotrexate, Xeljanz for 1-2 weeks prior to surgery and continue to hold 2 weeks post surgery.   o You can take Tylenol (Acetaminophen) for pain, 1000 mg  - Do not exceed 3,000 mg per day   o Any other medications prescribed, please discuss with your primary care provider at your pre-operative physical     As part of preparation for your upcoming procedure you are required to have a test for the novel Coronavirus, COVID-19.  o Please call the drive-up testing number to schedule your appointment. The test needs to be completed within 4 days (96) hours of surgery.   o Scheduling Number: [phone]    Pain Management    Dealing with pain  o As your body heals, you might feel a stabbing, burning, or aching pain. You may also have some numbness.  o Everyone feels pain differently, we may ask you to rate your pain using a pain scale. This will let us know how much pain you feel.   o Keep in mind that medicine won't take away all of your pain. It helps to try  other ways to relax and ease pain.     Things to help with pain  o After surgery, we will give you medicine for your pain. These medications work well, but they can make you drowsy, itchy, or sick to your stomach. If we give you narcotics for pain, try to take the pills with food.   o For mild to moderate pain, you can take medication such as Tylenol. These can be used with narcotics, but make sure that your narcotic does not contain Tylenol.   - Do NOT drive while taking narcotic pain medication  - Do NOT drink alcohol while using any pain medication  o You can utilize ice as needed (no longer than 20 minutes at one time)    You may resume taking NSAIDs (ex. Ibuprofen, aleve, naproxen) 6 weeks after surgery as it may cause bleeding and interfere with bone healing     Incision Care    Look at your incision site every day. You  may need a mirror or family member to help you.     No submerging incision in water such as pools, hot tubs, baths for at least 8 weeks or until incision is healed    You may get your incision wet in the shower. Allow water and soap to run over incision, and gently pat dry.     Remove dressing as instructed upon discharge    Contact us right away if you have:   o Fever over 101 degrees farenheit  o Green or yellow drainage (pus) from your incision or increased bloody drainage   o Redness, swelling, or warmth at your surgery site   o Chest pain or trouble breathing   o Bladder or bowel changes or loss of control    o Notify clinic [phone].    Activity Restrictions    If a brace is required per Dr. De Leon, [facility] will fit you for the brace in the hospital. Brace type and length of time to wear it will be determined by Dr. De Leon.     For the first 6-8 weeks, no lifting > 10 pounds, no bending, twisting, or overhead reaching.    Take stairs in moderation     Walking: Walking is the best way to start exercise after surgery. Take short frequent walks. You may gradually increase the distance as  tolerated. If you feel pain, decrease your activity, but DO NOT stop walking. Walking will help you gain strength and prevent muscle soreness and spasms.     Avoid bed rest and prolonged sitting for longer than 30 minutes (change positions frequently while awake)    No contact sports until after follow up visit    No high impact activities such as; running/jogging, snowmobile or 4 goldstein riding or any other recreational vehicles    Please call the clinic if you develop any of the following symptoms:  o Swelling and/or warmth in one or both legs  o Pain or tenderness in your leg, ankle, foot, or arm   o Red or discolored skin     Post-Op Follow Up Appointments    2 week staple/suture removal with RN    6 week post op with xray prior     3 months post op with xray prior     6 months post op with xray prior     1 year post op with xray prior    Please call to schedule follow up and xray appointments at [phone]    Resources    If you are currently employed, you will need to be off work for 4-6 weeks for post op recovery and healing.    Please fax any FMLA/short term disability paperwork to [phone]    You may call our clinic when you'd like to return to work and we can provide a work letter    To allow staff adequate time to complete paperwork, please send as soon as possible

## 2021-06-17 NOTE — NURSING NOTE
Reviewed pre- and post-operative instructions as outlined in the After Visit Summary/Patient Instructions with patient.   Surgery folder was given to patient    Patient Education Topic: Procedure with Dr. De Leon     Learner(s): Patient    Knowledge Level: Basic    Readiness to Learn: Ready    Method:  Verbal explanation    Outcome: Able to verbalize instructions    Barriers to Learning: No barrier    Covid Testing: patient educated they will need to have a test for the novel Coronavirus, COVID-19.The test needs to be completed within 4 days (96) hours of surgery. Order Placed.    Scheduling Number: [phone]    Patient had the opportunity for questions to be answered. Advised Patient to call clinic with any questions/concerns. Gave patient antibacterial soap for pre-surgery skin preparation.

## 2021-06-29 ENCOUNTER — HOSPITAL ENCOUNTER (OUTPATIENT)
Dept: ULTRASOUND IMAGING | Facility: CLINIC | Age: 46
End: 2021-06-29
Attending: SURGERY
Payer: COMMERCIAL

## 2021-06-29 ENCOUNTER — OFFICE VISIT (OUTPATIENT)
Dept: SURGERY | Facility: CLINIC | Age: 46
End: 2021-06-29
Payer: COMMERCIAL

## 2021-06-29 ENCOUNTER — HOSPITAL ENCOUNTER (OUTPATIENT)
Dept: CT IMAGING | Facility: CLINIC | Age: 46
End: 2021-06-29
Attending: NEUROLOGICAL SURGERY
Payer: COMMERCIAL

## 2021-06-29 VITALS
BODY MASS INDEX: 45.03 KG/M2 | WEIGHT: 315 LBS | HEART RATE: 74 BPM | SYSTOLIC BLOOD PRESSURE: 166 MMHG | DIASTOLIC BLOOD PRESSURE: 97 MMHG

## 2021-06-29 DIAGNOSIS — E04.1 THYROID NODULE: ICD-10-CM

## 2021-06-29 DIAGNOSIS — E04.1 THYROID NODULE: Primary | ICD-10-CM

## 2021-06-29 DIAGNOSIS — M47.12 CERVICAL SPONDYLOSIS WITH MYELOPATHY: ICD-10-CM

## 2021-06-29 PROCEDURE — 70491 CT SOFT TISSUE NECK W/DYE: CPT

## 2021-06-29 PROCEDURE — 250N000011 HC RX IP 250 OP 636: Performed by: RADIOLOGY

## 2021-06-29 PROCEDURE — 88173 CYTOPATH EVAL FNA REPORT: CPT | Performed by: PATHOLOGY

## 2021-06-29 PROCEDURE — 76536 US EXAM OF HEAD AND NECK: CPT

## 2021-06-29 PROCEDURE — 10005 FNA BX W/US GDN 1ST LES: CPT | Performed by: SURGERY

## 2021-06-29 PROCEDURE — 250N000009 HC RX 250: Performed by: RADIOLOGY

## 2021-06-29 RX ORDER — IOPAMIDOL 755 MG/ML
80 INJECTION, SOLUTION INTRAVASCULAR ONCE
Status: COMPLETED | OUTPATIENT
Start: 2021-06-29 | End: 2021-06-29

## 2021-06-29 RX ADMIN — IOPAMIDOL 80 ML: 755 INJECTION, SOLUTION INTRAVENOUS at 17:49

## 2021-06-29 RX ADMIN — SODIUM CHLORIDE 80 ML: 9 INJECTION, SOLUTION INTRAVENOUS at 17:51

## 2021-06-29 NOTE — LETTER
6/29/2021         RE: Archie Wallace  [address]  [address]  [place] [facility] [place] [facility] [place] [address]        Dear Colleague,    Thank you for referring your patient, Archie Wallace, to the [facility]. Please see a copy of my visit note below.    Thyroid nodule FNA.   Using an ultrasound machine multiple gray scale images of the neck were obtained in both the transverse and longitudinal planes with the patient supine in the examination chair after informed consent was obtained. This revealed one thyroid nodule(s) that met the current MILTON guidelines for FNA.  The skin was prepped and a total of 2 mL of 1% lidocaine with epinephrine was injected into the planned biopsy site(s).  Using the US for needle guidance, 1 pass was made into the right mid thyroid nodule using 25g needles.  Unable to tolerate more than one pass  No blood loss or complications. The slides were prepared and sent to pathology. Will call the patient with results.         Again, thank you for allowing me to participate in the care of your patient.        Sincerely,        Moshe Hernandez, DO

## 2021-06-30 NOTE — PROGRESS NOTES
Thyroid nodule FNA.   Using an ultrasound machine multiple gray scale images of the neck were obtained in both the transverse and longitudinal planes with the patient supine in the examination chair after informed consent was obtained. This revealed one thyroid nodule(s) that met the current MILTON guidelines for FNA.  The skin was prepped and a total of 2 mL of 1% lidocaine with epinephrine was injected into the planned biopsy site(s).  Using the US for needle guidance, 1 pass was made into the right mid thyroid nodule using 25g needles.  Unable to tolerate more than one pass  No blood loss or complications. The slides were prepared and sent to pathology. Will call the patient with results.

## 2021-07-01 LAB — COPATH REPORT: NORMAL

## 2021-07-12 ENCOUNTER — APPOINTMENT (OUTPATIENT)
Dept: LAB | Facility: CLINIC | Age: 46
End: 2021-07-12
Attending: NEUROLOGICAL SURGERY
Payer: MEDICAID

## 2021-07-12 ENCOUNTER — OFFICE VISIT (OUTPATIENT)
Dept: FAMILY MEDICINE | Facility: CLINIC | Age: 46
End: 2021-07-12
Payer: COMMERCIAL

## 2021-07-12 VITALS
SYSTOLIC BLOOD PRESSURE: 169 MMHG | OXYGEN SATURATION: 97 % | HEART RATE: 69 BPM | WEIGHT: 315 LBS | DIASTOLIC BLOOD PRESSURE: 94 MMHG | TEMPERATURE: 97.8 F | BODY MASS INDEX: 45.3 KG/M2

## 2021-07-12 DIAGNOSIS — I25.2 HISTORY OF MI (MYOCARDIAL INFARCTION): ICD-10-CM

## 2021-07-12 DIAGNOSIS — I10 HYPERTENSION GOAL BP (BLOOD PRESSURE) < 140/90: ICD-10-CM

## 2021-07-12 DIAGNOSIS — I25.10 CORONARY ARTERY DISEASE INVOLVING NATIVE HEART, ANGINA PRESENCE UNSPECIFIED, UNSPECIFIED VESSEL OR LESION TYPE: ICD-10-CM

## 2021-07-12 DIAGNOSIS — G95.9 CERVICAL MYELOPATHY (H): ICD-10-CM

## 2021-07-12 DIAGNOSIS — Z01.818 PREOP GENERAL PHYSICAL EXAM: Primary | ICD-10-CM

## 2021-07-12 DIAGNOSIS — E66.01 MORBID OBESITY (H): ICD-10-CM

## 2021-07-12 LAB
ANION GAP SERPL CALCULATED.3IONS-SCNC: 3 MMOL/L (ref 3–14)
BUN SERPL-MCNC: 12 MG/DL (ref 7–30)
CALCIUM SERPL-MCNC: 9 MG/DL (ref 8.5–10.1)
CHLORIDE BLD-SCNC: 104 MMOL/L (ref 94–109)
CO2 SERPL-SCNC: 32 MMOL/L (ref 20–32)
CREAT SERPL-MCNC: 0.83 MG/DL (ref 0.66–1.25)
ERYTHROCYTE [DISTWIDTH] IN BLOOD BY AUTOMATED COUNT: 13.5 % (ref 10–15)
GFR SERPL CREATININE-BSD FRML MDRD: >90 ML/MIN/1.73M2
GLUCOSE BLD-MCNC: 155 MG/DL (ref 70–99)
HBA1C MFR BLD: 6.6 % (ref 0–5.6)
HCT VFR BLD AUTO: 45.3 % (ref 40–53)
HGB BLD-MCNC: 14.6 G/DL (ref 13.3–17.7)
MCH RBC QN AUTO: 28 PG (ref 26.5–33)
MCHC RBC AUTO-ENTMCNC: 32.2 G/DL (ref 31.5–36.5)
MCV RBC AUTO: 87 FL (ref 78–100)
PLATELET # BLD AUTO: 290 10E3/UL (ref 150–450)
POTASSIUM BLD-SCNC: 3.9 MMOL/L (ref 3.4–5.3)
RBC # BLD AUTO: 5.22 10E6/UL (ref 4.4–5.9)
SODIUM SERPL-SCNC: 139 MMOL/L (ref 133–144)
WBC # BLD AUTO: 5.4 10E3/UL (ref 4–11)

## 2021-07-12 PROCEDURE — 93000 ELECTROCARDIOGRAM COMPLETE: CPT | Performed by: PHYSICIAN ASSISTANT

## 2021-07-12 PROCEDURE — 83036 HEMOGLOBIN GLYCOSYLATED A1C: CPT | Performed by: PHYSICIAN ASSISTANT

## 2021-07-12 PROCEDURE — 80048 BASIC METABOLIC PNL TOTAL CA: CPT | Performed by: PHYSICIAN ASSISTANT

## 2021-07-12 PROCEDURE — 85027 COMPLETE CBC AUTOMATED: CPT | Performed by: PHYSICIAN ASSISTANT

## 2021-07-12 PROCEDURE — 36415 COLL VENOUS BLD VENIPUNCTURE: CPT | Performed by: PHYSICIAN ASSISTANT

## 2021-07-12 PROCEDURE — 99214 OFFICE O/P EST MOD 30 MIN: CPT | Performed by: PHYSICIAN ASSISTANT

## 2021-07-12 ASSESSMENT — PAIN SCALES - GENERAL: PAINLEVEL: SEVERE PAIN (6)

## 2021-07-12 NOTE — PATIENT INSTRUCTIONS
Call cardiology clinic to have visit for cardiac clearance  STOP aspirin. Continue your other medications.       Preparing for Your Surgery  Getting started  A nurse will call you to review your health history and instructions. They will give you an arrival time based on your scheduled surgery time.  Please be ready to share the following:    Your doctor's clinic name and phone number    Your medical, surgical and anesthesia history    A list of allergies and sensitivities    A list of medicines, including herbal treatments and over-the-counter drugs    Whether the patient has a legal guardian (ask how to send us the papers in advance)  If you have a child who's having surgery, please ask for a copy of Preparing for Your Child's Surgery.    Preparing for surgery    Within 30 days of surgery: Have a pre-op exam (sometimes called an H&P, or History and Physical). This can be done at a clinic or pre-operative center.  ? If you're having a , you may not need this exam. Talk to your care team    At your pre-op exam, talk to your care team about all medicines you take. If you need to stop any medicines before surgery, ask when to start taking them again.  ? We do this for your safety. Many medicines can make you bleed too much during surgery. Some change how well surgery (anesthesia) drugs work.    Call your insurance company to let them know you're having surgery. (If you don't have insurance, call [phone].)    Call your clinic if there's any change in your health. This includes signs of a cold or flu (sore throat, runny nose, cough, rash, fever). It also includes a scrape or scratch near the surgery site.    If you have questions on the day of surgery, call your hospital or surgery center.  Eating and drinking guidelines  For your safety: Unless your surgeon tells you otherwise, follow the guidelines below.    Eat and drink as usual until 8 hours before surgery. After that, no food or milk.    Drink clear  liquids until 2 hours before surgery. These are liquids you can see through, like water, Gatorade and Propel Water. You may also have black coffee and tea (no cream or milk).    Nothing by mouth within 2 hours of surgery. This includes gum, candy and breath mints.    If you drink, stop drinking alcohol the night before surgery.    If your care team tells you to take medicine on the morning of surgery, it's okay to take it with a sip of water.  Preventing infection    Shower or bathe the night before and morning of your surgery. Follow the instructions your clinic gave you. (If no instructions, use regular soap.)    Don't shave or clip hair near your surgery site. We'll remove the hair if needed.    Don't smoke or vape the morning of surgery. You may chew nicotine gum up to 2 hours before surgery. A nicotine patch is okay.  ? Note: Some surgeries require you to completely quit smoking and nicotine. Check with your surgeon.    Your care team will make every effort to keep you safe from infection. We will:  ? Clean our hands often with soap and water (or an alcohol-based hand rub).  ? Clean the skin at your surgery site with a special soap that kills germs.  ? Give you a special gown to keep you warm. (Cold raises the risk of infection.)  ? Wear special hair covers, masks, gowns and gloves during surgery.  ? Give antibiotic medicine, if prescribed. Not all surgeries need antibiotics.  What to bring on the day of surgery    Photo ID and insurance card    Copy of your health care directive, if you have one    Glasses and hearing aides (bring cases)  ? You can't wear contacts during surgery    Inhaler and eye drops, if you use them (tell us about these when you arrive)    CPAP machine or breathing device, if you use them    A few personal items, if spending the night    If you have . . .  ? A pacemaker or ICD (cardiac defibrillator): Bring the ID card.  ? An implanted stimulator: Bring the remote control.  ? A legal  guardian: Bring a copy of the certified (court-stamped) guardianship papers.  Please remove any jewelry, including body piercings. Leave jewelry and other valuables at home.  If you're going home the day of surgery  Important: If you don't follow the rules below, we must cancel your surgery.     Arrange for someone to drive you home after surgery. You may not drive, take a taxi or take public transportation by yourself (unless you'll have local anesthesia only).    Arrange for a responsible adult to stay with you overnight. If you don't, we may keep you in the hospital overnight, and you may need to pay the costs yourself.  Questions?   If you have any questions for your care team, list them here: _________________________________________________________________________________________________________________________________________________________________________________________________________________________________________________________________________________________________________________________  For informational purposes only. Not to replace the advice of your health care provider. Copyright   2003, 2019 [facility]. All rights reserved. Clinically reviewed by Nan Teran MD. 3D F[employer] II [id number] - REV 4/20.

## 2021-07-12 NOTE — PROGRESS NOTES
[facility]  [address]  [place] [address]  Phone: [phone]  Primary Provider: No Ref-Primary, Physician  Pre-op Performing Provider: FRANDY GAMA      PREOPERATIVE EVALUATION:  Today's date: 7/12/2021    Archie Wallace is a 45 year old male who presents for a preoperative evaluation.    Surgical Information:  Surgery/Procedure: Cervical 3 to Cervical 7 Anterior Cervical Discectomy and Fusion  Surgery Location: [facility]  Surgeon: Dr. De Leon  Surgery Date: 7/16/21  Time of Surgery: 7:30 AM  Where patient plans to recover: At home with family  Fax number for surgical facility: Note does not need to be faxed, will be available electronically in Epic.    Type of Anesthesia Anticipated: to be determined    Assessment & Plan     The proposed surgical procedure is considered INTERMEDIATE risk.    Preop general physical exam  - EKG 12-lead complete w/read - Clinics  - Hemoglobin A1c; Future  - Basic metabolic panel  (Ca, Cl, CO2, Creat, Gluc, K, Na, BUN); Future  - CBC with platelets; Future  - Hemoglobin A1c  - Basic metabolic panel  (Ca, Cl, CO2, Creat, Gluc, K, Na, BUN)  - CBC with platelets    Cervical myelopathy (H)  Morbid obesity (H)  Hypertension goal BP (blood pressure) < 140/90  Coronary artery disease involving native heart, angina presence unspecified, unspecified vessel or lesion type  History of MI (myocardial infarction)      Risks and Recommendations:  The patient has the following additional risks and recommendations for perioperative complications:  Cardiovascular:   - Cardiology consulted due to patient history of MI and persistent MACK. Patient had negative stress ECHO Dec 2020, EKG today is unremarkable. Per cardiology, no further ischemic evaluation needed prior to proceeding with surgery.   Pulmonology:  - Cardiology ordered PFT's, completed 7/14/2021.   Obstructive Sleep Apnea:   - Patient is to bring their home CPAP with them on the  day of surgery    Medication Instructions:  Patient is to take all scheduled medications on the day of surgery EXCEPT for modifications listed below:   - aspirin: Discontinue aspirin 7-10 days prior to procedure to reduce bleeding risk. It should be resumed postoperatively.     RECOMMENDATION:  APPROVAL GIVEN to proceed with proposed procedure, without further diagnostic evaluation.          Subjective     HPI related to upcoming procedure: Ongoing neck pain not responding to conservative therapies. MRI showed large disc protrusions with severe central canal stenosis causing his symptoms. He will undergo C3-C7 discectomy and fusion.      Preop Questions 7/12/2021   1. Have you ever had a heart attack or stroke? YES - 2001 coronary artery angioplasty    2. Have you ever had surgery on your heart or blood vessels, such as a stent placement, a coronary artery bypass, or surgery on an artery in your head, neck, heart, or legs? YES - 2001 coronary artery angioplasty    3. Do you have chest pain with activity? YES - having chest discomfort with activity, worsened in the last month    4. Do you have a history of  heart failure? No   5. Do you currently have a cold, bronchitis or symptoms of other infection? No   6. Do you have a cough, shortness of breath, or wheezing? YES - asthma, not changed   7. Do you or anyone in your family have previous history of blood clots? No   8. Do you or does anyone in your family have a serious bleeding problem such as prolonged bleeding following surgeries or cuts? No   9. Have you ever had problems with anemia or been told to take iron pills? No   10. Have you had any abnormal blood loss such as black, tarry or bloody stools? No   11. Have you ever had a blood transfusion? No   12. Are you willing to have a blood transfusion if it is medically needed before, during, or after your surgery? Yes   13. Have you or any of your relatives ever had problems with anesthesia? YES - has had problems  waking up in the past   14. Do you have sleep apnea, excessive snoring or daytime drowsiness? YES - has a CPAP at home   14a. Do you have a CPAP machine? Yes   15. Do you have any artifical heart valves or other implanted medical devices like a pacemaker, defibrillator, or continuous glucose monitor? No   16. Do you have artificial joints? No   17. Are you allergic to latex? No     Health Care Directive:  Patient does not have a Health Care Directive or Living Will: Discussed advance care planning with patient; however, patient declined at this time.    Preoperative Review of :   reviewed - controlled substances reflected in medication list.      Status of Chronic Conditions:  ASTHMA - Patient has a longstanding history of moderate-severe Asthma . Patient has been doing well overall noting MACK and continues on medication regimen consisting of albuterol without adverse reactions or side effects.     CAD - Patient has a longstanding history of moderate-severe CAD. Patient denies recent chest pain or NTG use, or PND. Last Stress test 12/2020, EKG today. Has reported recent exercise induced dyspnea.    DIABETES - Patient has a longstanding history of DiabetesType Type II . Patient is being treated with diet and denies significant side effects. Control has been good. Complicating factors include but are not limited to: hypertension, hyperlipidemia, CAD/PVD and morbid obesity .     HYPERLIPIDEMIA - Patient has a long history of significant Hyperlipidemia requiring medication for treatment with recent good control. Patient reports no problems or side effects with the medication.     HYPERTENSION - Patient has longstanding history of HTN , currently denies any symptoms referable to elevated blood pressure. Specifically denies chest pain, palpitations, dyspnea, orthopnea, PND or peripheral edema. Patient reports he was told to stop all medications prior to his surgery - his blood pressure is elevated today.     SLEEP  APNEA - uses CPAP    Review of Systems  CONSTITUTIONAL: NEGATIVE for fever, chills, change in weight  INTEGUMENTARY/SKIN: NEGATIVE for worrisome rashes, moles or lesions  EYES: NEGATIVE for vision changes or irritation  ENT/MOUTH: NEGATIVE for ear, mouth and throat problems  RESP: NEGATIVE for significant cough or SOB  BREAST: NEGATIVE for masses, tenderness or discharge  CV: NEGATIVE for chest pain, palpitations or peripheral edema  GI: NEGATIVE for nausea, abdominal pain, heartburn, or change in bowel habits  : NEGATIVE for frequency, dysuria, or hematuria  MUSCULOSKELETAL: NEGATIVE for significant arthralgias or myalgia  NEURO: NEGATIVE for weakness, dizziness or paresthesias  ENDOCRINE: NEGATIVE for temperature intolerance, skin/hair changes  HEME: NEGATIVE for bleeding problems  PSYCHIATRIC: NEGATIVE for changes in mood or affect    Patient Active Problem List    Diagnosis Date Noted     Cervical myelopathy (H) 06/17/2021     Priority: Medium     Added automatically from request for surgery [id number]       History of MI (myocardial infarction) 07/17/2020     Priority: Medium     Type 2 diabetes mellitus with hyperglycemia, without long-term current use of insulin (H) 10/10/2016     Priority: Medium     Moderate major depression (H) 10/10/2016     Priority: Medium     Long QT interval syndrome      Priority: Medium     Moderate persistent asthma 04/19/2011     Priority: Medium     Hyperlipidemia with target LDL less than 100      Priority: Medium     Diagnosis updated by automated process. Provider to review and confirm.       CAD (coronary artery disease)      Priority: Medium     Hypertension goal BP (blood pressure) < 140/90 03/18/2011     Priority: Medium     Morbid obesity (H) 02/18/2010     Priority: Medium     Atopic rhinitis 02/09/2010     Priority: Medium     (Problem list name updated by automated process. Provider to review and confirm.)       Low back pain 02/09/2010     Priority: Medium      Obstructive sleep apnea 02/09/2010     Priority: Medium      Past Medical History:   Diagnosis Date     CAD (coronary artery disease)     Premature artery disease noted     Hyperlipidaemia LDL goal <100      Hypertension 2007     Hypertension goal BP (blood pressure) < 140/90      Long QT interval syndrome      MEDICAL HISTORY OF -     History of rhabdomyolosis, from strenuous exercise     Mild persistent asthma      Sleep apnea syndrome      Type 2 diabetes, HbA1c goal < 7% (H)      Past Surgical History:   Procedure Laterality Date     ANGIOGRAM       ANGIOPLASTY  2001    Coronary artery angioplasty//associated with mild AMI     TONSILLECTOMY  2005    With uvuloectomy     Current Outpatient Medications   Medication Sig Dispense Refill     albuterol (PROAIR HFA) 108 (90 Base) MCG/ACT inhaler Inhale 2 puffs into the lungs every 6 hours as needed 1 Inhaler 5     alcohol swab prep pads Use to swab area of injection/jessy as directed. 100 each 3     amLODIPine (NORVASC) 10 MG tablet Take 1 tablet (10 mg) by mouth daily 90 tablet 2     aspirin 81 MG EC tablet Take 1 tablet (81 mg) by mouth daily 90 tablet 3     atorvastatin (LIPITOR) 40 MG tablet Take 1 tablet (40 mg) by mouth daily 90 tablet 3     blood glucose (ACCU-CHEK GUIDE) test strip Use to test blood sugar 1 time daily (vary the time before meals and bed). 50 strip 3     blood glucose (NO BRAND SPECIFIED) test strip Use to test blood sugar 1 times daily or as directed. To accompany: Blood Glucose Monitor Brands: per insurance. 100 strip 6     blood glucose calibration (NO BRAND SPECIFIED) solution To accompany: Blood Glucose Monitor Brands: per insurance. 1 Bottle 3     blood glucose monitoring (ACCU-CHEK FASTCLIX) lancets 1 each daily Use to test blood sugar 1 time daily. 102 each 3     blood glucose monitoring (NO BRAND SPECIFIED) meter device kit Use to test blood sugar 1 times daily or as directed. Preferred blood glucose meter OR supplies to accompany:  Blood Glucose Monitor Brands: per insurance. 1 kit 0     Blood Glucose Monitoring Suppl (ACCU-CHEK GUIDE ME) w/Device KIT 1 Device daily Use to test blood sugar 1 times daily or as directed. Preferred blood glucose meter: per insurance. 1 kit 0     cloNIDine (CATAPRES) 0.1 MG tablet Take 1 tablet (0.1 mg) by mouth 2 times daily 60 tablet 1     gabapentin (NEURONTIN) 100 MG capsule Take 1 tablet at night x3 nights then 1 tablet twice daily x3 days then 1 tablet 3 times daily 90 capsule 1     losartan (COZAAR) 100 MG tablet Take 1 tablet (100 mg) by mouth daily 90 tablet 2     methocarbamol (ROBAXIN) 500 MG tablet Take 1 tablet (500 mg) by mouth 3 times daily as needed for muscle spasms 30 tablet 1     order for DME Equipment being ordered: CPAP--replacement with using prior settings. Additional supplies as needed with replacement machine.    Will need to contact the patient for information needed about the prior CPAP settings and use. 1 Units o     ORDER FOR DME CPAP setting 19 cm H2O 1 Units 0     ORDER FOR DME CPAP 1 Device 0     oxyCODONE (ROXICODONE) 5 MG tablet Take 1 tablet (5 mg) by mouth every 6 hours as needed for pain 30 tablet 0     thin (NO BRAND SPECIFIED) lancets Use with lanceting device. To accompany: Blood Glucose Monitor Brands: per insurance. 100 each 6       Allergies   Allergen Reactions     Biaxin [Clarithromycin]      Metformin      Reported breaking out with a skin reaction     Robafen Dm Cgh-Chest [Robitussin Cough-Chest Dm] Difficulty breathing     Tamiflu [Oseltamivir]         Social History     Tobacco Use     Smoking status: Former Smoker     Types: Cigars     Smokeless tobacco: Never Used     Tobacco comment: Smokes intermittently   Substance Use Topics     Alcohol use: Yes     Comment: occ/ mod       History   Drug Use No         Objective     BP (!) 169/94 (BP Location: Right arm, Patient Position: Chair, Cuff Size: Adult Large)   Pulse 69   Temp 97.8  F (36.6  C) (Oral)   Wt (!)  151.5 kg (334 lb 0.5 oz)   SpO2 97%   BMI 45.30 kg/m      Physical Exam    GENERAL APPEARANCE: healthy, alert and no distress     EYES: EOMI,  PERRL     HENT: ear canals and TM's normal and nose and mouth without ulcers or lesions     NECK: no adenopathy, no asymmetry, masses, or scars and thyroid normal to palpation     RESP: lungs clear to auscultation - no rales, rhonchi or wheezes     CV: regular rates and rhythm, normal S1 S2, no S3 or S4 and no murmur, click or rub     ABDOMEN:  soft, nontender, no HSM or masses and bowel sounds normal     MS: extremities normal- no gross deformities noted, no evidence of inflammation in joints, FROM in all extremities.     SKIN: no suspicious lesions or rashes     NEURO: Normal strength and tone, sensory exam grossly normal, mentation intact and speech normal     PSYCH: mentation appears normal. and affect normal/bright     LYMPHATICS: No cervical adenopathy    Recent Labs   Lab Test 06/02/21  1828 12/03/20  1017 04/21/20  0917   HGB  --  14.4 14.5   PLT  --   --  294    141 138   POTASSIUM 4.0 3.7 3.6   CR 0.87 0.71 0.73   A1C 6.7* 5.8* 6.6*        Diagnostics:  No results found for this or any previous visit (from the past 24 hour(s)).   EKG: Normal Sinus Rhythm, unchanged from previous tracings    Revised Cardiac Risk Index (RCRI):  The patient has the following serious cardiovascular risks for perioperative complications:   - Coronary Artery Disease (MI, positive stress test, angina, Qs on EKG) = 1 point     RCRI Interpretation: 1 point: Class II (low risk - 0.9% complication rate)           Signed Electronically by: Skylar De PA-C  Copy of this evaluation report is provided to requesting physician.

## 2021-07-13 ENCOUNTER — OFFICE VISIT (OUTPATIENT)
Dept: CARDIOLOGY | Facility: CLINIC | Age: 46
End: 2021-07-13
Payer: COMMERCIAL

## 2021-07-13 VITALS
OXYGEN SATURATION: 97 % | HEART RATE: 95 BPM | HEIGHT: 72 IN | BODY MASS INDEX: 42.66 KG/M2 | SYSTOLIC BLOOD PRESSURE: 163 MMHG | WEIGHT: 315 LBS | DIASTOLIC BLOOD PRESSURE: 102 MMHG

## 2021-07-13 DIAGNOSIS — R06.09 DOE (DYSPNEA ON EXERTION): Primary | ICD-10-CM

## 2021-07-13 PROCEDURE — 99214 OFFICE O/P EST MOD 30 MIN: CPT | Mod: GC | Performed by: INTERNAL MEDICINE

## 2021-07-13 ASSESSMENT — PAIN SCALES - GENERAL: PAINLEVEL: EXTREME PAIN (8)

## 2021-07-13 ASSESSMENT — MIFFLIN-ST. JEOR: SCORE: 2434.37

## 2021-07-13 NOTE — PROGRESS NOTES
"[facility]:    Referring Provider:  Chantell Olivera  Primary Care Provider:   No Ref-Primary, Physician  Indication for Consultation: Pre-operative cardiovascular evaluation     HPI: Archie Wallace is a 45 year old male being seen today for evaluation of preoperative evaluation.     The patient's risk factor profile is: (+) HTN, Type II DM [10 years], (+) hypercholesterolemia, (+) prior cigar use [quit 15 years ago], (+) fam Hx premature CAD.    The patient has a history of cardiovascular disease.  He reports having an MI in 2004 and believes he had a coronary angiogram and PCI but does not recall having a stent placed.  He had a stress nuclear study in 2005 that showed no focal fixed or reversible perfusion defects.  The LVEF was 54%.  He had an ECHO in 2014 showing LVEF 60% with borderline concentric LVH.  He has not history of CHF, arrhythmia, valvular heart disease.  The patient has no Hx of PAD or cerebrovascular disease.  He had a negative stress echo in 12/2020. He was referred for a pre-operative cardiovascular evaluation in anticipation for a C3-C7 anterior cervical discectomy and fusion.     Today the patient denies chest discomfort. He says that he was exercising on a treadmill for about 10-15 minutes per day, but had to stop a couple months ago due to neck pain. He never had any chest pain during exertion. He still has shortness of breath with exertion that has not changed since the last time he was seen in cardiology. He denies any shortness of breath with rest. He has BRONWYN and sleeps with a CPAP. He is unable to lay on his back because he feels like he is \"Choking.\" Typically sleeps on his side without issue. Has chronic lower extremity edema which is unchanged.  No palpitations, lightheadedness, dizziness or syncope.    PAST MEDICAL HISTORY:  Past Medical History:   Diagnosis Date     CAD (coronary artery disease)     Premature artery disease noted     Hyperlipidaemia LDL goal " <100      Hypertension 2007     Hypertension goal BP (blood pressure) < 140/90      Long QT interval syndrome      MEDICAL HISTORY OF -     History of rhabdomyolosis, from strenuous exercise     Mild persistent asthma      Sleep apnea syndrome      Type 2 diabetes, HbA1c goal < 7% (H)        CURRENT MEDICATIONS:  Current Outpatient Medications   Medication Sig     albuterol (PROAIR HFA) 108 (90 Base) MCG/ACT inhaler Inhale 2 puffs into the lungs every 6 hours as needed     alcohol swab prep pads Use to swab area of injection/jessy as directed.     amLODIPine (NORVASC) 10 MG tablet Take 1 tablet (10 mg) by mouth daily     aspirin 81 MG EC tablet Take 1 tablet (81 mg) by mouth daily     atorvastatin (LIPITOR) 40 MG tablet Take 1 tablet (40 mg) by mouth daily     blood glucose (ACCU-CHEK GUIDE) test strip Use to test blood sugar 1 time daily (vary the time before meals and bed).     blood glucose (NO BRAND SPECIFIED) test strip Use to test blood sugar 1 times daily or as directed. To accompany: Blood Glucose Monitor Brands: per insurance.     blood glucose calibration (NO BRAND SPECIFIED) solution To accompany: Blood Glucose Monitor Brands: per insurance.     blood glucose monitoring (ACCU-CHEK FASTCLIX) lancets 1 each daily Use to test blood sugar 1 time daily.     blood glucose monitoring (NO BRAND SPECIFIED) meter device kit Use to test blood sugar 1 times daily or as directed. Preferred blood glucose meter OR supplies to accompany: Blood Glucose Monitor Brands: per insurance.     Blood Glucose Monitoring Suppl (ACCU-CHEK GUIDE ME) w/Device KIT 1 Device daily Use to test blood sugar 1 times daily or as directed. Preferred blood glucose meter: per insurance.     cloNIDine (CATAPRES) 0.1 MG tablet Take 1 tablet (0.1 mg) by mouth 2 times daily     gabapentin (NEURONTIN) 100 MG capsule Take 1 tablet at night x3 nights then 1 tablet twice daily x3 days then 1 tablet 3 times daily     losartan (COZAAR) 100 MG tablet Take  1 tablet (100 mg) by mouth daily     methocarbamol (ROBAXIN) 500 MG tablet Take 1 tablet (500 mg) by mouth 3 times daily as needed for muscle spasms     order for DME Equipment being ordered: CPAP--replacement with using prior settings. Additional supplies as needed with replacement machine.    Will need to contact the patient for information needed about the prior CPAP settings and use.     ORDER FOR DME CPAP setting 19 cm H2O     ORDER FOR DME CPAP     oxyCODONE (ROXICODONE) 5 MG tablet Take 1 tablet (5 mg) by mouth every 6 hours as needed for pain     thin (NO BRAND SPECIFIED) lancets Use with lanceting device. To accompany: Blood Glucose Monitor Brands: per insurance.     No current facility-administered medications for this visit.       PAST SURGICAL HISTORY:  Past Surgical History:   Procedure Laterality Date     ANGIOGRAM       ANGIOPLASTY  2001    Coronary artery angioplasty//associated with mild AMI     TONSILLECTOMY  2005    With uvuloectomy       ALLERGIES  Biaxin [clarithromycin]; Metformin; Robafen dm cgh-chest [robitussin cough-chest dm]; and Tamiflu [oseltamivir]    FAMILY HX:  Family History   Problem Relation Age of Onset     Diabetes Mother      Hypertension Mother      Diabetes Father      Diabetes Sister        SOCIAL HX:  Social History     Socioeconomic History     Marital status:      Spouse name: Not on file     Number of children: Not on file     Years of education: Not on file     Highest education level: Not on file   Occupational History     Not on file   Social Needs     Financial resource strain: Not on file     Food insecurity     Worry: Not on file     Inability: Not on file     Transportation needs     Medical: Not on file     Non-medical: Not on file   Tobacco Use     Smoking status: Former Smoker     Types: Cigars     Smokeless tobacco: Never Used     Tobacco comment: Smokes intermittently   Substance and Sexual Activity     Alcohol use: Yes     Comment: occ/ mod     Drug  use: No     Sexual activity: Not Currently     Partners: Female   Lifestyle     Physical activity     Days per week: Not on file     Minutes per session: Not on file     Stress: Not on file   Relationships     Social connections     Talks on phone: Not on file     Gets together: Not on file     Attends [employer] service: Not on file     Active member of club or organization: Not on file     Attends meetings of clubs or organizations: Not on file     Relationship status: Not on file     Intimate partner violence     Fear of current or ex partner: Not on file     Emotionally abused: Not on file     Physically abused: Not on file     Forced sexual activity: Not on file   Other Topics Concern     Parent/sibling w/ CABG, MI or angioplasty before 65F 55M? No      Service No     Blood Transfusions No     Caffeine Concern No     Occupational Exposure No     Hobby Hazards No     Sleep Concern Yes     Comment: BRONWYN     Stress Concern No     Weight Concern Yes     Comment: Morbidly obese     Special Diet No     Back Care Yes     Exercise Yes     Comment: Active at work in maintenance     Bike Helmet Not Asked     Seat Belt Yes     Self-Exams Yes   Social History Narrative     Not on file       ROS:  Review Of Systems  Skin: negative  Eyes: negative  Ears/Nose/Throat: negative  Respiratory: Shortness of breath- chronic, unchanged  Cardiovascular: negative  Gastrointestinal: negative  Genitourinary: negative  Musculoskeletal: negative  Neurologic: negative  Psychiatric: negative  Hematologic/Lymphatic/Immunologic: negative  Endocrine: negative      VITAL SIGNS:  BP (!) 163/102 (BP Location: Left arm, Patient Position: Sitting, Cuff Size: Adult Large)   Pulse 95   Ht 1.829 m (6')   Wt (!) 151.6 kg (334 lb 4.8 oz)   SpO2 97%   BMI 45.34 kg/m    Body mass index is 45.34 kg/m .  Wt Readings from Last 2 Encounters:   07/12/21 (!) 151.5 kg (334 lb 0.5 oz)   06/29/21 (!) 150.6 kg (332 lb)       PHYSICAL EXAM  Archie Wallace is  a 45 year old male.in no acute distress.  HEENT: Eyes Nonicteric.  Neck: JVP 2cm H20.  Carotids +2 bilaterally without bruits.  Lungs: CTA.  Cor: RRR. Normal S1 and S2.  No murmur, rub, or gallop.  PMI in Lf 5th ICS.  Abd: Soft, nontender, nondistended.  NABS.  No pulsatile mass.  Extremities: No C/C/E.  Pulses +3/3 symmetric in upper and lower extremities.  Neuro: Grossly intact.  Psych: A&O x 3.  Skin: No rash.    LABS  Recent Labs   Lab Test 07/12/21 0920 12/03/20  1017 04/21/20  0917   WBC 5.4  --  5.0   HGB 14.6 14.4 14.5   HCT 45.3  --  45.9     --  294     Recent Labs   Lab Test 07/12/21  0920 06/02/21  1828    140   POTASSIUM 3.9 4.0   CHLORIDE 104 105   CO2 32 31   * 103*   BUN 12 14   CR 0.83 0.87   JOSHUA 9.0 8.9     Recent Labs   Lab Test 06/02/21  1828 07/17/20  1035 04/21/20  0917   CHOL  --  169 158   HDL  --  55 64   LDL 99 104* 85   TRIG  --  50 45   NHDL  --  114 94        EKG:    SR 70  No ST changes      EXERCISE ECHO  (12/9/2020)  Normal, low-risk exercise echocardiogram without evidence of ischemia. While heart rate was slightly submaximal (74% MPHR), a diagnostic workload was achieved based on rate-pressure product (RPP 30,444.)     The target heart rate was not achieved.   Exercise was terminated after 6:46 (m:ss) at a HR of 129 bpm (74% MPHR) due to hypertensive BP response.   No angina was elicited.  Normal heart rate and hypertensive BP response (baseline /98->236/98) to exercise.  Normal biventricular size, thickness, and global systolic function at baseline, LVEF=60-65%.  With exercise, LVEF increased to >70% and LV cavity size decreased appropriately.  No regional wall motion abnormalities are present at rest or with exercise.  No ECG evidence of ischemia.  Functional capacity is reduced for age.  No significant valvular abnormalities are noted on screening Doppler exam.  The aortic root and visualized ascending aorta are normal.    ECHO:  1/20/2014  Summary:                                                                            1. LV function is normal. The visually estimated ejection fraction is 60%.        2. Borderline concentric left ventricular hypertrophy.                                                                                           PHYSICIAN INTERPRETATION: Left Ventricle: The left ventricular size is normal.    LV function is normal. The visually estimated ejection fraction is 60%. LV septal wall thickness is severely increased. LV posterior wall thickness is mildly increased. Borderline concentric left ventricular hypertrophy. Normal diastolic function.                                                               Left Atrium: The left atrium is normal in size.                                   Right Ventricle: The right ventricle was not well visualized. The right ventricular size is normal. Global RV systolic function is normal.                Right Atrium: The right atrium is normal in size.                                 Mitral Valve: Sclerotic mitral valve leaflets. Trace mitral valve regurgitation.  Aortic Valve: The aortic valve is trileaflet. No evidence of significant aortic  valve regurgitation.                             Tricuspid Valve: The tricuspid valve is not well seen. Trace tricuspid regurgitation. The estimated pulmonary artery systolic pressure is normal at 22.5 mmHg plus right atrial pressure. Right atrial pressure is estimated at 5 mmHg.  Pulmonic Valve: The pulmonic valve was not well visualized. Trace pulmonic valve regurgitation.                       Pericardium: No pericardial effusion.                                             Aorta: Normal aortic root size and normal proximal ascending aortic size.         Venous: The inferior vena cava is normal size, and collapses normally with respiration.                                         CARDIAC MRI: None    CORONARY CTA: None    ADENOSINE NUCLEAR STRESS TEST:   11/30/2005  Impression:   1. Abnormal study with a moderate degree of certainty.   2. There is mild globally decreased perfusion on the stress images compared with the rest images. There are no focal fixed or reversible perfusion defects.   3. Left ventricular size is dilated at rest.  Transient ischemic dilation of the left ventricle did occur to 1.3.   4. The left ventricular ejection fraction is 54% and there are no regional wall motion abnormalities.     CARDIAC CATH: 2004  Unknown    ASSESSMENT AND PLAN:   1. Pre-operative evaluation:  Based on the revised cardiac risk index, Mr. Wallace is a Class II which is a 6% 30d risk of death, MI or cardiac arrest. He recently had a negative stress echocardiography which is reassuring. EKG unremarkable. He would not benefit from any further ischemic evaluation prior to proceeding with surgery. He does continue to endorse shortness of breath. He has not had pulmonary function tests completed for 10 years. It would be wise to have that completed prior to proceeding with surgery, especially considering his history of tobacco use.  - No further cardiac evaluation necessary pre-op  - Recommend pulmonary function test (scheduled for tomorrow)    FOLLOW UP:  With Cardiology PRN    This patient was seen and discussed with Dr. Rocha.    Jitendra Laureano MD  Cardiology Fellow  [phone]      ATTENDING NOTE:  Patient has been seen and evaluated by me on 07/13/2021. I have reviewed the documentation above.  I have reviewed today's vital signs, medications, labs, and imaging results.  I have reviewed and edited, as necessary, the history, review of systems, physical examination, and assessment and plan.  I have discussed my assessment and plan with the cardiology fellow.  Archie Wallace is a 45 year old male with risk factor profile (+) HTN, (+) Type II DM, (+) hypercholesterolemia, (+) prior tobacco use, (+) fam Hx premature CAD, (+) morbid obesity, Hx CAD with remote coronary  angiogram and angioplasty 20 years ago, referred to cardiology prior to consideration of cervical spine fusion.  He reported MACK (Aug 2020) prompting exercise ECHO (12/9/20).  He had a normal, low-risk exercise echocardiogram without evidence of ischemia. While heart rate was slightly submaximal (74% MPHR), a diagnostic workload was achieved based on rate-pressure product (RPP 30,444).  His functional capacity is limited by C-spine disease.  He notes MACK with 2 blocks and is unable to ascend 2 flights of stairs due to neck discomfort.  (+) BRONWYN, CPAP.  Exam documented above.  ECG yesterday showed NSR, without ischemia, injury, or infarction.  RCRI score = 1, 6% risk of perioperative cardiovascular event.  Defer on additional cardiovascular workup based on the stress ECHO from Dec 2020. Follow up PFTs given history of tobacco use.  Suspect morbid obesity and exercise deconditioning is primary cause of dyspnea.  Patient considering gastric bypass surgery.    PFTs  (7/14/21)  The FEV1 and FVC are reduced, but the FEV1/FVC ratio is normal.   TLC is reduced. RV and RV/TLC ratio are normal.  The diffusing capacity adjusted for hemoglobin is normal     IMPRESSION:   Spirometry does not meet ATS criteria; results should be interpreted with caution.   Moderate restriction.   Normal diffusing capacity.     ADDENDUM  (7/18/21): I suspect morbid obesity is responsible for the moderate restriction.  The patient may follow up with pulmonary medicine for further evaluation.    Pierre Rocha MD     Cardiovascular Division    CC  Patient Care Team:  No Ref-Primary, Physician as PCP - General  Chantell Jara APRN CNP as Assigned PCP  Stephanie Boss RD as Diabetes Educator (Dietitian, Registered)  CHANTELL JARA

## 2021-07-13 NOTE — H&P (VIEW-ONLY)
"[facility]:    Referring Provider:  Chantell Olivera  Primary Care Provider:   No Ref-Primary, Physician  Indication for Consultation: Pre-operative cardiovascular evaluation     HPI: Archie Wallace is a 45 year old male being seen today for evaluation of preoperative evaluation.     The patient's risk factor profile is: (+) HTN, Type II DM [10 years], (+) hypercholesterolemia, (+) prior cigar use [quit 15 years ago], (+) fam Hx premature CAD.    The patient has a history of cardiovascular disease.  He reports having an MI in 2004 and believes he had a coronary angiogram and PCI but does not recall having a stent placed.  He had a stress nuclear study in 2005 that showed no focal fixed or reversible perfusion defects.  The LVEF was 54%.  He had an ECHO in 2014 showing LVEF 60% with borderline concentric LVH.  He has not history of CHF, arrhythmia, valvular heart disease.  The patient has no Hx of PAD or cerebrovascular disease.  He had a negative stress echo in 12/2020. He was referred for a pre-operative cardiovascular evaluation in anticipation for a C3-C7 anterior cervical discectomy and fusion.     Today the patient denies chest discomfort. He says that he was exercising on a treadmill for about 10-15 minutes per day, but had to stop a couple months ago due to neck pain. He never had any chest pain during exertion. He still has shortness of breath with exertion that has not changed since the last time he was seen in cardiology. He denies any shortness of breath with rest. He has BRONWYN and sleeps with a CPAP. He is unable to lay on his back because he feels like he is \"Choking.\" Typically sleeps on his side without issue. Has chronic lower extremity edema which is unchanged.  No palpitations, lightheadedness, dizziness or syncope.    PAST MEDICAL HISTORY:  Past Medical History:   Diagnosis Date     CAD (coronary artery disease)     Premature artery disease noted     Hyperlipidaemia LDL goal " <100      Hypertension 2007     Hypertension goal BP (blood pressure) < 140/90      Long QT interval syndrome      MEDICAL HISTORY OF -     History of rhabdomyolosis, from strenuous exercise     Mild persistent asthma      Sleep apnea syndrome      Type 2 diabetes, HbA1c goal < 7% (H)        CURRENT MEDICATIONS:  Current Outpatient Medications   Medication Sig     albuterol (PROAIR HFA) 108 (90 Base) MCG/ACT inhaler Inhale 2 puffs into the lungs every 6 hours as needed     alcohol swab prep pads Use to swab area of injection/jessy as directed.     amLODIPine (NORVASC) 10 MG tablet Take 1 tablet (10 mg) by mouth daily     aspirin 81 MG EC tablet Take 1 tablet (81 mg) by mouth daily     atorvastatin (LIPITOR) 40 MG tablet Take 1 tablet (40 mg) by mouth daily     blood glucose (ACCU-CHEK GUIDE) test strip Use to test blood sugar 1 time daily (vary the time before meals and bed).     blood glucose (NO BRAND SPECIFIED) test strip Use to test blood sugar 1 times daily or as directed. To accompany: Blood Glucose Monitor Brands: per insurance.     blood glucose calibration (NO BRAND SPECIFIED) solution To accompany: Blood Glucose Monitor Brands: per insurance.     blood glucose monitoring (ACCU-CHEK FASTCLIX) lancets 1 each daily Use to test blood sugar 1 time daily.     blood glucose monitoring (NO BRAND SPECIFIED) meter device kit Use to test blood sugar 1 times daily or as directed. Preferred blood glucose meter OR supplies to accompany: Blood Glucose Monitor Brands: per insurance.     Blood Glucose Monitoring Suppl (ACCU-CHEK GUIDE ME) w/Device KIT 1 Device daily Use to test blood sugar 1 times daily or as directed. Preferred blood glucose meter: per insurance.     cloNIDine (CATAPRES) 0.1 MG tablet Take 1 tablet (0.1 mg) by mouth 2 times daily     gabapentin (NEURONTIN) 100 MG capsule Take 1 tablet at night x3 nights then 1 tablet twice daily x3 days then 1 tablet 3 times daily     losartan (COZAAR) 100 MG tablet Take  1 tablet (100 mg) by mouth daily     methocarbamol (ROBAXIN) 500 MG tablet Take 1 tablet (500 mg) by mouth 3 times daily as needed for muscle spasms     order for DME Equipment being ordered: CPAP--replacement with using prior settings. Additional supplies as needed with replacement machine.    Will need to contact the patient for information needed about the prior CPAP settings and use.     ORDER FOR DME CPAP setting 19 cm H2O     ORDER FOR DME CPAP     oxyCODONE (ROXICODONE) 5 MG tablet Take 1 tablet (5 mg) by mouth every 6 hours as needed for pain     thin (NO BRAND SPECIFIED) lancets Use with lanceting device. To accompany: Blood Glucose Monitor Brands: per insurance.     No current facility-administered medications for this visit.       PAST SURGICAL HISTORY:  Past Surgical History:   Procedure Laterality Date     ANGIOGRAM       ANGIOPLASTY  2001    Coronary artery angioplasty//associated with mild AMI     TONSILLECTOMY  2005    With uvuloectomy       ALLERGIES  Biaxin [clarithromycin]; Metformin; Robafen dm cgh-chest [robitussin cough-chest dm]; and Tamiflu [oseltamivir]    FAMILY HX:  Family History   Problem Relation Age of Onset     Diabetes Mother      Hypertension Mother      Diabetes Father      Diabetes Sister        SOCIAL HX:  Social History     Socioeconomic History     Marital status:      Spouse name: Not on file     Number of children: Not on file     Years of education: Not on file     Highest education level: Not on file   Occupational History     Not on file   Social Needs     Financial resource strain: Not on file     Food insecurity     Worry: Not on file     Inability: Not on file     Transportation needs     Medical: Not on file     Non-medical: Not on file   Tobacco Use     Smoking status: Former Smoker     Types: Cigars     Smokeless tobacco: Never Used     Tobacco comment: Smokes intermittently   Substance and Sexual Activity     Alcohol use: Yes     Comment: occ/ mod     Drug  use: No     Sexual activity: Not Currently     Partners: Female   Lifestyle     Physical activity     Days per week: Not on file     Minutes per session: Not on file     Stress: Not on file   Relationships     Social connections     Talks on phone: Not on file     Gets together: Not on file     Attends [employer] service: Not on file     Active member of club or organization: Not on file     Attends meetings of clubs or organizations: Not on file     Relationship status: Not on file     Intimate partner violence     Fear of current or ex partner: Not on file     Emotionally abused: Not on file     Physically abused: Not on file     Forced sexual activity: Not on file   Other Topics Concern     Parent/sibling w/ CABG, MI or angioplasty before 65F 55M? No      Service No     Blood Transfusions No     Caffeine Concern No     Occupational Exposure No     Hobby Hazards No     Sleep Concern Yes     Comment: BRONWYN     Stress Concern No     Weight Concern Yes     Comment: Morbidly obese     Special Diet No     Back Care Yes     Exercise Yes     Comment: Active at work in maintenance     Bike Helmet Not Asked     Seat Belt Yes     Self-Exams Yes   Social History Narrative     Not on file       ROS:  Review Of Systems  Skin: negative  Eyes: negative  Ears/Nose/Throat: negative  Respiratory: Shortness of breath- chronic, unchanged  Cardiovascular: negative  Gastrointestinal: negative  Genitourinary: negative  Musculoskeletal: negative  Neurologic: negative  Psychiatric: negative  Hematologic/Lymphatic/Immunologic: negative  Endocrine: negative      VITAL SIGNS:  There were no vitals taken for this visit.  There is no height or weight on file to calculate BMI.  Wt Readings from Last 2 Encounters:   07/12/21 (!) 151.5 kg (334 lb 0.5 oz)   06/29/21 (!) 150.6 kg (332 lb)       PHYSICAL EXAM  Archie Wallace is a 45 year old male.in no acute distress.  HEENT: Eyes Nonicteric.  Neck: JVP 2cm H20.  Carotids +2 bilaterally without  bruits.  Lungs: CTA.  Cor: RRR. Normal S1 and S2.  No murmur, rub, or gallop.  PMI in Lf 5th ICS.  Abd: Soft, nontender, nondistended.  NABS.  No pulsatile mass.  Extremities: No C/C/E.  Pulses +3/3 symmetric in upper and lower extremities.  Neuro: Grossly intact.  Psych: A&O x 3.  Skin: No rash.    LABS  Recent Labs   Lab Test 07/12/21  0920 12/03/20  1017 04/21/20  0917   WBC 5.4  --  5.0   HGB 14.6 14.4 14.5   HCT 45.3  --  45.9     --  294     Recent Labs   Lab Test 07/12/21  0920 06/02/21  1828    140   POTASSIUM 3.9 4.0   CHLORIDE 104 105   CO2 32 31   * 103*   BUN 12 14   CR 0.83 0.87   JOSHUA 9.0 8.9     Recent Labs   Lab Test 06/02/21  1828 07/17/20  1035 04/21/20  0917   CHOL  --  169 158   HDL  --  55 64   LDL 99 104* 85   TRIG  --  50 45   NHDL  --  114 94        EKG:    SR 70  No ST changes      EXERCISE ECHO  (12/9/2020)  Normal, low-risk exercise echocardiogram without evidence of ischemia. While heart rate was slightly submaximal (74% MPHR), a diagnostic workload was achieved based on rate-pressure product (RPP 30,444.)     The target heart rate was not achieved.   Exercise was terminated after 6:46 (m:ss) at a HR of 129 bpm (74% MPHR) due to hypertensive BP response.   No angina was elicited.  Normal heart rate and hypertensive BP response (baseline /98->236/98) to exercise.  Normal biventricular size, thickness, and global systolic function at baseline, LVEF=60-65%.  With exercise, LVEF increased to >70% and LV cavity size decreased appropriately.  No regional wall motion abnormalities are present at rest or with exercise.  No ECG evidence of ischemia.  Functional capacity is reduced for age.  No significant valvular abnormalities are noted on screening Doppler exam.  The aortic root and visualized ascending aorta are normal.    ECHO:  1/20/2014  Summary:                                                                           1. LV function is normal. The visually estimated  ejection fraction is 60%.        2. Borderline concentric left ventricular hypertrophy.                                                                                           PHYSICIAN INTERPRETATION: Left Ventricle: The left ventricular size is normal.    LV function is normal. The visually estimated ejection fraction is 60%. LV septal wall thickness is severely increased. LV posterior wall thickness is mildly increased. Borderline concentric left ventricular hypertrophy. Normal diastolic function.                                                               Left Atrium: The left atrium is normal in size.                                   Right Ventricle: The right ventricle was not well visualized. The right ventricular size is normal. Global RV systolic function is normal.                Right Atrium: The right atrium is normal in size.                                 Mitral Valve: Sclerotic mitral valve leaflets. Trace mitral valve regurgitation.  Aortic Valve: The aortic valve is trileaflet. No evidence of significant aortic  valve regurgitation.                             Tricuspid Valve: The tricuspid valve is not well seen. Trace tricuspid regurgitation. The estimated pulmonary artery systolic pressure is normal at 22.5 mmHg plus right atrial pressure. Right atrial pressure is estimated at 5 mmHg.  Pulmonic Valve: The pulmonic valve was not well visualized. Trace pulmonic valve regurgitation.                       Pericardium: No pericardial effusion.                                             Aorta: Normal aortic root size and normal proximal ascending aortic size.         Venous: The inferior vena cava is normal size, and collapses normally with respiration.                                         CARDIAC MRI: None    CORONARY CTA: None    ADENOSINE NUCLEAR STRESS TEST:  11/30/2005  Impression:   1. Abnormal study with a moderate degree of certainty.   2. There is mild globally decreased perfusion on  the stress images compared with the rest images. There are no focal fixed or reversible perfusion defects.   3. Left ventricular size is dilated at rest.  Transient ischemic dilation of the left ventricle did occur to 1.3.   4. The left ventricular ejection fraction is 54% and there are no regional wall motion abnormalities.     CARDIAC CATH: 2004  Unknown    ASSESSMENT AND PLAN:   1. Pre-operative evaluation:  Based on the revised cardiac risk index, Mr. Wallace is a Class II which is a 6% 30d risk of death, MI or cardiac arrest. He recently had a negative stress echocardiography which is reassuring. EKG unremarkable. He would not benefit from any further ischemic evaluation prior to proceeding with surgery. He does continue to endorse shortness of breath. He has not had pulmonary function tests completed for 10 years. It would be wise to have that completed prior to proceeding with surgery, especially considering his history of tobacco use.  - No further cardiac evaluation necessary pre-op  - Recommend pulmonary function test (scheduled for tomorrow)    FOLLOW UP:  With Cardiology PRN    This patient was seen and discussed with Dr. Rocha.    Jitendra Laureano MD  Cardiology Fellow  [phone]      ATTENDING NOTE:  Patient has been seen and evaluated by me on 07/13/2021. I have reviewed the documentation above.  I have reviewed today's vital signs, medications, labs, and imaging results.  I have reviewed and edited, as necessary, the history, review of systems, physical examination, and assessment and plan.  I have discussed my assessment and plan with the cardiology fellow.  Archie Wallace is a 45 year old male with risk factor profile (+) HTN, (+) Type II DM, (+) hypercholesterolemia, (+) prior tobacco use, (+) fam Hx premature CAD, (+) morbid obesity, Hx CAD with remote coronary angiogram and angioplasty 20 years ago, referred to cardiology prior to consideration of cervical spine fusion.  He reported MACK (Aug 2020)  prompting exercise ECHO (12/9/20).  He had a normal, low-risk exercise echocardiogram without evidence of ischemia. While heart rate was slightly submaximal (74% MPHR), a diagnostic workload was achieved based on rate-pressure product (RPP 30,444).  His functional capacity is limited by C-spine disease.  He notes MACK with 2 blocks and is unable to ascend 2 flights of stairs due to neck discomfort.  (+) BRONWYN, CPAP.  Exam documented above.  ECG yesterday showed NSR, without ischemia, injury, or infarction.  RCRI score = 1, 6% risk of perioperative cardiovascular event.  Defer on additional cardiovascular workup based on the stress ECHO from Dec 2020. Follow up PFTs given history of tobacco use.  Suspect morbid obesity and exercise deconditioning is primary cause of dyspnea.  Patient considering gastric bypass surgery.    Pierre Rocha MD     Cardiovascular Division    CC  Patient Care Team:  No Ref-Primary, Physician as PCP - General  Chantell Jara APRN CNP as Assigned PCP  Stephanie Boss RD as Diabetes Educator (Dietitian, Registered)  CHANTELL JARA

## 2021-07-13 NOTE — PROGRESS NOTES
Archie Wallace's goals for this visit include: Get clearance for the surgery   He requests these members of his care team be copied on today's visit information:     PCP: No Ref-Primary, Physician    Referring Provider:  No referring provider defined for this encounter.    BP (!) 163/102 (BP Location: Left arm, Patient Position: Sitting, Cuff Size: Adult Large)   Pulse 95   Ht 1.829 m (6')   Wt (!) 151.6 kg (334 lb 4.8 oz)   SpO2 97%   BMI 45.34 kg/m      Do you need any medication refills at today's visit? Yes. DARIN Rangel, EMT  Clinic Support  [facility]  [place]    Employed by [facility] [employer]

## 2021-07-13 NOTE — PATIENT INSTRUCTIONS
It was a pleasure to see you in the cardiology clinic today.    If you have any questions, you can reach my nurse, Odalys Ewing, at [phone].     Note the new medications: None    Stop the following medications: None    The results from today include: None    Tests ordered today: Pulmonary Function Testing at [facility], [address] [place] [address], tomorrow (Wednesday, July 14)    I would like you to follow up with Dr. Rocha in one year.    Sincerely,      Pierre Rocha MD     [facility]

## 2021-07-14 DIAGNOSIS — R06.09 DOE (DYSPNEA ON EXERTION): ICD-10-CM

## 2021-07-14 PROCEDURE — 94726 PLETHYSMOGRAPHY LUNG VOLUMES: CPT | Performed by: INTERNAL MEDICINE

## 2021-07-14 PROCEDURE — 94729 DIFFUSING CAPACITY: CPT | Performed by: INTERNAL MEDICINE

## 2021-07-14 PROCEDURE — 94375 RESPIRATORY FLOW VOLUME LOOP: CPT | Performed by: INTERNAL MEDICINE

## 2021-07-15 ENCOUNTER — ANESTHESIA EVENT (OUTPATIENT)
Dept: SURGERY | Facility: CLINIC | Age: 46
End: 2021-07-15
Payer: COMMERCIAL

## 2021-07-15 ENCOUNTER — TELEPHONE (OUTPATIENT)
Dept: FAMILY MEDICINE | Facility: CLINIC | Age: 46
End: 2021-07-15

## 2021-07-15 LAB
DLCOCOR-%PRED-PRE: 112 %
DLCOCOR-PRE: 35.85 ML/MIN/MMHG
DLCOUNC-%PRED-PRE: 112 %
DLCOUNC-PRE: 35.85 ML/MIN/MMHG
DLCOUNC-PRED: 31.92 ML/MIN/MMHG
ERV-%PRED-PRE: 67 %
ERV-PRE: 0.51 L
ERV-PRED: 0.75 L
EXPTIME-PRE: 6.03 SEC
FEF2575-%PRED-PRE: 62 %
FEF2575-PRE: 2.37 L/SEC
FEF2575-PRED: 3.78 L/SEC
FEFMAX-%PRED-PRE: 35 %
FEFMAX-PRE: 3.76 L/SEC
FEFMAX-PRED: 10.45 L/SEC
FEV1-%PRED-PRE: 66 %
FEV1-PRE: 2.68 L
FEV1FEV6-PRE: 75 %
FEV1FEV6-PRED: 81 %
FEV1FVC-PRE: 75 %
FEV1FVC-PRED: 80 %
FEV1SVC-PRE: 71 %
FEV1SVC-PRED: 71 %
FIFMAX-PRE: 3.55 L/SEC
FRCPLETH-%PRED-PRE: 70 %
FRCPLETH-PRE: 2.52 L
FRCPLETH-PRED: 3.6 L
FVC-%PRED-PRE: 70 %
FVC-PRE: 3.56 L
FVC-PRED: 5.03 L
IC-%PRED-PRE: 66 %
IC-PRE: 3.27 L
IC-PRED: 4.91 L
RVPLETH-%PRED-PRE: 92 %
RVPLETH-PRE: 2.02 L
RVPLETH-PRED: 2.18 L
TLCPLETH-%PRED-PRE: 76 %
TLCPLETH-PRE: 5.79 L
TLCPLETH-PRED: 7.53 L
VA-%PRED-PRE: 75 %
VA-PRE: 5.38 L
VC-%PRED-PRE: 66 %
VC-PRE: 3.77 L
VC-PRED: 5.66 L

## 2021-07-15 NOTE — OR NURSING
Patient thought he was also having a cyst removed from his thryroid tomorrow. I instructed patient to call his surgeon today.

## 2021-07-15 NOTE — TELEPHONE ENCOUNTER
Patient was seen for preop with Skylar De on 7-.  Patient had a cardiology appointment as well as pulmonary function testing done to clear for him for surgery.    Routing to Skylar to see if she can do an addendum to the preop clearing him for his surgery which is tomorrow a.m.  Patient arriving at 5:30 am for surgery at 7 am.    No need to call back.  They will watch for addendum in Epic. Kylee Lockwood,

## 2021-07-16 ENCOUNTER — ANESTHESIA (OUTPATIENT)
Dept: SURGERY | Facility: CLINIC | Age: 46
End: 2021-07-16
Payer: COMMERCIAL

## 2021-07-16 ENCOUNTER — APPOINTMENT (OUTPATIENT)
Dept: GENERAL RADIOLOGY | Facility: CLINIC | Age: 46
End: 2021-07-16
Attending: NEUROLOGICAL SURGERY
Payer: COMMERCIAL

## 2021-07-16 ENCOUNTER — APPOINTMENT (OUTPATIENT)
Dept: GENERAL RADIOLOGY | Facility: CLINIC | Age: 46
End: 2021-07-16
Attending: NURSE PRACTITIONER
Payer: COMMERCIAL

## 2021-07-16 ENCOUNTER — HOSPITAL ENCOUNTER (OUTPATIENT)
Facility: CLINIC | Age: 46
Discharge: HOME | End: 2021-07-18
Attending: NEUROLOGICAL SURGERY | Admitting: NEUROLOGICAL SURGERY
Payer: COMMERCIAL

## 2021-07-16 DIAGNOSIS — G95.9 CERVICAL MYELOPATHY (H): Primary | ICD-10-CM

## 2021-07-16 DIAGNOSIS — I10 HYPERTENSION GOAL BP (BLOOD PRESSURE) < 140/90: ICD-10-CM

## 2021-07-16 DIAGNOSIS — G95.9 CERVICAL MYELOPATHY (H): ICD-10-CM

## 2021-07-16 LAB
GLUCOSE BLDC GLUCOMTR-MCNC: 128 MG/DL (ref 70–99)
GLUCOSE BLDC GLUCOMTR-MCNC: 128 MG/DL (ref 70–99)
GLUCOSE BLDC GLUCOMTR-MCNC: 163 MG/DL (ref 70–99)

## 2021-07-16 PROCEDURE — 250N000009 HC RX 250: Performed by: NURSE ANESTHETIST, CERTIFIED REGISTERED

## 2021-07-16 PROCEDURE — 22552 ARTHRD ANT NTRBD CERVICAL EA: CPT | Mod: AS | Performed by: NURSE PRACTITIONER

## 2021-07-16 PROCEDURE — 278N000051 HC OR IMPLANT GENERAL: Performed by: NEUROLOGICAL SURGERY

## 2021-07-16 PROCEDURE — 250N000009 HC RX 250: Performed by: ANESTHESIOLOGY

## 2021-07-16 PROCEDURE — 22853 INSJ BIOMECHANICAL DEVICE: CPT | Mod: AS | Performed by: NURSE PRACTITIONER

## 2021-07-16 PROCEDURE — 999N000179 XR SURGERY CARM FLUORO LESS THAN 5 MIN W STILLS

## 2021-07-16 PROCEDURE — 60220 PARTIAL REMOVAL OF THYROID: CPT | Mod: RT | Performed by: SURGERY

## 2021-07-16 PROCEDURE — 22551 ARTHRD ANT NTRBDY CERVICAL: CPT | Performed by: NEUROLOGICAL SURGERY

## 2021-07-16 PROCEDURE — 22551 ARTHRD ANT NTRBDY CERVICAL: CPT | Mod: AS | Performed by: NURSE PRACTITIONER

## 2021-07-16 PROCEDURE — 250N000025 HC SEVOFLURANE, PER MIN: Performed by: NEUROLOGICAL SURGERY

## 2021-07-16 PROCEDURE — 360N000084 HC SURGERY LEVEL 4 W/ FLUORO, PER MIN: Performed by: NEUROLOGICAL SURGERY

## 2021-07-16 PROCEDURE — 258N000003 HC RX IP 258 OP 636: Performed by: NURSE ANESTHETIST, CERTIFIED REGISTERED

## 2021-07-16 PROCEDURE — 999N000141 HC STATISTIC PRE-PROCEDURE NURSING ASSESSMENT: Performed by: NEUROLOGICAL SURGERY

## 2021-07-16 PROCEDURE — 710N000009 HC RECOVERY PHASE 1, LEVEL 1, PER MIN: Performed by: NEUROLOGICAL SURGERY

## 2021-07-16 PROCEDURE — C1713 ANCHOR/SCREW BN/BN,TIS/BN: HCPCS | Performed by: NEUROLOGICAL SURGERY

## 2021-07-16 PROCEDURE — 250N000011 HC RX IP 250 OP 636: Performed by: NURSE ANESTHETIST, CERTIFIED REGISTERED

## 2021-07-16 PROCEDURE — 250N000011 HC RX IP 250 OP 636: Performed by: NURSE PRACTITIONER

## 2021-07-16 PROCEDURE — 258N000003 HC RX IP 258 OP 636: Performed by: NURSE PRACTITIONER

## 2021-07-16 PROCEDURE — 250N000011 HC RX IP 250 OP 636: Performed by: NEUROLOGICAL SURGERY

## 2021-07-16 PROCEDURE — 272N000001 HC OR GENERAL SUPPLY STERILE: Performed by: NEUROLOGICAL SURGERY

## 2021-07-16 PROCEDURE — 22845 INSERT SPINE FIXATION DEVICE: CPT | Mod: AS | Performed by: NURSE PRACTITIONER

## 2021-07-16 PROCEDURE — 20930 SP BONE ALGRFT MORSEL ADD-ON: CPT | Performed by: NEUROLOGICAL SURGERY

## 2021-07-16 PROCEDURE — 88307 TISSUE EXAM BY PATHOLOGIST: CPT | Mod: TC | Performed by: SURGERY

## 2021-07-16 PROCEDURE — 250N000011 HC RX IP 250 OP 636: Performed by: ANESTHESIOLOGY

## 2021-07-16 PROCEDURE — 22845 INSERT SPINE FIXATION DEVICE: CPT | Mod: 59 | Performed by: NEUROLOGICAL SURGERY

## 2021-07-16 PROCEDURE — 250N000013 HC RX MED GY IP 250 OP 250 PS 637: Performed by: NURSE PRACTITIONER

## 2021-07-16 PROCEDURE — 370N000017 HC ANESTHESIA TECHNICAL FEE, PER MIN: Performed by: NEUROLOGICAL SURGERY

## 2021-07-16 PROCEDURE — 250N000009 HC RX 250: Performed by: NEUROLOGICAL SURGERY

## 2021-07-16 PROCEDURE — 999N000063 XR SHOULDER RIGHT PORT G/E 2 VIEWS: Mod: RT

## 2021-07-16 PROCEDURE — C1762 CONN TISS, HUMAN(INC FASCIA): HCPCS | Performed by: NEUROLOGICAL SURGERY

## 2021-07-16 PROCEDURE — 22552 ARTHRD ANT NTRBD CERVICAL EA: CPT | Performed by: NEUROLOGICAL SURGERY

## 2021-07-16 PROCEDURE — 22853 INSJ BIOMECHANICAL DEVICE: CPT | Performed by: NEUROLOGICAL SURGERY

## 2021-07-16 PROCEDURE — 88307 TISSUE EXAM BY PATHOLOGIST: CPT | Mod: 26 | Performed by: PATHOLOGY

## 2021-07-16 DEVICE — CAGE 5030941 ANATOMIC PTC 14X11X9MM
Type: IMPLANTABLE DEVICE | Site: SPINE CERVICAL | Status: FUNCTIONAL
Brand: ANATOMIC PEEK PTC CERVICAL FUSION SYSTEM

## 2021-07-16 DEVICE — IMP SCR MEDT ZEVO 3.5X15MM ST VA 7723515: Type: IMPLANTABLE DEVICE | Site: SPINE CERVICAL | Status: FUNCTIONAL

## 2021-07-16 DEVICE — IMP SCR MEDT ZEVO 3.5X17MM ST VA 7723517: Type: IMPLANTABLE DEVICE | Site: SPINE CERVICAL | Status: FUNCTIONAL

## 2021-07-16 DEVICE — GRAFT BONE FIBERS GRAFTON DBM DBF 3ML T50103: Type: IMPLANTABLE DEVICE | Site: SPINE CERVICAL | Status: FUNCTIONAL

## 2021-07-16 RX ORDER — AMOXICILLIN 250 MG
1 CAPSULE ORAL 2 TIMES DAILY
Status: DISCONTINUED | OUTPATIENT
Start: 2021-07-16 | End: 2021-07-18 | Stop reason: HOSPADM

## 2021-07-16 RX ORDER — ACETAMINOPHEN 325 MG/1
650 TABLET ORAL EVERY 4 HOURS PRN
Status: DISCONTINUED | OUTPATIENT
Start: 2021-07-19 | End: 2021-07-18 | Stop reason: HOSPADM

## 2021-07-16 RX ORDER — SODIUM CHLORIDE 9 MG/ML
INJECTION, SOLUTION INTRAVENOUS CONTINUOUS
Status: DISCONTINUED | OUTPATIENT
Start: 2021-07-16 | End: 2021-07-17

## 2021-07-16 RX ORDER — CLONIDINE HYDROCHLORIDE 0.1 MG/1
0.1 TABLET ORAL 2 TIMES DAILY
Status: DISCONTINUED | OUTPATIENT
Start: 2021-07-16 | End: 2021-07-18 | Stop reason: HOSPADM

## 2021-07-16 RX ORDER — SODIUM CHLORIDE, SODIUM LACTATE, POTASSIUM CHLORIDE, CALCIUM CHLORIDE 600; 310; 30; 20 MG/100ML; MG/100ML; MG/100ML; MG/100ML
INJECTION, SOLUTION INTRAVENOUS CONTINUOUS PRN
Status: DISCONTINUED | OUTPATIENT
Start: 2021-07-16 | End: 2021-07-16

## 2021-07-16 RX ORDER — ONDANSETRON 4 MG/1
4 TABLET, ORALLY DISINTEGRATING ORAL EVERY 6 HOURS PRN
Status: DISCONTINUED | OUTPATIENT
Start: 2021-07-16 | End: 2021-07-18 | Stop reason: HOSPADM

## 2021-07-16 RX ORDER — SODIUM CHLORIDE, SODIUM LACTATE, POTASSIUM CHLORIDE, CALCIUM CHLORIDE 600; 310; 30; 20 MG/100ML; MG/100ML; MG/100ML; MG/100ML
INJECTION, SOLUTION INTRAVENOUS CONTINUOUS
Status: DISCONTINUED | OUTPATIENT
Start: 2021-07-16 | End: 2021-07-16 | Stop reason: HOSPADM

## 2021-07-16 RX ORDER — LABETALOL HYDROCHLORIDE 5 MG/ML
10 INJECTION, SOLUTION INTRAVENOUS EVERY 10 MIN PRN
Status: DISCONTINUED | OUTPATIENT
Start: 2021-07-16 | End: 2021-07-16 | Stop reason: HOSPADM

## 2021-07-16 RX ORDER — GABAPENTIN 300 MG/1
300 CAPSULE ORAL
Status: COMPLETED | OUTPATIENT
Start: 2021-07-16 | End: 2021-07-16

## 2021-07-16 RX ORDER — FENTANYL CITRATE 50 UG/ML
INJECTION, SOLUTION INTRAMUSCULAR; INTRAVENOUS PRN
Status: DISCONTINUED | OUTPATIENT
Start: 2021-07-16 | End: 2021-07-16

## 2021-07-16 RX ORDER — OXYCODONE HYDROCHLORIDE 5 MG/1
10 TABLET ORAL EVERY 4 HOURS PRN
Status: DISCONTINUED | OUTPATIENT
Start: 2021-07-16 | End: 2021-07-18 | Stop reason: HOSPADM

## 2021-07-16 RX ORDER — CEFAZOLIN SODIUM 2 G/100ML
2 INJECTION, SOLUTION INTRAVENOUS EVERY 8 HOURS
Status: DISCONTINUED | OUTPATIENT
Start: 2021-07-16 | End: 2021-07-18 | Stop reason: HOSPADM

## 2021-07-16 RX ORDER — NALOXONE HYDROCHLORIDE 0.4 MG/ML
0.2 INJECTION, SOLUTION INTRAMUSCULAR; INTRAVENOUS; SUBCUTANEOUS
Status: DISCONTINUED | OUTPATIENT
Start: 2021-07-16 | End: 2021-07-18 | Stop reason: HOSPADM

## 2021-07-16 RX ORDER — PROPOFOL 10 MG/ML
INJECTION, EMULSION INTRAVENOUS PRN
Status: DISCONTINUED | OUTPATIENT
Start: 2021-07-16 | End: 2021-07-16

## 2021-07-16 RX ORDER — CEFAZOLIN SODIUM 2 G/100ML
2 INJECTION, SOLUTION INTRAVENOUS EVERY 8 HOURS
Status: DISCONTINUED | OUTPATIENT
Start: 2021-07-17 | End: 2021-07-16

## 2021-07-16 RX ORDER — HYDROMORPHONE HCL IN WATER/PF 6 MG/30 ML
0.4 PATIENT CONTROLLED ANALGESIA SYRINGE INTRAVENOUS
Status: DISCONTINUED | OUTPATIENT
Start: 2021-07-16 | End: 2021-07-18 | Stop reason: HOSPADM

## 2021-07-16 RX ORDER — METHOCARBAMOL 500 MG/1
500 TABLET, FILM COATED ORAL 3 TIMES DAILY PRN
Status: DISCONTINUED | OUTPATIENT
Start: 2021-07-16 | End: 2021-07-16

## 2021-07-16 RX ORDER — CEFAZOLIN SODIUM IN 0.9 % NACL 3 G/100 ML
3 INTRAVENOUS SOLUTION, PIGGYBACK (ML) INTRAVENOUS
Status: COMPLETED | OUTPATIENT
Start: 2021-07-16 | End: 2021-07-16

## 2021-07-16 RX ORDER — OXYCODONE HYDROCHLORIDE 5 MG/1
5 TABLET ORAL EVERY 4 HOURS PRN
Status: DISCONTINUED | OUTPATIENT
Start: 2021-07-16 | End: 2021-07-18 | Stop reason: HOSPADM

## 2021-07-16 RX ORDER — EPHEDRINE SULFATE 50 MG/ML
INJECTION, SOLUTION INTRAMUSCULAR; INTRAVENOUS; SUBCUTANEOUS PRN
Status: DISCONTINUED | OUTPATIENT
Start: 2021-07-16 | End: 2021-07-16

## 2021-07-16 RX ORDER — ONDANSETRON 2 MG/ML
4 INJECTION INTRAMUSCULAR; INTRAVENOUS EVERY 30 MIN PRN
Status: DISCONTINUED | OUTPATIENT
Start: 2021-07-16 | End: 2021-07-16 | Stop reason: HOSPADM

## 2021-07-16 RX ORDER — FENTANYL CITRATE 50 UG/ML
50 INJECTION, SOLUTION INTRAMUSCULAR; INTRAVENOUS EVERY 5 MIN PRN
Status: DISCONTINUED | OUTPATIENT
Start: 2021-07-16 | End: 2021-07-16 | Stop reason: HOSPADM

## 2021-07-16 RX ORDER — ONDANSETRON 4 MG/1
4 TABLET, ORALLY DISINTEGRATING ORAL EVERY 30 MIN PRN
Status: DISCONTINUED | OUTPATIENT
Start: 2021-07-16 | End: 2021-07-16 | Stop reason: HOSPADM

## 2021-07-16 RX ORDER — CEFAZOLIN SODIUM IN 0.9 % NACL 3 G/100 ML
3 INTRAVENOUS SOLUTION, PIGGYBACK (ML) INTRAVENOUS SEE ADMIN INSTRUCTIONS
Status: DISCONTINUED | OUTPATIENT
Start: 2021-07-16 | End: 2021-07-16 | Stop reason: HOSPADM

## 2021-07-16 RX ORDER — CEFAZOLIN SODIUM 1 G/3ML
1 INJECTION, POWDER, FOR SOLUTION INTRAMUSCULAR; INTRAVENOUS EVERY 8 HOURS
Status: DISCONTINUED | OUTPATIENT
Start: 2021-07-16 | End: 2021-07-16

## 2021-07-16 RX ORDER — ALBUTEROL SULFATE 90 UG/1
2 AEROSOL, METERED RESPIRATORY (INHALATION) EVERY 6 HOURS PRN
Status: DISCONTINUED | OUTPATIENT
Start: 2021-07-16 | End: 2021-07-18 | Stop reason: HOSPADM

## 2021-07-16 RX ORDER — HYDROMORPHONE HYDROCHLORIDE 1 MG/ML
0.5 INJECTION, SOLUTION INTRAMUSCULAR; INTRAVENOUS; SUBCUTANEOUS EVERY 5 MIN PRN
Status: DISCONTINUED | OUTPATIENT
Start: 2021-07-16 | End: 2021-07-16 | Stop reason: HOSPADM

## 2021-07-16 RX ORDER — LOSARTAN POTASSIUM 100 MG/1
100 TABLET ORAL DAILY
Status: DISCONTINUED | OUTPATIENT
Start: 2021-07-17 | End: 2021-07-18 | Stop reason: HOSPADM

## 2021-07-16 RX ORDER — BISACODYL 10 MG
10 SUPPOSITORY, RECTAL RECTAL DAILY PRN
Status: DISCONTINUED | OUTPATIENT
Start: 2021-07-16 | End: 2021-07-18 | Stop reason: HOSPADM

## 2021-07-16 RX ORDER — HYDROMORPHONE HCL IN WATER/PF 6 MG/30 ML
0.2 PATIENT CONTROLLED ANALGESIA SYRINGE INTRAVENOUS
Status: DISCONTINUED | OUTPATIENT
Start: 2021-07-16 | End: 2021-07-18 | Stop reason: HOSPADM

## 2021-07-16 RX ORDER — ACETAMINOPHEN 325 MG/1
975 TABLET ORAL EVERY 8 HOURS
Status: DISCONTINUED | OUTPATIENT
Start: 2021-07-16 | End: 2021-07-18 | Stop reason: HOSPADM

## 2021-07-16 RX ORDER — LIDOCAINE 40 MG/G
CREAM TOPICAL
Status: DISCONTINUED | OUTPATIENT
Start: 2021-07-16 | End: 2021-07-18 | Stop reason: HOSPADM

## 2021-07-16 RX ORDER — ONDANSETRON 2 MG/ML
INJECTION INTRAMUSCULAR; INTRAVENOUS PRN
Status: DISCONTINUED | OUTPATIENT
Start: 2021-07-16 | End: 2021-07-16

## 2021-07-16 RX ORDER — ATORVASTATIN CALCIUM 40 MG/1
40 TABLET, FILM COATED ORAL DAILY
Status: DISCONTINUED | OUTPATIENT
Start: 2021-07-17 | End: 2021-07-18 | Stop reason: HOSPADM

## 2021-07-16 RX ORDER — MAGNESIUM HYDROXIDE 1200 MG/15ML
LIQUID ORAL PRN
Status: DISCONTINUED | OUTPATIENT
Start: 2021-07-16 | End: 2021-07-16 | Stop reason: HOSPADM

## 2021-07-16 RX ORDER — DEXAMETHASONE SODIUM PHOSPHATE 4 MG/ML
4 INJECTION, SOLUTION INTRA-ARTICULAR; INTRALESIONAL; INTRAMUSCULAR; INTRAVENOUS; SOFT TISSUE EVERY 6 HOURS
Status: DISCONTINUED | OUTPATIENT
Start: 2021-07-16 | End: 2021-07-18 | Stop reason: HOSPADM

## 2021-07-16 RX ORDER — ONDANSETRON 2 MG/ML
4 INJECTION INTRAMUSCULAR; INTRAVENOUS EVERY 6 HOURS PRN
Status: DISCONTINUED | OUTPATIENT
Start: 2021-07-16 | End: 2021-07-18 | Stop reason: HOSPADM

## 2021-07-16 RX ORDER — METHOCARBAMOL 750 MG/1
750 TABLET, FILM COATED ORAL EVERY 6 HOURS PRN
Status: DISCONTINUED | OUTPATIENT
Start: 2021-07-16 | End: 2021-07-18 | Stop reason: HOSPADM

## 2021-07-16 RX ORDER — PROPOFOL 10 MG/ML
INJECTION, EMULSION INTRAVENOUS CONTINUOUS PRN
Status: DISCONTINUED | OUTPATIENT
Start: 2021-07-16 | End: 2021-07-16

## 2021-07-16 RX ORDER — ACETAMINOPHEN 325 MG/1
975 TABLET ORAL ONCE
Status: COMPLETED | OUTPATIENT
Start: 2021-07-16 | End: 2021-07-16

## 2021-07-16 RX ORDER — LIDOCAINE HYDROCHLORIDE 40 MG/ML
INJECTION, SOLUTION RETROBULBAR PRN
Status: DISCONTINUED | OUTPATIENT
Start: 2021-07-16 | End: 2021-07-16

## 2021-07-16 RX ORDER — AMLODIPINE BESYLATE 10 MG/1
10 TABLET ORAL DAILY
Status: DISCONTINUED | OUTPATIENT
Start: 2021-07-17 | End: 2021-07-18 | Stop reason: HOSPADM

## 2021-07-16 RX ORDER — NALOXONE HYDROCHLORIDE 0.4 MG/ML
0.4 INJECTION, SOLUTION INTRAMUSCULAR; INTRAVENOUS; SUBCUTANEOUS
Status: DISCONTINUED | OUTPATIENT
Start: 2021-07-16 | End: 2021-07-18 | Stop reason: HOSPADM

## 2021-07-16 RX ORDER — HALOPERIDOL 5 MG/ML
1 INJECTION INTRAMUSCULAR
Status: DISCONTINUED | OUTPATIENT
Start: 2021-07-16 | End: 2021-07-16 | Stop reason: HOSPADM

## 2021-07-16 RX ORDER — CALCIUM CARBONATE 500 MG/1
500 TABLET, CHEWABLE ORAL 4 TIMES DAILY PRN
Status: DISCONTINUED | OUTPATIENT
Start: 2021-07-16 | End: 2021-07-18 | Stop reason: HOSPADM

## 2021-07-16 RX ORDER — GLYCOPYRROLATE 0.2 MG/ML
INJECTION, SOLUTION INTRAMUSCULAR; INTRAVENOUS PRN
Status: DISCONTINUED | OUTPATIENT
Start: 2021-07-16 | End: 2021-07-16

## 2021-07-16 RX ADMIN — PHENYLEPHRINE HYDROCHLORIDE 100 MCG: 10 INJECTION INTRAVENOUS at 10:22

## 2021-07-16 RX ADMIN — TOPICAL ANESTHETIC 1 SPRAY: 200 SPRAY DENTAL; PERIODONTAL at 08:05

## 2021-07-16 RX ADMIN — HYDROMORPHONE HYDROCHLORIDE 0.3 MG: 1 INJECTION, SOLUTION INTRAMUSCULAR; INTRAVENOUS; SUBCUTANEOUS at 17:25

## 2021-07-16 RX ADMIN — Medication 3 G: at 12:20

## 2021-07-16 RX ADMIN — FENTANYL CITRATE 100 MCG: 50 INJECTION, SOLUTION INTRAMUSCULAR; INTRAVENOUS at 07:59

## 2021-07-16 RX ADMIN — PHENYLEPHRINE HYDROCHLORIDE 100 MCG: 10 INJECTION INTRAVENOUS at 10:31

## 2021-07-16 RX ADMIN — PHENYLEPHRINE HYDROCHLORIDE 100 MCG: 10 INJECTION INTRAVENOUS at 08:37

## 2021-07-16 RX ADMIN — Medication 5 MG: at 09:00

## 2021-07-16 RX ADMIN — MIDAZOLAM HYDROCHLORIDE 1 MG: 1 INJECTION, SOLUTION INTRAMUSCULAR; INTRAVENOUS at 08:02

## 2021-07-16 RX ADMIN — DEXAMETHASONE SODIUM PHOSPHATE 4 MG: 4 INJECTION, SOLUTION INTRAMUSCULAR; INTRAVENOUS at 22:24

## 2021-07-16 RX ADMIN — FENTANYL CITRATE 25 MCG: 50 INJECTION, SOLUTION INTRAMUSCULAR; INTRAVENOUS at 17:01

## 2021-07-16 RX ADMIN — LIDOCAINE HYDROCHLORIDE 3 MG: 40 INJECTION, SOLUTION RETROBULBAR; TOPICAL at 08:05

## 2021-07-16 RX ADMIN — PHENYLEPHRINE HYDROCHLORIDE 100 MCG: 10 INJECTION INTRAVENOUS at 08:22

## 2021-07-16 RX ADMIN — SODIUM CHLORIDE, POTASSIUM CHLORIDE, SODIUM LACTATE AND CALCIUM CHLORIDE: 600; 310; 30; 20 INJECTION, SOLUTION INTRAVENOUS at 12:35

## 2021-07-16 RX ADMIN — HYDROMORPHONE HYDROCHLORIDE 0.2 MG: 1 INJECTION, SOLUTION INTRAMUSCULAR; INTRAVENOUS; SUBCUTANEOUS at 16:27

## 2021-07-16 RX ADMIN — GLYCOPYRROLATE 0.2 MG: 0.2 INJECTION, SOLUTION INTRAMUSCULAR; INTRAVENOUS at 07:57

## 2021-07-16 RX ADMIN — METHOCARBAMOL 750 MG: 750 TABLET ORAL at 20:19

## 2021-07-16 RX ADMIN — ONDANSETRON 4 MG: 2 INJECTION INTRAMUSCULAR; INTRAVENOUS at 14:00

## 2021-07-16 RX ADMIN — Medication 3 G: at 08:20

## 2021-07-16 RX ADMIN — PHENYLEPHRINE HYDROCHLORIDE 100 MCG: 10 INJECTION INTRAVENOUS at 15:09

## 2021-07-16 RX ADMIN — PHENYLEPHRINE HYDROCHLORIDE 100 MCG: 10 INJECTION INTRAVENOUS at 08:34

## 2021-07-16 RX ADMIN — HYDROMORPHONE HYDROCHLORIDE 0.4 MG: 0.2 INJECTION, SOLUTION INTRAMUSCULAR; INTRAVENOUS; SUBCUTANEOUS at 18:56

## 2021-07-16 RX ADMIN — MIDAZOLAM HYDROCHLORIDE 1 MG: 1 INJECTION, SOLUTION INTRAMUSCULAR; INTRAVENOUS at 08:04

## 2021-07-16 RX ADMIN — PHENYLEPHRINE HYDROCHLORIDE 100 MCG: 10 INJECTION INTRAVENOUS at 13:52

## 2021-07-16 RX ADMIN — GABAPENTIN 300 MG: 300 CAPSULE ORAL at 06:38

## 2021-07-16 RX ADMIN — Medication 5 MG: at 14:16

## 2021-07-16 RX ADMIN — ACETAMINOPHEN 975 MG: 325 TABLET, FILM COATED ORAL at 18:55

## 2021-07-16 RX ADMIN — LIDOCAINE HYDROCHLORIDE 2 MG: 40 INJECTION, SOLUTION RETROBULBAR; TOPICAL at 08:03

## 2021-07-16 RX ADMIN — HYDROMORPHONE HYDROCHLORIDE 0.5 MG: 1 INJECTION, SOLUTION INTRAMUSCULAR; INTRAVENOUS; SUBCUTANEOUS at 11:27

## 2021-07-16 RX ADMIN — LIDOCAINE HYDROCHLORIDE 3 ML: 40 INJECTION, SOLUTION RETROBULBAR; TOPICAL at 07:17

## 2021-07-16 RX ADMIN — MIDAZOLAM HYDROCHLORIDE 2 MG: 1 INJECTION, SOLUTION INTRAMUSCULAR; INTRAVENOUS at 07:57

## 2021-07-16 RX ADMIN — SODIUM CHLORIDE: 9 INJECTION, SOLUTION INTRAVENOUS at 18:55

## 2021-07-16 RX ADMIN — Medication 5 MG: at 08:31

## 2021-07-16 RX ADMIN — PHENYLEPHRINE HYDROCHLORIDE 100 MCG: 10 INJECTION INTRAVENOUS at 12:16

## 2021-07-16 RX ADMIN — Medication 5 MG: at 08:57

## 2021-07-16 RX ADMIN — PHENYLEPHRINE HYDROCHLORIDE 0.3 MCG/KG/MIN: 10 INJECTION INTRAVENOUS at 08:40

## 2021-07-16 RX ADMIN — PHENYLEPHRINE HYDROCHLORIDE 100 MCG: 10 INJECTION INTRAVENOUS at 08:31

## 2021-07-16 RX ADMIN — PHENYLEPHRINE HYDROCHLORIDE 100 MCG: 10 INJECTION INTRAVENOUS at 08:40

## 2021-07-16 RX ADMIN — ACETAMINOPHEN 975 MG: 325 TABLET, FILM COATED ORAL at 06:38

## 2021-07-16 RX ADMIN — PROPOFOL 50 MCG/KG/MIN: 10 INJECTION, EMULSION INTRAVENOUS at 08:20

## 2021-07-16 RX ADMIN — DEXMEDETOMIDINE 12 MCG: 100 INJECTION, SOLUTION, CONCENTRATE INTRAVENOUS at 08:10

## 2021-07-16 RX ADMIN — SODIUM CHLORIDE, POTASSIUM CHLORIDE, SODIUM LACTATE AND CALCIUM CHLORIDE: 600; 310; 30; 20 INJECTION, SOLUTION INTRAVENOUS at 09:30

## 2021-07-16 RX ADMIN — PROPOFOL 200 MG: 10 INJECTION, EMULSION INTRAVENOUS at 08:15

## 2021-07-16 RX ADMIN — Medication 5 MG: at 08:52

## 2021-07-16 RX ADMIN — FENTANYL CITRATE 50 MCG: 50 INJECTION, SOLUTION INTRAMUSCULAR; INTRAVENOUS at 16:04

## 2021-07-16 RX ADMIN — LIDOCAINE HYDROCHLORIDE 3 MG: 40 INJECTION, SOLUTION RETROBULBAR; TOPICAL at 08:10

## 2021-07-16 RX ADMIN — CEFAZOLIN SODIUM 2 G: 2 INJECTION, SOLUTION INTRAVENOUS at 20:19

## 2021-07-16 RX ADMIN — OXYCODONE HYDROCHLORIDE 5 MG: 5 TABLET ORAL at 20:18

## 2021-07-16 RX ADMIN — SODIUM CHLORIDE, POTASSIUM CHLORIDE, SODIUM LACTATE AND CALCIUM CHLORIDE: 600; 310; 30; 20 INJECTION, SOLUTION INTRAVENOUS at 07:56

## 2021-07-16 RX ADMIN — CLONIDINE HYDROCHLORIDE 0.1 MG: 0.1 TABLET ORAL at 20:19

## 2021-07-16 RX ADMIN — PHENYLEPHRINE HYDROCHLORIDE 100 MCG: 10 INJECTION INTRAVENOUS at 09:20

## 2021-07-16 ASSESSMENT — MIFFLIN-ST. JEOR: SCORE: 2453.43

## 2021-07-16 ASSESSMENT — LIFESTYLE VARIABLES: TOBACCO_USE: 1

## 2021-07-16 NOTE — ANESTHESIA PREPROCEDURE EVALUATION
Anesthesia Pre-Procedure Evaluation    Patient: Archie Wallace   MRN: [id number] : 1975        Preoperative Diagnosis: Cervical myelopathy (H) [G95.9]   Procedure : Procedure(s):  Cervical 3 to Cervical 7 Anterior Cervical Discectomy and Fusion     Past Medical History:   Diagnosis Date     CAD (coronary artery disease)     Premature artery disease noted     Hyperlipidaemia LDL goal <100      Hypertension 2007     Hypertension goal BP (blood pressure) < 140/90      Long QT interval syndrome      MEDICAL HISTORY OF -     History of rhabdomyolosis, from strenuous exercise     Mild persistent asthma      Sleep apnea syndrome     cpap     Type 2 diabetes, HbA1c goal < 7% (H)       Past Surgical History:   Procedure Laterality Date     ANGIOGRAM       ANGIOPLASTY      Coronary artery angioplasty//associated with mild AMI     TONSILLECTOMY      With uvuloectomy      Allergies   Allergen Reactions     Tamiflu [Oseltamivir] Anaphylaxis     Biaxin [Clarithromycin]      Muscle breakdown     Metformin      Reported breaking out with a skin reaction     Robafen Dm Cgh-Chest [Robitussin Cough-Chest Dm] Difficulty breathing      Social History     Tobacco Use     Smoking status: Former Smoker     Types: Cigars     Quit date: 2000     Years since quittin.5     Smokeless tobacco: Never Used     Tobacco comment: Smokes intermittently   Substance Use Topics     Alcohol use: Yes     Comment: occ/ mod      Wt Readings from Last 1 Encounters:   21 (!) 153.5 kg (338 lb 8 oz)        Anesthesia Evaluation            ROS/MED HX  ENT/Pulmonary:     (+) sleep apnea, tobacco use, asthma     Neurologic: Comment: IMPRESSION:  1. Congenitally narrow spinal canal.  2. Moderate to large disc protrusions at multiple levels producing  severe central canal stenosis and cord deformity at C5-C6 and moderate  to severe central canal stenosis at C4-C5.  3. Abnormal cord signal intensity in the left half of the cord at the  C5  level consistent with edema, gliosis, or ischemic change.  4. 4.7 x 4.6 cm mass in right lobe of thyroid. This is most likely is  a thyroid goiter or benign adenoma although it is difficult to exclude  other etiologies. If indicated, ultrasound and fine needle biopsy  might be helpful in further evaluation      Cardiovascular: Comment: Long QT interval syndrome      Echo:  Interpretation Summary     Normal, low-risk exercise echocardiogram without evidence of ischemia. While  heart rate was slightly submaximal (74% MPHR), a diagnostic workload was  achieved based on rate-pressure product (RPP 30,444.)     The target heart rate was not achieved. Exercise was terminated after 6:46  (m:ss) at a HR of 129 bpm (74% MPHR) due to hypertensive BP response. No  angina was elicited.  Normal heart rate and hypertensive BP response (baseline /98->236/98) to  exercise.  Normal biventricular size, thickness, and global systolic function at  baseline, LVEF=60-65%.  With exercise, LVEF increased to >70% and LV cavity size decreased  appropriately.  No regional wall motion abnormalities are present at rest or with exercise.  No angina was elicited.  No ECG evidence of ischemia.  Functional capacity is reduced for age.  No significant valvular abnormalities are noted on screening Doppler exam.  The aortic root and visualized ascending aorta are normal.    (+) hypertension--CAD ---    METS/Exercise Tolerance:     Hematologic:  - neg hematologic  ROS     Musculoskeletal:  - neg musculoskeletal ROS     GI/Hepatic:  - neg GI/hepatic ROS     Renal/Genitourinary:  - neg Renal ROS     Endo: Comment: High cholesterol. Large thyroid. Tracheal deviation.    (+) type II DM, Obesity,     Psychiatric/Substance Use:  - neg psychiatric ROS     Infectious Disease:       Malignancy:       Other:            Physical Exam    Airway      Comment: Large thyroid.    Mallampati: II   TM distance: > 3 FB   Neck ROM: limited   Mouth opening: > 3  cm    Respiratory Devices and Support         Dental  no notable dental history         Cardiovascular   cardiovascular exam normal          Pulmonary   pulmonary exam normal                OUTSIDE LABS:  CBC:   Lab Results   Component Value Date    WBC 5.4 07/12/2021    WBC 5.0 04/21/2020    HGB 14.6 07/12/2021    HGB 14.4 12/03/2020    HCT 45.3 07/12/2021    HCT 45.9 04/21/2020     07/12/2021     04/21/2020     BMP:   Lab Results   Component Value Date     07/12/2021     06/02/2021    POTASSIUM 3.9 07/12/2021    POTASSIUM 4.0 06/02/2021    CHLORIDE 104 07/12/2021    CHLORIDE 105 06/02/2021    CO2 32 07/12/2021    CO2 31 06/02/2021    BUN 12 07/12/2021    BUN 14 06/02/2021    CR 0.83 07/12/2021    CR 0.87 06/02/2021     (H) 07/16/2021     (H) 07/12/2021     COAGS:   Lab Results   Component Value Date    PTT 75 (H) 11/30/2005    INR 1.1 01/06/2014     POC: No results found for: BGM, HCG, HCGS  HEPATIC:   Lab Results   Component Value Date    ALBUMIN 3.4 07/17/2020    PROTTOTAL 7.7 07/17/2020    ALT 38 06/02/2021    AST 11 07/17/2020    ALKPHOS 45 07/17/2020    BILITOTAL 0.6 07/17/2020     OTHER:   Lab Results   Component Value Date    A1C 6.6 (H) 07/12/2021    JOSHUA 9.0 07/12/2021    TSH 0.92 04/21/2020       Anesthesia Plan    ASA Status:  3      Anesthesia Type: General.     - Airway: ETT   Induction: Propofol.   Maintenance: Balanced.   Techniques and Equipment:     - Airway: Awake FOI/VL, Shoulder Bhavana/Ramp         Consents    Anesthesia Plan(s) and associated risks, benefits, and realistic alternatives discussed. Questions answered and patient/representative(s) expressed understanding.     - Discussed with:  Patient         Postoperative Care    Pain management: IV analgesics, Multi-modal analgesia.   PONV prophylaxis: Ondansetron (or other 5HT-3), Dexamethasone or Solumedrol     Comments:                Carter Pelletier MD

## 2021-07-16 NOTE — OP NOTE
Date of surgery: 7/16/2021  Surgeon: Jaquan De Leon MD  Assistant: Lexie Cruz NP  Note: Lexie Cruz was present for and assisted with the entire surgery, and his/her role as an assistant was crucial for aid in positioning, exposure, suctioning, retraction, and closure     Preoperative diagnosis: Cervical myelopathy  Postoperative diagnosis: Cervical myelopathy     Procedure:  1.  C3-4 anterior discectomy and interbody arthrodesis  2.  C4-5 anterior discectomy and interbody arthrodesis  3.  C5-6 anterior discectomy and interbody arthrodesis  4.  C6-7 anterior discectomy and interbody arthrodesis  5.  C3-4 insertion of intervertebral graft and allograft  6.  C4-5 insertion of intervertebral graft and allograft  7.  C5-6 insertion of intervertebral graft and allograft  8.  C6-7 insertion of intervertebral graft and allograft  9.  C3 to C7 anterior spinal instrumentation with insertion of Medtronic Zevo plate and vertebral body screws  10.  Use of intraoperative microscope and fluoroscopy     EBL: 400 mL     Indications: 45-year-old male who presented with progressive hand weakness and worsening motor function.  MRI demonstrated severe stenosis from C3 to C7 with cord signal change.  He underwent non-surgical care without improvement.  Imaging also showed a large thyroid mass, and patient discussed resection of this with Dr. Hernandez.  We discussed the logistics of performing both procedures in the same setting given the practical need to remove the mass to access the spine, and the time sensitive nature of spinal decompression given his progressive weakness.  Risks, benefits, indications, and alternatives were discussed with the patient and family in detail.  All their questions were answered, and they wished to proceed with surgery.     Description of surgery: The patient was positioned supine.  Sterile prepping and draping procedures were performed.  Antibiotics were administered and timeout was  performed.  A right horizontal neck incision was performed along the SCM.  Please refer to Dr. Hernandez's separately dictated note regarding resection of the thyroid lesion.  After the mass was removed, the Metzenbaum scissors were used to create a plane in the fascia medial to the sternocleidomastoid muscle.  Blunt dissection was used to come down upon the anterior cervical spine.  The spinal needle was used to verify the correct level.  The monopolar was used to elevate the longus coli, and the Trimline retractor was inserted.  The 15 blade was used to perform an annulotomy in the C6-7 disc space.  A complete discectomy was performed with combination of the high-speed drill, curettes, and pituitary rongeurs.  Interbody arthrodesis was performed with a high-speed drill.  The posterior osteophytes were removed with the drill, and the posterior longitudinal ligament was removed with the Kerrison Rongeurs, with decompression of the bilateral neural foramina.  An intervertebral graft was delivered in the disc space at C6-7.  Next, the 15 blade was used to perform an annulotomy in the C5-6 disc space.  A complete discectomy was performed with combination of the high-speed drill, curettes, and pituitary rongeurs.  Interbody arthrodesis was performed with a high-speed drill.  The posterior osteophytes were removed with the drill, and the posterior longitudinal ligament was removed with the Kerrison Rongeurs, with decompression of the bilateral neural foramina.  An intervertebral graft was delivered in the disc space at C5-6.  The 15 blade was used to perform an annulotomy in the C4-5 disc space.  A complete discectomy was performed with combination of the high-speed drill, curettes, and pituitary rongeurs.  Interbody arthrodesis was performed with a high-speed drill.  The posterior osteophytes were removed with the drill, and the posterior longitudinal ligament was removed with the Kerrison Rongeurs, with decompression of the  bilateral neural foramina.  An intervertebral graft was delivered in the disc space at C4-5.  The 15 blade was used to perform an annulotomy in the C3-4 disc space.  A complete discectomy was performed with combination of the high-speed drill, curettes, and pituitary rongeurs.  Interbody arthrodesis was performed with a high-speed drill.  The posterior osteophytes were removed with the drill, and the posterior longitudinal ligament was removed with the Kerrison Rongeurs, with decompression of the bilateral neural foramina.  An intervertebral graft was delivered in the disc space at C3-4.  Subsequently, a Medtronic Zevo plate was positioned, and fixed into place from C3 to C7 with bilateral vertebral body screws at C3, C4, C5, C6, and C7.  Hemostasis was achieved.  Antibiotic irrigation was performed.  The platysma and dermal layers were closed with 3-0 Vicryl sutures, and the skin was closed with a running subcuticular stitch.  There were no intraprocedural complications.

## 2021-07-16 NOTE — ANESTHESIA POSTPROCEDURE EVALUATION
Patient: Archie Wallace    Procedure(s):  Cervical 3 to Cervical 7 Anterior Cervical Discectomy and Fusion  RIGHT THYROIDECTOMY    Diagnosis:Cervical myelopathy (H) [G95.9]  Diagnosis Additional Information: No value filed.    Anesthesia Type:  General    Note:  Disposition: Inpatient   Postop Pain Control: Uneventful            Sign Out: Well controlled pain   PONV: No   Neuro/Psych: Uneventful            Sign Out: Acceptable/Baseline neuro status   Airway/Respiratory: Uneventful            Sign Out: Acceptable/Baseline resp. status   CV/Hemodynamics: Uneventful            Sign Out: Acceptable CV status; No obvious hypovolemia; No obvious fluid overload   Other NRE: NONE   DID A NON-ROUTINE EVENT OCCUR? No           Last vitals:  Vitals:    07/16/21 1800 07/16/21 1807 07/16/21 1826   BP: 105/67  (!) 154/85   Pulse: 87  87   Resp: 19  12   Temp:   36.6  C (97.9  F)   SpO2:  95% 98%       Last vitals prior to Anesthesia Care Transfer:  CRNA VITALS  7/16/2021 1502 - 7/16/2021 1602      7/16/2021             NIBP:  111/62    NIBP Mean:  83          Electronically Signed By: Jose Daniel Prieto MD  July 16, 2021  6:54 PM

## 2021-07-16 NOTE — PROGRESS NOTES
PTA medications completed by Medication Scribe day of surgery     Medication history sources: Patient and H&P  In the past week, patient estimated taking medication this percent of the time: Greater than 90%  Adherence assessment: N/A Not Observed    Significant changes made to the medication list:  None      Additional medication history information:   None    Medication reconciliation completed by provider prior to medication history? No    Time spent in this activity: 25 minutes    The information provided in this note is only as accurate as the sources available at the time of update(s)      Prior to Admission medications    Medication Sig Last Dose Taking? Auth Provider   albuterol (PROAIR HFA) 108 (90 Base) MCG/ACT inhaler Inhale 2 puffs into the lungs every 6 hours as needed  at PRN Yes Chantell Olivera APRN CNP   amLODIPine (NORVASC) 10 MG tablet Take 1 tablet (10 mg) by mouth daily 7/16/2021 at AM Yes Chantell Olivera APRN CNP   aspirin 81 MG EC tablet Take 1 tablet (81 mg) by mouth daily MORE THAN A WEEK Yes Chantell Olivera APRN CNP   atorvastatin (LIPITOR) 40 MG tablet Take 1 tablet (40 mg) by mouth daily 7/16/2021 at AM Yes Chantell Olivera APRN CNP   cloNIDine (CATAPRES) 0.1 MG tablet Take 1 tablet (0.1 mg) by mouth 2 times daily 7/16/2021 at AM Yes Chantell Olivera APRN CNP   gabapentin (NEURONTIN) 100 MG capsule Take 1 tablet at night x3 nights then 1 tablet twice daily x3 days then 1 tablet 3 times daily MORE THAN A WEEK Yes Chantell Olivera APRN CNP   losartan (COZAAR) 100 MG tablet Take 1 tablet (100 mg) by mouth daily 7/16/2021 at AM Yes Chantell Olivera APRN CNP   methocarbamol (ROBAXIN) 500 MG tablet Take 1 tablet (500 mg) by mouth 3 times daily as needed for muscle spasms MORE THAN A WEEK at PRN Yes Chantell Olivera APRN CNP   oxyCODONE (ROXICODONE) 5 MG tablet Take 1 tablet (5 mg) by mouth every 6 hours as needed for pain  at PRN Yes Jaquan De Leon MD   alcohol swab prep  pads Use to swab area of injection/jessy as directed.   Chantell Olivera APRN CNP   blood glucose (ACCU-CHEK GUIDE) test strip Use to test blood sugar 1 time daily (vary the time before meals and bed).   Chantell Olivera APRN CNP   blood glucose (NO BRAND SPECIFIED) test strip Use to test blood sugar 1 times daily or as directed. To accompany: Blood Glucose Monitor Brands: per insurance.   Chantell Olivera APRN CNP   blood glucose calibration (NO BRAND SPECIFIED) solution To accompany: Blood Glucose Monitor Brands: per insurance.   Chantell Olivera APRN CNP   blood glucose monitoring (ACCU-CHEK FASTCLIX) lancets 1 each daily Use to test blood sugar 1 time daily.   Chantell Olivera APRN CNP   blood glucose monitoring (NO BRAND SPECIFIED) meter device kit Use to test blood sugar 1 times daily or as directed. Preferred blood glucose meter OR supplies to accompany: Blood Glucose Monitor Brands: per insurance.   Chantell Olivera APRN CNP   Blood Glucose Monitoring Suppl (ACCU-CHEK GUIDE ME) w/Device KIT 1 Device daily Use to test blood sugar 1 times daily or as directed. Preferred blood glucose meter: per insurance.   Chantell Olivera APRN CNP   order for DME Equipment being ordered: CPAP--replacement with using prior settings. Additional supplies as needed with replacement machine.    Will need to contact the patient for information needed about the prior CPAP settings and use.   Sudeep Short MD   ORDER FOR DME CPAP setting 19 cm H2O   Sudeep Short MD   ORDER FOR DME CPAP   Sudeep Short MD   thin (NO BRAND SPECIFIED) lancets Use with lanceting device. To accompany: Blood Glucose Monitor Brands: per insurance.   Chantell Olivera APRN CNP [facility]

## 2021-07-16 NOTE — PROGRESS NOTES
Slow to wake up in PACU. During neuro assessment He was saying his right shoulder hurts. Dr. De Leon came to bedside and looked at it. Eventually a shoulder x-ray was ordered to rule out everything. Pain stated better and heat was applied. Wife was updated and informed. Dr. Prieto gave the ok to send patient up to the floor. Neuros all intact will continue to monitor.        Carrol Gavin RN on 7/16/2021 at 5:51 PM

## 2021-07-16 NOTE — ANESTHESIA CARE TRANSFER NOTE
Patient: Archie Wallace    Procedure(s):  Cervical 3 to Cervical 7 Anterior Cervical Discectomy and Fusion  RIGHT THYROIDECTOMY    Diagnosis: Cervical myelopathy (H) [G95.9]  Diagnosis Additional Information: No value filed.    Anesthesia Type:   General     Note:    Oropharynx: oral airway in place and spontaneously breathing  Level of Consciousness: drowsy  Oxygen Supplementation: face mask  Level of Supplemental Oxygen (L/min / FiO2): 8  Airway patency satisfactory and stable: oral airway in place #10.  Dentition: dentition unchanged  Vital Signs Stable: post-procedure vital signs reviewed and stable  Report to RN Given: handoff report given  Patient transferred to: PACU    Handoff Report: Identifed the Patient, Identified the Reponsible Provider, Reviewed the pertinent medical history, Discussed the surgical course, Reviewed Intra-OP anesthesia mangement and issues during anesthesia, Set expectations for post-procedure period and Allowed opportunity for questions and acknowledgement of understanding      Vitals: (Last set prior to Anesthesia Care Transfer)  CRNA VITALS  7/16/2021 1502 - 7/16/2021 1540      7/16/2021             NIBP:  111/62    NIBP Mean:  83        Electronically Signed By: CECE Millan CRNA  July 16, 2021  3:40 PM

## 2021-07-16 NOTE — BRIEF OP NOTE
[facility] Brief Operative Note    Pre-operative diagnosis: Cervical myelopathy (H) [G95.9]   Post-operative diagnosis As above   Procedure: Procedure(s):  Cervical 3 to Cervical 7 Anterior Cervical Discectomy and Fusion  RIGHT THYROIDECTOMY   Surgeon(s): Surgeon(s) and Role:  Panel 1:     * Jaquan De Leon MD - Primary  Panel 2:     * Moshe Hernandez DO - Primary   Estimated blood loss: 450 mL    Specimens: ID Type Source Tests Collected by Time Destination   1 : RIGHT THYROID Tissue Thyroid, Right SURGICAL PATHOLOGY EXAM Moshe Hernandez DO 7/16/2021 10:45 AM       Findings: See op note

## 2021-07-16 NOTE — OP NOTE
Endocrine Surgery Operative Note    Pre-operative diagnosis: Large thyroid nodule   Post-operative diagnosis same   Procedure: right thyroidectomy   Surgeon: Moshe Hernandez DO   Assistants(s): Lexie Cruz NP   Anesthesia: General    Estimated blood loss: 500 ml    Total IV fluids: (See anesthesia record)   Blood transfusion: No transfusion was given during surgery   Total urine output: (See anesthesia record)   Drains or packing: None   Specimens: Right thyroid   Implants: None   Findings: Large right thyroid nodule extending below the clavicle and superior to just inferior to the angle of the mandible.    Complications: None   Condition on discharge: Stable         Procedure Performed   Right thyroidectomy     Description of Procedure(s)   The patient was brought to the operating suite and placed supine on the operating room table. The appropriate monitors were placed, and general anesthetic was easily induced. The patient was intubated with the glidescope and a laryngeal monitoring ET tube was placed under direct vision. The laryngeal nerve monitor was connected and tested.   The neck was then prepped and draped in sterile fashion and the neck was injected with 0.25% Marcaine with 1:200,000 epinephrine in the previously marked low cervical collar incision.      An incision was made with a 10 blade and then dissection was carried down through the platysma. Subplatysmal flaps were raised to the clavicle and just superior to the thyroid cartilage. The midline raphe was identified and divided. Beginning on the right side, the strap muscles were then bluntly elevated off the thyroid. The right thyroid was retracted medially.  Attention was directed to the superior pole. Lateral dissection then proceeded superior to the superior pole of the thyroid.The right strap muscles were divided to increase exposure. The medial cleft was then bluntly disected and the superior part of the thyroid was retracted caudally  using debakey forceps. The superior pedicle was then divided using the harmonic device being sure to stay close to the thyroid. The middle thyroid vein and inferior thyroid pedicle were divided with the harmonic scalpel. The thyroid was clamped and then delivered from the wound. In doing so the capsule was disrupted and the thyroid parenchyma was bleeding. The large thyroid nodule, the bleeding and torn capsule made it hard to identify the tubercle of zuckerkandl. The tubercle of zuckerkandl was eventually identified and the tissue was carefully divided using elevators.  The recurrent laryngeal nerve was identified and then dissected along its course.      The thyroid was freed from its tracheal attachments and removed.      The wound was irrigated and hemostasis was achieved. The case was then turned over to neurosugery for the spinal fusion.      After the spinal fusion I scrubbed back in to ensure the areas of bleeding had stopped and reapproximate the strap muscles. The strap muscles were approximated with a single figure of eight stich using 3-0 vicryl.

## 2021-07-16 NOTE — ANESTHESIA PROCEDURE NOTES
Airway       Patient location during procedure: OR       Procedure Start/Stop Times: 7/16/2021 8:15 AM and 7/16/2021 8:20 AM  Staff -        Anesthesiologist:  Carter Pelletier MD       Performed By: anesthesiologistIndications and Patient Condition       Indications for airway management: kings-procedural       Induction type:awake       Mask difficulty assessment: 0 - not attempted    Final Airway Details       Final airway type: endotracheal airway       Successful airway: ETT - single  Endotracheal Airway Details        ETT size (mm): 8.0       Cuffed: yes       Successful intubation technique: flexible bronchoscopy       Grade View of Cords: 1       Position: Right       Measured from: lips       Secured at (cm): 24       Bite block used: Soft    Post intubation assessment        Placement verified by: capnometry        Number of attempts at approach: 1       Secured with: pink tape       Ease of procedure: easy       Dentition: Intact    Additional Comments       Awake fiberoptic. Done for large thyroid and tracheal deviation. Easily done.

## 2021-07-17 ENCOUNTER — DOCUMENTATION ONLY (OUTPATIENT)
Dept: ORTHOPEDICS | Facility: CLINIC | Age: 46
End: 2021-07-17

## 2021-07-17 ENCOUNTER — APPOINTMENT (OUTPATIENT)
Dept: PHYSICAL THERAPY | Facility: CLINIC | Age: 46
End: 2021-07-17
Attending: NEUROLOGICAL SURGERY
Payer: COMMERCIAL

## 2021-07-17 LAB
GLUCOSE BLDC GLUCOMTR-MCNC: 168 MG/DL (ref 70–99)
GLUCOSE BLDC GLUCOMTR-MCNC: 172 MG/DL (ref 70–99)
GLUCOSE BLDC GLUCOMTR-MCNC: 176 MG/DL (ref 70–99)
GLUCOSE BLDC GLUCOMTR-MCNC: 186 MG/DL (ref 70–99)
GLUCOSE BLDC GLUCOMTR-MCNC: 236 MG/DL (ref 70–99)

## 2021-07-17 PROCEDURE — 97530 THERAPEUTIC ACTIVITIES: CPT | Mod: GP | Performed by: PHYSICAL THERAPIST

## 2021-07-17 PROCEDURE — 250N000013 HC RX MED GY IP 250 OP 250 PS 637: Performed by: NURSE PRACTITIONER

## 2021-07-17 PROCEDURE — 250N000011 HC RX IP 250 OP 636: Performed by: NURSE PRACTITIONER

## 2021-07-17 PROCEDURE — 97161 PT EVAL LOW COMPLEX 20 MIN: CPT | Mod: GP | Performed by: PHYSICAL THERAPIST

## 2021-07-17 PROCEDURE — 250N000011 HC RX IP 250 OP 636: Performed by: NEUROLOGICAL SURGERY

## 2021-07-17 PROCEDURE — 97116 GAIT TRAINING THERAPY: CPT | Mod: GP | Performed by: PHYSICAL THERAPIST

## 2021-07-17 PROCEDURE — L1499 SPINAL ORTHOSIS NOS: HCPCS

## 2021-07-17 RX ADMIN — OXYCODONE HYDROCHLORIDE 10 MG: 5 TABLET ORAL at 00:20

## 2021-07-17 RX ADMIN — OXYCODONE HYDROCHLORIDE 10 MG: 5 TABLET ORAL at 04:46

## 2021-07-17 RX ADMIN — LOSARTAN POTASSIUM 100 MG: 100 TABLET, FILM COATED ORAL at 08:24

## 2021-07-17 RX ADMIN — OXYCODONE HYDROCHLORIDE 10 MG: 5 TABLET ORAL at 22:31

## 2021-07-17 RX ADMIN — DEXAMETHASONE SODIUM PHOSPHATE 4 MG: 4 INJECTION, SOLUTION INTRAMUSCULAR; INTRAVENOUS at 09:33

## 2021-07-17 RX ADMIN — METHOCARBAMOL 750 MG: 750 TABLET ORAL at 02:37

## 2021-07-17 RX ADMIN — CLONIDINE HYDROCHLORIDE 0.1 MG: 0.1 TABLET ORAL at 08:25

## 2021-07-17 RX ADMIN — DEXAMETHASONE SODIUM PHOSPHATE 4 MG: 4 INJECTION, SOLUTION INTRAMUSCULAR; INTRAVENOUS at 04:46

## 2021-07-17 RX ADMIN — ACETAMINOPHEN 975 MG: 325 TABLET, FILM COATED ORAL at 18:09

## 2021-07-17 RX ADMIN — CEFAZOLIN SODIUM 2 G: 2 INJECTION, SOLUTION INTRAVENOUS at 04:46

## 2021-07-17 RX ADMIN — SENNOSIDES AND DOCUSATE SODIUM 1 TABLET: 8.6; 5 TABLET ORAL at 22:30

## 2021-07-17 RX ADMIN — METHOCARBAMOL 750 MG: 750 TABLET ORAL at 22:31

## 2021-07-17 RX ADMIN — SENNOSIDES AND DOCUSATE SODIUM 1 TABLET: 8.6; 5 TABLET ORAL at 08:24

## 2021-07-17 RX ADMIN — DEXAMETHASONE SODIUM PHOSPHATE 4 MG: 4 INJECTION, SOLUTION INTRAMUSCULAR; INTRAVENOUS at 18:09

## 2021-07-17 RX ADMIN — METHOCARBAMOL 750 MG: 750 TABLET ORAL at 08:29

## 2021-07-17 RX ADMIN — ACETAMINOPHEN 975 MG: 325 TABLET, FILM COATED ORAL at 02:37

## 2021-07-17 RX ADMIN — OXYCODONE HYDROCHLORIDE 10 MG: 5 TABLET ORAL at 09:33

## 2021-07-17 RX ADMIN — OXYCODONE HYDROCHLORIDE 10 MG: 5 TABLET ORAL at 14:11

## 2021-07-17 RX ADMIN — CLONIDINE HYDROCHLORIDE 0.1 MG: 0.1 TABLET ORAL at 22:30

## 2021-07-17 RX ADMIN — AMLODIPINE BESYLATE 10 MG: 10 TABLET ORAL at 08:24

## 2021-07-17 RX ADMIN — OXYCODONE HYDROCHLORIDE 10 MG: 5 TABLET ORAL at 18:16

## 2021-07-17 RX ADMIN — CEFAZOLIN SODIUM 2 G: 2 INJECTION, SOLUTION INTRAVENOUS at 19:56

## 2021-07-17 RX ADMIN — ACETAMINOPHEN 975 MG: 325 TABLET, FILM COATED ORAL at 11:07

## 2021-07-17 RX ADMIN — CEFAZOLIN SODIUM 2 G: 2 INJECTION, SOLUTION INTRAVENOUS at 12:00

## 2021-07-17 RX ADMIN — ATORVASTATIN CALCIUM 40 MG: 40 TABLET, FILM COATED ORAL at 08:24

## 2021-07-17 NOTE — PLAN OF CARE
"Pt transferred from PACU at 1800 s/p anterior C3-C7 discectomy and fusion and resection of thyroid mass. Somewhat groggy upon arrival to unit, but responded to voice, able to follow directions, and oriented x4. VSS on 3 L O2 NC. Appeared to have several second periods of apnea when resting, O2 sats remained in mid 90s. Spouse at the bedside - had pt's home CPAP in her car, and agreed to bring it to pt's room before she leaves for the evening. CMS with tingling in bilateral hands. RUE 3/5 strength with weak deltoid - unable to lift upper arm off of bed without assistance. Lung sounds diminished. Dressing on anterior neck CDI. Aspen collar in place. IVETT drain with sanguinous fluid. C/o 9/10 pain \"all over my neck\", and in R shoulder with movement of arm. Scheduled tylenol and prn dilaudid administered. Cleary patent with adequate o/p. Tolerating clear liquids. Bowel sounds hypoactive.   "

## 2021-07-17 NOTE — PLAN OF CARE
POD #1 from an anterior C3 - C7 discectomy/fusion and thyroid mass removal. C-Collar in place at all times. Alert, oriented x4. CMS with improving strength in the RUE specifically - strong hand grasp and now able to lift arm and somewhat resist to gravity, both RUE and LUE scoring a 4/5 with tingling present bilaterally. Bowel sounds audible, passing flatus, tolerating clear liquid diet. VSS on CPAP overnight, refused capnography. Dressing CDI. IVETT to bulb suction with an output of 40 mL. Up with A2. Cleary in place with adequate output. PIV infusing NS at 75 mL/hour. C/o incisonal pain, decreased with scheduled tylenol, PRN robaxin and PRN oxycodone. Pt scoring green on the Aggression Stop Light Tool. Plan to work with therapies, discharge pending.

## 2021-07-17 NOTE — PROGRESS NOTES
07/17/21 0815   Quick Adds   Type of Visit Initial PT Evaluation   Living Environment   People in home spouse;child(mayo), dependent  (wife and 15 yo daughter )   Current Living Arrangements apartment   Home Accessibility no concerns   Transportation Anticipated family or friend will provide   Living Environment Comments lives in apt without stairs, no devices    Self-Care   Usual Activity Tolerance moderate   Current Activity Tolerance moderate   Regular Exercise No   Equipment Currently Used at Home none   Activity/Exercise/Self-Care Comment has been off work (/) for > 2.5 months due to UE weakness and neck pain, planning to return to work as soon as able to.    Disability/Function   Fall history within last six months yes   Number of times patient has fallen within last six months 3   General Information   Onset of Illness/Injury or Date of Surgery 07/16/21   Referring Physician Lexie Cruz NP   Patient/Family Therapy Goals Statement (PT) return home    Pertinent History of Current Problem (include personal factors and/or comorbidities that impact the POC) 45-year-old male who presented with progressive hand weakness and worsening motor function.  MRI demonstrated severe stenosis from C3 to C7 with cord signal change s/p C3-7 fusion    Existing Precautions/Restrictions spinal;other (see comments)  (aspen cervical collar on at all times )   Cognition   Orientation Status (Cognition) oriented x 4   Affect/Mental Status (Cognition) WFL   Pain Assessment   Patient Currently in Pain Yes, see Vital Sign flowsheet  (mild pain noted in posterior neck )   Strength   Strength Comments R UE slightly weaker however > 3/5 MMT    Bed Mobility   Comment (Bed Mobility) SBA supine <> sit via log roll   Transfers   Transfer Safety Comments SBA sit <> stand without device   Gait/Stairs (Locomotion)   [place] Level (Gait) supervision   Assistive Device (Gait)   (FWW progressing towards no device )    Distance in Feet (Required for LE Total Joints) 200' x 2    Negotiation (Stairs) stairs independence;handrail location;number of steps   [place] Level (Stairs) modified independence   Handrail Location (Stairs) both sides   Number of Steps (Stairs) 4   Sensory Examination   Sensory Perception Comments denies numbness/tinging in B UE that was present prior to surgery, no sensory changes to B LEs    Clinical Impression   Criteria for Skilled Therapeutic Intervention yes, treatment indicated   PT Diagnosis (PT) impaired mobility    Influenced by the following impairments weakness, impaired balance, pain, numbness/tingling    Functional limitations due to impairments decreased activity tolerance    Clinical Presentation Stable/Uncomplicated   Clinical Presentation Rationale clinical judgement    Clinical Decision Making (Complexity) low complexity   Therapy Frequency (PT) Daily   Predicted Duration of Therapy Intervention (days/wks) 1 day    Planned Therapy Interventions (PT) balance training;bed mobility training;gait training;stair training;strengthening;transfer training   Anticipated Equipment Needs at Discharge (PT)   (none)   Risk & Benefits of therapy have been explained evaluation/treatment results reviewed;care plan/treatment goals reviewed;risks/benefits reviewed;current/potential barriers reviewed;participants voiced agreement with care plan;participants included;patient   PT Discharge Planning    PT Discharge Recommendation (DC Rec) home with assist   PT Rationale for DC Rec Pt ambulating > 200 feet without device and performing all mobility independent and SBA - pt anticipates d/c to home with spouse and family assist. Reviewed spinal precautions with patient and wife via phone call. No further PT questions, edu for cervical collar use at all times.    PT Brief overview of current status  Ind/SBA with all mobility, met PT goals.    Total Evaluation Time   Total Evaluation Time (Minutes) 5

## 2021-07-17 NOTE — PLAN OF CARE
Physical Therapy Discharge Summary    Reason for therapy discharge:    All goals and outcomes met, no further needs identified.    Progress towards therapy goal(s). See goals on Care Plan in Epic electronic health record for goal details.  Goals met    Therapy recommendation(s):    No further therapy is recommended. Would benefit from OP PT when cleared by surgeon prior to returning to physically demanding job (/). Met all inpatient PT goals, safe to discharge home with spouse assist, no need for FWW for safe ambulation.

## 2021-07-17 NOTE — PROGRESS NOTES
Order received for an Upperglade Collar at the [facility] 7th floor.  Upon arrival the patient has on an Upperglade [place] collar.  Pt. Stated that they just wanted it looked at for proper fit.  I have adjusted the chin section up one notch and tightened the fit.  The fit does seem appropriate at this time.  I have provided an extra set of pads per request.    Tavo ALBERTS/TANA

## 2021-07-17 NOTE — PROGRESS NOTES
POD#1 s/p Cervical 3 to Cervical 7 Anterior Cervical Discectomy and Fusion and right thyroidectomy.  Started on Decadron yesterday.  He states he's feeling better today.  Incision with dressing intact, cervical collar in place.    IVETT 40cc/8 hrs, sitting up in chair. Moving all four extremities well.    PLAN:  Continue Decadron IV today and change to oral tomorrow and taper over 1 week.  Glucose checks.  Continue abx while drain in.    Discussed with Dr. De Leon.    Riddhi Roldan MPAS  [facility] Neurosurgery  [address] [address]    Tel [phone]  Pager [phone]

## 2021-07-17 NOTE — PROVIDER NOTIFICATION
355 Archie Wallace. Orders seem to be for same day, however this seems like an inpatient. Could you take a look at these orders? Thanks, Gabino LESLIE [phone]

## 2021-07-18 VITALS
OXYGEN SATURATION: 97 % | RESPIRATION RATE: 18 BRPM | BODY MASS INDEX: 42.66 KG/M2 | DIASTOLIC BLOOD PRESSURE: 82 MMHG | SYSTOLIC BLOOD PRESSURE: 129 MMHG | TEMPERATURE: 97.3 F | HEART RATE: 73 BPM | WEIGHT: 315 LBS | HEIGHT: 72 IN

## 2021-07-18 LAB
GLUCOSE BLDC GLUCOMTR-MCNC: 165 MG/DL (ref 70–99)
GLUCOSE BLDC GLUCOMTR-MCNC: 209 MG/DL (ref 70–99)

## 2021-07-18 PROCEDURE — 250N000011 HC RX IP 250 OP 636: Performed by: NURSE PRACTITIONER

## 2021-07-18 PROCEDURE — 250N000011 HC RX IP 250 OP 636: Performed by: NEUROLOGICAL SURGERY

## 2021-07-18 PROCEDURE — 250N000013 HC RX MED GY IP 250 OP 250 PS 637: Performed by: NURSE PRACTITIONER

## 2021-07-18 RX ORDER — ASPIRIN 81 MG/1
81 TABLET ORAL DAILY
Qty: 90 TABLET | Refills: 3 | COMMUNITY
Start: 2021-07-30

## 2021-07-18 RX ORDER — AMOXICILLIN 250 MG
1 CAPSULE ORAL 2 TIMES DAILY PRN
Qty: 50 TABLET | Refills: 0 | Status: SHIPPED | OUTPATIENT
Start: 2021-07-18 | End: 2021-08-31

## 2021-07-18 RX ORDER — OXYCODONE HYDROCHLORIDE 5 MG/1
5-10 TABLET ORAL EVERY 4 HOURS PRN
Qty: 50 TABLET | Refills: 0 | Status: SHIPPED | OUTPATIENT
Start: 2021-07-18 | End: 2021-07-27

## 2021-07-18 RX ORDER — DEXAMETHASONE 2 MG/1
TABLET ORAL
Qty: 26 TABLET | Refills: 0 | Status: SHIPPED | OUTPATIENT
Start: 2021-07-18 | End: 2021-10-12

## 2021-07-18 RX ADMIN — CEFAZOLIN SODIUM 2 G: 2 INJECTION, SOLUTION INTRAVENOUS at 03:24

## 2021-07-18 RX ADMIN — AMLODIPINE BESYLATE 10 MG: 10 TABLET ORAL at 08:43

## 2021-07-18 RX ADMIN — OXYCODONE HYDROCHLORIDE 10 MG: 5 TABLET ORAL at 08:42

## 2021-07-18 RX ADMIN — CLONIDINE HYDROCHLORIDE 0.1 MG: 0.1 TABLET ORAL at 08:43

## 2021-07-18 RX ADMIN — SENNOSIDES AND DOCUSATE SODIUM 1 TABLET: 8.6; 5 TABLET ORAL at 08:43

## 2021-07-18 RX ADMIN — OXYCODONE HYDROCHLORIDE 10 MG: 5 TABLET ORAL at 03:24

## 2021-07-18 RX ADMIN — ACETAMINOPHEN 975 MG: 325 TABLET, FILM COATED ORAL at 10:18

## 2021-07-18 RX ADMIN — LOSARTAN POTASSIUM 100 MG: 100 TABLET, FILM COATED ORAL at 08:43

## 2021-07-18 RX ADMIN — ACETAMINOPHEN 975 MG: 325 TABLET, FILM COATED ORAL at 03:24

## 2021-07-18 RX ADMIN — ATORVASTATIN CALCIUM 40 MG: 40 TABLET, FILM COATED ORAL at 08:43

## 2021-07-18 RX ADMIN — DEXAMETHASONE SODIUM PHOSPHATE 4 MG: 4 INJECTION, SOLUTION INTRAMUSCULAR; INTRAVENOUS at 00:39

## 2021-07-18 RX ADMIN — DEXAMETHASONE SODIUM PHOSPHATE 4 MG: 4 INJECTION, SOLUTION INTRAMUSCULAR; INTRAVENOUS at 06:24

## 2021-07-18 NOTE — DISCHARGE SUMMARY
Service Date: 07/16/2021  Discharge Date: 07/18/2021    PRIMARY DIAGNOSIS:  Cervical myelopathy.    OPERATION PERFORMED:  C3 to C4, C4 to C5, C5 to C6, C6 to C7 anterior cervical diskectomy and fusion performed 07/16/2021.    COMPLICATIONS:  None.    CONSULTATIONS:  Surgery for thyroid nodule.    HOSPITAL COURSE:  The patient is a 45-year-old male who presented to our clinic with cervical myelopathy, in which surgical management was deemed appropriate.  The patient was prepared for surgery on 07/16/2021, at which time he underwent the above-described procedure.  Please see the dictated operative report for further details.  He tolerated surgery well, and there were no complications.    Postoperatively, he has adequate neurosurgical recovery.  He has remained afebrile with stable vital signs.  He is tolerating a regular diet without difficulty and ambulating well.  His operative incision is healing well.  There are no signs of drainage, erythema or edema.  He is ready to be discharged to home on 07/18/2021 in improved condition as compared to admission.    All instructions regarding diet, activity, wound care, bowel management and followup were given to the patient.  He was released in good condition.    DISCHARGE MEDICATIONS:    1.  Aspirin can be resumed in 2 weeks from surgery, which would be 07/30/2021.  2.  Robaxin he has at home, which he will take as instructed for muscle    3. Oxycodone 5 mg, instructed to take 1-2 tablets every 4 hours as needed for pain, #50, no refills.    4.  Decadron 2 mg tablet taper over 1 week.  5.  Senna 8.6 , just 50, instructed to take 1 tablet twice daily as needed for constipation.    PLAN:    1.  Follow up in the Neurosurgery Nurse Clinic on 07/29/2021.  2.  Follow up in Neurosurgery Clinic 08/31/2021 and 10/21/2021.    As always, the patient was instructed to call or return to the clinic for any worsening or changes in symptoms.     As Dictated by RAE EVANS        D:  2021   T: 2021   MT: UZMA    Name:     MANPREET GONZALEZ  MRN:      [id number]        Account:      [id number]   :      1975           Service Date: 2021                                  Discharge Date: 2021     Document: [id number]

## 2021-07-18 NOTE — PLAN OF CARE
POD #2 from an anterior C3 - C7 discectomy/fusion and thyroid mass removal. C-Collar in place at all times. Alert, oriented x4. CMS intact ex: tingling present in the hands bilaterally. Bowel sounds audible, passing flatus, tolerating regular diet. VSS on CPAP overnight. Dressing CDI. IVETT to bulb suction. Up with SBA with a BG. Voiding adequately. PIV SL. C/o incisonal pain, decreased with scheduled tylenol, PRN robaxin and PRN oxycodone. Pt scoring green on the Aggression Stop Light Tool. Plan for a possible discharge home today.

## 2021-07-18 NOTE — PLAN OF CARE
POD 2 anterior C3-C7 discectomy and fusion and resection of thyroid mass. Alert and oriented x4. VSS on RA. CMS intact, denies numbness/tingling in hands. Strength 5/5 x4 extremities. Lung sounds clear. Dressing on anterior neck CDI. Sami drain discontinued and dressing change demonstrated for pt's spouse. Incision WDL with intact steri-strips.. Ambulating with SBA and GB. Posterior neck and shoulder pain managed with prn oxycodone. Tolerating regular diet. Reviewed written discharge instructions with pt and his spouse, all questions answered. Discharged home with spouse.

## 2021-07-18 NOTE — PLAN OF CARE
POD 1anterior C3-C7 discectomy and fusion and resection of thyroid mass. Alert and oriented x4. VSS on RA. Uses CPAP when sleeping. CMS with improved, slight tingling in finger tips of bilateral hands, otherwise intact. Strength 5/5 x4 extremities. Lung sounds clear. Dressing on anterior neck CDI. Sami drain patent with 15 ml s/s drainage. Cleary discontinued, voiding without difficulty. Ambulating with SBA and GB. Posterior neck and shoulder pain managed with prn oxycodone and robaxin x1. Tolerating regular diet. Likely discharge home tomorrow.

## 2021-07-18 NOTE — DISCHARGE INSTRUCTIONS
Spine and Brain Clinic at [facility]  Dr. De Leon Discharge Instructions Following Spine Surgery  [phone]  Monday - Friday; 8:00 AM - 4:00 PM    In General:   After you have had surgery on your spine, remember do not twist, or excessively flex or extend the area that you had surgery.  These activities can prevent healing.  Pain is normal and to be expected following surgery.  Please call our office to schedule your appointment follow up appointment.      Bowel Care:  Many people have constipation (hard stools) after surgery.  To help prevent constipation: Drink plenty of fluid (8-10 glasses/day); Eat more fiber, such as whole grain bread, bran cereal, and fruits and vegetables; Stay active by walking; Over the counter stool softener may also help.      Medications:  Spine surgery and pain management is unique to all patients.  You will generally be given medications for pain, muscle spasms or tightness, and for constipation during the immediate post op period.  It is important that you use these as prescribed.  Please remember to bring your pill bottles to all of your appointments. Avoid alcoholic beverages while taking narcotic pain medications. You can use ice to areas of pain as needed, 20 minutes at a time.  Changing positions and walking will help loosen your muscles as well.    Driving:  No driving while on narcotic pain medications.  It is state law not to drive while under the influence of a drug to a degree which renders you incapable of safely driving.  The narcotic medication you will be taking after surgery falls under this category.     Activity:   After surgery, most people feel less pain than they have had in a long time.  Walking and light activities will help you regain the use of your muscles.  You are encouraged to walk: start with short walks 5-10 minutes at a time for 4-5 times per day and increase as tolerated.  Stair climbing as tolerated, we recommend you use the railing.  No lifting greater than 10 pounds: approximately equal to one gallon of milk. No twisting, bending in the area you have had surgery. No housework, vacuuming, laundry, leaf raking, lawn mowing, or snow removal. Wear your brace (if ordered) as directed.    Showers:  If you have sutures or staples you may shower two days after surgery. It is ok to let water run over your incision but do not touch or scrub on the incision. Pat dry immediately after showering. If there is a dressing in place, you may remove it 2 days after surgery. If you were closed with Derma gonzalez (glue), you may shower without covering the incision. No baths, hot tubs, or pool activity for at least 6 weeks.     Nutrition:  In general, your diet restrictions will not change with your surgery.  You may need to eat small frequent meals initially until your appetite returns.  Eat plenty of high fiber foods and drink plenty of fluids. If you do not have a fluid restriction from or prior to surgery, we recommend 6-8 (8oz) glasses of water per day. Other fluids are fine, but water is best. Nausea is not uncommon; it is a common side effect to many pain medications.  We recommend that you take the pain medications with food, if this does not improve your symptoms, please call us.     Smoking:  For proper healing it is required that you quit using all tobacco products.  This includes smoking, chewing, nicotine gums, and nicotine patches.  Call Dr. De Leon if these occur: Drainage from your incision, increased pain/redness/swelling, temperatures greater than 101.5, increased leg pain or swelling or unrelieved headaches    Go to the nearest Emergency Room if you experience: chest pain, shortness of breath, neck swelling or swallowing problems

## 2021-07-19 ENCOUNTER — TELEPHONE (OUTPATIENT)
Dept: NEUROSURGERY | Facility: CLINIC | Age: 46
End: 2021-07-19

## 2021-07-20 LAB
PATH REPORT.COMMENTS IMP SPEC: NORMAL
PATH REPORT.COMMENTS IMP SPEC: NORMAL
PATH REPORT.FINAL DX SPEC: NORMAL
PATH REPORT.GROSS SPEC: NORMAL
PATH REPORT.MICROSCOPIC SPEC OTHER STN: NORMAL
PATH REPORT.RELEVANT HX SPEC: NORMAL
PHOTO IMAGE: NORMAL

## 2021-07-20 NOTE — PLAN OF CARE
[facility]      OUTPATIENT PHYSICAL THERAPY EVALUATION  PLAN OF TREATMENT FOR OUTPATIENT REHABILITATION  (COMPLETE FOR INITIAL CLAIMS ONLY)  Patient's Last Name, First Name, M.I.  YOB: 1975  Archie Wallace                        Provider's Name  [facility] Medical Record No.  [id number]                               Onset Date:  07/16/21   Start of Care Date:         Type:     _X_PT   ___OT   ___SLP Medical Diagnosis:                           PT Diagnosis:  impaired mobility    Visits from SOC:  1   _________________________________________________________________________________  Plan of Treatment/Functional Goals    Planned Interventions: balance training, bed mobility training, gait training, stair training, strengthening, transfer training     Goals: See Physical Therapy Goals on Care Plan in [facility] electronic health record.    Therapy Frequency: Daily  Predicted Duration of Therapy Intervention: 1 day   _________________________________________________________________________________    I CERTIFY THE NEED FOR THESE SERVICES FURNISHED UNDER        THIS PLAN OF TREATMENT AND WHILE UNDER MY CARE     (Physician co-signature of this document indicates review and certification of the therapy plan).               ,      Referring Physician: Lexie Cruz NP            Initial Assessment        See Physical Therapy evaluation dated   in Epic electronic health record.

## 2021-07-21 ENCOUNTER — NURSE TRIAGE (OUTPATIENT)
Dept: NURSING | Facility: CLINIC | Age: 46
End: 2021-07-21

## 2021-07-21 ENCOUNTER — OFFICE VISIT (OUTPATIENT)
Dept: FAMILY MEDICINE | Facility: CLINIC | Age: 46
End: 2021-07-21
Payer: COMMERCIAL

## 2021-07-21 VITALS
HEART RATE: 64 BPM | WEIGHT: 315 LBS | BODY MASS INDEX: 45.1 KG/M2 | SYSTOLIC BLOOD PRESSURE: 163 MMHG | HEIGHT: 70 IN | OXYGEN SATURATION: 95 % | RESPIRATION RATE: 24 BRPM | TEMPERATURE: 97.6 F | DIASTOLIC BLOOD PRESSURE: 81 MMHG

## 2021-07-21 DIAGNOSIS — E11.65 TYPE 2 DIABETES MELLITUS WITH HYPERGLYCEMIA, WITHOUT LONG-TERM CURRENT USE OF INSULIN (H): ICD-10-CM

## 2021-07-21 DIAGNOSIS — Z09 HOSPITAL DISCHARGE FOLLOW-UP: Primary | ICD-10-CM

## 2021-07-21 DIAGNOSIS — G95.9 CERVICAL MYELOPATHY (H): ICD-10-CM

## 2021-07-21 DIAGNOSIS — E07.9 THYROID MASS: ICD-10-CM

## 2021-07-21 LAB
ALBUMIN UR-MCNC: NEGATIVE MG/DL
APPEARANCE UR: CLEAR
BILIRUB UR QL STRIP: NEGATIVE
COLOR UR AUTO: YELLOW
CREAT UR-MCNC: 88 MG/DL
GLUCOSE BLD-MCNC: 243 MG/DL (ref 79–116)
GLUCOSE UR STRIP-MCNC: NEGATIVE MG/DL
HGB UR QL STRIP: NEGATIVE
KETONES UR STRIP-MCNC: NEGATIVE MG/DL
LEUKOCYTE ESTERASE UR QL STRIP: NEGATIVE
MICROALBUMIN UR-MCNC: 9 MG/DL
MICROALBUMIN/CREAT UR: 10.23 MG/G CR (ref 0–17)
NITRATE UR QL: NEGATIVE
PH UR STRIP: 7 [PH] (ref 5–7)
SP GR UR STRIP: 1.02 (ref 1–1.03)
UROBILINOGEN UR STRIP-ACNC: 1 E.U./DL

## 2021-07-21 PROCEDURE — 82043 UR ALBUMIN QUANTITATIVE: CPT | Performed by: NURSE PRACTITIONER

## 2021-07-21 PROCEDURE — 36415 COLL VENOUS BLD VENIPUNCTURE: CPT | Performed by: NURSE PRACTITIONER

## 2021-07-21 PROCEDURE — 82947 ASSAY GLUCOSE BLOOD QUANT: CPT | Performed by: NURSE PRACTITIONER

## 2021-07-21 PROCEDURE — 81003 URINALYSIS AUTO W/O SCOPE: CPT | Performed by: NURSE PRACTITIONER

## 2021-07-21 PROCEDURE — 99214 OFFICE O/P EST MOD 30 MIN: CPT | Performed by: NURSE PRACTITIONER

## 2021-07-21 RX ORDER — BLOOD PRESSURE TEST KIT
1 KIT MISCELLANEOUS DAILY
Qty: 100 EACH | Refills: 0 | Status: SHIPPED | OUTPATIENT
Start: 2021-07-21 | End: 2022-09-07

## 2021-07-21 RX ORDER — BLOOD SUGAR DIAGNOSTIC
STRIP MISCELLANEOUS
Qty: 100 STRIP | Refills: 0 | Status: SHIPPED | OUTPATIENT
Start: 2021-07-21 | End: 2021-08-15

## 2021-07-21 RX ORDER — LANCETS
EACH MISCELLANEOUS
Qty: 100 EACH | Refills: 4 | Status: SHIPPED | OUTPATIENT
Start: 2021-07-21 | End: 2022-09-07

## 2021-07-21 RX ORDER — PEN NEEDLE, DIABETIC 30 GX3/16"
1 NEEDLE, DISPOSABLE MISCELLANEOUS DAILY
Qty: 100 EACH | Refills: 0 | Status: SHIPPED | OUTPATIENT
Start: 2021-07-21

## 2021-07-21 ASSESSMENT — PAIN SCALES - GENERAL: PAINLEVEL: EXTREME PAIN (8)

## 2021-07-21 ASSESSMENT — MIFFLIN-ST. JEOR: SCORE: 2439.82

## 2021-07-21 NOTE — TELEPHONE ENCOUNTER
Had surgery 'cervical spine surgery''  last week. On July 16th.    Blood Sugar has been increased , high 200's and 300's patient is reporting.  Patient also reports ;   Feeling light headed and urinating a lot.    Lightheaded, and dizzyness this morning, and last night..  Patient advised to be seen today . He is willing to go to [facility], [facility], or [facility].    Patient was advised that if unable to be seen , to go to ER @ [facility]     Jade Leyva, RN RN  [facility] Triage/refill nurse    Reason for Disposition    Patient wants to be seen    Blood glucose > 240 mg/dL (13.3 mmol/L) AND vomiting AND unable to check for ketones (in blood or urine)    Additional Information    Negative: Blood glucose > 300 mg/dL (16.7 mmol/L) AND two or more times in a row    Negative: Urine ketones moderate - large (or blood ketones > 1.4 mmol/L)    Negative: Blood glucose > 400 mg/dL (22.2 mmol/L)    Protocols used: DIABETES - HIGH BLOOD SUGAR-A-OH

## 2021-07-21 NOTE — PROGRESS NOTES
Assessment & Plan     Hospital discharge follow-up  Admitted to [facility] 7/16-7/18/2021 for cervical myelopathy, s/p cervical diskectomy and fusion 7/16/2021 and s/p right thyroidectomy 7/16/2021 for thyroid mass.      Cervical myelopathy (H)  s/p cervical diskectomy and fusion 7/16/2021  Continue to follow with neurosurgery    Thyroid mass  s/p right thyroidectomy 7/16/2021 for thyroid mass  Continue to follow with endocrinology    Type 2 diabetes mellitus with hyperglycemia, without long-term current use of insulin (H)  Blood glucose has been elevated since hospitalization and use of PO steroids. No ketones in urine at clinic lab today. Will start basal insulin. May be possible to stop once he is further along in his healing and steroids are finished. Counseled on how to use insulin, check blood glucose 3-4 times daily. Blood sugar goal: blood sugars between  mg/dL right away in the morning or before meals, and less than 180 mg/dL two hours after a meal.   - Albumin Random Urine Quantitative with Creat Ratio  - UA macro with reflex to Microscopic and Culture - Clinc Collect  - Glucose, whole blood  - insulin glargine (LANTUS SOLOSTAR) 100 UNIT/ML pen; Inject 10 Units Subcutaneous At Bedtime  - Insulin Pen Needle (PEN NEEDLES) 32G X 4 MM MISC; 1 each daily  - Alcohol Swabs PADS; 1 each daily  - ACCU-CHEK GUIDE test strip; Use to test blood sugar 3-4 times daily or as directed.  - blood glucose monitoring (SOFTCLIX) lancets; Use to test blood sugar 3-4 times daily.      Review of the result(s) of each unique test - as noted  Ordering of each unique test  Prescription drug management      Return in about 2 days (around 7/23/2021) for  diabetes check.    Leana Thomas, CECE [facility] OLIVA Almanza is a 45 year old who presents for the following health issues  accompanied by his spouse:    HPI     Diabetes Follow-up    How often are you checking your blood  sugar? Three times daily  Blood sugar testing frequency justification:  Uncontrolled diabetes  What time of day are you checking your blood sugars (select all that apply)?  Before and after meals  Have you had any blood sugars above 200?  Yes, in the 300's  Have you had any blood sugars below 70?  No    What symptoms do you notice when your blood sugar is low?  Dizzy    What concerns do you have today about your diabetes? Blood sugar is often over 200     Do you have any of these symptoms? (Select all that apply)  Dry mouth   High blood sugars just since his surgery 7/16/2021 and being on PO steroids.     BP Readings from Last 2 Encounters:   07/21/21 (!) 163/81   07/18/21 129/82     Hemoglobin A1C (%)   Date Value   07/12/2021 6.6 (H)   06/02/2021 6.7 (H)   12/03/2020 5.8 (H)     LDL Cholesterol Calculated (mg/dL)   Date Value   07/17/2020 104 (H)   04/21/2020 85     LDL Cholesterol Direct (mg/dL)   Date Value   06/02/2021 99         How many servings of fruits and vegetables do you eat daily?  2-3    On average, how many sweetened beverages do you drink each day (Examples: soda, juice, sweet tea, etc.  Do NOT count diet or artificially sweetened beverages)?   3    How many days per week do you exercise enough to make your heart beat faster? 0    How many minutes a day do you exercise enough to make your heart beat faster? 0    How many days per week do you miss taking your medication? 0      Hospital Follow-up Visit:    Hospital/Nursing Home/IP Rehab Facility: [facility]  Date of Admission: 7/16/2021  Date of Discharge: 7/18/2021  Reason(s) for Admission: Cervical myelopathy      Was your hospitalization related to COVID-19? No   Problems taking medications regularly:  None  Medication changes since discharge:   DISCHARGE MEDICATIONS:    1.  Aspirin can be resumed in 2 weeks from surgery, which would be 07/30/2021.  2.  Robaxin he has at home, which he will take as instructed for muscle   "  3. Oxycodone 5 mg, instructed to take 1-2 tablets every 4 hours as needed for pain, #50, no refills.    4.  Decadron 2 mg tablet taper over 1 week.  5.  Senna 8.6 , just 50, instructed to take 1 tablet twice daily as needed for constipation    Problems adhering to non-medication therapy:  None, have not started yet.     Summary of hospitalization:  [facility] discharge summary reviewed  Diagnostic Tests/Treatments reviewed.  Follow up needed: neurosurgery follow up and endocrinology follow up  Other Healthcare Providers Involved in Patient s Care:         Specialist appointments  Update since discharge: pain improved, blood glucose worsened     Post Discharge Medication Reconciliation: discharge medications reconciled and changed, per note/orders.    Plan of care communicated with patient and family            Review of Systems   Constitutional, HEENT, cardiovascular, pulmonary, gi and gu systems are negative, except as otherwise noted.      Objective    BP (!) 163/81 (BP Location: Right arm, Patient Position: Sitting, Cuff Size: Adult Large)   Pulse 64   Temp 97.6  F (36.4  C) (Oral)   Resp 24   Ht 1.778 m (5' 10\")   Wt (!) 155.4 kg (342 lb 8 oz)   SpO2 95%   BMI 49.14 kg/m    Body mass index is 49.14 kg/m .  Physical Exam   GENERAL: healthy, alert and no distress  EYES: Eyes grossly normal to inspection, PERRL and conjunctivae and sclerae normal  RESP: lungs clear to auscultation - no rales, rhonchi or wheezes  CV: regular rate and rhythm, normal S1 S2, no S3 or S4, no murmur, click or rub, no peripheral edema and peripheral pulses strong  MS:wearing C collar   NEURO: Normal strength and tone, mentation intact and speech normal  PSYCH: mentation appears normal, affect normal/bright    Results for orders placed or performed in visit on 07/21/21 (from the past 24 hour(s))   UA macro with reflex to Microscopic and Culture - Clinc Collect    Specimen: Urine, Midstream   Result Value Ref Range "    Color Urine Yellow Colorless, Straw, Light Yellow, Yellow    Appearance Urine Clear Clear    Glucose Urine Negative Negative mg/dL    Bilirubin Urine Negative Negative    Ketones Urine Negative Negative mg/dL    Specific Gravity Urine 1.020 1.003 - 1.035    Blood Urine Negative Negative    pH Urine 7.0 5.0 - 7.0    Protein Albumin Urine Negative Negative mg/dL    Urobilinogen Urine 1.0 0.2, 1.0 E.U./dL    Nitrite Urine Negative Negative    Leukocyte Esterase Urine Negative Negative    Narrative    Microscopic not indicated   Glucose, whole blood   Result Value Ref Range    Glucose Whole Blood 243 (H) 79 - 116 mg/dL

## 2021-07-22 NOTE — PROGRESS NOTES
Assessment & Plan     Drug or chemical induced  hyperglycemia, with long-term current use of insulin (H)/  Type 2 diabetes mellitus without complication, unspecified whether long term insulin use (H)  Patient recently started on a steroid after neck and thyroid surgery, blood sugars were then noted to be high and started on insulin.  Blood sugar has been running in the upper 200s and low 300s.  Still on Decadron.  Discussed fact that this is likely from the stress of surgery and Decadron and should improve spontaneously.  We will have her increase the dose of insulin to 14 units; since completing steroid in 2 days, will stay on the same and recheck 7/27/21.  Might need insulin for a few weeks until blood sugars get down to normal and if do might then be discontinued    HTN, goal below 140/90  Blood pressure at goal    BMI 45.0-49.9, adult (H)    Return in about 4 days (around 7/27/2021) for Virtual visit.    Agustin Parekh MD  [facility] OLIVA Almanza is a 45 year old who presents for the following health issues  accompanied by his female partner:    \[facility]\""       Hospital Follow-up Visit:    Hospital/Nursing Home/IP Rehab Facility: [facility]  Date of Admission: 7/16/21  Date of Discharge: 7/18/21  Reason(s) for Admission: Cervical Myelopathy       Was your hospitalization related to COVID-19? No   Problems taking medications regularly:  None  Medication changes since discharge: Yes  Problems adhering to non-medication therapy:  None    Summary of hospitalization:  [facility] discharge summary reviewed  Diagnostic Tests/Treatments reviewed.  Follow up needed: Neurosurgery  Other Healthcare Providers Involved in Patient s Care:         Specialist appointment - Neurosurgery  Update since discharge: improved.   Post Discharge Medication Reconciliation: discharge medications reconciled and changed, per note/orders.  Plan of care communicated  with patient and family        Diabetes Follow-up    Has on going diabetes which has been well controlled but sugars shot up after surgery and being started on steroid; was started on insulin 3 days ago but sugars still high (237, 297, 336, 320) has 2 days of steroid left.    How often are you checking your blood sugar? Four or more times daily  Blood sugar testing frequency justification:  Patient modifying lifestyle changes (diet, exercise) with blood sugars  What time of day are you checking your blood sugars (select all that apply)?  Before and after meals  Have you had any blood sugars above 200?  Yes   Have you had any blood sugars below 70?  No    What symptoms do you notice when your blood sugar is low?  Dizzy and Blurred vision    What concerns do you have today about your diabetes?  Blood sugar is often over 200     Do you have any of these symptoms? (Select all that apply)  Excessive thirst, Blurry vision and Weight loss    Hemoglobin A1C (%)   Date Value   07/12/2021 6.6 (H)   06/02/2021 6.7 (H)   12/03/2020 5.8 (H)     LDL Cholesterol Calculated (mg/dL)   Date Value   07/17/2020 104 (H)   04/21/2020 85     LDL Cholesterol Direct (mg/dL)   Date Value   06/02/2021 99         How many servings of fruits and vegetables do you eat daily?  2-3    On average, how many sweetened beverages do you drink each day (Examples: soda, juice, sweet tea, etc.  Do NOT count diet or artificially sweetened beverages)?   0    How many days per week do you exercise enough to make your heart beat faster? 3 or less    How many minutes a day do you exercise enough to make your heart beat faster? 9 or less    How many days per week do you miss taking your medication? 0      Review of Systems    HEENT, cardiovascular, pulmonary, gi and gu systems are negative, except as otherwise noted.      Objective    /80   Pulse 64   Temp 97.9  F (36.6  C) (Oral)   Wt (!) 151.5 kg (334 lb 0.5 oz)   SpO2 99%   BMI 47.93 kg/m    Body  mass index is 47.93 kg/m .  Physical Exam   GENERAL: healthy, obese, alert and no distress  NECK: Has cervical collar and dressing around the neck.  RESP: lungs clear to auscultation - no rales, rhonchi or wheezes  CV: regular rate and rhythm, no murmur, click or rub, no peripheral edema   MS: no gross musculoskeletal defects noted, no edema

## 2021-07-23 ENCOUNTER — OFFICE VISIT (OUTPATIENT)
Dept: FAMILY MEDICINE | Facility: CLINIC | Age: 46
End: 2021-07-23
Payer: COMMERCIAL

## 2021-07-23 VITALS
TEMPERATURE: 97.9 F | HEART RATE: 64 BPM | OXYGEN SATURATION: 99 % | SYSTOLIC BLOOD PRESSURE: 122 MMHG | WEIGHT: 315 LBS | BODY MASS INDEX: 47.93 KG/M2 | DIASTOLIC BLOOD PRESSURE: 80 MMHG

## 2021-07-23 DIAGNOSIS — E11.65 TYPE 2 DIABETES MELLITUS WITH HYPERGLYCEMIA, WITHOUT LONG-TERM CURRENT USE OF INSULIN (H): ICD-10-CM

## 2021-07-23 DIAGNOSIS — Z79.4 DRUG OR CHEMICAL INDUCED DIABETES MELLITUS WITH HYPERGLYCEMIA, WITH LONG-TERM CURRENT USE OF INSULIN (H): Primary | ICD-10-CM

## 2021-07-23 DIAGNOSIS — I10 HTN, GOAL BELOW 140/90: ICD-10-CM

## 2021-07-23 DIAGNOSIS — E09.65 DRUG OR CHEMICAL INDUCED DIABETES MELLITUS WITH HYPERGLYCEMIA, WITH LONG-TERM CURRENT USE OF INSULIN (H): Primary | ICD-10-CM

## 2021-07-23 DIAGNOSIS — E11.9 TYPE 2 DIABETES MELLITUS WITHOUT COMPLICATION, UNSPECIFIED WHETHER LONG TERM INSULIN USE (H): ICD-10-CM

## 2021-07-23 PROCEDURE — 99213 OFFICE O/P EST LOW 20 MIN: CPT | Performed by: FAMILY MEDICINE

## 2021-07-23 NOTE — PATIENT INSTRUCTIONS
Diabetes Mellitus:   Hyperglycemia due to recent surgery and medication (Decadron)   Plan: Increase Lantus to 14 units nightly            Completing Decadron on 7/25/21 and will expect blood sugars to             start coming down.  Follow up with phone visit: 7/27/21

## 2021-07-27 ENCOUNTER — VIRTUAL VISIT (OUTPATIENT)
Dept: FAMILY MEDICINE | Facility: CLINIC | Age: 46
End: 2021-07-27
Payer: COMMERCIAL

## 2021-07-27 DIAGNOSIS — E11.65 TYPE 2 DIABETES MELLITUS WITH HYPERGLYCEMIA, WITH LONG-TERM CURRENT USE OF INSULIN (H): Primary | ICD-10-CM

## 2021-07-27 DIAGNOSIS — Z79.4 TYPE 2 DIABETES MELLITUS WITH HYPERGLYCEMIA, WITH LONG-TERM CURRENT USE OF INSULIN (H): Primary | ICD-10-CM

## 2021-07-27 DIAGNOSIS — G95.9 CERVICAL MYELOPATHY (H): ICD-10-CM

## 2021-07-27 PROCEDURE — 99213 OFFICE O/P EST LOW 20 MIN: CPT | Mod: 95 | Performed by: FAMILY MEDICINE

## 2021-07-27 RX ORDER — OXYCODONE HYDROCHLORIDE 5 MG/1
5-10 TABLET ORAL EVERY 4 HOURS PRN
Qty: 50 TABLET | Refills: 0 | Status: SHIPPED | OUTPATIENT
Start: 2021-07-27 | End: 2021-08-31

## 2021-07-27 NOTE — PROGRESS NOTES
Archie is a 45 year old who is being evaluated via a billable telephone visit.      What phone number would you like to be contacted at? [phone]  How would you like to obtain your AVS? Mail a copy    Assessment & Plan      Type 2 diabetes mellitus with hyperglycemia, with long-term current use of insulin (H)  (primary encounter diagnosis)    Patient has ongoing diabetes, recently had surgery for cervical spine fusion and Rt thyroidectomy and was started on's steroid and she was shot up.  Was started on insulin last week, and blood sugars were in the 300s, after increasing insulin to 14 units his blood sugars are now in the 200s. He has completed the course of steroid he was taking.  Since done with a steroid; will watch the blood sugars for another 3 days before increasing the dose of insulin if need be.    Cervical myelopathy (H)  And out of his pain medications, seeing surgeon in 2 days; will do a short-term refill  - oxyCODONE (ROXICODONE) 5 MG tablet; Take 1-2 tablets (5-10 mg) by mouth every 4 hours as needed for moderate to severe pain    Return in about 3 days (around 7/30/2021) for call with update.    Agustin Parekh MD  [facility]   Archie is a 45 year old who presents for the following health issues;    HPI     Diabetes Follow-up    Blood sugars; getting under 200, 208 - 290; still has polyuria, 6-8 time. Vision still blurry.     Increase   How often are you checking your blood sugar? Two times daily  Blood sugar testing frequency justification:  Uncontrolled diabetes  What time of day are you checking your blood sugars (select all that apply)?  Before and after meals  Have you had any blood sugars above 200?  Yes   Have you had any blood sugars below 70?  No    What symptoms do you notice when your blood sugar is low?  None    What concerns do you have today about your diabetes? None and Blood sugar is often over 200     Do you have any of these symptoms?  (Select all that apply)  Numbness in feet      Hyperlipidemia Follow-Up      Are you regularly taking any medication or supplement to lower your cholesterol?   Yes-     Are you having muscle aches or other side effects that you think could be caused by your cholesterol lowering medication?  Yes    Hypertension Follow-up      Do you check your blood pressure regularly outside of the clinic? Yes     Are you following a low salt diet? No    Are your blood pressures ever more than 140 on the top number (systolic) OR more   than 90 on the bottom number (diastolic), for example 140/90? No    BP Readings from Last 2 Encounters:   07/29/21 (!) 167/105   07/23/21 122/80     Hemoglobin A1C (%)   Date Value   07/12/2021 6.6 (H)   06/02/2021 6.7 (H)   12/03/2020 5.8 (H)     LDL Cholesterol Calculated (mg/dL)   Date Value   07/17/2020 104 (H)   04/21/2020 85     LDL Cholesterol Direct (mg/dL)   Date Value   06/02/2021 99         How many servings of fruits and vegetables do you eat daily?  2-3    On average, how many sweetened beverages do you drink each day (Examples: soda, juice, sweet tea, etc.  Do NOT count diet or artificially sweetened beverages)?   0    How many days per week do you exercise enough to make your heart beat faster? 3 or less    How many minutes a day do you exercise enough to make your heart beat faster? 9 or less    How many days per week do you miss taking your medication? 0    Review of Systems   Constitutional, HEENT, cardiovascular, pulmonary, gi and gu systems are negative, except as otherwise noted.      Objective         Vitals:  No vitals were obtained today due to virtual visit.      Phone call duration: 14 minutes

## 2021-07-27 NOTE — PATIENT INSTRUCTIONS
[facility]    If you have any questions regarding to your visit please contact your care team:       Team Red:   Clinic Hours Telephone Number   [address] Dr, Dr 7am-6pm  Monday - Thursday   7am-5pm  Fridays  [phone]  (Appointment scheduling available 24/7)    Questions about your recent visit?   Team Line  [phone]   Urgent Care - [place] and [facility] - 11am-8pm Monday-Friday Saturday-Sunday- 9am-5pm   [place] - 5pm-8pm Monday-Friday Saturday-Sunday- 9am-5pm  [phone] - [place]  [phone] - [place]       What options do I have for a visit other than an office visit? We offer electronic visits (e-visits) and telephone visits, when medically appropriate.  Please check with your medical insurance to see if these types of visits are covered, as you will be responsible for any charges that are not paid by your insurance.      You can use [employer] (secure electronic communication) to access to your chart, send your primary care provider a message, or make an appointment. Ask a team member how to get started.     For a price quote for your services, please call our Consumer Price Line at [phone] or our Imaging Cost estimation line at [phone] (for imaging tests).

## 2021-07-28 ENCOUNTER — TELEPHONE (OUTPATIENT)
Dept: FAMILY MEDICINE | Facility: CLINIC | Age: 46
End: 2021-07-28

## 2021-07-28 NOTE — TELEPHONE ENCOUNTER
Prior Authorization Retail Medication Request    Medication/Dose:   ICD code (if different than what is on RX):  G95.9  Previously Tried and Failed:    Rationale:      Insurance Name:  [employer] [facility] AND [facility]  Insurance ID: [id number]        Pharmacy Information (if different than what is on RX)  Name:  Jennifer  Phone:  [phone]

## 2021-07-28 NOTE — TELEPHONE ENCOUNTER
Central Prior Authorization Team   Phone: [phone]      PA Initiation    Medication: oxyCODONE (ROXICODONE) 5 MG tablet - INITIATED  Insurance Company: [employer] - Phone [phone] Fax [phone]  Pharmacy Filling the Rx: [id number] - [address] OF [address]  Filling Pharmacy Phone: [phone]  Filling Pharmacy Fax: [phone]  Start Date: 7/28/2021

## 2021-07-29 ENCOUNTER — OFFICE VISIT (OUTPATIENT)
Dept: NEUROSURGERY | Facility: CLINIC | Age: 46
End: 2021-07-29
Payer: COMMERCIAL

## 2021-07-29 VITALS
DIASTOLIC BLOOD PRESSURE: 105 MMHG | BODY MASS INDEX: 46.35 KG/M2 | SYSTOLIC BLOOD PRESSURE: 167 MMHG | OXYGEN SATURATION: 99 % | HEART RATE: 102 BPM | WEIGHT: 315 LBS

## 2021-07-29 DIAGNOSIS — Z98.1 S/P CERVICAL SPINAL FUSION: Primary | ICD-10-CM

## 2021-07-29 PROCEDURE — 99207 PR NO CHARGE NURSE ONLY: CPT

## 2021-07-29 RX ORDER — OXYCODONE AND ACETAMINOPHEN 5; 325 MG/1; MG/1
1 TABLET ORAL 4 TIMES DAILY PRN
Qty: 30 TABLET | Refills: 0 | Status: SHIPPED | OUTPATIENT
Start: 2021-07-29 | End: 2021-08-10

## 2021-07-29 ASSESSMENT — PAIN SCALES - GENERAL: PAINLEVEL: EXTREME PAIN (9)

## 2021-07-29 NOTE — TELEPHONE ENCOUNTER
Central Prior Authorization Team   Phone: [phone]      Prior Authorization Not Needed per Insurance    07/28/2021  Medication: oxyCODONE (ROXICODONE) 5 MG tablet - NOT NEEDED  Insurance Company: [employer] - Phone [phone] Fax [phone]  Expected CoPay:      Pharmacy Filling the Rx: [facility] #[id number] [place] [address] [address] AT [address] OF [place] [address]  Pharmacy Notified: No  Patient Notified: Marylin Cho from [employer] stated the medication does not require a prior authroization and the pharmacy received a paid claim on 07/27/2021.

## 2021-07-29 NOTE — PROGRESS NOTES
Patient presents for 2 week incision nurse visit check.     DOS:7/16/2021  Procedure: Cervical 3 to Cervical 7 Anterior Cervical Discectomy and Fusion  Surgeon: Jaquan De Leon MD     Patient reports pain in bilateral sides of neck and shoulders. Taking 1-2 tabs oxycodone PRN and tylenol as well as muscle relaxer.    Encouraged icing for at least 5-7 times throughout the day for 20-30 minutes at a time, avoiding heat to the incision area. Discussed maximum dose of Acetaminophen in 24 hours, along with holding NSAIDs for a period of 12 weeks.     Patient is walking frequently without difficulty. Legs examined in clinic; no redness, swelling, or warmth noted. Patient denies any pain in bilateral calves. Activity restrictions reinforced at this time as outlined the After Visit Summary.      Patient's appetite is decreased, RN encouraged nutritional meals and gave ideas   Bowel/bladder problems? No  Taking stool softeners? No  Discussed reasons for constipation post-operatively and encouraged stool softeners while taking narcotic pain medication.     Patient denies signs of infection at incision site. Cervical incision inspected. Edges well-approximated. No redness, swelling, drainage, or warmth noted. Aftercare instructions discussed with patient.     Refills given at this appointment? Yes  Sent for x-rays after this appointment? No  Return to work discussed at this appointment? No    All of patient's questions addressed today. He was instructed to call with any additional questions/concerns.

## 2021-07-29 NOTE — PATIENT INSTRUCTIONS
Instructions for Patient    Keep your incision clean and dry at all times.     No bathing, swimming, or submerging in water until incision is well healed.      No lifting greater than 10-15 pounds. No bending, twisting, or overhead reaching.    Continue to wear brace as directed by your surgeon    Return in 4 weeks for follow up appointment and xray    Weaning from narcotic pain medications    When it is time, start weaning by extending the time between doses.     For example, if you're taking 2 tabs every 4 hours, spread it out to 2 tabs over 4.5, 5, 6 hours.     At that point you can certainly cut down to 1 tab, then wean to an as needed basis until completely done with them.    For refills, please call our clinic. A nurse will call you back to obtain a pain assessment. Please call 3-4 business days before you run out so we can ensure there is a provider available at the location you prefer for .    Call the clinic or go to Emergency Room if you develop any new pain, drainage, swelling, or fever. Go to the Emergency Room if sudden onset of severe headache, weakness, confusion, change in level of consciousness, pain, or loss of movement.    Post-operative appointments    You will need an x-ray before your 6 week post op, 3 months post op, 6 months post op, 1 year post-op appointments    Please call clinic with any further questions or concerns    MARIAMA Watkins  [facility] Neurosurgery [facility]  [address] [address]  T:  [phone]  F:  [phone]

## 2021-07-29 NOTE — LETTER
7/29/2021         RE: Archie Wallace  [address]  [place] [address]        Dear Colleague,    Thank you for referring your patient, Archie Wallace, to the [facility] NEUROLOGICAL [facility]. Please see a copy of my visit note below.    Patient presents for 2 week incision nurse visit check.     DOS:7/16/2021  Procedure: Cervical 3 to Cervical 7 Anterior Cervical Discectomy and Fusion  Surgeon: Jaquan De Leon MD     Patient reports pain in bilateral sides of neck and shoulders. Taking 1-2 tabs oxycodone PRN and tylenol as well as muscle relaxer.    Encouraged icing for at least 5-7 times throughout the day for 20-30 minutes at a time, avoiding heat to the incision area. Discussed maximum dose of Acetaminophen in 24 hours, along with holding NSAIDs for a period of 12 weeks.     Patient is walking frequently without difficulty. Legs examined in clinic; no redness, swelling, or warmth noted. Patient denies any pain in bilateral calves. Activity restrictions reinforced at this time as outlined the After Visit Summary.      Patient's appetite is decreased, RN encouraged nutritional meals and gave ideas   Bowel/bladder problems? No  Taking stool softeners? No  Discussed reasons for constipation post-operatively and encouraged stool softeners while taking narcotic pain medication.     Patient denies signs of infection at incision site. Cervical incision inspected. Edges well-approximated. No redness, swelling, drainage, or warmth noted. Aftercare instructions discussed with patient.     Refills given at this appointment? Yes  Sent for x-rays after this appointment? No  Return to work discussed at this appointment? No    All of patient's questions addressed today. He was instructed to call with any additional questions/concerns.             Again, thank you for allowing me to participate in the care of your patient.        Sincerely,         Neurosurgery Nurse

## 2021-07-30 DIAGNOSIS — R94.2 ABNORMAL PFT: ICD-10-CM

## 2021-07-30 DIAGNOSIS — R06.02 SHORTNESS OF BREATH: Primary | ICD-10-CM

## 2021-08-09 ENCOUNTER — MYC MEDICAL ADVICE (OUTPATIENT)
Dept: NEUROSURGERY | Facility: CLINIC | Age: 46
End: 2021-08-09

## 2021-08-09 NOTE — TELEPHONE ENCOUNTER
Attempted to reach out to patient, no answer. Left voice message for patient to call clinic back to further discuss.     The patient had left a [employer] message for refill request and it was not routed to the clinic.       Patient calling for a refill     DOS: 7/16/21  Procedure: Cervical 3 to Cervical 7 Anterior Cervical Discectomy and Fusion  Surgeon: Dr De Leon

## 2021-08-10 DIAGNOSIS — Z98.1 S/P CERVICAL SPINAL FUSION: ICD-10-CM

## 2021-08-10 RX ORDER — OXYCODONE AND ACETAMINOPHEN 5; 325 MG/1; MG/1
1 TABLET ORAL 4 TIMES DAILY PRN
Qty: 30 TABLET | Refills: 0 | Status: SHIPPED | OUTPATIENT
Start: 2021-08-10 | End: 2021-08-27

## 2021-08-10 NOTE — TELEPHONE ENCOUNTER
Patient calling for a refill of Percocet     DOS: 7/16/21  Procedure: Cervical 3 to Cervical 7 Anterior Cervical Discectomy and Fusion  Surgeon: Dr. De Leon     Current symptom(s): Patient states that he continues to have upper back pain closer to the base of his neck. He rates his pain at a 7-8/10.   Current pain management: He states that he is taking 1 tab every 4-6 hours. He is also utilizing ice intermittently throughout the day.     Last fill: 7/29  Next visit: 8/31    Medication pended for your approval, if appropriate. Pharmacy verified.     Any patient questions or concerns:     Informed patient request will be forwarded to care team.

## 2021-08-12 DIAGNOSIS — E11.65 TYPE 2 DIABETES MELLITUS WITH HYPERGLYCEMIA, WITHOUT LONG-TERM CURRENT USE OF INSULIN (H): ICD-10-CM

## 2021-08-15 RX ORDER — BLOOD SUGAR DIAGNOSTIC
STRIP MISCELLANEOUS
Qty: 200 STRIP | Refills: 1 | Status: SHIPPED | OUTPATIENT
Start: 2021-08-15 | End: 2022-09-07

## 2021-08-27 DIAGNOSIS — Z98.1 S/P CERVICAL SPINAL FUSION: ICD-10-CM

## 2021-08-27 RX ORDER — OXYCODONE AND ACETAMINOPHEN 5; 325 MG/1; MG/1
1 TABLET ORAL 4 TIMES DAILY PRN
Qty: 30 TABLET | Refills: 0 | Status: SHIPPED | OUTPATIENT
Start: 2021-08-27 | End: 2021-08-31

## 2021-08-27 NOTE — TELEPHONE ENCOUNTER
Reason for call: Pt called for refill on his medication. Please call him back at [phone]. Thank you.

## 2021-08-27 NOTE — TELEPHONE ENCOUNTER
Patient calling for a refill of Percocet.     DOS:7/16/21  Procedure: Cervical 3 to Cervical 7 Anterior Cervical Discectomy and Fusion  Surgeon:Dr. De Leon     Current symptom(s): Pain is intermittent to the back/base of his skull, neck and lower back area. Levels range from 5-8/10. Reinforced activity restrictions as he said the pain gets worse with twisting and turning.      Current pain management: Percocet 1-2 times a day    Last fill: 8/10/21  Next visit: 8/31/21    Medication pended for your approval, if appropriate. Pharmacy verified.     Any patient questions or concerns: No    Informed patient request will be forwarded to care team.

## 2021-08-30 DIAGNOSIS — Z98.1 S/P CERVICAL SPINAL FUSION: Primary | ICD-10-CM

## 2021-08-31 ENCOUNTER — OFFICE VISIT (OUTPATIENT)
Dept: NEUROSURGERY | Facility: CLINIC | Age: 46
End: 2021-08-31
Payer: COMMERCIAL

## 2021-08-31 ENCOUNTER — ANCILLARY PROCEDURE (OUTPATIENT)
Dept: GENERAL RADIOLOGY | Facility: CLINIC | Age: 46
End: 2021-08-31
Attending: NURSE PRACTITIONER
Payer: COMMERCIAL

## 2021-08-31 VITALS
OXYGEN SATURATION: 98 % | HEART RATE: 90 BPM | HEIGHT: 70 IN | WEIGHT: 315 LBS | BODY MASS INDEX: 45.1 KG/M2 | SYSTOLIC BLOOD PRESSURE: 192 MMHG | DIASTOLIC BLOOD PRESSURE: 120 MMHG

## 2021-08-31 DIAGNOSIS — Z98.1 S/P CERVICAL SPINAL FUSION: ICD-10-CM

## 2021-08-31 DIAGNOSIS — Z98.1 S/P CERVICAL SPINAL FUSION: Primary | ICD-10-CM

## 2021-08-31 PROCEDURE — 72040 X-RAY EXAM NECK SPINE 2-3 VW: CPT | Performed by: RADIOLOGY

## 2021-08-31 PROCEDURE — 99024 POSTOP FOLLOW-UP VISIT: CPT | Performed by: NURSE PRACTITIONER

## 2021-08-31 RX ORDER — METHYLPREDNISOLONE 4 MG
TABLET, DOSE PACK ORAL
Qty: 21 TABLET | Refills: 0 | Status: SHIPPED | OUTPATIENT
Start: 2021-08-31 | End: 2021-10-12

## 2021-08-31 RX ORDER — OXYCODONE AND ACETAMINOPHEN 5; 325 MG/1; MG/1
1 TABLET ORAL EVERY 6 HOURS PRN
COMMUNITY
End: 2021-09-07

## 2021-08-31 ASSESSMENT — PAIN SCALES - GENERAL: PAINLEVEL: SEVERE PAIN (6)

## 2021-08-31 ASSESSMENT — MIFFLIN-ST. JEOR: SCORE: 2409.87

## 2021-08-31 NOTE — NURSING NOTE
"Archie Wallace is a 46 year old male who presents for:  Chief Complaint   Patient presents with     Neurologic Problem     C3-7 ACDF. DOS 7/16/21, 7 wk s/p. Patient notes his neck is feeling crazy. It was great but now he is having more pain. He will take the brace off when sitting. He tried to  a case of water and after that he had increased pain.         Vitals:    Vitals:    08/31/21 1315   BP: (!) 192/120   Pulse: 90   SpO2: 98%   Weight: (!) 337 lb (152.9 kg)   Height: 5' 10\" (1.778 m)       BMI:  Estimated body mass index is 48.35 kg/m  as calculated from the following:    Height as of this encounter: 5' 10\" (1.778 m).    Weight as of this encounter: 337 lb (152.9 kg).    Pain Score:  Severe Pain (6)        ИВАН Centeno to follow up with Primary Care provider regarding elevated blood pressure.    "

## 2021-08-31 NOTE — LETTER
"    8/31/2021         RE: Archie Wallace  [address]  [place] [address]        Dear Colleague,    Thank you for referring your patient, Archie Wallace, to the [facility]. Please see a copy of my visit note below.    [facility] Neurosurgery  Neurosurgery Followup:    HPI: 6 weeks s/p C3-7 ACDF with Dr. De Leon on 7/16/2021. Doing well until a couple weeks ago. He states he was feeling well and bent over to pickup a case of bottled water. Since that time has had right-sided neck pain that radiates to the right shoulder. He denies paresthesias and weakness. No new or worsening symptoms. Incision intact.    Medical, surgical, family, and social history unchanged since prior exam.    Exam:  Constitutional:  Alert, well nourished, NAD.  HEENT: Normocephalic, atraumatic.   Pulm:  Without shortness of breath   CV:  No pitting edema of BLE.     Vital Signs:  BP (!) 192/120   Pulse 90   Ht 5' 10\" (1.778 m)   Wt (!) 337 lb (152.9 kg)   SpO2 98%   BMI 48.35 kg/m      Neurological:  Awake  Alert  Oriented x 3  Motor exam:     Shoulder Abduction:  Right:  5/5    Left:  5/5  Biceps:                      Right:  5/5    Left:  5/5  Triceps:                     Right:  5/5    Left:  5/5  Wrist Extensors:       Right:  5/5    Left:  5/5  Wrist Flexors:           Right:  5/5    Left:  5/5  Intrinsics:                  Right:  5/5    Left:  5/5     Able to spontaneously move U/E bilaterally  Sensation intact throughout all U/E dermatomes    Incisions:  Healing nicely.    Imaging:  AP and lateral films reveal intact and stable hardware.    A/P: s/p cervical spine fusion. May increase weight restriction to 20 pounds. Follow up in 6 weeks with cervical XR prior. We discussed physical therapy. He plans to increase activities at home and discuss PT at his next visit. He requests a letter to remain off work as he doesn't feel able to work at this point. He verbalized " understanding and agreement.    Patient Instructions   - May increase lifting restriction to 20 pounds    - Follow up in 6 weeks with xray prior     - Call the clinic at [phone] for increased pain or any other questions and concerns.    Lexie Cruz, JELLY  [facility] Neurosurgery  [address] [place] [address]  Tel [phone]  Fax [phone]      ADDENDUM: Chart review reveals elevated blood pressure reading today. Nursing contacted patients significant other who states he did not take his blood pressure medications today. They plan to take the medications and recheck his blood pressure. If remains elevated recommend him to be seen for this. She verbalized understanding and agreement.      Again, thank you for allowing me to participate in the care of your patient.        Sincerely,        Lexie Cruz, NP

## 2021-08-31 NOTE — PROGRESS NOTES
"[facility] Neurosurgery  Neurosurgery Followup:    HPI: 6 weeks s/p C3-7 ACDF with Dr. De Leon on 7/16/2021. Doing well until a couple weeks ago. He states he was feeling well and bent over to pickup a case of bottled water. Since that time has had right-sided neck pain that radiates to the right shoulder. He denies paresthesias and weakness. No new or worsening symptoms. Incision intact.    Medical, surgical, family, and social history unchanged since prior exam.    Exam:  Constitutional:  Alert, well nourished, NAD.  HEENT: Normocephalic, atraumatic.   Pulm:  Without shortness of breath   CV:  No pitting edema of BLE.     Vital Signs:  BP (!) 192/120   Pulse 90   Ht 5' 10\" (1.778 m)   Wt (!) 337 lb (152.9 kg)   SpO2 98%   BMI 48.35 kg/m      Neurological:  Awake  Alert  Oriented x 3  Motor exam:     Shoulder Abduction:  Right:  5/5    Left:  5/5  Biceps:                      Right:  5/5    Left:  5/5  Triceps:                     Right:  5/5    Left:  5/5  Wrist Extensors:       Right:  5/5    Left:  5/5  Wrist Flexors:           Right:  5/5    Left:  5/5  Intrinsics:                  Right:  5/5    Left:  5/5     Able to spontaneously move U/E bilaterally  Sensation intact throughout all U/E dermatomes    Incisions:  Healing nicely.    Imaging:  AP and lateral films reveal intact and stable hardware.    A/P: s/p cervical spine fusion. May increase weight restriction to 20 pounds. Follow up in 6 weeks with cervical XR prior. We discussed physical therapy. He plans to increase activities at home and discuss PT at his next visit. He requests a letter to remain off work as he doesn't feel able to work at this point. He verbalized understanding and agreement.    Patient Instructions   - May increase lifting restriction to 20 pounds    - Follow up in 6 weeks with xray prior     - Call the clinic at [phone] for increased pain or any other questions and concerns.    Lexie Cruz, [facility] " [facility]  [address] [address]  Tel [phone]  Fax [phone]      ADDENDUM: Chart review reveals elevated blood pressure reading today. Nursing contacted patients significant other who states he did not take his blood pressure medications today. They plan to take the medications and recheck his blood pressure. If remains elevated recommend him to be seen for this. She verbalized understanding and agreement.

## 2021-08-31 NOTE — NURSING NOTE
Called patient and left a vm for patient to follow up with primary care physician for elevated blood pressure.   Shira Schmid Certified Medical Assistant

## 2021-08-31 NOTE — LETTER
[facility]  Neurosurgery Clinic  [address] [place] [address]  [id number]         August 31, 2021    To Whom it May Concern,      Archie Wallace is being seen at our clinic for post-operative follow up care. He should remain off work for ongoing post-operative recovery. Archie Wallace will be re-evaluated at their next follow up visit in 6 weeks. Please call our clinic with questions or concerns.       Sincerely,            Lexie Cruz CNP  [facility]  Neurosurgery [address]  [address]  [phone]

## 2021-08-31 NOTE — PATIENT INSTRUCTIONS
- May increase lifting restriction to 20 pounds    - Follow up in 6 weeks with xray prior     - Call the clinic at [phone] for increased pain or any other questions and concerns.

## 2021-09-07 ENCOUNTER — VIRTUAL VISIT (OUTPATIENT)
Dept: ENDOCRINOLOGY | Facility: CLINIC | Age: 46
End: 2021-09-07
Payer: COMMERCIAL

## 2021-09-07 ENCOUNTER — OFFICE VISIT (OUTPATIENT)
Dept: ENDOCRINOLOGY | Facility: CLINIC | Age: 46
End: 2021-09-07
Payer: COMMERCIAL

## 2021-09-07 ENCOUNTER — TELEPHONE (OUTPATIENT)
Dept: SURGERY | Facility: CLINIC | Age: 46
End: 2021-09-07

## 2021-09-07 ENCOUNTER — TELEPHONE (OUTPATIENT)
Dept: ENDOCRINOLOGY | Facility: CLINIC | Age: 46
End: 2021-09-07

## 2021-09-07 VITALS
SYSTOLIC BLOOD PRESSURE: 169 MMHG | OXYGEN SATURATION: 94 % | BODY MASS INDEX: 44.1 KG/M2 | DIASTOLIC BLOOD PRESSURE: 107 MMHG | HEIGHT: 71 IN | WEIGHT: 315 LBS | HEART RATE: 96 BPM

## 2021-09-07 DIAGNOSIS — E66.9 OBESITY: ICD-10-CM

## 2021-09-07 DIAGNOSIS — Z71.3 NUTRITIONAL COUNSELING: Primary | ICD-10-CM

## 2021-09-07 DIAGNOSIS — E11.65 TYPE 2 DIABETES MELLITUS WITH HYPERGLYCEMIA, WITHOUT LONG-TERM CURRENT USE OF INSULIN (H): Primary | ICD-10-CM

## 2021-09-07 DIAGNOSIS — E11.65 TYPE 2 DIABETES MELLITUS WITH HYPERGLYCEMIA, WITHOUT LONG-TERM CURRENT USE OF INSULIN (H): ICD-10-CM

## 2021-09-07 DIAGNOSIS — E66.813 CLASS 3 SEVERE OBESITY DUE TO EXCESS CALORIES WITH SERIOUS COMORBIDITY AND BODY MASS INDEX (BMI) OF 45.0 TO 49.9 IN ADULT (H): ICD-10-CM

## 2021-09-07 DIAGNOSIS — E66.01 CLASS 3 SEVERE OBESITY DUE TO EXCESS CALORIES WITH SERIOUS COMORBIDITY AND BODY MASS INDEX (BMI) OF 45.0 TO 49.9 IN ADULT (H): ICD-10-CM

## 2021-09-07 DIAGNOSIS — Z98.1 S/P CERVICAL SPINAL FUSION: Primary | ICD-10-CM

## 2021-09-07 PROCEDURE — 99203 OFFICE O/P NEW LOW 30 MIN: CPT | Performed by: PHYSICIAN ASSISTANT

## 2021-09-07 PROCEDURE — 97802 MEDICAL NUTRITION INDIV IN: CPT | Mod: 95 | Performed by: DIETITIAN, REGISTERED

## 2021-09-07 RX ORDER — OXYCODONE AND ACETAMINOPHEN 5; 325 MG/1; MG/1
1 TABLET ORAL EVERY 6 HOURS PRN
Qty: 30 TABLET | Refills: 0 | Status: SHIPPED | OUTPATIENT
Start: 2021-09-07 | End: 2021-09-15

## 2021-09-07 ASSESSMENT — PAIN SCALES - GENERAL: PAINLEVEL: SEVERE PAIN (6)

## 2021-09-07 ASSESSMENT — MIFFLIN-ST. JEOR: SCORE: 2433.34

## 2021-09-07 NOTE — LETTER
"2021       RE: Archie Wallace  [address]  [address]  [place] [address]     Dear Colleague,    Thank you for referring your patient, Archie Wallace, to the [facility] [place] at [facility]. Please see a copy of my visit note below.    30 minutes spent on the date of the encounter doing chart review, history and exam, documentation and further activities per the note    New Bariatric Surgery Consultation Note    2021    RE: Archie Wallace  MR#: [id number]  : 1975      Referring provider: Chantell Conte    Chief Complaint/Reason for visit: evaluation for possible weight loss surgery    Dear No Ref-Primary, Physician (General),    I had the pleasure of seeing your patient, Archie Wallace, to evaluate his obesity and consider him for possible weight loss surgery. As you know, Archie Wallace is 46 year old.  He has a height of 5' 11.25\", a weight of 337 lbs 12.8 oz, and calculated Body mass index is 46.78 kg/m .     Seen at [facility]  for bariatric surgery. He decided against surgery.      Assessment & Plan   Problem List Items Addressed This Visit        Endocrine Diagnoses    Class 3 severe obesity due to excess calories with serious comorbidity and body mass index (BMI) of 45.0 to 49.9 in adult (H)     Plan for lap sleeve gastrectomy with Dr Mata. Timing TBD  Needs:   3-6 RD visits  20# wt loss from 337 lbs  Start ozempic 0.25mg weekly  Psych eval  Sleep, cards, PCP letters  Follow up 1 month MTM, surgeon, RD.         Relevant Medications    semaglutide (OZEMPIC) 2 MG/1.5ML SOPN pen    Type 2 diabetes mellitus with hyperglycemia, without long-term current use of insulin (H) - Primary    Relevant Medications    semaglutide (OZEMPIC) 2 MG/1.5ML SOPN pen          HISTORY OF PRESENT ILLNESS:  Weight Loss History Reviewed with Patient 2021   How long have you been overweight? Since late teens " through early 20's   What is the most that you have ever weighed? 389   What is the most weight you have lost? 95   I have tried the following methods to lose weight Watching portions or calories, Exercise, Atkins type diet (low carb/high protein), Slimfast, OTC Medications   I have tried the following weight loss medications? (Check all that apply) Fen-Phen   Have you ever had weight loss surgery? No       CO-MORBIDITIES OF OBESITY INCLUDE:     9/7/2021   I have the following health issues associated with obesity: Type II Diabetes, Heart Disease, High Blood Pressure, High Cholesterol, Sleep Apnea, GERD (Reflux), Asthma   T2DM: Dx 20 years ago. A1C 6.6 7/12/21  Lantus 12 units evening just recently due to steroids for neck.  Hx of metformin stopped due to rash.  BRONWYN: uses CPAP regularly.Last visit Erica [facility] sleep clinic 5/5/20.  Needs follow up. [phone]   Cardiologist: Dr Pierre Rocha. CAD. Hx of MI 2004 with angio but no stent  HTN: high recently with pain from recent surgery.  High cholesterol: takes statin    PAST MEDICAL HISTORY:  Past Medical History:   Diagnosis Date     CAD (coronary artery disease)     Premature artery disease noted     Hyperlipidaemia LDL goal <100      Hypertension 2007     Hypertension goal BP (blood pressure) < 140/90      Long QT interval syndrome      MEDICAL HISTORY OF -     History of rhabdomyolosis, from strenuous exercise     Mild persistent asthma      Sleep apnea syndrome     cpap     Type 2 diabetes, HbA1c goal < 7% (H)        PAST SURGICAL HISTORY:  Past Surgical History:   Procedure Laterality Date     ANGIOGRAM       ANGIOPLASTY  2001    Coronary artery angioplasty//associated with mild AMI     DISCECTOMY, FUSION CERVICAL ANTERIOR THREE+ LEVELS, COMBINED N/A 7/16/2021    Procedure: Cervical 3 to Cervical 7 Anterior Cervical Discectomy and Fusion;  Surgeon: Jaquan De Leon MD;  Location: [facility] OR     NASAL SINUS SURGERY       THYROIDECTOMY Right 7/16/2021     Procedure: RIGHT THYROIDECTOMY;  Surgeon: Moshe Hernandez DO;  Location: [facility] OR     TONSILLECTOMY  2005    With uvuloectomy       FAMILY HISTORY:   Family History   Problem Relation Age of Onset     Diabetes Mother      Hypertension Mother      Glaucoma Mother      Diabetes Father      Diabetes Sister      Glaucoma Other      Macular Degeneration No family hx of        SOCIAL HISTORY:   Social History Questions Reviewed With Patient 9/7/2021   Which best describes your employment status (select all that apply) I am unemployed   If you work, 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 Yes   Who do you have in your support network that can be available to help you for the first 2 weeks after surgery? girlfriend   Who can you count on for support throughout your weight loss surgery journey? girlfriend   Can you afford 3 meals a day?  Yes   Can you afford 50-60 dollars a month for vitamins? Yes   Lives with girlfriend and daughter    HABITS:     9/7/2021   How often do you drink alcohol? Monthly or less   If you do drink alcohol, how many drinks might you have in a day? (one drink = 5 oz. wine, 1 can/bottle of beer, 1 shot liquor) 1 or 2   Have you ever used any of the following nicotine products? Cigarettes   If you previously used any of these products, what year did you quit? 2000   Have you or are you currently using street drugs or prescription strength medication for which you do not have a prescription for? Yes   Do you have a history of chemical dependency (alcohol or drug abuse)? No     Currently taking narcotic/opioids Yes    PSYCHOLOGICAL HISTORY:   Psychological History Reviewed With Patient 9/7/2021   Have you ever attempted suicide? Never.   Have you had thoughts of suicide in the past year? Yes   Have you ever been hospitalized for mental illness or a suicide attempt? Never.   Do you have a history of chronic pain? No    Have you ever been diagnosed with fibromyalgia? No   Are you currently seeing a therapist or counselor?  No   Are you currently seeing a psychiatrist? No       ROS:     9/7/2021   Skin:  Leg swelling   HEENT: Headaches, Dizziness/lightheadedness, Missing teeth   If you answered yes to missing teeth, please indicate how many: 10   Musculoskeletal: Joint Pain, Back pain, Swelling of legs   Cardiovascular: Shortness of breath with activity, Heart murmurs   Pulmonary: Shortness of breath with activity, Snoring, Awaken from sleep to catch your breath, People have told me I stop breathing while asleep   Gastrointestinal: Heartburn, Reflux, Diarrhea, Difficulty swallowing (food gets stuck)   Genitourinary: None of the above   Hematological: None of the above   Neurological: None of the above       EATING BEHAVIORS:     9/7/2021   Have you or anyone else thought that you had an eating disorder? No   Do you currently binge eat (eat a large amount of food in a short time)? Yes   Are you an emotional eater? Yes   Do you get up to eat after falling asleep? Yes       EXERCISE:     9/7/2021   How often do you exercise? Never   What keeps you from being more active?  I am as active as I can possbily be, I have just had surgery on one or more of my joints, My ability to walk or move around is limited       MEDICATIONS:  Current Outpatient Medications   Medication Sig Dispense Refill     ACCU-CHEK GUIDE test strip USE TO TEST BLOOD SUGAR 3 TO FOUR TIMES DAILY 200 strip 1     albuterol (PROAIR HFA) 108 (90 Base) MCG/ACT inhaler Inhale 2 puffs into the lungs every 6 hours as needed 1 Inhaler 5     alcohol swab prep pads Use to swab area of injection/jessy as directed. 100 each 3     Alcohol Swabs PADS 1 each daily 100 each 0     amLODIPine (NORVASC) 10 MG tablet Take 1 tablet (10 mg) by mouth daily 90 tablet 2     aspirin 81 MG EC tablet Take 1 tablet (81 mg) by mouth daily 90 tablet 3     atorvastatin (LIPITOR) 40 MG tablet Take 1  tablet (40 mg) by mouth daily 90 tablet 3     blood glucose (ACCU-CHEK GUIDE) test strip Use to test blood sugar 1 time daily (vary the time before meals and bed). 50 strip 3     blood glucose (NO BRAND SPECIFIED) test strip Use to test blood sugar 1 times daily or as directed. To accompany: Blood Glucose Monitor Brands: per insurance. 100 strip 6     blood glucose calibration (NO BRAND SPECIFIED) solution To accompany: Blood Glucose Monitor Brands: per insurance. 1 Bottle 3     blood glucose monitoring (ACCU-CHEK FASTCLIX) lancets 1 each daily Use to test blood sugar 1 time daily. 102 each 3     blood glucose monitoring (NO BRAND SPECIFIED) meter device kit Use to test blood sugar 1 times daily or as directed. Preferred blood glucose meter OR supplies to accompany: Blood Glucose Monitor Brands: per insurance. 1 kit 0     blood glucose monitoring (SOFTCLIX) lancets Use to test blood sugar 3-4 times daily. 100 each 4     Blood Glucose Monitoring Suppl (ACCU-CHEK GUIDE ME) w/Device KIT 1 Device daily Use to test blood sugar 1 times daily or as directed. Preferred blood glucose meter: per insurance. 1 kit 0     cloNIDine (CATAPRES) 0.1 MG tablet Take 1 tablet (0.1 mg) by mouth 2 times daily 60 tablet 1     dexamethasone (DECADRON) 2 MG tablet Take 4 mg ( 2 tabs) every 8 hrs X2 days, then take 4 mg every 12 hrs X2 days, then take 4 mg daily X2 days, then take 2 mg daily X2 days 26 tablet 0     gabapentin (NEURONTIN) 100 MG capsule Take 1 tablet at night x3 nights then 1 tablet twice daily x3 days then 1 tablet 3 times daily 90 capsule 1     insulin glargine (LANTUS SOLOSTAR) 100 UNIT/ML pen Inject 14 Units Subcutaneous At Bedtime (has some) 15 mL 0     Insulin Pen Needle (PEN NEEDLES) 32G X 4 MM MISC 1 each daily 100 each 0     losartan (COZAAR) 100 MG tablet Take 1 tablet (100 mg) by mouth daily 90 tablet 2     methocarbamol (ROBAXIN) 500 MG tablet Take 1 tablet (500 mg) by mouth 3 times daily as needed for muscle  spasms 30 tablet 1     methylPREDNISolone (MEDROL DOSEPAK) 4 MG tablet therapy pack Follow Package Directions 21 tablet 0     order for DME Equipment being ordered: CPAP--replacement with using prior settings. Additional supplies as needed with replacement machine.    Will need to contact the patient for information needed about the prior CPAP settings and use. 1 Units o     ORDER FOR DME CPAP setting 19 cm H2O 1 Units 0     ORDER FOR DME CPAP 1 Device 0     oxyCODONE-acetaminophen (PERCOCET) 5-325 MG tablet Take 1 tablet by mouth every 6 hours as needed for severe pain       semaglutide (OZEMPIC) 2 MG/1.5ML SOPN pen Inject 0.25 mg subcutaneously once weekly for 4 weeks then 0.5 mg once weekly. 3 mL 1     thin (NO BRAND SPECIFIED) lancets Use with lanceting device. To accompany: Blood Glucose Monitor Brands: per insurance. 100 each 6       ALLERGIES:  Allergies   Allergen Reactions     Oseltamivir Anaphylaxis and Rash     Biaxin [Clarithromycin]      Muscle breakdown     Bromaxefed Dm Rf Cough     Clarithromycin Other (See Comments)     Weakness     Robafen Dm Cgh-Chest [Robitussin Cough-Chest Dm] Difficulty breathing     Guaifenesin Rash     Metformin Rash     Reported breaking out with a skin reaction  Reported breaking out with a skin reaction  Reported breaking out with a skin reaction       July 16th recent surgeries:    Pre-operative diagnosis: Large thyroid nodule   Post-operative diagnosis same   Procedure: right thyroidectomy   Surgeon: Moseh Hernandez DO     Preoperative diagnosis: Cervical myelopathy  Postoperative diagnosis: Cervical myelopathy     Procedure:  1.  C3-4 anterior discectomy and interbody arthrodesis  2.  C4-5 anterior discectomy and interbody arthrodesis  3.  C5-6 anterior discectomy and interbody arthrodesis  4.  C6-7 anterior discectomy and interbody arthrodesis  5.  C3-4 insertion of intervertebral graft and allograft  6.  C4-5 insertion of intervertebral graft and allograft  7.  C5-6  "insertion of intervertebral graft and allograft  8.  C6-7 insertion of intervertebral graft and allograft  9.  C3 to C7 anterior spinal instrumentation with insertion of Medtronic Zevo plate and vertebral body screws  10.  Use of intraoperative microscope and fluoroscopy    Office Visit on 07/21/2021   Component Date Value Ref Range Status     Creatinine Urine mg/dL 07/21/2021 88  mg/dL Final     Albumin Urine mg/L 07/21/2021 9  mg/dL Final     Albumin Urine mg/g Cr 07/21/2021 10.23  0.00 - 17.00 mg/g Cr Final     Color Urine 07/21/2021 Yellow  Colorless, Straw, Light Yellow, Yellow Final     Appearance Urine 07/21/2021 Clear  Clear Final     Glucose Urine 07/21/2021 Negative  Negative mg/dL Final     Bilirubin Urine 07/21/2021 Negative  Negative Final     Ketones Urine 07/21/2021 Negative  Negative mg/dL Final     Specific Gravity Urine 07/21/2021 1.020  1.003 - 1.035 Final     Blood Urine 07/21/2021 Negative  Negative Final     pH Urine 07/21/2021 7.0  5.0 - 7.0 Final     Protein Albumin Urine 07/21/2021 Negative  Negative mg/dL Final     Urobilinogen Urine 07/21/2021 1.0  0.2, 1.0 E.U./dL Final     Nitrite Urine 07/21/2021 Negative  Negative Final     Leukocyte Esterase Urine 07/21/2021 Negative  Negative Final     Glucose Whole Blood 07/21/2021 243* 79 - 116 mg/dL Final       PHYSICAL EXAM:  Objective    BP (!) 169/107 (BP Location: Left arm, Patient Position: Chair, Cuff Size: Adult Large)   Pulse 96   Ht 1.81 m (5' 11.25\")   Wt (!) 153.2 kg (337 lb 12.8 oz)   SpO2 94%   BMI 46.78 kg/m    Body mass index is 46.78 kg/m .  Physical Exam   GENERAL: Healthy, alert and no distress  EYES: Eyes grossly normal to inspection.  No discharge or erythema, or obvious scleral/conjunctival abnormalities.  RESP: No audible wheeze, cough, or visible cyanosis.  No visible retractions or increased work of breathing.    SKIN: Visible skin clear. No significant rash, abnormal pigmentation or lesions.  NEURO: Cranial nerves " grossly intact.  Mentation and speech appropriate for age.  PSYCH: Mentation appears normal, affect normal/bright, judgement and insight intact, normal speech and appearance well-groomed.    In summary, Archie Wallace has Class III obesity with a body mass index of Body mass index is 46.78 kg/m . kg/m2 and the comorbidities stated above. He completed an informational seminar and is a possible candidate for the laparoscopic gastric sleeve.  He will have to complete the following pre-requisites:  If you have not already watched our online seminar please go to [email or web address]    Weight loss requirement: 20 lb from 337 lbs prior to surgery. Will have final weight check 2-3 weeks prior to surgery at anesthesia or nurse pre-op teaching visit.      Bariatric labs ordered, call for a lab only appointment at any [facility] lab. To find a lab location near you, please call [phone]. Please let us know if orders need to be faxed to a non [facility] lab.    Schedule bariatric psych eval as soon as possible.  List of psychologists will be sent to you via [employer] or given to you in clinic.     Call [facility] at [phone] to discuss insurance coverage for bariatric surgery.  Please check with your insurance regarding bariatric surgery coverage also. [facility] can also help you with scheduling psych eval if you are having difficulties.    The following clearance letters are needed: Letters will be sent to you via [employer] or given to you in clinic  - Letter of support from primary care provider. Provider can submit through electronic medical record or fax to [phone]  - Sleep clearance  - Cardiology clearance    Smoking cessation and nicotine test needed: No    Consider starting Ozempic 0.25mg. No hx of pancreatitis. No hx of MEN or thyroid cancer.    NEXT VISITS: A  should reach out to you to schedule the following appointments.  If they do not reach you please call [phone] to  schedule the following appointments:    -See dietitian in 1 month and monthly for 3-6 months    -See Lauren Bloch MTM pharmacist in 1 month to follow up on weight loss medications    -See Stephania in 3 months to follow up on pre-op weight loss and weight loss medications    -See Dr Mata in 1 month for bariatric surgeon visit. Discuss bariatric surgery.     Today in the office we discussed gastric sleeve surgery. Preoperative, perioperative, and postoperative processes, management, and follow up were addressed.  Risks and benefits were outlined including the risk of death, staple line leak (1-2%), PE, DVT, ulcer, worsening GERD, N/V, stricture, hernia, wound infection, weight regain, and vitamin deficiencies. I emphasized exercise and activity along with appropriate food choice as the main foundation for weight loss with surgery providing surgical reinforcement of this.  All questions were answered.  A goal sheet and support group handout were given to the patient.      Once the patient has completed the requirements in their task list and there are no further recommendations, the pt will be allowed to see the surgeon of her choice for consultation on the laparoscopic gastric sleeve surgery. Patient verbalizes understanding of the process to surgery and expectations for the postoperative period including the need for lifelong lifestyle changes, vitamin supplementation, and laboratory monitoring.    Sincerely,     Stephania Youssef PA-C

## 2021-09-07 NOTE — PATIENT INSTRUCTIONS
"Brian Almanza,    Follow-up with RD in 1 month    Thank you,    Harini Finley, RD, LD  If you would like to schedule or reschedule an appointment with the RD, please call [phone]    Nutrition Goals    Relating To Eatin). Eat slowly (20-30 minutes per meal), chewing foods well (25 chews per bite/applesauce consistency)    2). 9\" Plate method (1/2 plate non-starchy vegetables/fruit, 1/4 plate lean protein, 1/4 plate whole grain starch - no more than 1/2 cup carb/meal)   - focus on lean protein sources: see handout below - chicken, eggs, turkey (limiting red meat, processed meat)   - increase fruit/veg intake, aim for fruit or vegetable at each meal/snack    The Plate Method  [email or web address]    Protein Sources for Weight Loss  [email or web address]     Relating to beverages:  3). Avoid calorie-containing beverages/avoid sugar containing beverages   - try flavor water enhancers (mane, crytal light), gatorade zero, powerade zero     Diabetes Recipe Resources:  Https://www.diabetesfoodhub.org/  https://www.cdc.gov/diabetes/library/spotlights/hack-your-snack.html  https://www.eatright.org/food/nutrition/dietary-guidelines-and-myplate/how-to-add-whole-grains-to-your-diet  https://diabeticgourEdaixi.com/diabetic-recipe/turkey-veggie-snacks    Carbohydrates  [email or web address]     Guide To Carbohydrate Counting  [email or web address]      Mindful Eating  [email or web address]     Summary of Volumetrics Eating Plan  [email or web address]     Diet Guidelines after Weight Loss Surgery  [email or web address]     Tips for Low Sodium Diet  [email or web address]          Interested in working with a health ?  Health coaches work with you to improve your overall health and wellbeing.  They look at the whole person, and may involve discussion of different areas of life, including, but not limited to the four pillars of health (sleep, exercise, " nutrition, and stress management). Discuss with your care team if you would like to start working a health .    Health Coaching-3 Pack:    $99 for three health coaching visits    Visits may be done in person or via phone    Coaching is a partnership between the  and the client; Coaches do not prescribe or diagnose    Coaching helps inspire the client to reach his/her personal goals      Virtual Support Groups are Available             Healthy Lifestyle Support Group      All are welcome!     Facilitator: Debi Pedro, Certified Health     - Meets once a month on a Friday from 12:30pm to 1:30pm.  - Due to Covid-19 she is doing this Support Group virtually using Microsoft Teams.  - 60 minutes of small group guided discussion, support and resources.  - Please email Debi directly at [email or web address] to receive monthly invitations.  - If you opt to participate in individual health coaching sessions or for general questions, contact Debi via email.  - Debi will send out invites for each session, so the phone number and the conference ID may change for each session.

## 2021-09-07 NOTE — TELEPHONE ENCOUNTER
Called [facility] to check if medical policy has coverage for bariatric surgery, it does. I inquired about how many dietary visits are needed and was told that will be decided when I send in the prior authorization request (which makes no sense). Will plan on our program's 3 RD visits.

## 2021-09-07 NOTE — PROGRESS NOTES
"Doximity link sent, converted to telephone visit per pt request.  Archie Wallace is a 46 year old male who is being evaluated via a billable telephone visit.     The patient has been notified of following:     \"This telephone visit will be conducted via a call between you and your physician/provider. We have found that certain health care needs can be provided without the need for a physical exam.  This service lets us provide the care you need with a short phone conversation.  If a prescription is necessary we can send it directly to your pharmacy.  If lab work is needed we can place an order for that and you can then stop by our lab to have the test done at a later time.    Telephone visits are billed at different rates depending on your insurance coverage. During this emergency period, for some insurers they may be billed the same as an in-person visit.  Please reach out to your insurance provider with any questions.    If during the course of the call the physician/provider feels a telephone visit is not appropriate, you will not be charged for this service.\"    Patient has given verbal consent for Telephone visit?  Yes    How would you like to obtain your AVS? My Chart    Phone call duration: 31 minutes    During this virtual visit the patient is located in [place], patient verifies this as the location during the entirety of this visit.       New Bariatric Nutrition Consultation Note    Reason For Visit: Nutrition Assessment    Archie Wallace is a 46 year old presenting today for new bariatric nutrition consult.   Pt is interested in laparoscopic sleeve gastrectomy with Dr. Mata, expected surgery in D.  Patient is accompanied by self.  This is pt's first of 6 required nutrition visits prior to surgery.     Pt referred by RAE Alejandro on September 7, 2021.  Patient with Co-morbidities of obesity including:  Type II DM yes (A1C 6.6 7/12/21)  Renal Failure no  Sleep apnea no  Hypertension yes  Dyslipidemia " "yes  Joint painno  Back pain no  GERD  yes    No flowsheet data found.    ANTHROPOMETRICS:  Estimated body mass index is 48.35 kg/m  as calculated from the following:    Height as of 21: 1.778 m (5' 10\").    Weight as of 21: 152.9 kg (337 lb).    Required weight loss goal pre-op: -20 lbs from initial consult weight (goal weight 317 lbs or less before surgery)    No flowsheet data found.    No flowsheet data found.    SUPPLEMENT INFORMATION:  none    NUTRITION HISTORY:  NKFA or intolerances  Pt endorsed some previous diet ed received in past. RD provided brief review of low sodium and consistent CHO as it relates to HTN and DM. Pt could benefit from continued diet ed.  Likes: fruit, pear, banana   Meal Duration: 10 min    Recent Diet Recall  B: skipped  L: garsia burger ([employer])  D: 5 mini burger with fries ([employer])  Snacks: cookie (oatmeal raisen)   Beverages: water, coffee ([employer] vanilla creamer, not sure if sugar free), juice (1-2 day) gatorade, powerade zero.  (no carbonated beverages)  Alcohol: rarely - celebrations  Dine out: 2-3 times a week      No flowsheet data found.    No flowsheet data found.    ADDITIONAL INFORMATION:  No discussed today    No flowsheet data found.    No flowsheet data found.      NUTRITION DIAGNOSIS:  Obesity r/t long history of self-monitoring deficit and excessive energy intake aeb BMI >30 kg/m2.    INTERVENTION:  Intervention Provided/Education Provided on post-op diet guidelines, GI anatomy of bariatric surgeries, ways to help prepare for post-op diet guidelines pre-operatively, portion/calorie-control, mindful eating and sources of protein.  Handouts provided - Low Sodium Diet, CHO counting  Patient demonstrates understanding.  AVS vis MyChart     No flowsheet data found.    Follow up: continue post op diet ed:  vitamins/minerals essential post-operatively, and activity level  Expected Engagement: good    GOALS:  Relating To Eatin). Eat slowly (20-30 minutes " "per meal), chewing foods well (25 chews per bite/applesauce consistency)    2). 9\" Plate method (1/2 plate non-starchy vegetables/fruit, 1/4 plate lean protein, 1/4 plate whole grain starch - no more than 1/2 cup carb/meal)   - focus on lean protein sources: see handout below - chicken, eggs, turkey (limiting red meat, processed meat)   - increase fruit/veg intake, aim for fruit or vegetable at each meal/snack    The Plate Method  [email or web address]://[email or web address]    Protein Sources for Weight Loss  [email or web address]     Relating to beverages:  3). Avoid calorie-containing beverages/avoid sugar containing beverages   - try flavor water enhancers (mane, crytal light), gatorade zero, powerade zero     Diabetes Recipe Resources:  Https://www.diabetesfoodhub.org/  https://www.cdc.gov/diabetes/library/spotlights/hack-your-snack.html  https://www.eatright.org/food/nutrition/dietary-guidelines-and-myplate/how-to-add-whole-grains-to-your-diet  https://diabeticgoGoGo Tech.com/diabetic-recipe/turkey-veggie-snacks    Carbohydrates  [email or web address]     Guide To Carbohydrate Counting  http://[email or web address]      Mindful Eating  [email or web address]://[email or web address]     Summary of Volumetrics Eating Plan  [email or web address]     Diet Guidelines after Weight Loss Surgery  [email or web address]     Tips for Low Sodium Diet  [email or web address]://[email or web address]      Follow up: 1 month    Time spent with patient: 31 minutes.  Harini Finley, JUWAN, LD        "

## 2021-09-07 NOTE — TELEPHONE ENCOUNTER
Patient calling for a refill of Percocet.     DOS: 7/16/21   Procedure: Cervical 3 to Cervical 7 Anterior Cervical Discectomy and Fusion  Surgeon: Dr. De Leon    Current symptom(s): Patient states that he has pain in his lower neck area, patient also has pain in the left side of his neck and shoulder. He also has intermittent pain down arms. He rates his pain at a 7/10.   Current pain management: He currently utilizes percocet 1 tab 4x daily, hot packing and ice. He states that the last dose for the MDP is today and it has been helping with symptom management.     Last fill: 8/27/21  Next visit: 10/19/21    Medication pended for your approval, if appropriate. Pharmacy verified.     Any patient questions or concerns:     Informed patient request will be forwarded to care team.

## 2021-09-07 NOTE — PATIENT INSTRUCTIONS
If you have not already watched our online seminar please go to [email or web address]    Weight loss requirement: 20 lb from 337 lbs prior to surgery. Will have final weight check 2-3 weeks prior to surgery at anesthesia or nurse pre-op teaching visit.      Bariatric labs ordered, call for a lab only appointment at any [facility] lab. To find a lab location near you, please call [phone]. Please let us know if orders need to be faxed to a non [facility] lab.    Schedule bariatric psych eval as soon as possible.  List of psychologists will be sent to you via [employer] or given to you in clinic.     Call [facility] at [phone] to discuss insurance coverage for bariatric surgery.  Please check with your insurance regarding bariatric surgery coverage also. Twin can also help you with scheduling psych eval if you are having difficulties.    The following clearance letters are needed: Letters will be sent to you via [employer] or given to you in clinic  - Letter of support from primary care provider. Provider can submit through electronic medical record or fax to [phone]  - Sleep clearance  - Cardiology clearance    Smoking cessation and nicotine test needed: No    Consider starting Ozempic 0.25mg. No hx of pancreatitis. No hx of MEN or thyroid cancer.    NEXT VISITS: A  should reach out to you to schedule the following appointments.  If they do not reach you please call [phone] to schedule the following appointments:    -See dietitian in 1 month and monthly for 3-6 months    -See Lauren Bloch [facility] pharmacist in 1 month to follow up on weight loss medications    -See Stephania in 3 months to follow up on pre-op weight loss and weight loss medications    -See Dr Mata in 1 month for bariatric surgeon visit. Discuss bariatric surgery.     Bariatric Task List    Fax:  Please fax all paperwork to: [phone] -     Status:  Is patient a candidate for bariatric surgery?:    -      Cleared to schedule surgeon consult?:    -     Status:  surgery evaluation in process -     Surgeon: Adolfo -     Tentative surgery month/year: TBD -        Insurance: Insurance:  [employer] -      Contact insurance to discuss coverage: Needed -       Cigna: PCP Recommendation and Medical Clearance:    -     HP Referral:    -      Advanced beneficiary notification (ABN) for Medicare patients for RD visits   and surgery:   -      Weight history:   -     Other:    -        Patient Info: Initial Weight:  337 -     Date of Initial Weight/Height:  9/7/2021 -     Goal Weight (lbs):  317 -     Required Weight Loss:  20 -     Surgery Type:  sleeve gastrectomy -     Multidisciplinary Meeting:    -        Dietician Visits: Structured weight loss required by insurance?:  structured weight loss required -     Dietician Visit 1:  Completed -     Dietician Visit 2:  Needed -     Dietician Visit 3:  Needed -     Dietician Visit 4:  Needed -     Dietician Visit 5:  Needed -     Dietician Visit 6:  Needed -     Dietician Visit additional:    -     Clearance from dietician to see surgeon?:    -     Dietician Notes:    -        Psychological Evaluation: Psych eval:  Needed -     Therapist letter of support:    -     Psychiatrist letter of support:    -     Establish care with therapist:    -     Complete eating disorder evaluation:    -     Letter of clearance from therapist/eating disorder program:    -     Other:    -        Lab Work: Complete Blood Count:  Needed -     Comprehensive Metabolic Panel:  Needed -     Vitamin D:  Needed -     PTH:  Needed -     Hgb A1c:  Needed -      Lipids: Needed -      TSH ([place]Я, SCA, [place] MA): Needed -       Ferritin:   -       Folate:   -       Testosterone, Total and Free:   -     Thiamine:   -     Vitamin A:   -     Vitamin B12:   -     Zinc:   -     C-peptide:   -     H. pylori:    -     MRSA (2 swabs, minimum 48 hours apart):   -     Nicotine Testing:    -     Recheck Vitamin D:   -    "  Other:    -        Consults/ Clearance Sleep Medicine:  Needed -     Cardiac:  Needed -     Pain:   -     Dental:    -     Endocrine:    -     Gastroenterology:    -     Vascular Medicine:    -     Hematology:    -     Medical Weight Management: Needed - started stephen with Stephania 9/7/21   Physical Therapy/Exercise:    -     Nephrology:    -     Neurology:    -     Pulmonology:    -     Rheumatology:    -     Other:    -     Other:    -     Other:    -        Testing: UGI:    -     EGD:    -     Sleep Study:   -     Other:   -     Other:    -        PCP: Establish care with PCP:  Completed -     Follow up with PCP:    -     PCP letter of support:  Needed -        Stopping Smoking/ Alcohol Use: Quit tobacco use (3 months smoke free)?:    -     Quit date:    -     Quit alcohol use:   -     Quit date:   -     Other:   -     Quit date:   -        Patient Education:  Information Session:  Completed -     Given \"Making your decision\" handout?:  Yes -     Given \"A Roadmap to you Weight Loss Surgery\" handout?: Yes -     Given \"Get Well Loop\" information?: Yes -     Given support group information?:    -     Attended support group?:    -     Support plan in place?:  Completed -     Research consents signed?:  research consent not signed -     Avoid NSAIDS/ Alternate Plan for Pain:   -        Additional Surgery Requirements: Review Coag plan:    -     HgA1c <8:    -     Inpatient pain consult:    -     Final nicotine screen:    -     Dental work complete:    -     Birth control plan:    -     Gallstone prevention plan (Actigall for 6 months postop):   -     Other:   -     Other:   -        Final Tasks:  Before surgery online class:  Needed -     Before surgery online class website link:  [email or web address]   After surgery online class:  Needed -     After surgery online class website link:  [email or web address]   Nurse visit per clinic:  Needed -     History and Physical per clinic:   - "     Final labs per clinic: Needed -     Chest xray per clinic:   -     Electrocardiogram (ECG) per clinic:   -     Other:   -        Notes:   -              MEDICATION STARTED AT THIS APPOINTMENT    We are starting a GLP-1 (Glucagon-like Peptide-1) medication. Examples of this medication include Byetta, Bydureon, or Victoza, etc. The medication chosen will depend on insurance coverage, and can be changed to the medication that is covered under your plan. These medications work the same, in that they can help you lose weight and control your blood sugar. One of the ways it works is by slowing down the rate that food leaves your stomach. You feel mann and will eat less.  It also helps regulate hormones that can help improve your blood sugars.    Usually short acting insulins or oral agents are stopped or decreased by the doctor at the appointment. Low blood sugars are rare but can happen if patients are on insulin or other oral agents. If you notice consistent low sugars or high sugars, your medication may need to be adjusted after your appointment. If this is the case, please call RN and provide her your blood sugar record from the last 3-4 days. The RN will get in touch with the doctor and call you back/PlayPhonet message with recommendations. We tolerate high sugars for a bit, so if sugars are running 180-200, this is ok. As weight starts dropping the blood sugars should too. If readings are consistently over 200 for 1-2 weeks, then you should call the doctor/nurse.    Types of GLP-1 medications include:    Victoza: Starting dose is 0.6 mg for a week, then 1.2 mg for a week, and then 1.8 mg thereafter (if prescribed by doctor). This medication is given daily. Pen needles need to be ordered also.    Ozempic: Starting dose is 0.25 mg once weekly for 4 weeks, and then increase to 0.5 mg weekly. If after 4 weeks of being on 0.5 mg weekly dosing and still wanting additional weight loss and/or blood sugar benefits, check  with your doctor to see if they want to increase the dose to 1 mg weekly. Pen needles come in the Ozempic pen box.    Side effects of GLP- Medications include: The most common side effects are all GI related and consist of: nausea, constipation, diarrhea, burping, or gassiness. Patients are advised to eat slowly and less, and nausea typically passes if people can stick it out.     The risk of pancreatitis (inflammation of the pancreas) has been associated with this type of medication, but is very rare.  If you have had pancreatitis in the past, this medication may not be for you. Please let us know about any past history of pancreas problems.      Symptoms of pancreatitis include: Pain in your upper stomach area which  may travel to your back and be worse after eating. Your stomach area may be tender to the touch.  You may have vomiting or nausea and/or have a fever. If you should develop any of these symptoms, stop the medication and contact your primary care doctor. They will do a blood test to check for pancreatitis.         There is a small chance you may have some low blood sugar after taking the medication.   The signs of low blood sugar are:  o Weakness  o Shaky   o Hungry  o Sweating  o Confusion      See below for ways to treat low blood sugar without adding in lots of extra calories.      Treating Low Blood Sugar    If you have symptoms of low blood sugar (sweating, shaking, dizzy, confused) eat 15 grams of carbs and wait 15 minutes:      Glucose Tabs are best for sugars under 70 -  Dex4 or BD Glucose tablets are good, you will need to take 3-4 of these to equal 15 grams.       One small box of raisins    4 oz fruit juice box or   cup fruit juice    1 small apple    1 small banana      cup canned fruit in water      English muffin or a slice of bread with jelly     1 low fat frozen waffle with sugar-free syrup      cup cottage cheese with   cup frozen or fresh blueberries    1 cup skim or low-fat milk       cup whole grain cereal    4-6 crackers such as Triscuits      This medication is usually covered by insurance for patients with a diagnosis of Type 2 Diabetes. Depending on insurance coverage, the medication may be changed to a different formulary, but they all work in the same way. Sometimes a prior authorization is required, which may take up to 1-2 weeks for an insurance company to make a decision if they will cover the medication. Please be patient, you will be notified either way.     Contact the nurse via [employer] or call [phone] if you have any questions or concerns. (Do not stop taking it if you don't think it's working. For some people it works without them knowing it.)     In order to get refills of this or any medication we prescribe you must be seen in the medical weight mgmt clinic every 2-4 months.

## 2021-09-07 NOTE — TELEPHONE ENCOUNTER
Central Prior Authorization Team   Phone: [phone]      PA Initiation    Medication: Ozempic-PA INITIATED  Insurance Company: [employer] - Phone [phone] Fax [phone]  Pharmacy Filling the Rx: [id number] [address]  Filling Pharmacy Phone: [phone]  Filling Pharmacy Fax:    Start Date: 9/7/2021

## 2021-09-07 NOTE — TELEPHONE ENCOUNTER
"Prior Authorization Retail Medication Request    Medication/Dose: Ozempic  ICD code (if different than what is on RX):  Type 2 diabetes mellitus with hyperglycemia, without long-term current use of insulin (H) [E11.65]  - Primary     Previously Tried and Failed:  Watching portions or calories, Exercise, Atkins type diet (low carb/high protein), Slimfast, OTC Medications, Fen-Phen, Metformin  Rationale:  I had the pleasure of seeing your patient, Archie Wallace, to evaluate his obesity and consider him for possible weight loss surgery. As you know, Archie Wallace is 46 year old.  He has a height of 5' 11.25\", a weight of 337 lbs 12.8 oz, and calculated Body mass index is 46.78 kg/m  and the following co-morbidities: Type II Diabetes, Heart Disease, High Blood Pressure, High Cholesterol, Sleep Apnea, GERD (Reflux), Asthma. T2DM: Dx 20 years ago. A1C 6.6 7/12/21  Lantus 12 units evening just recently due to steroids for neck.  Hx of metformin stopped due to rash.    Insurance Name:    Insurance ID:        Pharmacy Information (if different than what is on RX)  Name:  [facility] #[id number] - [address]  Phone:  [phone]  "

## 2021-09-07 NOTE — TELEPHONE ENCOUNTER
Left VM 9/7  Dr Mata 1 month bariatric surgeon visit in person   Nolberto 1 month video   MTM 1 month phone follow up Johnie Cat 3 months pre bariatric surgery

## 2021-09-07 NOTE — PROGRESS NOTES
"30 minutes spent on the date of the encounter doing chart review, history and exam, documentation and further activities per the note    New Bariatric Surgery Consultation Note    2021    RE: Archie Wallace  MR#: [id number]  : 1975      Referring provider: Chantell Conte    Chief Complaint/Reason for visit: evaluation for possible weight loss surgery    Dear No Ref-Primary, Physician (General),    I had the pleasure of seeing your patient, Archie Wallace, to evaluate his obesity and consider him for possible weight loss surgery. As you know, Archie Wallace is 46 year old.  He has a height of 5' 11.25\", a weight of 337 lbs 12.8 oz, and calculated Body mass index is 46.78 kg/m .     Seen at [facility]  for bariatric surgery. He decided against surgery.      Assessment & Plan   Problem List Items Addressed This Visit        Endocrine Diagnoses    Class 3 severe obesity due to excess calories with serious comorbidity and body mass index (BMI) of 45.0 to 49.9 in adult (H)     Plan for lap sleeve gastrectomy with Dr Mata. Timing TBD  Needs:   3-6 RD visits  20# wt loss from 337 lbs  Start ozempic 0.25mg weekly  Psych eval  Sleep, cards, PCP letters  Follow up 1 month MTM, surgeon, RD.         Relevant Medications    semaglutide (OZEMPIC) 2 MG/1.5ML SOPN pen    Type 2 diabetes mellitus with hyperglycemia, without long-term current use of insulin (H) - Primary    Relevant Medications    semaglutide (OZEMPIC) 2 MG/1.5ML SOPN pen          HISTORY OF PRESENT ILLNESS:  Weight Loss History Reviewed with Patient 2021   How long have you been overweight? Since late teens through early 20's   What is the most that you have ever weighed? 389   What is the most weight you have lost? 95   I have tried the following methods to lose weight Watching portions or calories, Exercise, Atkins type diet (low carb/high protein), Slimfast, OTC Medications   I have tried the following weight loss medications? " (Check all that apply) Fen-Phen   Have you ever had weight loss surgery? No       CO-MORBIDITIES OF OBESITY INCLUDE:     9/7/2021   I have the following health issues associated with obesity: Type II Diabetes, Heart Disease, High Blood Pressure, High Cholesterol, Sleep Apnea, GERD (Reflux), Asthma   T2DM: Dx 20 years ago. A1C 6.6 7/12/21  Lantus 12 units evening just recently due to steroids for neck.  Hx of metformin stopped due to rash.  BRONWYN: uses CPAP regularly.Last visit Erica Dalton [facility] sleep clinic 5/5/20.  Needs follow up. [phone]   Cardiologist: Dr Pierre Rocha. CAD. Hx of MI 2004 with angio but no stent  HTN: high recently with pain from recent surgery.  High cholesterol: takes statin    PAST MEDICAL HISTORY:  Past Medical History:   Diagnosis Date     CAD (coronary artery disease)     Premature artery disease noted     Hyperlipidaemia LDL goal <100      Hypertension 2007     Hypertension goal BP (blood pressure) < 140/90      Long QT interval syndrome      MEDICAL HISTORY OF -     History of rhabdomyolosis, from strenuous exercise     Mild persistent asthma      Sleep apnea syndrome     cpap     Type 2 diabetes, HbA1c goal < 7% (H)        PAST SURGICAL HISTORY:  Past Surgical History:   Procedure Laterality Date     ANGIOGRAM       ANGIOPLASTY  2001    Coronary artery angioplasty//associated with mild AMI     DISCECTOMY, FUSION CERVICAL ANTERIOR THREE+ LEVELS, COMBINED N/A 7/16/2021    Procedure: Cervical 3 to Cervical 7 Anterior Cervical Discectomy and Fusion;  Surgeon: Jaquan eD Leon MD;  Location: [facility] OR     NASAL SINUS SURGERY       THYROIDECTOMY Right 7/16/2021    Procedure: RIGHT THYROIDECTOMY;  Surgeon: Moshe Hernandez DO;  Location: [facility] OR     TONSILLECTOMY  2005    With uvuloectomy       FAMILY HISTORY:   Family History   Problem Relation Age of Onset     Diabetes Mother      Hypertension Mother      Glaucoma Mother      Diabetes Father      Diabetes Sister      Glaucoma Other       Macular Degeneration No family hx of        SOCIAL HISTORY:   Social History Questions Reviewed With Patient 9/7/2021   Which best describes your employment status (select all that apply) I am unemployed   If you work, 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 Yes   Who do you have in your support network that can be available to help you for the first 2 weeks after surgery? girlfriend   Who can you count on for support throughout your weight loss surgery journey? girlfriend   Can you afford 3 meals a day?  Yes   Can you afford 50-60 dollars a month for vitamins? Yes   Lives with girlfriend and daughter    HABITS:     9/7/2021   How often do you drink alcohol? Monthly or less   If you do drink alcohol, how many drinks might you have in a day? (one drink = 5 oz. wine, 1 can/bottle of beer, 1 shot liquor) 1 or 2   Have you ever used any of the following nicotine products? Cigarettes   If you previously used any of these products, what year did you quit? 2000   Have you or are you currently using street drugs or prescription strength medication for which you do not have a prescription for? Yes   Do you have a history of chemical dependency (alcohol or drug abuse)? No     Currently taking narcotic/opioids Yes    PSYCHOLOGICAL HISTORY:   Psychological History Reviewed With Patient 9/7/2021   Have you ever attempted suicide? Never.   Have you had thoughts of suicide in the past year? Yes   Have you ever been hospitalized for mental illness or a suicide attempt? Never.   Do you have a history of chronic pain? No   Have you ever been diagnosed with fibromyalgia? No   Are you currently seeing a therapist or counselor?  No   Are you currently seeing a psychiatrist? No       ROS:     9/7/2021   Skin:  Leg swelling   HEENT: Headaches, Dizziness/lightheadedness, Missing teeth   If you answered yes to missing teeth, please indicate how many: 10    Musculoskeletal: Joint Pain, Back pain, Swelling of legs   Cardiovascular: Shortness of breath with activity, Heart murmurs   Pulmonary: Shortness of breath with activity, Snoring, Awaken from sleep to catch your breath, People have told me I stop breathing while asleep   Gastrointestinal: Heartburn, Reflux, Diarrhea, Difficulty swallowing (food gets stuck)   Genitourinary: None of the above   Hematological: None of the above   Neurological: None of the above       EATING BEHAVIORS:     9/7/2021   Have you or anyone else thought that you had an eating disorder? No   Do you currently binge eat (eat a large amount of food in a short time)? Yes   Are you an emotional eater? Yes   Do you get up to eat after falling asleep? Yes       EXERCISE:     9/7/2021   How often do you exercise? Never   What keeps you from being more active?  I am as active as I can possbily be, I have just had surgery on one or more of my joints, My ability to walk or move around is limited       MEDICATIONS:  Current Outpatient Medications   Medication Sig Dispense Refill     ACCU-CHEK GUIDE test strip USE TO TEST BLOOD SUGAR 3 TO FOUR TIMES DAILY 200 strip 1     albuterol (PROAIR HFA) 108 (90 Base) MCG/ACT inhaler Inhale 2 puffs into the lungs every 6 hours as needed 1 Inhaler 5     alcohol swab prep pads Use to swab area of injection/jessy as directed. 100 each 3     Alcohol Swabs PADS 1 each daily 100 each 0     amLODIPine (NORVASC) 10 MG tablet Take 1 tablet (10 mg) by mouth daily 90 tablet 2     aspirin 81 MG EC tablet Take 1 tablet (81 mg) by mouth daily 90 tablet 3     atorvastatin (LIPITOR) 40 MG tablet Take 1 tablet (40 mg) by mouth daily 90 tablet 3     blood glucose (ACCU-CHEK GUIDE) test strip Use to test blood sugar 1 time daily (vary the time before meals and bed). 50 strip 3     blood glucose (NO BRAND SPECIFIED) test strip Use to test blood sugar 1 times daily or as directed. To accompany: Blood Glucose Monitor Brands: per  insurance. 100 strip 6     blood glucose calibration (NO BRAND SPECIFIED) solution To accompany: Blood Glucose Monitor Brands: per insurance. 1 Bottle 3     blood glucose monitoring (ACCU-CHEK FASTCLIX) lancets 1 each daily Use to test blood sugar 1 time daily. 102 each 3     blood glucose monitoring (NO BRAND SPECIFIED) meter device kit Use to test blood sugar 1 times daily or as directed. Preferred blood glucose meter OR supplies to accompany: Blood Glucose Monitor Brands: per insurance. 1 kit 0     blood glucose monitoring (SOFTCLIX) lancets Use to test blood sugar 3-4 times daily. 100 each 4     Blood Glucose Monitoring Suppl (ACCU-CHEK GUIDE ME) w/Device KIT 1 Device daily Use to test blood sugar 1 times daily or as directed. Preferred blood glucose meter: per insurance. 1 kit 0     cloNIDine (CATAPRES) 0.1 MG tablet Take 1 tablet (0.1 mg) by mouth 2 times daily 60 tablet 1     dexamethasone (DECADRON) 2 MG tablet Take 4 mg ( 2 tabs) every 8 hrs X2 days, then take 4 mg every 12 hrs X2 days, then take 4 mg daily X2 days, then take 2 mg daily X2 days 26 tablet 0     gabapentin (NEURONTIN) 100 MG capsule Take 1 tablet at night x3 nights then 1 tablet twice daily x3 days then 1 tablet 3 times daily 90 capsule 1     insulin glargine (LANTUS SOLOSTAR) 100 UNIT/ML pen Inject 14 Units Subcutaneous At Bedtime (has some) 15 mL 0     Insulin Pen Needle (PEN NEEDLES) 32G X 4 MM MISC 1 each daily 100 each 0     losartan (COZAAR) 100 MG tablet Take 1 tablet (100 mg) by mouth daily 90 tablet 2     methocarbamol (ROBAXIN) 500 MG tablet Take 1 tablet (500 mg) by mouth 3 times daily as needed for muscle spasms 30 tablet 1     methylPREDNISolone (MEDROL DOSEPAK) 4 MG tablet therapy pack Follow Package Directions 21 tablet 0     order for DME Equipment being ordered: CPAP--replacement with using prior settings. Additional supplies as needed with replacement machine.    Will need to contact the patient for information needed about  the prior CPAP settings and use. 1 Units o     ORDER FOR DME CPAP setting 19 cm H2O 1 Units 0     ORDER FOR DME CPAP 1 Device 0     oxyCODONE-acetaminophen (PERCOCET) 5-325 MG tablet Take 1 tablet by mouth every 6 hours as needed for severe pain       semaglutide (OZEMPIC) 2 MG/1.5ML SOPN pen Inject 0.25 mg subcutaneously once weekly for 4 weeks then 0.5 mg once weekly. 3 mL 1     thin (NO BRAND SPECIFIED) lancets Use with lanceting device. To accompany: Blood Glucose Monitor Brands: per insurance. 100 each 6       ALLERGIES:  Allergies   Allergen Reactions     Oseltamivir Anaphylaxis and Rash     Biaxin [Clarithromycin]      Muscle breakdown     Bromaxefed Dm Rf Cough     Clarithromycin Other (See Comments)     Weakness     Robafen Dm Cgh-Chest [Robitussin Cough-Chest Dm] Difficulty breathing     Guaifenesin Rash     Metformin Rash     Reported breaking out with a skin reaction  Reported breaking out with a skin reaction  Reported breaking out with a skin reaction       July 16th recent surgeries:    Pre-operative diagnosis: Large thyroid nodule   Post-operative diagnosis same   Procedure: right thyroidectomy   Surgeon: Moshe Hernandez DO     Preoperative diagnosis: Cervical myelopathy  Postoperative diagnosis: Cervical myelopathy     Procedure:  1.  C3-4 anterior discectomy and interbody arthrodesis  2.  C4-5 anterior discectomy and interbody arthrodesis  3.  C5-6 anterior discectomy and interbody arthrodesis  4.  C6-7 anterior discectomy and interbody arthrodesis  5.  C3-4 insertion of intervertebral graft and allograft  6.  C4-5 insertion of intervertebral graft and allograft  7.  C5-6 insertion of intervertebral graft and allograft  8.  C6-7 insertion of intervertebral graft and allograft  9.  C3 to C7 anterior spinal instrumentation with insertion of Medtronic Zevo plate and vertebral body screws  10.  Use of intraoperative microscope and fluoroscopy    Office Visit on 07/21/2021   Component Date Value Ref  "Range Status     Creatinine Urine mg/dL 07/21/2021 88  mg/dL Final     Albumin Urine mg/L 07/21/2021 9  mg/dL Final     Albumin Urine mg/g Cr 07/21/2021 10.23  0.00 - 17.00 mg/g Cr Final     Color Urine 07/21/2021 Yellow  Colorless, Straw, Light Yellow, Yellow Final     Appearance Urine 07/21/2021 Clear  Clear Final     Glucose Urine 07/21/2021 Negative  Negative mg/dL Final     Bilirubin Urine 07/21/2021 Negative  Negative Final     Ketones Urine 07/21/2021 Negative  Negative mg/dL Final     Specific Gravity Urine 07/21/2021 1.020  1.003 - 1.035 Final     Blood Urine 07/21/2021 Negative  Negative Final     pH Urine 07/21/2021 7.0  5.0 - 7.0 Final     Protein Albumin Urine 07/21/2021 Negative  Negative mg/dL Final     Urobilinogen Urine 07/21/2021 1.0  0.2, 1.0 E.U./dL Final     Nitrite Urine 07/21/2021 Negative  Negative Final     Leukocyte Esterase Urine 07/21/2021 Negative  Negative Final     Glucose Whole Blood 07/21/2021 243* 79 - 116 mg/dL Final       PHYSICAL EXAM:  Objective    BP (!) 169/107 (BP Location: Left arm, Patient Position: Chair, Cuff Size: Adult Large)   Pulse 96   Ht 1.81 m (5' 11.25\")   Wt (!) 153.2 kg (337 lb 12.8 oz)   SpO2 94%   BMI 46.78 kg/m    Body mass index is 46.78 kg/m .  Physical Exam   GENERAL: Healthy, alert and no distress  EYES: Eyes grossly normal to inspection.  No discharge or erythema, or obvious scleral/conjunctival abnormalities.  RESP: No audible wheeze, cough, or visible cyanosis.  No visible retractions or increased work of breathing.    SKIN: Visible skin clear. No significant rash, abnormal pigmentation or lesions.  NEURO: Cranial nerves grossly intact.  Mentation and speech appropriate for age.  PSYCH: Mentation appears normal, affect normal/bright, judgement and insight intact, normal speech and appearance well-groomed.    In summary, Archie Wallace has Class III obesity with a body mass index of Body mass index is 46.78 kg/m . kg/m2 and the comorbidities stated " above. He completed an informational seminar and is a possible candidate for the laparoscopic gastric sleeve.  He will have to complete the following pre-requisites:  If you have not already watched our online seminar please go to [email or web address]    Weight loss requirement: 20 lb from 337 lbs prior to surgery. Will have final weight check 2-3 weeks prior to surgery at anesthesia or nurse pre-op teaching visit.      Bariatric labs ordered, call for a lab only appointment at any [facility] lab. To find a lab location near you, please call [phone]. Please let us know if orders need to be faxed to a non [facility] lab.    Schedule bariatric psych eval as soon as possible.  List of psychologists will be sent to you via [employer] or given to you in clinic.     Call [facility] at [phone] to discuss insurance coverage for bariatric surgery.  Please check with your insurance regarding bariatric surgery coverage also. Twin can also help you with scheduling psych eval if you are having difficulties.    The following clearance letters are needed: Letters will be sent to you via [employer] or given to you in clinic  - Letter of support from primary care provider. Provider can submit through electronic medical record or fax to [phone]  - Sleep clearance  - Cardiology clearance    Smoking cessation and nicotine test needed: No    Consider starting Ozempic 0.25mg. No hx of pancreatitis. No hx of MEN or thyroid cancer.    NEXT VISITS: A  should reach out to you to schedule the following appointments.  If they do not reach you please call [phone] to schedule the following appointments:    -See dietitian in 1 month and monthly for 3-6 months    -See Lauren Bloch [facility] pharmacist in 1 month to follow up on weight loss medications    -See Stephania in 3 months to follow up on pre-op weight loss and weight loss medications    -See Dr Mata in 1 month for bariatric surgeon visit. Discuss  bariatric surgery.     Today in the office we discussed gastric sleeve surgery. Preoperative, perioperative, and postoperative processes, management, and follow up were addressed.  Risks and benefits were outlined including the risk of death, staple line leak (1-2%), PE, DVT, ulcer, worsening GERD, N/V, stricture, hernia, wound infection, weight regain, and vitamin deficiencies. I emphasized exercise and activity along with appropriate food choice as the main foundation for weight loss with surgery providing surgical reinforcement of this.  All questions were answered.  A goal sheet and support group handout were given to the patient.      Once the patient has completed the requirements in their task list and there are no further recommendations, the pt will be allowed to see the surgeon of her choice for consultation on the laparoscopic gastric sleeve surgery. Patient verbalizes understanding of the process to surgery and expectations for the postoperative period including the need for lifelong lifestyle changes, vitamin supplementation, and laboratory monitoring.    Sincerely,     Stephania Youssef PA-C

## 2021-09-07 NOTE — Clinical Note
Dr Mata 1 month bariatric surgeon visit in person  Nolberto 1 month video  MTM 1 month phone follow up Johnie Cat 3 months pre bariatric surgery

## 2021-09-07 NOTE — NURSING NOTE
"Chief Complaint   Patient presents with     New Patient     NBS consult       Vitals:    09/07/21 0723   BP: (!) 169/107   BP Location: Left arm   Patient Position: Chair   Cuff Size: Adult Large   Pulse: 96   SpO2: 94%   Weight: (!) 153.2 kg (337 lb 12.8 oz)   Height: 1.81 m (5' 11.25\")       Body mass index is 46.78 kg/m .                            "

## 2021-09-07 NOTE — Clinical Note
NBS in person today. Dr Adolfo Logan month TBD  [facility],  3 or 6 RD Twin?  Alicia Orozco gave him a NBS packet.  I sent cards, sleep, PCP to his providers in [facility].  Thanks! Stephania

## 2021-09-07 NOTE — LETTER
"9/7/2021       RE: Archie Wallace  [address]  [address]  [place] [address]     Dear Colleague,    Thank you for referring your patient, Archie Wallace, to the [facility] [place] [facility]. Please see a copy of my visit note below.    Doximity link sent, converted to telephone visit per pt request.  Archie Wallace is a 46 year old male who is being evaluated via a billable telephone visit.     The patient has been notified of following:     \"This telephone visit will be conducted via a call between you and your physician/provider. We have found that certain health care needs can be provided without the need for a physical exam.  This service lets us provide the care you need with a short phone conversation.  If a prescription is necessary we can send it directly to your pharmacy.  If lab work is needed we can place an order for that and you can then stop by our lab to have the test done at a later time.    Telephone visits are billed at different rates depending on your insurance coverage. During this emergency period, for some insurers they may be billed the same as an in-person visit.  Please reach out to your insurance provider with any questions.    If during the course of the call the physician/provider feels a telephone visit is not appropriate, you will not be charged for this service.\"    Patient has given verbal consent for Telephone visit?  Yes    How would you like to obtain your AVS? My Chart    Phone call duration: 31 minutes    During this virtual visit the patient is located in [place], patient verifies this as the location during the entirety of this visit.       New Bariatric Nutrition Consultation Note    Reason For Visit: Nutrition Assessment    Archie Wallace is a 46 year old presenting today for new bariatric nutrition consult.   Pt is interested in laparoscopic sleeve gastrectomy with Dr. Mata, expected " "surgery in TBD.  Patient is accompanied by self.  This is pt's first of 6 required nutrition visits prior to surgery.     Pt referred by RAE Alejandro on September 7, 2021.  Patient with Co-morbidities of obesity including:  Type II DM yes (A1C 6.6 7/12/21)  Renal Failure no  Sleep apnea no  Hypertension yes  Dyslipidemia yes  Joint painno  Back pain no  GERD  yes    No flowsheet data found.    ANTHROPOMETRICS:  Estimated body mass index is 48.35 kg/m  as calculated from the following:    Height as of 8/31/21: 1.778 m (5' 10\").    Weight as of 8/31/21: 152.9 kg (337 lb).    Required weight loss goal pre-op: -20 lbs from initial consult weight (goal weight 317 lbs or less before surgery)    No flowsheet data found.    No flowsheet data found.    SUPPLEMENT INFORMATION:  none    NUTRITION HISTORY:  NKFA or intolerances  Pt endorsed some previous diet ed received in past. RD provided brief review of low sodium and consistent CHO as it relates to HTN and DM. Pt could benefit from continued diet ed.  Likes: fruit, pear, banana   Meal Duration: 10 min    Recent Diet Recall  B: skipped  L: garsia burger ([employer])  D: 5 mini burger with fries ([employer])  Snacks: cookie (oatmeal raisen)   Beverages: water, coffee (Georgian vanilla creamer, not sure if sugar free), juice (1-2 day) gatorade, powerade zero.  (no carbonated beverages)  Alcohol: rarely - celebrations  Dine out: 2-3 times a week      No flowsheet data found.    No flowsheet data found.    ADDITIONAL INFORMATION:  No discussed today    No flowsheet data found.    No flowsheet data found.      NUTRITION DIAGNOSIS:  Obesity r/t long history of self-monitoring deficit and excessive energy intake aeb BMI >30 kg/m2.    INTERVENTION:  Intervention Provided/Education Provided on post-op diet guidelines, GI anatomy of bariatric surgeries, ways to help prepare for post-op diet guidelines pre-operatively, portion/calorie-control, mindful eating and sources of " "protein.  Handouts provided - Low Sodium Diet, CHO counting  Patient demonstrates understanding.  AVS vis MyChart     No flowsheet data found.    Follow up: continue post op diet ed:  vitamins/minerals essential post-operatively, and activity level  Expected Engagement: good    GOALS:  Relating To Eatin). Eat slowly (20-30 minutes per meal), chewing foods well (25 chews per bite/applesauce consistency)    2). 9\" Plate method (1/2 plate non-starchy vegetables/fruit, 1/4 plate lean protein, 1/4 plate whole grain starch - no more than 1/2 cup carb/meal)   - focus on lean protein sources: see handout below - chicken, eggs, turkey (limiting red meat, processed meat)   - increase fruit/veg intake, aim for fruit or vegetable at each meal/snack    The Plate Method  [email or web address]    Protein Sources for Weight Loss  [email or web address]     Relating to beverages:  3). Avoid calorie-containing beverages/avoid sugar containing beverages   - try flavor water enhancers (mane, crytal light), gatorade zero, powerade zero     Diabetes Recipe Resources:  Https://www.diabetesfoodhub.org/  https://www.cdc.gov/diabetes/library/spotlights/hack-your-snack.html  https://www.eatright.org/food/nutrition/dietary-guidelines-and-myplate/how-to-add-whole-grains-to-your-diet  https://diabeticgourDamballa.com/diabetic-recipe/turkey-veggie-snacks    Carbohydrates  [email or web address]     Guide To Carbohydrate Counting  [email or web address]      Mindful Eating  [email or web address]     Summary of Volumetrics Eating Plan  [email or web address]     Diet Guidelines after Weight Loss Surgery  [email or web address]     Tips for Low Sodium Diet  [email or web address]      Follow up: 1 month    Time spent with patient: 31 minutes.  Harini Finley, JUWAN, LD      "

## 2021-09-07 NOTE — ASSESSMENT & PLAN NOTE
Plan for lap sleeve gastrectomy with Dr Mata. Timing TBD  Needs:   3-6 RD visits  20# wt loss from 337 lbs  Start ozempic 0.25mg weekly  Psych eval  Sleep, cards, PCP letters  Follow up 1 month MTM, surgeon, RD.

## 2021-09-07 NOTE — TELEPHONE ENCOUNTER
Reason for call: Pt called for refill of Percocet. Please call him back at [phone]. Thank you

## 2021-09-08 NOTE — TELEPHONE ENCOUNTER
PRIOR AUTHORIZATION DENIED-Per Carlos at [facility]    Medication: Ozempic-[place] denied    Denial Date: 9/8/2021    Denial Rational: GLP-1 will not be approved for A1C <7. Insurance said it is clear he needs this for weight loss, which will not be covered

## 2021-09-09 ENCOUNTER — CARE COORDINATION (OUTPATIENT)
Dept: ENDOCRINOLOGY | Facility: CLINIC | Age: 46
End: 2021-09-09

## 2021-09-09 NOTE — PROGRESS NOTES
Bariatric Task List updated.  Bariatric information/clearance letters sent to patient via [facility].    Bariatric Task List    Fax:  Please fax all paperwork to: [phone] -     Status:  Is patient a candidate for bariatric surgery?:    -     Cleared to schedule surgeon consult?:    -     Status:  surgery evaluation in process -     Surgeon: Mata -     Tentative surgery month/year: TBD -        Insurance: Insurance:  [employer] -      Contact insurance to discuss coverage: Needed -       C[employer]: PCP Recommendation and Medical Clearance:    -     HP Referral:    -      Advanced beneficiary notification (ABN) for Medicare patients for RD visits   and surgery:   -      Weight history:   -     Other:    -        Patient Info: Initial Weight:  337 -     Date of Initial Weight/Height:  9/7/2021 -     Goal Weight (lbs):  317 -     Required Weight Loss:  20 -     Surgery Type:  sleeve gastrectomy -     Multidisciplinary Meeting:    -        Dietician Visits: Structured weight loss required by insurance?:  structured weight loss required -     Dietician Visit 1:  Completed - 9/7/21 SS - AS   Dietician Visit 2:  Needed -     Dietician Visit 3:  Needed -     Dietician Visit 4:  Needed -     Dietician Visit 5:  Needed -     Dietician Visit 6:  Needed -     Dietician Visit additional:  Needed - Monthly until surgery - AS   Clearance from dietician to see surgeon?:    -     Dietician Notes:    -        Psychological Evaluation: Psych eval:  Needed - List and letter sent to pt 9/9/21 - AS   Therapist letter of support:    -     Psychiatrist letter of support:    -     Establish care with therapist:    -     Complete eating disorder evaluation:    -     Letter of clearance from therapist/eating disorder program:    -     Other:    -        Lab Work: Complete Blood Count:  Needed - Ordered 9/7/21-  AS   Comprehensive Metabolic Panel:  Needed - Ordered 9/7/21-  AS   Vitamin D:  Needed - Ordered 9/7/21-  AS   PTH:  Needed -  "Ordered 9/7/21-  AS   Hgb A1c:  Needed - Ordered 9/7/21-  AS    Lipids: Needed - Ordered 9/7/21-  AS    TSH ([facility], [facility], [place]): Needed - Ordered 9/7/21-  AS     Ferritin:   -       Folate:   -       Testosterone, Total and Free:   -     Thiamine:   -     Vitamin A:   -     Vitamin B12:   -     Zinc:   -     C-peptide:   -     H. pylori:    -     MRSA (2 swabs, minimum 48 hours apart):   -     Nicotine Testing:    -     Recheck Vitamin D:   -     Other:    -        Consults/ Clearance Sleep Medicine:  Needed - Letter sent to pt 9/9/21 - AS   Cardiac:  Needed - Letter sent to pt 9/9/21 -AS   Pain:   -     Dental:    -     Endocrine:    -     Gastroenterology:    -     Vascular Medicine:    -     Hematology:    -     Medical Weight Management: Needed - started ozempic with Stephania 9/7/21   Physical Therapy/Exercise:    -     Nephrology:    -     Neurology:    -     Pulmonology:    -     Rheumatology:    -     Other:    -     Other:    -     Other:    -        Testing: UGI:    -     EGD:    -     Sleep Study:   -     Other:   -     Other:    -        PCP: Establish care with PCP:  Completed -     Follow up with PCP:    -     PCP letter of support:  Needed - Letter sent to pt 9/9/21 -AS      Stopping Smoking/ Alcohol Use: Quit tobacco use (3 months smoke free)?:    -     Quit date:    -     Quit alcohol use:   -     Quit date:   -     Other:   -     Quit date:   -        Patient Education:  Information Session:  Completed -     Given \"Making your decision\" handout?:  Yes - 9/9/21   Given \"A Roadmap to you Weight Loss Surgery\" handout?: Yes - 9/9/21   Given \"Get Well Loop\" information?: Yes - 9/9/21   Given support group information?:    -     Attended support group?:    -     Support plan in place?:  Completed -     Research consents signed?:  research consent not signed -     Avoid NSAIDS/ Alternate Plan for Pain:   -        Additional Surgery Requirements: Review Coag plan:    -     HgA1c <8:    -     Inpatient pain " consult:    -     Final nicotine screen:    -     Dental work complete:    -     Birth control plan:    -     Gallstone prevention plan (Actigall for 6 months postop):   -     Other:   -     Other:   -        Final Tasks:  Before surgery online class:  Needed -     Before surgery online class website link:  [email or web address]   After surgery online class:  Needed -     After surgery online class website link:  [email or web address]   Nurse visit per clinic:  Needed - Call Pam at [phone] to schedule once you have a surgery date.   History and Physical per clinic:   -     Final labs per clinic: Needed -     Chest xray per clinic:   -     Electrocardiogram (ECG) per clinic:   -     Other:   -        Notes:   -

## 2021-09-15 DIAGNOSIS — Z98.1 S/P CERVICAL SPINAL FUSION: ICD-10-CM

## 2021-09-15 RX ORDER — OXYCODONE AND ACETAMINOPHEN 5; 325 MG/1; MG/1
1 TABLET ORAL EVERY 6 HOURS PRN
Qty: 30 TABLET | Refills: 0 | Status: SHIPPED | OUTPATIENT
Start: 2021-09-15 | End: 2021-09-22

## 2021-09-15 NOTE — TELEPHONE ENCOUNTER
"Patient calling for a refill of Percocet.     DOS: 7/16/21  Procedure: Cervical 3 to Cervical 7 Anterior Cervical Discectomy and Fusion  Surgeon: Dr. De Leon    Current symptom(s): Patient states that about two days ago he began to hear \"squeeking\" from his neck when he tilts his head back or side to side. Patient states that he continues to have pain, but pain has stayed the same. Pain is in lower neck area, patient also has pain in the left side of his neck and shoulder. He also has intermittent pain down arms. He rates his pain at a 7/10.   Current pain management: He currently is taking percocet 1 tablet every 6 hours. He is also utilizing ice. Patient received MDP at last visit 8/31. He states this did not help.     Last fill: 9/7/21  Next visit: 10/12/21    Medication pended for your approval, if appropriate. Pharmacy verified.     Any patient questions or concerns:     Informed patient request will be forwarded to care team.     "

## 2021-09-15 NOTE — TELEPHONE ENCOUNTER
Contacted patient to discuss symptoms. He denies injury or fall. No increase in pain or any new symptoms. No red flags. Plan to continue to monitor symptoms and follow up if symptoms persist or worsen. Reminded him of current activity restrictions. He verbalized understanding and agreement.

## 2021-09-15 NOTE — TELEPHONE ENCOUNTER
Reason for call: Pt called to request pain medication refill. Please call him back at [phone]. Thank you   Detail Level: Zone Initiate Treatment: permethrin 5 % topical cream: Apply neck down to feet overnight x 8 \\ntriamcinolone acetonide 0.1 % topical cream: BID Sig: Apply to AA on the body BID for up to 2 weeks, then as need for flares\\n

## 2021-09-16 ENCOUNTER — TELEPHONE (OUTPATIENT)
Dept: ENDOCRINOLOGY | Facility: CLINIC | Age: 46
End: 2021-09-16

## 2021-09-16 DIAGNOSIS — E66.813 CLASS 3 SEVERE OBESITY DUE TO EXCESS CALORIES WITH SERIOUS COMORBIDITY AND BODY MASS INDEX (BMI) OF 45.0 TO 49.9 IN ADULT (H): Primary | ICD-10-CM

## 2021-09-16 DIAGNOSIS — E11.65 TYPE 2 DIABETES MELLITUS WITH HYPERGLYCEMIA, WITHOUT LONG-TERM CURRENT USE OF INSULIN (H): ICD-10-CM

## 2021-09-16 DIAGNOSIS — E66.01 CLASS 3 SEVERE OBESITY DUE TO EXCESS CALORIES WITH SERIOUS COMORBIDITY AND BODY MASS INDEX (BMI) OF 45.0 TO 49.9 IN ADULT (H): Primary | ICD-10-CM

## 2021-09-16 RX ORDER — TOPIRAMATE 25 MG/1
TABLET, FILM COATED ORAL
Qty: 90 TABLET | Refills: 1 | Status: SHIPPED | OUTPATIENT
Start: 2021-09-16 | End: 2021-11-02

## 2021-09-16 NOTE — TELEPHONE ENCOUNTER
Received call/voicemail from patient in regards to mychart message that was sent yesterday. Patient states he reviewed the message and would like to try the Topiramate. Sending prescription to pharmacy, along with mychart message to patient to inform.

## 2021-09-19 ENCOUNTER — HEALTH MAINTENANCE LETTER (OUTPATIENT)
Age: 46
End: 2021-09-19

## 2021-09-22 DIAGNOSIS — Z98.1 S/P CERVICAL SPINAL FUSION: ICD-10-CM

## 2021-09-22 RX ORDER — OXYCODONE AND ACETAMINOPHEN 5; 325 MG/1; MG/1
1 TABLET ORAL EVERY 6 HOURS PRN
Qty: 30 TABLET | Refills: 0 | Status: SHIPPED | OUTPATIENT
Start: 2021-09-22 | End: 2021-10-04

## 2021-09-22 NOTE — TELEPHONE ENCOUNTER
"Patient calling for a refill of percocet    DOS: 7/16/21  Procedure: Cervical 3 to Cervical 7 Anterior Cervical Discectomy and Fusion  Surgeon: Dr De Leon    Current symptom(s):   There is no change in the \"squeeking\" in his neck.  It has not gotten any worse. The pain is in the back of the neck. Very sore in the am so he started wearing the brace again at night for the past week. Rates the pain 6-8/10. The pain in his arms is resolving. He is not doing any lifting.     Current pain management:  Percocet 1-2 tabs every 4 hours. Tylenol 1000 mg 2-3 times daily. Reminded the script reads 1 tab every 4 hours. He is also using heat.    Last fill: 9/15 #30  He has 1 tab left  Next visit: 10/12/21    Medication pended for your approval, if appropriate. Pharmacy verified.     Any patient questions or concerns:     Informed patient request will be forwarded to care team.   "

## 2021-10-01 DIAGNOSIS — Z98.1 S/P CERVICAL SPINAL FUSION: ICD-10-CM

## 2021-10-01 NOTE — TELEPHONE ENCOUNTER
Reason for call: Pt called to request medication refill. Please call him back at [phone]. Thank you

## 2021-10-04 RX ORDER — OXYCODONE AND ACETAMINOPHEN 5; 325 MG/1; MG/1
1 TABLET ORAL EVERY 6 HOURS PRN
Qty: 30 TABLET | Refills: 0 | Status: SHIPPED | OUTPATIENT
Start: 2021-10-04 | End: 2021-10-12

## 2021-10-04 NOTE — PROGRESS NOTES
"Archie Wallace is a 46 year old male who is being evaluated via a billable video visit.      The patient has been notified of following:     \"This video visit will be conducted via a call between you and your physician/provider. We have found that certain health care needs can be provided without the need for an in-person physical exam.  This service lets us provide the care you need with a video conversation.  If a prescription is necessary we can send it directly to your pharmacy.  If lab work is needed we can place an order for that and you can then stop by our lab to have the test done at a later time.    Video visits are billed at different rates depending on your insurance coverage.  Please reach out to your insurance provider with any questions.    If during the course of the call the physician/provider feels a video visit is not appropriate, you will not be charged for this service.\"    Patient has given verbal consent for Video visit? Yes  How would you like to obtain your AVS? MyChart  Will anyone else be joining your video visit? No  {If patient encounters technical issues they should call [phone]      Video-Visit Details    Type of service:  Video Visit    Video Start Time: 7:58 AM  Video End Time: 8:23 AM    Originating Location (pt. Location): Home    Distant Location (provider location):  [facility] [place]     Platform used for Video Visit: Parents Journey    During this virtual visit the patient is located in [place], patient verifies this as the location during the entirety of this visit.       Bariatric Nutrition Consultation Note    Reason For Visit: Nutrition Assessment    Archie Wallace is a 46 year old presenting today for return bariatric nutrition consult.   Pt is interested in laparoscopic sleeve gastrectomy with Dr. Mata, expected surgery in TBD.  Patient is accompanied by self.  This is pt's 2nd of 6 required nutrition visits prior to surgery.     Pt referred by " "RAE Alejandro on September 7, 2021.  Patient with Co-morbidities of obesity including:  Type II DM yes (A1C 6.6 7/12/21)  Renal Failure no  Sleep apnea no  Hypertension yes  Dyslipidemia yes  Joint painno  Back pain no  GERD  yes    Support System Reviewed With Patient 9/7/2021   Who do you have in your support network that can be available to help you for the first 2 weeks after surgery? girlfriend   Who can you count on for support throughout your weight loss surgery journey? girlfriend       ANTHROPOMETRICS:  Estimated body mass index is 46.78 kg/m  as calculated from the following:    Height as of 9/7/21: 1.81 m (5' 11.25\").    Weight as of 9/7/21: 153.2 kg (337 lb 12.8 oz).    Current weight: 340 lbs     Required weight loss goal pre-op: -20 lbs from initial consult weight (goal weight 317 lbs or less before surgery)       9/7/2021   I have tried the following methods to lose weight Watching portions or calories, Exercise, Atkins type diet (low carb/high protein), Slimfast, OTC Medications       Weight Loss Questions Reviewed With Patient 9/7/2021   How long have you been overweight? Since late teens through early 20's       SUPPLEMENT INFORMATION:  none    NUTRITION HISTORY:  NKFA or intolerances  Pt endorsed some previous diet ed received in past. RD provided brief review of low sodium and consistent CHO as it relates to HTN and DM. Pt could benefit from continued diet ed.  Likes: fruit, pear, banana   Meal Duration: 10 min  Checks BG TID in morning, noon and at night    Feeling a little lost with how to lose weight. Not eating structured meal times.   Travels a lot - 2-3 times per month   Has Keto protein shakes/bar     Recent Diet Recall  B: bowl of cereal; 1-2 bfast sandwich from [employer]  L (3 pm): bananas   D (8:30-9pm): sometimes skipping and snacking throughout the night or pasta (larger plate)  Snacks: chips, jello  Beverages: water, Gatorade/Powerade   Alcohol: rarely - " "celebrations  Dine out: 2-3 times a week    Progress Towards Previous Goals:  Relating To Eatin). Eat slowly (20-30 minutes per meal), chewing foods well (25 chews per bite/applesauce consistency) - Met, continues, has a smaller esophagus, feels like food get stuck if he doesn't chew.     2). 9\" Plate method (1/2 plate non-starchy vegetables/fruit, 1/4 plate lean protein, 1/4 plate whole grain starch - no more than 1/2 cup carb/meal) - Not met   - focus on lean protein sources: see handout below - chicken, eggs, turkey (limiting red meat, processed meat)   - increase fruit/veg intake, aim for fruit or vegetable at each meal/snack    Relating to beverages:  3). Avoid calorie-containing beverages/avoid sugar containing beverages - Met, continues    - try flavor water enhancers (mane, crytal light), gatorade zero, powerade zero    Eating Habits 2021   Do you have any dietary restrictions? Yes   Do you currently binge eat (eat a large amount of food in a short time)? Yes   Are you an emotional eater? Yes   Do you get up to eat after falling asleep? Yes   What foods do you crave? nothing       ADDITIONAL INFORMATION:  Had spine surgery and half thyroid removed in July, was on some lifting restrictions for 3 months. Fosucing on walking more.     Dining Out History Reviewed With Patient 2021   How often do you dine out? A couple of times a week.   Where do you dine out? (select all that apply) fast food chains   What types of food do you order when you dine out? jass       Physical Activity Reviewed With Patient 2021   How often do you exercise? Never   What keeps you from being more active?  I am as active as I can possbily be, I have just had surgery on one or more of my joints, My ability to walk or move around is limited         NUTRITION DIAGNOSIS:  Obesity r/t long history of self-monitoring deficit and excessive energy intake aeb BMI >30 kg/m2. - continues    INTERVENTION:  Intervention " "Provided/Education Provided on post-op diet guidelines, GI anatomy of bariatric surgeries, ways to help prepare for post-op diet guidelines pre-operatively, portion/calorie-control, mindful eating and sources of protein.  Handouts provided - Low Sodium Diet, CHO counting  Patient demonstrates understanding.  AVS vis MyChart     Questions Reviewed With Patient 2021   How ready are you to make changes regarding your weight? Number 1 = Not ready at all to make changes up to 10 = very ready. 10   How confident are you that you can change? 1 = Not confident that you will be successful making changes up to 10 = very confident. 10       Follow up: continue post op diet ed:  vitamins/minerals essential post-operatively, and activity level  Expected Engagement: good    GOALS:  Relating To Eatin). Eat 3 meals per day  2). Use the 9\" Plate method to structure meals   - Focus on eating lean protein and serving of veggies/fruits at each meal   - Check out diabetesfoodLakoob.org for meal ideas   - May use a protein shake/bar or low-calorie frozen meal as back-up meal choice.   3) Avoid snacking    - Try using a distraction (playing games on your phone) to avoid cravings.    - Focus on hydration between meals  4) Eliminate calorie-containing beverages.     The Plate Method  [email or web address]    Protein Sources for Weight Loss  [email or web address]     Carbohydrates  [email or web address]     Diet Guidelines after Weight Loss Surgery  [email or web address]     Tips for Low Sodium Diet  [email or web address]    Meal Replacement Shake Options:   *Protein Shake Criteria: no more than 210 Calories, at least 20 grams of protein, and less than 10 grams of sugar   [facility] smoothie (160 Calories, 20 g protein)   Premier Protein (160 Calories, 30 g protein)  Slim Fast Advanced Nutrition (180 Calories, 20 g protein)  Muscle Milk, lactose-free, 17 oz bottle (210 Calories, 30 g " protein)  Integrated Supplements, no artificial sugars (110 Calories, 20 g protein)  Genepro, unflavored protein powder (60 Calories, 30 g protein)  Boost/Ensure Max (160 calories, 30 gm protein)   Vidient Core Power (170 calories, 26 gm protein)    Meal Replacement Bar Options:  [facility] Protein Shake (160 Calories, 15 g protein)  Quest Protein Bars (190 Calories, 20 g protein)  Built Bar (170 Calories, 15-20 g protein)  One Protein Bar (210 calories, 20 g protein)  [place] Signature Protein Bar ([employer]) (190 Calories, 21 g protein)  Pure Protein Bars (180 Calories, 21 g protein)    Low Calorie Frozen Meals:  Lean Cuisine  Healthy Choice  Smart Ones  Jasson Mcclendon       Follow up: 1 month    Time spent with patient: 25 minutes.  Kayla Mcdonnell RD, LD

## 2021-10-04 NOTE — TELEPHONE ENCOUNTER
Patient calling for a refill of Percocet.     DOS: 7/16/21  Procedure: Cervical 3 to Cervical 7 Anterior Cervical Discectomy and Fusion  Surgeon: Dr. De Leon     Current symptom(s): Pain 6/10 to the back of his neck and shoulders described as a constant ache.  Current pain management: Percocet 1 tab Q 4 hrs. Reminded him that he needs to take it as prescribed-no more than the 1 tab Q 6 hrs.    Last fill: 9/22/21  Next visit: 10/12/21    Medication pended for your approval, if appropriate. Pharmacy verified.     Any patient questions or concerns:     Informed patient request will be forwarded to care team.

## 2021-10-05 ENCOUNTER — VIRTUAL VISIT (OUTPATIENT)
Dept: ENDOCRINOLOGY | Facility: CLINIC | Age: 46
End: 2021-10-05
Payer: COMMERCIAL

## 2021-10-05 DIAGNOSIS — E11.65 TYPE 2 DIABETES MELLITUS WITH HYPERGLYCEMIA, WITHOUT LONG-TERM CURRENT USE OF INSULIN (H): ICD-10-CM

## 2021-10-05 DIAGNOSIS — E66.9 OBESITY: ICD-10-CM

## 2021-10-05 DIAGNOSIS — Z71.3 NUTRITIONAL COUNSELING: Primary | ICD-10-CM

## 2021-10-05 PROCEDURE — 97803 MED NUTRITION INDIV SUBSEQ: CPT | Mod: 95 | Performed by: DIETITIAN, REGISTERED

## 2021-10-05 NOTE — PATIENT INSTRUCTIONS
"Brian Almanza,     Follow-up with RD in 1 month    Thank you,    Kayla Mcdonnell, JUWAN, LD  If you would like to schedule or reschedule an appointment with the RD, please call [phone]    Nutrition Goals  Relating To Eatin). Eat 3 meals per day  2). Use the 9\" Plate method to structure meals   - Focus on eating lean protein and serving of veggies/fruits at each meal   - Check out diabetesfoodhub.org for meal ideas   - May use a protein shake/bar or low-calorie frozen meal as back-up meal choice.   3) Avoid snacking    - Try using a distraction (playing games on your phone) to avoid cravings.    - Focus on hydration between meals  4) Eliminate calorie-containing beverages.     The Plate Method  http://[email or web address][email or web address]    Protein Sources for Weight Loss  [email or web address]     Carbohydrates  [email or web address]     Diet Guidelines after Weight Loss Surgery  [email or web address]     Tips for Low Sodium Diet  [email or web address]    Meal Replacement Shake Options:   *Protein Shake Criteria: no more than 210 Calories, at least 20 grams of protein, and less than 10 grams of sugar   [facility] smoothie (160 Calories, 20 g protein)   Premier Protein (160 Calories, 30 g protein)  Slim Fast Advanced Nutrition (180 Calories, 20 g protein)  Muscle Milk, lactose-free, 17 oz bottle (210 Calories, 30 g protein)  Integrated Supplements, no artificial sugars (110 Calories, 20 g protein)  Genepro, unflavored protein powder (60 Calories, 30 g protein)  Boost/Ensure Max (160 calories, 30 gm protein)   [employer]life Core Power (170 calories, 26 gm protein)    Meal Replacement Bar Options:  [facility] Protein Shake (160 Calories, 15 g protein)  Quest Protein Bars (190 Calories, 20 g protein)  Built Bar (170 Calories, 15-20 g protein)  One Protein Bar (210 calories, 20 g protein)  [place] Signature Protein Bar ([employer]co) (190 Calories, 21 g protein)  Pure Protein Bars (180 Calories, 21 g " protein)    Low Calorie Frozen Meals:  Lean Cuisine  Healthy Choice  Smart Ones  Jasson Jenningsmillicent Mcclendon     Interested in working with a health ?  Health coaches work with you to improve your overall health and wellbeing.  They look at the whole person, and may involve discussion of different areas of life, including, but not limited to the four pillars of health (sleep, exercise, nutrition, and stress management). Discuss with your care team if you would like to start working a health .    Health Coaching-3 Pack:    $99 for three health coaching visits    Visits may be done in person or via phone    Coaching is a partnership between the  and the client; Coaches do not prescribe or diagnose    Coaching helps inspire the client to reach his/her personal goals      COMPREHENSIVE WEIGHT MANAGEMENT PROGRAM  VIRTUAL SUPPORT GROUPS    For Support Group Information:      We offer support groups for patients who are working on weight loss and considering, preparing for or have had weight loss surgery.   There is no cost for this opportunity.  You are invited to attend the?Virtual Support Groups?provided by any of the following locations:    1. [facility] via Microsoft Teams with Alycia Oleary RN  2.   [place] via Nymir[employer] with Abe Lopez, PhD, LP  3.   [place] [employer] with Debi Carmona RN  4.   [facility] via Microsoft Teams with Debi Pedro [facility]    The following Support Group information can also be found on our website:  [email or web address]      [facility] Weight Loss Surgery Support Group    [facility] Weight Loss Surgery Support Group  The support group is a patient-lead forum that meets monthly to share experiences, encouragement and education. It is open to those who have had weight loss surgery, are scheduled for surgery, and those who are considering surgery.   WHEN: This group  "meets on the 3rd Wednesday of each month from 5:00PM - 6:00PM virtually using Microsoft Teams.   FACILITATOR: Led by Alycia Oleary, the program's Clinical Coordinator.   TO REGISTER: Please contact the clinic via [employer] or call the nurse line directly at [phone] to inform our staff that you would like an invite sent to you and to let us know the email you would like the invite sent to. Prior to the meeting, a link with directions on how to join the meeting will be sent to you.    2021 Meetings August 18: \"Let's Talk\" a time for the group to share.  September 15: \"Let's Talk\" a time for the group to share.  October 20: \"Let's Talk\" a time for the group to share.  November 17: Eusebia Dubose RD, KYRA \"Protein, Metabolism and Meal Planning\"  December 15: Walter Hatch RD, LD will speak, \"Recipe Modification\"    [facility] and [facility] Support Groups    Connections: Bariatric Care Support Group?  This is open to all [facility] (and those external to this program) pre- and post- operative bariatric surgery patients as well as their support system.   WHEN: This group meets the 2nd Tuesday of each month from 6:30 PM - 8:00 PM virtually using Microsoft Teams.   FACILITATOR: Led by Aeb Lopez, Ph.D who is a Licensed Psychologist with the [facility] Comprehensive Weight Management Program.   TO REGISTER: Please send an email to Abe Lopez, Ph.D., LP at?[email or web address]?if you would like an invitation to the group and to learn about using Microsoft Teams.    2021 Meetings  August 10: Open Forum  September 14: Guest Speaker: Debi Carmona, RN,CBN, CIC, [facility] Comprehensive Weight Management Program, \"Your Hospital Stay and Post-Operative Compliance\"  October 12: Open Forum  November 9: Guest Speaker: Deana Alvarenga RD,LD, [facility] Comprehensive Weight Management Program,\"Holiday Eating\".  December 14: Guest Speaker Erinn Choudhury MD, MPH, [facility], " "[facility], \"Body Contouring Surgery for Bariatric Surgery Patients\"     Connections: Post-Operative Bariatric Surgery Support Group  This is a support group for [facility] bariatric patients (and those external to [facility]) who have had bariatric surgery and are at least 3 months post-surgery.  WHEN: This support group meets the 4th Wednesday of the month from 11:00 AM - 12:00 PM virtually using Microsoft Teams.   FACILITATOR: Led by Certified Bariatric Nurse, Debi Carmona RN.   TO REGISTER: Please send an email to Debi at [email or web address] if you would like an invitation to the group and to learn about using [employer].      [facility] Healthy Lifestyle Virtual Support Group    Healthy Lifestyle Virtual Support Group?  This is 60 minutes of small group guided discussion, support and resources. All are welcome who want a healthy lifestyle.  WHEN: This group meets monthly on a Friday from 12:30 PM - to 1:30 PM virtually using Microsoft Teams.   FACILITATOR: Led by National Board Certified Health , Debi Pedro [facility].   TO REGISTER: Please send an email to [email or web address] [email or web address] to receive monthly invites to the group or if you have any questions about having a health .  Prior to the meeting, a link with directions on how to join the meeting will be sent to you.    2021 Meetings  August 27: Open Forum  September 24: Sleep and the 7 Types of Rest  October 29: Open Forum  November 19: Gratitude     December 10: Open Forum                            "

## 2021-10-05 NOTE — LETTER
"10/5/2021       RE: Archie Wallace  [address]  [address]  [place] [address]     Dear Colleague,    Thank you for referring your patient, Archie Wallace, to the [facility] WEIGHT MANAGEMENT [facility]INIC [place] at [facility]. Please see a copy of my visit note below.    Archie Wallace is a 46 year old male who is being evaluated via a billable video visit.      The patient has been notified of following:     \"This video visit will be conducted via a call between you and your physician/provider. We have found that certain health care needs can be provided without the need for an in-person physical exam.  This service lets us provide the care you need with a video conversation.  If a prescription is necessary we can send it directly to your pharmacy.  If lab work is needed we can place an order for that and you can then stop by our lab to have the test done at a later time.    Video visits are billed at different rates depending on your insurance coverage.  Please reach out to your insurance provider with any questions.    If during the course of the call the physician/provider feels a video visit is not appropriate, you will not be charged for this service.\"    Patient has given verbal consent for Video visit? Yes  How would you like to obtain your AVS? MyChart  Will anyone else be joining your video visit? No  {If patient encounters technical issues they should call [phone]      Video-Visit Details    Type of service:  Video Visit    Video Start Time: 7:58 AM  Video End Time: 8:23 AM    Originating Location (pt. Location): Home    Distant Location (provider location):  [facility]NAG[facility] [place]     Platform used for Video Visit: Devtoo    During this virtual visit the patient is located in [place], patient verifies this as the location during the entirety of this visit.       Bariatric Nutrition Consultation Note    Reason For " "Visit: Nutrition Assessment    Archie Wallace is a 46 year old presenting today for return bariatric nutrition consult.   Pt is interested in laparoscopic sleeve gastrectomy with Dr. Mata, expected surgery in TBD.  Patient is accompanied by self.  This is pt's 2nd of 6 required nutrition visits prior to surgery.     Pt referred by RAE Alejandro on September 7, 2021.  Patient with Co-morbidities of obesity including:  Type II DM yes (A1C 6.6 7/12/21)  Renal Failure no  Sleep apnea no  Hypertension yes  Dyslipidemia yes  Joint painno  Back pain no  GERD  yes    Support System Reviewed With Patient 9/7/2021   Who do you have in your support network that can be available to help you for the first 2 weeks after surgery? girlfriend   Who can you count on for support throughout your weight loss surgery journey? girlfriend       ANTHROPOMETRICS:  Estimated body mass index is 46.78 kg/m  as calculated from the following:    Height as of 9/7/21: 1.81 m (5' 11.25\").    Weight as of 9/7/21: 153.2 kg (337 lb 12.8 oz).    Current weight: 340 lbs     Required weight loss goal pre-op: -20 lbs from initial consult weight (goal weight 317 lbs or less before surgery)       9/7/2021   I have tried the following methods to lose weight Watching portions or calories, Exercise, Atkins type diet (low carb/high protein), Slimfast, OTC Medications       Weight Loss Questions Reviewed With Patient 9/7/2021   How long have you been overweight? Since late teens through early 20's       SUPPLEMENT INFORMATION:  none    NUTRITION HISTORY:  NKFA or intolerances  Pt endorsed some previous diet ed received in past. RD provided brief review of low sodium and consistent CHO as it relates to HTN and DM. Pt could benefit from continued diet ed.  Likes: fruit, pear, banana   Meal Duration: 10 min  Checks BG TID in morning, noon and at night    Feeling a little lost with how to lose weight. Not eating structured meal times.   Travels a lot - 2-3 times " "per month   Has Keto protein shakes/bar     Recent Diet Recall  B: bowl of cereal; 1-2 bfast sandwich from [employer]Adam Bolanos  L (3 pm): bananas   D (8:30-9pm): sometimes skipping and snacking throughout the night or pasta (larger plate)  Snacks: chips, jello  Beverages: water, Gatorade/Powerade   Alcohol: rarely - celebrations  Dine out: 2-3 times a week    Progress Towards Previous Goals:  Relating To Eatin). Eat slowly (20-30 minutes per meal), chewing foods well (25 chews per bite/applesauce consistency) - Met, continues, has a smaller esophagus, feels like food get stuck if he doesn't chew.     2). 9\" Plate method (1/2 plate non-starchy vegetables/fruit, 1/4 plate lean protein, 1/4 plate whole grain starch - no more than 1/2 cup carb/meal) - Not met   - focus on lean protein sources: see handout below - chicken, eggs, turkey (limiting red meat, processed meat)   - increase fruit/veg intake, aim for fruit or vegetable at each meal/snack    Relating to beverages:  3). Avoid calorie-containing beverages/avoid sugar containing beverages - Met, continues    - try flavor water enhancers (mane, crytal light), gatorade zero, powerade zero    Eating Habits 2021   Do you have any dietary restrictions? Yes   Do you currently binge eat (eat a large amount of food in a short time)? Yes   Are you an emotional eater? Yes   Do you get up to eat after falling asleep? Yes   What foods do you crave? nothing       ADDITIONAL INFORMATION:  Had spine surgery and half thyroid removed in July, was on some lifting restrictions for 3 months. Fosucing on walking more.     Dining Out History Reviewed With Patient 2021   How often do you dine out? A couple of times a week.   Where do you dine out? (select all that apply) fast food chains   What types of food do you order when you dine out? jass       Physical Activity Reviewed With Patient 2021   How often do you exercise? Never   What keeps you from being more active?  " "I am as active as I can possbily be, I have just had surgery on one or more of my joints, My ability to walk or move around is limited         NUTRITION DIAGNOSIS:  Obesity r/t long history of self-monitoring deficit and excessive energy intake aeb BMI >30 kg/m2. - continues    INTERVENTION:  Intervention Provided/Education Provided on post-op diet guidelines, GI anatomy of bariatric surgeries, ways to help prepare for post-op diet guidelines pre-operatively, portion/calorie-control, mindful eating and sources of protein.  Handouts provided - Low Sodium Diet, CHO counting  Patient demonstrates understanding.  AVS vis MyChart     Questions Reviewed With Patient 2021   How ready are you to make changes regarding your weight? Number 1 = Not ready at all to make changes up to 10 = very ready. 10   How confident are you that you can change? 1 = Not confident that you will be successful making changes up to 10 = very confident. 10       Follow up: continue post op diet ed:  vitamins/minerals essential post-operatively, and activity level  Expected Engagement: good    GOALS:  Relating To Eatin). Eat 3 meals per day  2). Use the 9\" Plate method to structure meals   - Focus on eating lean protein and serving of veggies/fruits at each meal   - Check out diabetesfoodhub.org for meal ideas   - May use a protein shake/bar or low-calorie frozen meal as back-up meal choice.   3) Avoid snacking    - Try using a distraction (playing games on your phone) to avoid cravings.    - Focus on hydration between meals  4) Eliminate calorie-containing beverages.     The Plate Method  http://www.immatics biotechnologies/181604sc.pdf    Protein Sources for Weight Loss  [email or web address]     Carbohydrates  [email or web address]     Diet Guidelines after Weight Loss Surgery  [email or web address]     Tips for Low Sodium Diet  [email or web address]    Meal Replacement Shake Options:   *Protein Shake Criteria: no " more than 210 Calories, at least 20 grams of protein, and less than 10 grams of sugar   [facility] smoothie (160 Calories, 20 g protein)   Premier Protein (160 Calories, 30 g protein)  Slim Fast Advanced Nutrition (180 Calories, 20 g protein)  Muscle Milk, lactose-free, 17 oz bottle (210 Calories, 30 g protein)  Integrated Supplements, no artificial sugars (110 Calories, 20 g protein)  Genepro, unflavored protein powder (60 Calories, 30 g protein)  Boost/Ensure Max (160 calories, 30 gm protein)   [employer]ley Core Power (170 calories, 26 gm protein)    Meal Replacement Bar Options:  [facility] Protein Shake (160 Calories, 15 g protein)  Quest Protein Bars (190 Calories, 20 g protein)  Built Bar (170 Calories, 15-20 g protein)  One Protein Bar (210 calories, 20 g protein)  [place] Signature Protein Bar ([employer]co) (190 Calories, 21 g protein)  Pure Protein Bars (180 Calories, 21 g protein)    Low Calorie Frozen Meals:  Lean Cuisine  Healthy Choice  Smart Ones  Jasson Mcclendon       Follow up: 1 month    Time spent with patient: 25 minutes.  Kayla Mcdonnell, JUWAN, LD

## 2021-10-12 ENCOUNTER — ANCILLARY PROCEDURE (OUTPATIENT)
Dept: GENERAL RADIOLOGY | Facility: CLINIC | Age: 46
End: 2021-10-12
Attending: NURSE PRACTITIONER
Payer: COMMERCIAL

## 2021-10-12 ENCOUNTER — OFFICE VISIT (OUTPATIENT)
Dept: NEUROSURGERY | Facility: CLINIC | Age: 46
End: 2021-10-12
Payer: COMMERCIAL

## 2021-10-12 VITALS
BODY MASS INDEX: 44.1 KG/M2 | WEIGHT: 315 LBS | OXYGEN SATURATION: 98 % | SYSTOLIC BLOOD PRESSURE: 128 MMHG | HEART RATE: 69 BPM | DIASTOLIC BLOOD PRESSURE: 82 MMHG | HEIGHT: 71 IN

## 2021-10-12 DIAGNOSIS — Z98.1 S/P CERVICAL SPINAL FUSION: Primary | ICD-10-CM

## 2021-10-12 DIAGNOSIS — M54.50 CHRONIC LOW BACK PAIN WITHOUT SCIATICA, UNSPECIFIED BACK PAIN LATERALITY: ICD-10-CM

## 2021-10-12 DIAGNOSIS — G89.29 CHRONIC LOW BACK PAIN WITHOUT SCIATICA, UNSPECIFIED BACK PAIN LATERALITY: ICD-10-CM

## 2021-10-12 DIAGNOSIS — Z98.1 S/P CERVICAL SPINAL FUSION: ICD-10-CM

## 2021-10-12 PROCEDURE — 99024 POSTOP FOLLOW-UP VISIT: CPT | Performed by: NURSE PRACTITIONER

## 2021-10-12 PROCEDURE — 72040 X-RAY EXAM NECK SPINE 2-3 VW: CPT | Performed by: RADIOLOGY

## 2021-10-12 RX ORDER — OXYCODONE AND ACETAMINOPHEN 5; 325 MG/1; MG/1
1 TABLET ORAL EVERY 6 HOURS PRN
Qty: 30 TABLET | Refills: 0 | Status: SHIPPED | OUTPATIENT
Start: 2021-10-12 | End: 2022-04-27

## 2021-10-12 RX ORDER — METHOCARBAMOL 500 MG/1
500 TABLET, FILM COATED ORAL 3 TIMES DAILY PRN
Qty: 30 TABLET | Refills: 1 | Status: SHIPPED | OUTPATIENT
Start: 2021-10-12

## 2021-10-12 ASSESSMENT — PAIN SCALES - GENERAL: PAINLEVEL: SEVERE PAIN (7)

## 2021-10-12 ASSESSMENT — MIFFLIN-ST. JEOR: SCORE: 2449.67

## 2021-10-12 NOTE — LETTER
"    10/12/2021         RE: Archie Wallace  [address]  [address]  [place] [address]        Dear Colleague,    Thank you for referring your patient, Archie Wallace, to the [facility] [place]. Please see a copy of my visit note below.    [facility]  Neurosurgery Followup:    HPI: 3 months s/p C3-7 ACDF with Dr. De Leon on 7/16/2021. Doing well. Has some bilateral shoulder stiffness and pain with associated headaches. Bilateral hand paresthesias as prior. Reports generalized weakness. No weakness on exam. Incision intact.     Medical, surgical, family, and social history unchanged since prior exam.    Exam:  Constitutional:  Alert, well nourished, NAD.  HEENT: Normocephalic, atraumatic.   Pulm:  Without shortness of breath   CV:  No pitting edema of BLE.     Vital Signs:  /82   Pulse 69   Ht 5' 11.25\" (1.81 m)   Wt (!) 341 lb 6.4 oz (154.9 kg)   SpO2 98%   BMI 47.28 kg/m      Neurological:  Awake  Alert  Oriented x 3  Motor exam:     Shoulder Abduction:  Right:  5/5    Left:  5/5  Biceps:                      Right:  5/5    Left:  5/5  Triceps:                     Right:  5/5    Left:  5/5  Wrist Extensors:       Right:  5/5    Left:  5/5  Wrist Flexors:           Right:  5/5    Left:  5/5  Intrinsics:                  Right:  5/5    Left:  5/5     Able to spontaneously move U/E bilaterally  Sensation intact throughout all U/E dermatomes    Incisions:  Healing nicely    Imaging:  AP and lateral films reveal intact and stable hardware.    A/P: s/p cervical spine fusion. Begin PT. Last refill of percocet and robaxin. Follow up in 3 months with cervical xr prior. He verbalized understanding and agreement.    Patient Instructions   - May increase lifting restriction and activity as tolerated.    - Physical therapy ordered. They will contact you to schedule.    - Follow up in 3 months with xray prior     - Call the clinic at [phone] for increased pain or " any other questions and concerns.    Lexie Cruz, JELLY  [place] [id number] [address] [address]  Tel [phone]  Fax [phone]        Again, thank you for allowing me to participate in the care of your patient.        Sincerely,        Lexie Cruz, NP

## 2021-10-12 NOTE — PROGRESS NOTES
"[facility] Neurosurgery  Neurosurgery Followup:    HPI: 3 months s/p C3-7 ACDF with Dr. De Leon on 7/16/2021. Doing well. Has some bilateral shoulder stiffness and pain with associated headaches. Bilateral hand paresthesias as prior. Reports generalized weakness. No weakness on exam. Incision intact.     Medical, surgical, family, and social history unchanged since prior exam.    Exam:  Constitutional:  Alert, well nourished, NAD.  HEENT: Normocephalic, atraumatic.   Pulm:  Without shortness of breath   CV:  No pitting edema of BLE.     Vital Signs:  /82   Pulse 69   Ht 5' 11.25\" (1.81 m)   Wt (!) 341 lb 6.4 oz (154.9 kg)   SpO2 98%   BMI 47.28 kg/m      Neurological:  Awake  Alert  Oriented x 3  Motor exam:     Shoulder Abduction:  Right:  5/5    Left:  5/5  Biceps:                      Right:  5/5    Left:  5/5  Triceps:                     Right:  5/5    Left:  5/5  Wrist Extensors:       Right:  5/5    Left:  5/5  Wrist Flexors:           Right:  5/5    Left:  5/5  Intrinsics:                  Right:  5/5    Left:  5/5     Able to spontaneously move U/E bilaterally  Sensation intact throughout all U/E dermatomes    Incisions:  Healing nicely    Imaging:  AP and lateral films reveal intact and stable hardware.    A/P: s/p cervical spine fusion. Begin PT. Last refill of percocet and robaxin. Follow up in 3 months with cervical xr prior. He verbalized understanding and agreement.    Patient Instructions   - May increase lifting restriction and activity as tolerated.    - Physical therapy ordered. They will contact you to schedule.    - Follow up in 3 months with xray prior     - Call the clinic at [phone] for increased pain or any other questions and concerns.    Lexie Cruz, JELLY  [facility] Neurosurgery  [address] [place] [address]  [address] [place] [address]  Tel [phone]  Fax [phone]    "

## 2021-10-12 NOTE — PATIENT INSTRUCTIONS
- May increase lifting restriction and activity as tolerated.    - Physical therapy ordered. They will contact you to schedule.    - Follow up in 3 months with xray prior     - Call the clinic at [phone] for increased pain or any other questions and concerns.

## 2021-10-26 ENCOUNTER — FCC EXTENDED DOCUMENTATION (OUTPATIENT)
Dept: PSYCHOLOGY | Facility: CLINIC | Age: 46
End: 2021-10-26

## 2021-10-27 NOTE — PROGRESS NOTES
Discharge Summary  Multiple Sessions    Client Name: Archie Wallace MRN#: [id number] YOB: 1975      Intake / Discharge Date: 3 sessions 5/18/2020        Reason for Discharge:  Client did not return    Provider will be leaving the agency. Future care of this client will be covered by other Health Care Providers as needed.        Isabella Holloway LP   10/26/2021

## 2021-11-01 NOTE — PROGRESS NOTES
"Archie Wallace is a 46 year old male who is being evaluated via a billable video visit.      The patient has been notified of following:     \"This video visit will be conducted via a call between you and your physician/provider. We have found that certain health care needs can be provided without the need for an in-person physical exam.  This service lets us provide the care you need with a video conversation.  If a prescription is necessary we can send it directly to your pharmacy.  If lab work is needed we can place an order for that and you can then stop by our lab to have the test done at a later time.    Video visits are billed at different rates depending on your insurance coverage.  Please reach out to your insurance provider with any questions.    If during the course of the call the physician/provider feels a video visit is not appropriate, you will not be charged for this service.\"    Patient has given verbal consent for Video visit? Yes  How would you like to obtain your AVS? MyChart  Will anyone else be joining your video visit? No  {If patient encounters technical issues they should call [phone]      Video-Visit Details    Type of service:  Video Visit    Video Start Time: 8:19 AM   Video End Time: 8:32 AM    Originating Location (pt. Location): Home    Distant Location (provider location):  [facility] [place]     Platform used for Video Visit: Immusoft    During this virtual visit the patient is located in [place], patient verifies this as the location during the entirety of this visit.       Bariatric Nutrition Consultation Note    Reason For Visit: Nutrition Assessment    Archie Wallace is a 46 year old presenting today for return bariatric nutrition consult.   Pt is interested in laparoscopic sleeve gastrectomy with Dr. Mata, expected surgery in TBD.  Patient is accompanied by self.  This is pt's 3rd of 6 required nutrition visits prior to surgery.     Pt referred by " "RAE Alejandro on September 7, 2021.  Patient with Co-morbidities of obesity including:  Type II DM yes (A1C 6.6 7/12/21)  Renal Failure no  Sleep apnea no  Hypertension yes  Dyslipidemia yes  Joint painno  Back pain no  GERD  yes    Support System Reviewed With Patient 9/7/2021   Who do you have in your support network that can be available to help you for the first 2 weeks after surgery? girlfriend   Who can you count on for support throughout your weight loss surgery journey? girlfriend       ANTHROPOMETRICS:  Estimated body mass index is 46.78 kg/m  as calculated from the following:    Height as of 9/7/21: 1.81 m (5' 11.25\").    Weight as of 9/7/21: 153.2 kg (337 lb 12.8 oz).    Current weight: 343 lbs (+6 lbs from initial)    Required weight loss goal pre-op: -20 lbs from initial consult weight (goal weight 317 lbs or less before surgery)       9/7/2021   I have tried the following methods to lose weight Watching portions or calories, Exercise, Atkins type diet (low carb/high protein), Slimfast, OTC Medications       Weight Loss Questions Reviewed With Patient 9/7/2021   How long have you been overweight? Since late teens through early 20's       SUPPLEMENT INFORMATION:  None    Medications for Weight Loss:  Topiramate - pt states he started, is taking at night, doesn't feel hungry during the day, but then feels really hungry at night.     NUTRITION HISTORY:  NKFA or intolerances  Pt endorsed some previous diet ed received in past. RD provided brief review of low sodium and consistent CHO as it relates to HTN and DM. Pt could benefit from continued diet ed.    Checks BG TID in morning, noon and at night  Travels a lot - 2-3 times per month   Has Keto protein shakes/bar   Already having to chew to fine texture after thyroid surgery.   Lactose intolerant.    Skipping daytime meals, since he is not feeling hungry. Finds he is overly hungry at night, making harder to control portions.     Recent Diet Recall  B: " "skipping  L (4-5 pm): light snack if anything  D (7:30-8pm): starts getting hungry - sandwich, salad, banana. Will sleep for 3 hours, then waking to eat something.    Beverages: water, zero and regular Gatorade/Powerade   Alcohol: rarely - celebrations  Dine out: 2-3 times a week    Progress Towards Previous Goals:  1). Eat 3 meals per day - not met  2). Use the 9\" Plate method to structure meals - Improving, getting more fruit/veggie servings in   - Focus on eating lean protein and serving of veggies/fruits at each meal   - Check out diabetesfoodhub.org for meal ideas   - May use a protein shake/bar or low-calorie frozen meal as back-up meal choice.   3) Avoid snacking - Improved during day, however finds he is grazing at night.   - Try using a distraction (playing games on your phone) to avoid cravings.    - Focus on hydration between meals  4) Eliminate calorie-containing beverages. - Improving, continues to have some regular Gatorade     Eating Habits 9/7/2021   Do you have any dietary restrictions? Yes   Do you currently binge eat (eat a large amount of food in a short time)? Yes   Are you an emotional eater? Yes   Do you get up to eat after falling asleep? Yes   What foods do you crave? nothing       ADDITIONAL INFORMATION:  Had spine surgery and half thyroid removed in July, per 10/12 visit with neurosurg, lifting restrictions lifted and pt to begin PT.     Dining Out History Reviewed With Patient 9/7/2021   How often do you dine out? A couple of times a week.   Where do you dine out? (select all that apply) fast food chains   What types of food do you order when you dine out? jass       Physical Activity Reviewed With Patient 9/7/2021   How often do you exercise? Never   What keeps you from being more active?  I am as active as I can possbily be, I have just had surgery on one or more of my joints, My ability to walk or move around is limited         NUTRITION DIAGNOSIS:  Obesity r/t long history of " "self-monitoring deficit and excessive energy intake aeb BMI >30 kg/m2. - continues    INTERVENTION:  Intervention Provided/Education Provided on post-op diet guidelines, ways to help prepare for post-op diet guidelines pre-operatively, portion/calorie-control, mindful eating and sources of protein.  Reviewed previous goals and strategy to meet goals  Discussed tips for portion control  Reviewed moderate consistent carb/meal intake to help with mgmt of BG.   Patient demonstrates understanding.  AVS vis MyChart     Questions Reviewed With Patient 9/7/2021   How ready are you to make changes regarding your weight? Number 1 = Not ready at all to make changes up to 10 = very ready. 10   How confident are you that you can change? 1 = Not confident that you will be successful making changes up to 10 = very confident. 10       Follow up: continue post op diet ed:  vitamins/minerals essential post-operatively, and activity level  Expected Engagement: good    GOALS:  1). Eat 3 meals per day   - Even if not hungry, have small daytime meal   2). Use the 9\" Plate method to structure meals   - Focus on eating lean protein and serving of veggies/fruits at each meal   - Check out diabetesfoodhub.org for meal ideas   - May use a protein shake/bar or low-calorie frozen meal as back-up meal choice.   3) Avoid snacking    - Try using a distraction (playing games on your phone) to avoid cravings.    - Focus on hydration between meals   - Use protein shake/bar  4) Eliminate calorie-containing beverages.     The Plate Method  [email or web address]    Protein Sources for Weight Loss  [email or web address]     Carbohydrates  [email or web address]     Diet Guidelines after Weight Loss Surgery  [email or web address]     Tips for Low Sodium Diet  [email or web address]    Meal Replacement Shake Options:   *Protein Shake Criteria: no more than 210 Calories, at least 20 grams of protein, and less " than 10 grams of sugar   [facility] smoothie (160 Calories, 20 g protein)   Premier Protein (160 Calories, 30 g protein)  Slim Fast Advanced Nutrition (180 Calories, 20 g protein)  Muscle Milk, lactose-free, 17 oz bottle (210 Calories, 30 g protein)  Integrated Supplements, no artificial sugars (110 Calories, 20 g protein)  Genepro, unflavored protein powder (60 Calories, 30 g protein)  Boost/Ensure Max (160 calories, 30 gm protein)   [employer] Core Power (170 calories, 26 gm protein)    Meal Replacement Bar Options:  [facility] Protein Shake (160 Calories, 15 g protein)  Quest Protein Bars (190 Calories, 20 g protein)  Built Bar (170 Calories, 15-20 g protein)  One Protein Bar (210 calories, 20 g protein)  [place] Signature Protein Bar ([employer]) (190 Calories, 21 g protein)  Pure Protein Bars (180 Calories, 21 g protein)    Low Calorie Frozen Meals:  Lean Cuisine  Healthy Choice  Smart Ones  Jasson Mcclendon       Follow up: 1 month    Time spent with patient: 13 minutes.  Kayla Mcdonnell RD, LD

## 2021-11-02 ENCOUNTER — VIRTUAL VISIT (OUTPATIENT)
Dept: ENDOCRINOLOGY | Facility: CLINIC | Age: 46
End: 2021-11-02
Payer: COMMERCIAL

## 2021-11-02 DIAGNOSIS — Z71.3 NUTRITIONAL COUNSELING: Primary | ICD-10-CM

## 2021-11-02 DIAGNOSIS — E11.65 TYPE 2 DIABETES MELLITUS WITH HYPERGLYCEMIA, WITHOUT LONG-TERM CURRENT USE OF INSULIN (H): ICD-10-CM

## 2021-11-02 DIAGNOSIS — E66.813 CLASS 3 SEVERE OBESITY DUE TO EXCESS CALORIES WITH SERIOUS COMORBIDITY AND BODY MASS INDEX (BMI) OF 45.0 TO 49.9 IN ADULT (H): ICD-10-CM

## 2021-11-02 DIAGNOSIS — E66.9 OBESITY: ICD-10-CM

## 2021-11-02 DIAGNOSIS — E66.01 CLASS 3 SEVERE OBESITY DUE TO EXCESS CALORIES WITH SERIOUS COMORBIDITY AND BODY MASS INDEX (BMI) OF 45.0 TO 49.9 IN ADULT (H): ICD-10-CM

## 2021-11-02 PROCEDURE — 97803 MED NUTRITION INDIV SUBSEQ: CPT | Mod: 95 | Performed by: DIETITIAN, REGISTERED

## 2021-11-02 RX ORDER — TOPIRAMATE 25 MG/1
50 TABLET, FILM COATED ORAL 2 TIMES DAILY
Qty: 120 TABLET | Refills: 3 | Status: SHIPPED | OUTPATIENT
Start: 2021-11-02 | End: 2022-08-10

## 2021-11-02 NOTE — LETTER
"11/2/2021       RE: Archie Wallace  [address]  [address]  [place] [address]     Dear Colleague,    Thank you for referring your patient, Archie Wallace, to the [facility] [place] at [facility]. Please see a copy of my visit note below.    Archie Wallace is a 46 year old male who is being evaluated via a billable video visit.      The patient has been notified of following:     \"This video visit will be conducted via a call between you and your physician/provider. We have found that certain health care needs can be provided without the need for an in-person physical exam.  This service lets us provide the care you need with a video conversation.  If a prescription is necessary we can send it directly to your pharmacy.  If lab work is needed we can place an order for that and you can then stop by our lab to have the test done at a later time.    Video visits are billed at different rates depending on your insurance coverage.  Please reach out to your insurance provider with any questions.    If during the course of the call the physician/provider feels a video visit is not appropriate, you will not be charged for this service.\"    Patient has given verbal consent for Video visit? Yes  How would you like to obtain your AVS? MyChart  Will anyone else be joining your video visit? No  {If patient encounters technical issues they should call [phone]      Video-Visit Details    Type of service:  Video Visit    Video Start Time: 8:19 AM   Video End Time: 8:32 AM    Originating Location (pt. Location): Home    Distant Location (provider location):  [facility] [place]     Platform used for Video Visit: [employer]rAssist    During this virtual visit the patient is located in [place], patient verifies this as the location during the entirety of this visit.       Bariatric Nutrition Consultation Note    Reason For " "Visit: Nutrition Assessment    Archie Wallace is a 46 year old presenting today for return bariatric nutrition consult.   Pt is interested in laparoscopic sleeve gastrectomy with Dr. Mata, expected surgery in TBD.  Patient is accompanied by self.  This is pt's 3rd of 6 required nutrition visits prior to surgery.     Pt referred by RAE Alejandro on September 7, 2021.  Patient with Co-morbidities of obesity including:  Type II DM yes (A1C 6.6 7/12/21)  Renal Failure no  Sleep apnea no  Hypertension yes  Dyslipidemia yes  Joint painno  Back pain no  GERD  yes    Support System Reviewed With Patient 9/7/2021   Who do you have in your support network that can be available to help you for the first 2 weeks after surgery? girlfriend   Who can you count on for support throughout your weight loss surgery journey? girlfriend       ANTHROPOMETRICS:  Estimated body mass index is 46.78 kg/m  as calculated from the following:    Height as of 9/7/21: 1.81 m (5' 11.25\").    Weight as of 9/7/21: 153.2 kg (337 lb 12.8 oz).    Current weight: 343 lbs (+6 lbs from initial)    Required weight loss goal pre-op: -20 lbs from initial consult weight (goal weight 317 lbs or less before surgery)       9/7/2021   I have tried the following methods to lose weight Watching portions or calories, Exercise, Atkins type diet (low carb/high protein), Slimfast, OTC Medications       Weight Loss Questions Reviewed With Patient 9/7/2021   How long have you been overweight? Since late teens through early 20's       SUPPLEMENT INFORMATION:  None    Medications for Weight Loss:  Topiramate - pt states he started, is taking at night, doesn't feel hungry during the day, but then feels really hungry at night.     NUTRITION HISTORY:  NKFA or intolerances  Pt endorsed some previous diet ed received in past. RD provided brief review of low sodium and consistent CHO as it relates to HTN and DM. Pt could benefit from continued diet ed.    Checks BG TID in " "morning, noon and at night  Travels a lot - 2-3 times per month   Has Keto protein shakes/bar   Already having to chew to fine texture after thyroid surgery.   Lactose intolerant.    Skipping daytime meals, since he is not feeling hungry. Finds he is overly hungry at night, making harder to control portions.     Recent Diet Recall  B: skipping  L (4-5 pm): light snack if anything  D (7:30-8pm): starts getting hungry - sandwich, salad, banana. Will sleep for 3 hours, then waking to eat something.    Beverages: water, zero and regular Gatorade/Powerade   Alcohol: rarely - celebrations  Dine out: 2-3 times a week    Progress Towards Previous Goals:  1). Eat 3 meals per day - not met  2). Use the 9\" Plate method to structure meals - Improving, getting more fruit/veggie servings in   - Focus on eating lean protein and serving of veggies/fruits at each meal   - Check out diabetesfoodhub.org for meal ideas   - May use a protein shake/bar or low-calorie frozen meal as back-up meal choice.   3) Avoid snacking - Improved during day, however finds he is grazing at night.   - Try using a distraction (playing games on your phone) to avoid cravings.    - Focus on hydration between meals  4) Eliminate calorie-containing beverages. - Improving, continues to have some regular Gatorade     Eating Habits 9/7/2021   Do you have any dietary restrictions? Yes   Do you currently binge eat (eat a large amount of food in a short time)? Yes   Are you an emotional eater? Yes   Do you get up to eat after falling asleep? Yes   What foods do you crave? nothing       ADDITIONAL INFORMATION:  Had spine surgery and half thyroid removed in July, per 10/12 visit with neurosurg, lifting restrictions lifted and pt to begin PT.     Dining Out History Reviewed With Patient 9/7/2021   How often do you dine out? A couple of times a week.   Where do you dine out? (select all that apply) fast food chains   What types of food do you order when you dine out? " "jass       Physical Activity Reviewed With Patient 9/7/2021   How often do you exercise? Never   What keeps you from being more active?  I am as active as I can possbily be, I have just had surgery on one or more of my joints, My ability to walk or move around is limited         NUTRITION DIAGNOSIS:  Obesity r/t long history of self-monitoring deficit and excessive energy intake aeb BMI >30 kg/m2. - continues    INTERVENTION:  Intervention Provided/Education Provided on post-op diet guidelines, ways to help prepare for post-op diet guidelines pre-operatively, portion/calorie-control, mindful eating and sources of protein.  Reviewed previous goals and strategy to meet goals  Discussed tips for portion control  Reviewed moderate consistent carb/meal intake to help with mgmt of BG.   Patient demonstrates understanding.  AVS vis MyChart     Questions Reviewed With Patient 9/7/2021   How ready are you to make changes regarding your weight? Number 1 = Not ready at all to make changes up to 10 = very ready. 10   How confident are you that you can change? 1 = Not confident that you will be successful making changes up to 10 = very confident. 10       Follow up: continue post op diet ed:  vitamins/minerals essential post-operatively, and activity level  Expected Engagement: good    GOALS:  1). Eat 3 meals per day   - Even if not hungry, have small daytime meal   2). Use the 9\" Plate method to structure meals   - Focus on eating lean protein and serving of veggies/fruits at each meal   - Check out diabetesfoodhub.org for meal ideas   - May use a protein shake/bar or low-calorie frozen meal as back-up meal choice.   3) Avoid snacking    - Try using a distraction (playing games on your phone) to avoid cravings.    - Focus on hydration between meals   - Use protein shake/bar  4) Eliminate calorie-containing beverages.     The Plate Method  [email or web address]    Protein Sources for Weight " Loss  [email or web address]     Carbohydrates  [email or web address]     Diet Guidelines after Weight Loss Surgery  [email or web address]     Tips for Low Sodium Diet  [email or web address]    Meal Replacement Shake Options:   *Protein Shake Criteria: no more than 210 Calories, at least 20 grams of protein, and less than 10 grams of sugar   [facility] smoothie (160 Calories, 20 g protein)   Premier Protein (160 Calories, 30 g protein)  Slim Fast Advanced Nutrition (180 Calories, 20 g protein)  Muscle Milk, lactose-free, 17 oz bottle (210 Calories, 30 g protein)  Integrated Supplements, no artificial sugars (110 Calories, 20 g protein)  Genepro, unflavored protein powder (60 Calories, 30 g protein)  Boost/Ensure Max (160 calories, 30 gm protein)   [employer] Core Power (170 calories, 26 gm protein)    Meal Replacement Bar Options:  [facility] Protein Shake (160 Calories, 15 g protein)  Quest Protein Bars (190 Calories, 20 g protein)  Built Bar (170 Calories, 15-20 g protein)  One Protein Bar (210 calories, 20 g protein)  [place] Signature Protein Bar ([employer]) (190 Calories, 21 g protein)  Pure Protein Bars (180 Calories, 21 g protein)    Low Calorie Frozen Meals:  Lean Cuisine  Healthy Choice  Smart Ones  Jasson Mcclendon       Follow up: 1 month    Time spent with patient: 13 minutes.  Kayla Mcdonnell, JUWAN, LD

## 2021-11-02 NOTE — PATIENT INSTRUCTIONS
"Brian Almanza,    Follow-up with RD in 1 month.    Thank you,    Kayla Mcdonnell, RD, LD  If you would like to schedule or reschedule an appointment with the RD, please call [phone]    Nutrition Goals  1). Eat 3 meals per day   - Even if not hungry, have small daytime meal   2). Use the 9\" Plate method to structure meals   - Focus on eating lean protein and serving of veggies/fruits at each meal   - Check out diabetesfoodhub.org for meal ideas   - May use a protein shake/bar or low-calorie frozen meal as back-up meal choice.   3) Avoid snacking    - Try using a distraction (playing games on your phone) to avoid cravings.    - Focus on hydration between meals   - Use protein shake/bar  4) Eliminate calorie-containing beverages.     The Plate Method  [email or web address]    Protein Sources for Weight Loss  [email or web address]     Carbohydrates  [email or web address]     Diet Guidelines after Weight Loss Surgery  [email or web address]     Tips for Low Sodium Diet  [email or web address]    Meal Replacement Shake Options:   *Protein Shake Criteria: no more than 210 Calories, at least 20 grams of protein, and less than 10 grams of sugar   [facility] smoothie (160 Calories, 20 g protein)   Premier Protein (160 Calories, 30 g protein)  Slim Fast Advanced Nutrition (180 Calories, 20 g protein)  Muscle Milk, lactose-free, 17 oz bottle (210 Calories, 30 g protein)  Integrated Supplements, no artificial sugars (110 Calories, 20 g protein)  Genepro, unflavored protein powder (60 Calories, 30 g protein)  Boost/Ensure Max (160 calories, 30 gm protein)   Everplaces Core Power (170 calories, 26 gm protein)    Meal Replacement Bar Options:  [facility] Protein Shake (160 Calories, 15 g protein)  Quest Protein Bars (190 Calories, 20 g protein)  Built Bar (170 Calories, 15-20 g protein)  One Protein Bar (210 calories, 20 g protein)  [place] Signature Protein Bar ([employer]) (190 Calories, " 21 g protein)  Pure Protein Bars (180 Calories, 21 g protein)    Low Calorie Frozen Meals:  Lean Cuisine  Healthy Choice  Smart Ones  Jasson Noy Mcclendon     Interested in working with a health ?  Health coaches work with you to improve your overall health and wellbeing.  They look at the whole person, and may involve discussion of different areas of life, including, but not limited to the four pillars of health (sleep, exercise, nutrition, and stress management). Discuss with your care team if you would like to start working a health .    Health Coaching-3 Pack:    $99 for three health coaching visits    Visits may be done in person or via phone    Coaching is a partnership between the  and the client; Coaches do not prescribe or diagnose    Coaching helps inspire the client to reach his/her personal goals      COMPREHENSIVE WEIGHT MANAGEMENT PROGRAM  VIRTUAL SUPPORT GROUPS    For Support Group Information:      We offer support groups for patients who are working on weight loss and considering, preparing for or have had weight loss surgery.   There is no cost for this opportunity.  You are invited to attend the?Virtual Support Groups?provided by any of the following locations:    1. [facility] via [employer] Teams with Alycia Oleary RN  2.   [place] [facility] Teams with Abe Lopez, PhD, LP  3.   [place] with Debi Carmona RN  4.   [facility] via Microsoft Teams with Debi [facility]    The following Support Group information can also be found on our website:  [email or web address]      [facility] Weight Loss Surgery Support Group    [facility] Weight Loss Surgery Support Group  The support group is a patient-lead forum that meets monthly to share experiences, encouragement and education. It is open to those who have had weight loss surgery, are scheduled for surgery, and those  "who are considering surgery.   WHEN: This group meets on the 3rd Wednesday of each month from 5:00PM - 6:00PM virtually using Microsoft Teams.   FACILITATOR: Led by Alycia Oleary, the program's Clinical Coordinator.   TO REGISTER: Please contact the clinic via [email or web address] or call the nurse line directly at [phone] to inform our staff that you would like an invite sent to you and to let us know the email you would like the invite sent to. Prior to the meeting, a link with directions on how to join the meeting will be sent to you.    2021 Meetings August 18: \"Let's Talk\" a time for the group to share.  September 15: \"Let's Talk\" a time for the group to share.  October 20: \"Let's Talk\" a time for the group to share.  November 17: Eusebia Dubose RD, KYRA \"Protein, Metabolism and Meal Planning\"  December 15: Walter Hatch RD, LD will speak, \"Recipe Modification\"    [facility] Support Groups    Connections: Bariatric Care Support Group?  This is open to all [facility] (and those external to this program) pre- and post- operative bariatric surgery patients as well as their support system.   WHEN: This group meets the 2nd Tuesday of each month from 6:30 PM - 8:00 PM virtually using Microsoft Teams.   FACILITATOR: Led by Abe Lopez, Ph.D who is a Licensed Psychologist with the [facility] Comprehensive Weight Management Program.   TO REGISTER: Please send an email to Abe Lopez, Ph.D., LP at?[email or web address]?if you would like an invitation to the group and to learn about using Microsoft Teams.    2021 Meetings  August 10: Open Forum  September 14: Guest Speaker: Debi Carmona, RN,CBN, CIC, [facility] Comprehensive Weight Management Program, \"Your Hospital Stay and Post-Operative Compliance\"  October 12: Open Forum  November 9: Guest Speaker: Deana Alvarenga RD,KYRA, [facility] Comprehensive Weight Management Program,\"Holiday Eating\".  December 14: " "Guest Speaker Erinn Choudhury MD, MPH, [facility], [facility], \"Body Contouring Surgery for Bariatric Surgery Patients\"     Connections: Post-Operative Bariatric Surgery Support Group  This is a support group for [facility] bariatric patients (and those external to [facility]) who have had bariatric surgery and are at least 3 months post-surgery.  WHEN: This support group meets the 4th Wednesday of the month from 11:00 AM - 12:00 PM virtually using Microsoft Teams.   FACILITATOR: Led by Certified Bariatric Nurse, Debi Carmona RN.   TO REGISTER: Please send an email to Debi at [email or web address] if you would like an invitation to the group and to learn about using [employer].      [facility] Healthy Lifestyle Virtual Support Group    Healthy Lifestyle Virtual Support Group?  This is 60 minutes of small group guided discussion, support and resources. All are welcome who want a healthy lifestyle.  WHEN: This group meets monthly on a Friday from 12:30 PM - to 1:30 PM virtually using Microsoft Teams.   FACILITATOR: Led by National Board Certified Health , Debi Pedro [facility].   TO REGISTER: Please send an email to Debi at?[email or web address] to receive monthly invites to the group or if you have any questions about having a health .  Prior to the meeting, a link with directions on how to join the meeting will be sent to you.    2021 Meetings  August 27: Open Forum  September 24: Sleep and the 7 Types of Rest  October 29: Open Forum  November 19: Gratitude     December 10: Open Forum                            "

## 2021-11-23 ENCOUNTER — THERAPY VISIT (OUTPATIENT)
Dept: PHYSICAL THERAPY | Facility: CLINIC | Age: 46
End: 2021-11-23
Payer: COMMERCIAL

## 2021-11-23 DIAGNOSIS — M54.2 NECK PAIN: ICD-10-CM

## 2021-11-23 DIAGNOSIS — M54.6 BILATERAL THORACIC BACK PAIN: ICD-10-CM

## 2021-11-23 PROCEDURE — 97162 PT EVAL MOD COMPLEX 30 MIN: CPT | Mod: GP | Performed by: PHYSICAL THERAPIST

## 2021-11-23 PROCEDURE — 97140 MANUAL THERAPY 1/> REGIONS: CPT | Mod: GP | Performed by: PHYSICAL THERAPIST

## 2021-11-23 NOTE — PROGRESS NOTES
[facility]    OUTPATIENT Physical Therapy ORTHOPEDIC EVALUATION  PLAN OF TREATMENT FOR OUTPATIENT REHABILITATION  (COMPLETE FOR INITIAL CLAIMS ONLY)  Patient's Last Name, First Name, M.I.  YOB: 1975  Archie Wallace    Provider s Name:  [facility]   Medical Record No.  [id number]   Start of Care Date:  11/23/21   Onset Date:   10/12/21 (MD referral )   Type:     _X__PT   ___OT Medical Diagnosis:    Encounter Diagnoses   Name Primary?     Neck pain      Bilateral thoracic back pain         Treatment Diagnosis:  neck pain s/p fusion        Goals:     11/23/21 0500   Body Part   Goals listed below are for neck pain   Goal #1   Goal #1 sleeping   Previous Functional Level No restrictions   Current Functional Level 3-5 hours without sleep per night   STG Target Performance 2-3 hours without sleep per night   Rationale to establish restorative sleep pattern   Due Date 12/28/21   LTG Target Performance 1-2 hours without sleep per night   Rationale to establish restorative sleep pattern   Due Date 02/01/22   Goal #2   Goal #2 driving/transportation   Previous Functional Level No restrictions   Current Functional Level Drives using side and rearview mirrors   Performance Level 6/10 pain   STG Target Performance Drive using side and rearview mirrors   Performance Level 4/10 pain   Rationale for safe driving   Due date 12/28/21   LTG Target Performance Drive using side and rearview mirrors   Performance Level 1/10 pain   Rationale for safe driving   Due date 02/01/22       Therapy Frequency:  1x/ week  Predicted Duration of Therapy Intervention:  10 week    Hang Gottlieb, PT                 I CERTIFY THE NEED FOR THESE SERVICES FURNISHED UNDER        THIS PLAN OF TREATMENT AND WHILE UNDER MY CARE     (Physician attestation of this document indicates review and certification  of the therapy plan).                       Certification Date From:  11/23/21   Certification Date To:  02/20/22    Referring Provider:  Lexie Cruz    Initial Assessment        See Epic Evaluation SOC Date: 11/23/21

## 2021-11-23 NOTE — PROGRESS NOTES
Physical Therapy Initial Evaluation  Subjective:  The history is provided by the patient.   Therapist Generated HPI Evaluation  Problem details: DOS 7/16/2021.  R> L neck pain.  Feels like he has weakness in bilat UE.  MD referral 10/12/2021..         Type of problem:  Cervical spine and thoracic spine.    This is a chronic condition.  Condition occurred with:  Insidious onset.  Where condition occurred: for unknown reasons.  Patient reports pain:  Central cervical spine, lower cervical spine, cervical left side, cervical right side, mid cervical spine, upper thoracic, mid thoracic, thoracic left side and thoracic right side (R > L ).  Pain is described as aching and is constant.  Pain radiates to:  Shoulder left and shoulder right. Pain is the same all the time.  Since onset symptoms are unchanged.  Associated symptoms:  Headache, loss of motion/stiffness, numbness, tingling and loss of strength. Symptoms are exacerbated by driving, rotating head, looking up or down, certain positions and lifting  and relieved by ice and heat.    Previous treatment includes surgery. There was mild improvement following previous treatment.  Restrictions due to condition include:  Currently not working due to present treatment.      Patient Health History  Archie Wallace being seen for neck pain.     Date of Onset: Pain started in June.      Pain is reported as 6/10 on pain scale.  General health as reported by patient is poor.  Pertinent medical history includes: asthma, diabetes, heart problems, high blood pressure, numbness/tingling, overweight, sleep disorder/apnea and thyroid problems.   Red flags:  Pain at rest/night and significant weakness.   Other medical allergies details: see in EPIC.   Surgeries include:  Orthopedic surgery. Other surgery history details: neck.     Other medications details: List in EPIC.    Current occupation is .   Primary job tasks include:  Repetitive tasks, lifting/carrying, operating a  machine/assembly, prolonged standing and pushing/pulling.                                    Objective:  Standing Alignment:    Cervical/Thoracic:  Forward head  Shoulder/UE:  Rounded shoulders                                  Cervical/Thoracic Evaluation    AROM:  AROM Cervical:    Flexion:          Decreased 50%  Extension:       Neutral  Rotation:         Left: decreased 75%     Right: decreased 90%  Side Bend:      Left:     Right:       Headaches: cervical  Cervical Myotomes:  normal (pain with testing;  reports muscle pain in R ribs with testing on R)                      Cervical Dermatomes:  normal                    Cervical Palpation:    Tenderness present at Left:    Sternocleidomstoid; Scalenes; Rhomboids; Upper Trap; Levator; Erector Spinae; Facet and Suboccipitals  Tenderness present at Right:    Sternocleidomstoid; Scalenes; Rhomboids; Upper Trap; Levator; Erector Spinae; Facet and Suboccipitals      Spinal Segmental Conclusions:  No assessed                                                  General     ROS    Assessment/Plan:    Patient is a 46 year old male with cervical complaints.    Patient has the following significant findings with corresponding treatment plan.                Diagnosis 1:  Cervical pain s/p C3-C7 fusion  Pain -  manual therapy, self management, education, directional preference exercise and home program  Decreased ROM/flexibility - manual therapy and therapeutic exercise  Decreased strength - therapeutic exercise and therapeutic activities  Impaired muscle performance - neuro re-education  Decreased function - therapeutic activities    Therapy Evaluation Codes:   1) History comprised of:   Personal factors that impact the plan of care:      None.    Comorbidity factors that impact the plan of care are:      Asthma, Diabetes, Heart problems, High blood pressure, Numbness/tingling, Overweight, Sleep disorder/apnea and Weakness.     Medications impacting care: High blood  pressure.  2) Examination of Body Systems comprised of:   Body structures and functions that impact the plan of care:      Cervical spine.   Activity limitations that impact the plan of care are:      Driving, Lifting and Laying down.  3) Clinical presentation characteristics are:   Evolving/Changing.  4) Decision-Making    Moderate complexity using standardized patient assessment instrument and/or measureable assessment of functional outcome.  Cumulative Therapy Evaluation is: Moderate complexity.    Previous and current functional limitations:  (See Goal Flow Sheet for this information)    Short term and Long term goals: (See Goal Flow Sheet for this information)     Communication ability:  Patient appears to be able to clearly communicate and understand verbal and written communication and follow directions correctly.  Treatment Explanation - The following has been discussed with the patient:   RX ordered/plan of care  Anticipated outcomes  Possible risks and side effects  This patient would benefit from PT intervention to resume normal activities.   Rehab potential is good.    Frequency:  1 X week, once daily  Duration:  for 10 weeks  Discharge Plan:  Achieve all LTG.  Independent in home treatment program.  Reach maximal therapeutic benefit.    Please refer to the daily flowsheet for treatment today, total treatment time and time spent performing 1:1 timed codes.

## 2021-11-29 NOTE — PROGRESS NOTES
"Archie Wallace is a 46 year old male who is being evaluated via a billable video visit.      The patient has been notified of following:     \"This video visit will be conducted via a call between you and your physician/provider. We have found that certain health care needs can be provided without the need for an in-person physical exam.  This service lets us provide the care you need with a video conversation.  If a prescription is necessary we can send it directly to your pharmacy.  If lab work is needed we can place an order for that and you can then stop by our lab to have the test done at a later time.    Video visits are billed at different rates depending on your insurance coverage.  Please reach out to your insurance provider with any questions.    If during the course of the call the physician/provider feels a video visit is not appropriate, you will not be charged for this service.\"    Patient has given verbal consent for Video visit? Yes  How would you like to obtain your AVS? MyChart  Will anyone else be joining your video visit? No  {If patient encounters technical issues they should call [phone]      Video-Visit Details    Type of service:  Video Visit    Video Start Time: 8:03 AM   Video End Time: 8:18 AM    Originating Location (pt. Location): Home    Distant Location (provider location):  [facility] [place]     Platform used for Video Visit: "[employer] - The Simple, Fast and Elegant Tablet Sales App"    During this virtual visit the patient is located in [place], patient verifies this as the location during the entirety of this visit.       Bariatric Nutrition Consultation Note    Reason For Visit: Nutrition Assessment    Archie Wallace is a 46 year old presenting today for return bariatric nutrition consult.   Pt is interested in laparoscopic sleeve gastrectomy with Dr. Mata, expected surgery in D.  Patient is accompanied by self.  This is pt's 4th of 6 required nutrition visits prior to surgery.     Coordination " "note: Reviewed task list with pt, encouraged he start working on setting up appointments. Pt agreeable.     Pt referred by RAE Alejandro on September 7, 2021.  Patient with Co-morbidities of obesity including:  Type II DM yes (A1C 6.6 7/12/21)  Renal Failure no  Sleep apnea no  Hypertension yes  Dyslipidemia yes  Joint painno  Back pain no  GERD  yes    Support System Reviewed With Patient 9/7/2021   Who do you have in your support network that can be available to help you for the first 2 weeks after surgery? girlfriend   Who can you count on for support throughout your weight loss surgery journey? girlfriend       ANTHROPOMETRICS:  Estimated body mass index is 46.78 kg/m  as calculated from the following:    Height as of 9/7/21: 1.81 m (5' 11.25\").    Weight as of 9/7/21: 153.2 kg (337 lb 12.8 oz).    Most recent weight: 343 lbs (+6 lbs from initial) - has not checked weight since Oct.    Required weight loss goal pre-op: -20 lbs from initial consult weight (goal weight 317 lbs or less before surgery)       9/7/2021   I have tried the following methods to lose weight Watching portions or calories, Exercise, Atkins type diet (low carb/high protein), Slimfast, OTC Medications       Weight Loss Questions Reviewed With Patient 9/7/2021   How long have you been overweight? Since late teens through early 20's       SUPPLEMENT INFORMATION:  None    Medications for Weight Loss:  Topiramate - Pt reports he started higher dose the next day after it was ordered by RAE Alejandro. Reports no issues with higher dose, however he is unsure if helping yet. Encouraged pt ti follow-up with PharmD to discuss further.     NUTRITION HISTORY:  No known food allergies. Lactose intolerant.     Checks BG TID in morning, noon and at night  Travels a lot - 2-3 times per month   Already having to chew to fine texture after thyroid surgery.     Doesn't eat if not hungry, leads to skipping daytime meals. Sometimes eating out of boredom. " "Waking in the night every 2-3 hours, will need something sweet (candy, Lil Odalys's cakes) to fall back asleep. This is likely contributing to daytime fullness.     Recent Diet Recall  B: skipping  L: skipped  D (7:00 pm): 1 Burger (picked-up)  Beverages: water, juice regular Gatorade/Powerade   Alcohol: rarely - celebrations  Dine out: 2-3 times a week    Progress Towards Previous Goals:  1). Eat 3 meals per day - Not met, often skips breakfast and lunch   - Even if not hungry, have small daytime meal   2). Use the 9\" Plate method to structure meals - Met at times   - Focus on eating lean protein and serving of veggies/fruits at each meal   - Check out diabetesfoodhub.org for meal ideas   - May use a protein shake/bar or low-calorie frozen meal as back-up meal choice.   3) Avoid snacking - Improved while awake, however continues to snack frequently through the night.   - Try using a distraction (playing games on your phone) to avoid cravings.    - Focus on hydration between meals   - Use protein shake/bar  4) Eliminate calorie-containing beverages. - Improving, has started Gatorade zero instead.       Eating Habits 9/7/2021   Do you have any dietary restrictions? Yes   Do you currently binge eat (eat a large amount of food in a short time)? Yes   Are you an emotional eater? Yes   Do you get up to eat after falling asleep? Yes   What foods do you crave? nothing       ADDITIONAL INFORMATION:  Had spine surgery and half thyroid removed in July, per 10/12 visit with neurosurg, lifting restrictions lifted and pt to begin PT.     Dining Out History Reviewed With Patient 9/7/2021   How often do you dine out? A couple of times a week.   Where do you dine out? (select all that apply) fast food chains   What types of food do you order when you dine out? jass       Physical Activity Reviewed With Patient 9/7/2021   How often do you exercise? Never   What keeps you from being more active?  I am as active as I can possbily " "be, I have just had surgery on one or more of my joints, My ability to walk or move around is limited         NUTRITION DIAGNOSIS:  Obesity r/t long history of self-monitoring deficit and excessive energy intake aeb BMI >30 kg/m2. - continues    INTERVENTION:  Intervention Provided/Education Provided on post-op diet guidelines, ways to help prepare for post-op diet guidelines pre-operatively, portion/calorie-control, mindful eating and sources of protein.  Reviewed previous goals and strategy to meet goals  Reviewed moderate consistent carb/meal intake to help with mgmt of BG.   Encouraged consistent meal pattern and working on eliminating night-time snacking.   Encouraged frequent monitoring of weight.   Patient demonstrates understanding.  AVS vis MyChart     Questions Reviewed With Patient 9/7/2021   How ready are you to make changes regarding your weight? Number 1 = Not ready at all to make changes up to 10 = very ready. 10   How confident are you that you can change? 1 = Not confident that you will be successful making changes up to 10 = very confident. 10       Expected Engagement: fair-good    GOALS:  1). Eat 3 meals per day   - Even if not hungry, have small daytime meal    - May use protein shake/bar  2). Use the 9\" Plate method to structure meals   - Focus on eating lean protein and serving of veggies/fruits at each meal   - Check out diabetesfoodhub.org for meal ideas   - May use a protein shake/bar or low-calorie frozen meal as back-up meal choice.   3) Avoid snacking after waking  4) Eliminate calorie-containing beverages     The Plate Method  [email or web address]    Protein Sources for Weight Loss  [email or web address]     Carbohydrates  [email or web address]     Diet Guidelines after Weight Loss Surgery  [email or web address]     Tips for Low Sodium Diet  [email or web address]    Meal Replacement Shake Options:   *Protein Shake Criteria: no more than " 210 Calories, at least 20 grams of protein, and less than 10 grams of sugar   [facility] smoothie (160 Calories, 20 g protein)   Premier Protein (160 Calories, 30 g protein)  Slim Fast Advanced Nutrition (180 Calories, 20 g protein)  Muscle Milk, lactose-free, 17 oz bottle (210 Calories, 30 g protein)  Integrated Supplements, no artificial sugars (110 Calories, 20 g protein)  Genepro, unflavored protein powder (60 Calories, 30 g protein)  Boost/Ensure Max (160 calories, 30 gm protein)   TSCA Core Power (170 calories, 26 gm protein)    Meal Replacement Bar Options:  [facility] Protein Shake (160 Calories, 15 g protein)  Quest Protein Bars (190 Calories, 20 g protein)  Built Bar (170 Calories, 15-20 g protein)  One Protein Bar (210 calories, 20 g protein)  [employer] Signature Protein Bar ([employer]) (190 Calories, 21 g protein)  Pure Protein Bars (180 Calories, 21 g protein)    Low Calorie Frozen Meals:  Lean Cuisine  Healthy Choice  Smart Ones  Jasson Mcclendon       Follow up: 1 month    Time spent with patient: 15 minutes.  Kayla Mcdonnell, RD, LD

## 2021-11-30 ENCOUNTER — VIRTUAL VISIT (OUTPATIENT)
Dept: ENDOCRINOLOGY | Facility: CLINIC | Age: 46
End: 2021-11-30
Payer: COMMERCIAL

## 2021-11-30 DIAGNOSIS — E11.65 TYPE 2 DIABETES MELLITUS WITH HYPERGLYCEMIA, WITHOUT LONG-TERM CURRENT USE OF INSULIN (H): ICD-10-CM

## 2021-11-30 DIAGNOSIS — E66.9 OBESITY: ICD-10-CM

## 2021-11-30 DIAGNOSIS — Z71.3 NUTRITIONAL COUNSELING: Primary | ICD-10-CM

## 2021-11-30 PROCEDURE — 97803 MED NUTRITION INDIV SUBSEQ: CPT | Mod: 95 | Performed by: DIETITIAN, REGISTERED

## 2021-11-30 NOTE — LETTER
"11/30/2021       RE: Archie Wallace  [address]  [address]  [place] [address]     Dear Colleague,    Thank you for referring your patient, Archie Wallace, to the [facility] [place] [facility]. Please see a copy of my visit note below.    Archie Wallace is a 46 year old male who is being evaluated via a billable video visit.      The patient has been notified of following:     \"This video visit will be conducted via a call between you and your physician/provider. We have found that certain health care needs can be provided without the need for an in-person physical exam.  This service lets us provide the care you need with a video conversation.  If a prescription is necessary we can send it directly to your pharmacy.  If lab work is needed we can place an order for that and you can then stop by our lab to have the test done at a later time.    Video visits are billed at different rates depending on your insurance coverage.  Please reach out to your insurance provider with any questions.    If during the course of the call the physician/provider feels a video visit is not appropriate, you will not be charged for this service.\"    Patient has given verbal consent for Video visit? Yes  How would you like to obtain your AVS? MyChart  Will anyone else be joining your video visit? No  {If patient encounters technical issues they should call [phone]      Video-Visit Details    Type of service:  Video Visit    Video Start Time: 8:03 AM   Video End Time: 8:18 AM    Originating Location (pt. Location): Home    Distant Location (provider location):  [facility] [place]     Platform used for Video Visit: [employer]ellow[employer]    During this virtual visit the patient is located in [place], patient verifies this as the location during the entirety of this visit.       Bariatric Nutrition Consultation Note    Reason " "For Visit: Nutrition Assessment    Archie Wallace is a 46 year old presenting today for return bariatric nutrition consult.   Pt is interested in laparoscopic sleeve gastrectomy with Dr. Mata, expected surgery in TBD.  Patient is accompanied by self.  This is pt's 4th of 6 required nutrition visits prior to surgery.     Coordination note: Reviewed task list with pt, encouraged he start working on setting up appointments. Pt agreeable.     Pt referred by RAE Alejandro on September 7, 2021.  Patient with Co-morbidities of obesity including:  Type II DM yes (A1C 6.6 7/12/21)  Renal Failure no  Sleep apnea no  Hypertension yes  Dyslipidemia yes  Joint painno  Back pain no  GERD  yes    Support System Reviewed With Patient 9/7/2021   Who do you have in your support network that can be available to help you for the first 2 weeks after surgery? girlfriend   Who can you count on for support throughout your weight loss surgery journey? girlfriend       ANTHROPOMETRICS:  Estimated body mass index is 46.78 kg/m  as calculated from the following:    Height as of 9/7/21: 1.81 m (5' 11.25\").    Weight as of 9/7/21: 153.2 kg (337 lb 12.8 oz).    Most recent weight: 343 lbs (+6 lbs from initial) - has not checked weight since Oct.    Required weight loss goal pre-op: -20 lbs from initial consult weight (goal weight 317 lbs or less before surgery)       9/7/2021   I have tried the following methods to lose weight Watching portions or calories, Exercise, Atkins type diet (low carb/high protein), Slimfast, OTC Medications       Weight Loss Questions Reviewed With Patient 9/7/2021   How long have you been overweight? Since late teens through early 20's       SUPPLEMENT INFORMATION:  None    Medications for Weight Loss:  Topiramate - Pt reports he started higher dose the next day after it was ordered by RAE Alejandro. Reports no issues with higher dose, however he is unsure if helping yet. Encouraged pt ti follow-up with PharmD " "to discuss further.     NUTRITION HISTORY:  No known food allergies. Lactose intolerant.     Checks BG TID in morning, noon and at night  Travels a lot - 2-3 times per month   Already having to chew to fine texture after thyroid surgery.     Doesn't eat if not hungry, leads to skipping daytime meals. Sometimes eating out of boredom. Waking in the night every 2-3 hours, will need something sweet (candy, Lil Odalys's cakes) to fall back asleep. This is likely contributing to daytime fullness.     Recent Diet Recall  B: skipping  L: skipped  D (7:00 pm): 1 Burger (picked-up)  Beverages: water, juice regular Gatorade/Powerade   Alcohol: rarely - celebrations  Dine out: 2-3 times a week    Progress Towards Previous Goals:  1). Eat 3 meals per day - Not met, often skips breakfast and lunch   - Even if not hungry, have small daytime meal   2). Use the 9\" Plate method to structure meals - Met at times   - Focus on eating lean protein and serving of veggies/fruits at each meal   - Check out diabetesfoodhub.org for meal ideas   - May use a protein shake/bar or low-calorie frozen meal as back-up meal choice.   3) Avoid snacking - Improved while awake, however continues to snack frequently through the night.   - Try using a distraction (playing games on your phone) to avoid cravings.    - Focus on hydration between meals   - Use protein shake/bar  4) Eliminate calorie-containing beverages. - Improving, has started Gatorade zero instead.       Eating Habits 9/7/2021   Do you have any dietary restrictions? Yes   Do you currently binge eat (eat a large amount of food in a short time)? Yes   Are you an emotional eater? Yes   Do you get up to eat after falling asleep? Yes   What foods do you crave? nothing       ADDITIONAL INFORMATION:  Had spine surgery and half thyroid removed in July, per 10/12 visit with neurosurg, lifting restrictions lifted and pt to begin PT.     Dining Out History Reviewed With Patient 9/7/2021   How often " "do you dine out? A couple of times a week.   Where do you dine out? (select all that apply) fast food chains   What types of food do you order when you dine out? jass       Physical Activity Reviewed With Patient 9/7/2021   How often do you exercise? Never   What keeps you from being more active?  I am as active as I can possbily be, I have just had surgery on one or more of my joints, My ability to walk or move around is limited         NUTRITION DIAGNOSIS:  Obesity r/t long history of self-monitoring deficit and excessive energy intake aeb BMI >30 kg/m2. - continues    INTERVENTION:  Intervention Provided/Education Provided on post-op diet guidelines, ways to help prepare for post-op diet guidelines pre-operatively, portion/calorie-control, mindful eating and sources of protein.  Reviewed previous goals and strategy to meet goals  Reviewed moderate consistent carb/meal intake to help with mgmt of BG.   Encouraged consistent meal pattern and working on eliminating night-time snacking.   Encouraged frequent monitoring of weight.   Patient demonstrates understanding.  AVS vis MyChart     Questions Reviewed With Patient 9/7/2021   How ready are you to make changes regarding your weight? Number 1 = Not ready at all to make changes up to 10 = very ready. 10   How confident are you that you can change? 1 = Not confident that you will be successful making changes up to 10 = very confident. 10       Expected Engagement: fair-good    GOALS:  1). Eat 3 meals per day   - Even if not hungry, have small daytime meal    - May use protein shake/bar  2). Use the 9\" Plate method to structure meals   - Focus on eating lean protein and serving of veggies/fruits at each meal   - Check out diabetesfoodhub.org for meal ideas   - May use a protein shake/bar or low-calorie frozen meal as back-up meal choice.   3) Avoid snacking after waking  4) Eliminate calorie-containing beverages     The Plate " Method  [email or web address]    Protein Sources for Weight Loss  [email or web address]     Carbohydrates  [email or web address]     Diet Guidelines after Weight Loss Surgery  [email or web address]     Tips for Low Sodium Diet  [email or web address]    Meal Replacement Shake Options:   *Protein Shake Criteria: no more than 210 Calories, at least 20 grams of protein, and less than 10 grams of sugar    [facility] smoothie (160 Calories, 20 g protein)   Premier Protein (160 Calories, 30 g protein)  Slim Fast Advanced Nutrition (180 Calories, 20 g protein)  Muscle Milk, lactose-free, 17 oz bottle (210 Calories, 30 g protein)  Integrated Supplements, no artificial sugars (110 Calories, 20 g protein)  Genepro, unflavored protein powder (60 Calories, 30 g protein)  Boost/Ensure Max (160 calories, 30 gm protein)   VYou Core Power (170 calories, 26 gm protein)    Meal Replacement Bar Options:   [facility] Protein Shake (160 Calories, 15 g protein)  Quest Protein Bars (190 Calories, 20 g protein)  Built Bar (170 Calories, 15-20 g protein)  One Protein Bar (210 calories, 20 g protein)  [employer] Signature Protein Bar ([employer]) (190 Calories, 21 g protein)  Pure Protein Bars (180 Calories, 21 g protein)    Low Calorie Frozen Meals:  Lean Cuisine  Healthy Choice  Smart Ones  Jasson Mcclendon       Follow up: 1 month    Time spent with patient: 15 minutes.  Kayla Mcdonnell RD, LD

## 2021-11-30 NOTE — PATIENT INSTRUCTIONS
"Brian Almanza,    Follow-up with RD in 1 month.    Thank you,    Kayla Mcdonnell, RD, LD  If you would like to schedule or reschedule an appointment with the RD, please call [phone]    Nutrition Goals  1). Eat 3 meals per day   - Even if not hungry, have small daytime meal    - May use protein shake/bar  2). Use the 9\" Plate method to structure meals   - Focus on eating lean protein and serving of veggies/fruits at each meal   - Check out diabetesfoodhub.org for meal ideas   - May use a protein shake/bar or low-calorie frozen meal as back-up meal choice.   3) Avoid snacking after waking  4) Eliminate calorie-containing beverages     The Plate Method  [email or web address]    Protein Sources for Weight Loss  [email or web address]     Carbohydrates  [email or web address]     Diet Guidelines after Weight Loss Surgery  [email or web address]     Tips for Low Sodium Diet  [email or web address]    Meal Replacement Shake Options:   *Protein Shake Criteria: no more than 210 Calories, at least 20 grams of protein, and less than 10 grams of sugar   [facility] smoothie (160 Calories, 20 g protein)   Premier Protein (160 Calories, 30 g protein)  Slim Fast Advanced Nutrition (180 Calories, 20 g protein)  Muscle Milk, lactose-free, 17 oz bottle (210 Calories, 30 g protein)  Integrated Supplements, no artificial sugars (110 Calories, 20 g protein)  Genepro, unflavored protein powder (60 Calories, 30 g protein)  Boost/Ensure Max (160 calories, 30 gm protein)   [employer]life Core Power (170 calories, 26 gm protein)    Meal Replacement Bar Options:  [facility] Protein Shake (160 Calories, 15 g protein)  Quest Protein Bars (190 Calories, 20 g protein)  Built Bar (170 Calories, 15-20 g protein)  One Protein Bar (210 calories, 20 g protein)  [place] Signature Protein Bar (Costco) (190 Calories, 21 g protein)  Pure Protein Bars (180 Calories, 21 g protein)    Low Calorie Frozen Meals:  Lean " Mar  Healthy Choice  Smart Ones  Jasson Jenningsmillicent Mcclendon         Interested in working with a health ?  Health coaches work with you to improve your overall health and wellbeing.  They look at the whole person, and may involve discussion of different areas of life, including, but not limited to the four pillars of health (sleep, exercise, nutrition, and stress management). Discuss with your care team if you would like to start working a health .    Health Coaching-3 Pack:    $99 for three health coaching visits    Visits may be done in person or via phone    Coaching is a partnership between the  and the client; Coaches do not prescribe or diagnose    Coaching helps inspire the client to reach his/her personal goals      COMPREHENSIVE WEIGHT MANAGEMENT PROGRAM  VIRTUAL SUPPORT GROUPS    For Support Group Information:      We offer support groups for patients who are working on weight loss and considering, preparing for or have had weight loss surgery.   There is no cost for this opportunity.  You are invited to attend the?Virtual Support Groups?provided by any of the following locations:    1. [facility] via Microsoft Teams with Alycia Oleary RN  2.   [place] via [place] with Abe Lopez, PhD, LP  3.   [place] [facility] [employer] with Debi Carmona RN  4.   [facility] via Microsoft Teams with Debi Pedro [facility]    The following Support Group information can also be found on our website:  [email or web address]      [facility] Weight Loss Surgery Support Group    [facility] Weight Loss Surgery Support Group  The support group is a patient-lead forum that meets monthly to share experiences, encouragement and education. It is open to those who have had weight loss surgery, are scheduled for surgery, and those who are considering surgery.   WHEN: This group meets on the 3rd Wednesday of each month  "from 5:00PM - 6:00PM virtually using Microsoft Teams.   FACILITATOR: Led by Alycia Oleary, the program's Clinical Coordinator.   TO REGISTER: Please contact the clinic via [place] or call the nurse line directly at [phone] to inform our staff that you would like an invite sent to you and to let us know the email you would like the invite sent to. Prior to the meeting, a link with directions on how to join the meeting will be sent to you.    2021 Meetings August 18: \"Let's Talk\" a time for the group to share.  September 15: \"Let's Talk\" a time for the group to share.  October 20: \"Let's Talk\" a time for the group to share.  November 17: Eusebia Dubose RD, KYRA \"Protein, Metabolism and Meal Planning\"  December 15: Walter Hatch RD, LD will speak, \"Recipe Modification\"    [facility] Support Groups    Connections: Bariatric Care Support Group?  This is open to all [facility] (and those external to this program) pre- and post- operative bariatric surgery patients as well as their support system.   WHEN: This group meets the 2nd Tuesday of each month from 6:30 PM - 8:00 PM virtually using Microsoft Teams.   FACILITATOR: Led by Abe Lopez, Ph.D who is a Licensed Psychologist with the [facility] Comprehensive Weight Management Program.   TO REGISTER: Please send an email to Abe Lopez, Ph.D., LP at?[email or web address]?if you would like an invitation to the group and to learn about using Microsoft Teams.    2021 Meetings  August 10: Open Forum  September 14: Guest Speaker: Debi Carmona, RN,CBN, CIC, [facility] Comprehensive Weight Management Program, \"Your Hospital Stay and Post-Operative Compliance\"  October 12: Open Forum  November 9: Guest Speaker: Deana Alvarenga RD,KYRA, [facility] Comprehensive Weight Management Program,\"Holiday Eating\".  December 14: Guest Speaker Erinn Choudhury MD, MPH, [facility], [facility], \"Body " "Contouring Surgery for Bariatric Surgery Patients\"     Connections: Post-Operative Bariatric Surgery Support Group  This is a support group for [facility] bariatric patients (and those external to [facility]) who have had bariatric surgery and are at least 3 months post-surgery.  WHEN: This support group meets the 4th Wednesday of the month from 11:00 AM - 12:00 PM virtually using Microsoft Teams.   FACILITATOR: Led by Certified Bariatric Nurse, Debi Carmona RN.   TO REGISTER: Please send an email to Debi at [email or web address] if you would like an invitation to the group and to learn about using [employer].      [facility] Healthy Lifestyle Virtual Support Group    Healthy Lifestyle Virtual Support Group?  This is 60 minutes of small group guided discussion, support and resources. All are welcome who want a healthy lifestyle.  WHEN: This group meets monthly on a Friday from 12:30 PM - to 1:30 PM virtually using Microsoft Teams.   FACILITATOR: Led by National Board Certified Health , Debi Pedro, [facility].   TO REGISTER: Please send an email to Debi [email or web address] to receive monthly invites to the group or if you have any questions about having a health .  Prior to the meeting, a link with directions on how to join the meeting will be sent to you.    2021 Meetings  August 27: Open Forum  September 24: Sleep and the 7 Types of Rest  October 29: Open Forum  November 19: Gratitude     December 10: Open Forum                            "

## 2021-12-02 ENCOUNTER — OFFICE VISIT (OUTPATIENT)
Dept: OPTOMETRY | Facility: CLINIC | Age: 46
End: 2021-12-02
Payer: COMMERCIAL

## 2021-12-02 ENCOUNTER — APPOINTMENT (OUTPATIENT)
Dept: OPTOMETRY | Facility: CLINIC | Age: 46
End: 2021-12-02
Payer: COMMERCIAL

## 2021-12-02 DIAGNOSIS — E11.9 TYPE 2 DIABETES MELLITUS WITHOUT RETINOPATHY (H): ICD-10-CM

## 2021-12-02 DIAGNOSIS — H52.221 REGULAR ASTIGMATISM OF RIGHT EYE: ICD-10-CM

## 2021-12-02 DIAGNOSIS — Z01.01 ENCOUNTER FOR EXAMINATION OF EYES AND VISION WITH ABNORMAL FINDINGS: Primary | ICD-10-CM

## 2021-12-02 DIAGNOSIS — H52.13 MYOPIA OF BOTH EYES: ICD-10-CM

## 2021-12-02 DIAGNOSIS — H52.4 PRESBYOPIA: ICD-10-CM

## 2021-12-02 PROBLEM — J39.2 NASOPHARYNGEAL MASS: Status: ACTIVE | Noted: 2020-12-17

## 2021-12-02 PROBLEM — J35.2 ADENOID HYPERTROPHY: Status: ACTIVE | Noted: 2020-12-17

## 2021-12-02 PROCEDURE — 92015 DETERMINE REFRACTIVE STATE: CPT | Performed by: OPTOMETRIST

## 2021-12-02 PROCEDURE — 92014 COMPRE OPH EXAM EST PT 1/>: CPT | Performed by: OPTOMETRIST

## 2021-12-02 PROCEDURE — V2020 VISION SVCS FRAMES PURCHASES: HCPCS | Mod: GA | Performed by: OPTOMETRIST

## 2021-12-02 PROCEDURE — V2100 LENS SPHER SINGLE PLANO 4.00: HCPCS | Mod: LT | Performed by: OPTOMETRIST

## 2021-12-02 RX ORDER — CETIRIZINE HYDROCHLORIDE 10 MG/1
10 TABLET ORAL
COMMUNITY
Start: 2020-03-25 | End: 2022-04-27

## 2021-12-02 RX ORDER — ATORVASTATIN CALCIUM 80 MG/1
TABLET, FILM COATED ORAL
COMMUNITY
Start: 2021-08-28 | End: 2022-04-27

## 2021-12-02 ASSESSMENT — REFRACTION_MANIFEST
OS_ADD: +2.00
OD_AXIS: 094
OS_SPHERE: -1.25
OD_SPHERE: -2.50
OS_CYLINDER: SPHERE
OD_CYLINDER: +1.25
OD_ADD: +2.00

## 2021-12-02 ASSESSMENT — EXTERNAL EXAM - LEFT EYE: OS_EXAM: NORMAL

## 2021-12-02 ASSESSMENT — SLIT LAMP EXAM - LIDS
COMMENTS: NORMAL
COMMENTS: NORMAL

## 2021-12-02 ASSESSMENT — CONF VISUAL FIELD
OD_NORMAL: 1
OS_NORMAL: 1
METHOD: COUNTING FINGERS

## 2021-12-02 ASSESSMENT — CUP TO DISC RATIO
OS_RATIO: 0.2
OD_RATIO: 0.2

## 2021-12-02 ASSESSMENT — VISUAL ACUITY
METHOD: SNELLEN - LINEAR
OS_SC: 20/40
OD_SC: 20/60
OS_SC: 20/80
OD_SC: 20/40
OD_SC+: -1

## 2021-12-02 ASSESSMENT — TONOMETRY
OS_IOP_MMHG: 16
IOP_METHOD: ICARE
OD_IOP_MMHG: 16

## 2021-12-02 ASSESSMENT — EXTERNAL EXAM - RIGHT EYE: OD_EXAM: NORMAL

## 2021-12-02 NOTE — PATIENT INSTRUCTIONS
Patient educated on importance of good blood sugar control.  Letter sent to primary care provider with diabetic eye exam report.     Archie was advised of today's exam findings.  Fill glasses prescription  Allow 2 weeks to adapt to change in glasses  Wear glasses full time  Copy of glasses Rx provided today.    Return in 1 year for eye exam, or sooner if needed.    The effects of the dilating drops last for 4- 6 hours.  You will be more sensitive to light and vision will be blurry up close.  Mydriatic sunglasses were given if needed.      Rogerio Mcallister O.D.  [address]  [place]  [address]    [phone]

## 2021-12-02 NOTE — PROGRESS NOTES
Chief Complaint   Patient presents with     Diabetic Eye Exam        Chief Complaint(s) and History of Present Illness(es)     Diabetic Eye Exam     Diabetes Type: Type 2 and on insulin    Duration: 5 years               Hemoglobin A1C   Date Value Ref Range Status   07/12/2021 6.6 (H) 0.0 - 5.6 % Final     Comment:     Normal <5.7%   Prediabetes 5.7-6.4%    Diabetes 6.5% or higher     Note: Adopted from ADA consensus guidelines.   06/02/2021 6.7 (H) 0 - 5.6 % Final     Comment:     Normal <5.7% Prediabetes 5.7-6.4%  Diabetes 6.5% or higher - adopted from ADA   consensus guidelines.     12/03/2020 5.8 (H) 0 - 5.6 % Final     Comment:     Normal <5.7% Prediabetes 5.7-6.4%  Diabetes 6.5% or higher - adopted from ADA   consensus guidelines.     07/17/2020 6.6 (H) 0 - 5.6 % Final     Comment:     Normal <5.7% Prediabetes 5.7-6.4%  Diabetes 6.5% or higher - adopted from ADA   consensus guidelines.         Last Eye Exam: 12/4/2020  Dilated Previously: Yes, side effects of dilation explained today    What are you currently using to see?  Glasses - got stolen last year. Occasionally uses readers (unknown power)    Distance Vision Acuity: Noticed gradual change in both eyes    Near Vision Acuity: Not satisfied - reading is difficult    Eye Comfort: dry  Do you use eye drops? : Yes: Visine 2x/day as needed  Occupation or Hobbies: [employer]     Medical, surgical and family histories reviewed and updated 12/2/2021.       OBJECTIVE: See Ophthalmology exam    ASSESSMENT:    ICD-10-CM    1. Encounter for examination of eyes and vision with abnormal findings  Z01.01    2. Type 2 diabetes mellitus without retinopathy (H)  E11.9    3. Myopia of both eyes  H52.13    4. Regular astigmatism of right eye  H52.221    5. Presbyopia  H52.4       PLAN:    Archie Wallace aware  eye exam results will be sent to No Ref-Primary, Physician.  Patient Instructions   Patient educated on importance of good blood sugar control.  Letter  sent to primary care provider with diabetic eye exam report.     Archie was advised of today's exam findings.  Fill glasses prescription  Allow 2 weeks to adapt to change in glasses  Wear glasses full time  Copy of glasses Rx provided today.    Return in 1 year for eye exam, or sooner if needed.    The effects of the dilating drops last for 4- 6 hours.  You will be more sensitive to light and vision will be blurry up close.  Mydriatic sunglasses were given if needed.      Rogerio Mcallister O.D.  [address] [facility] [place]  [address]    [phone]

## 2021-12-02 NOTE — LETTER
12/2/2021         RE: Archie Wallace  [address]  [address]  [place] [address]        Dear Colleague,    Thank you for referring your patient, Archie Wallace, to the [facility]. Please see a copy of my visit note below.    Chief Complaint   Patient presents with     Diabetic Eye Exam        Chief Complaint(s) and History of Present Illness(es)     Diabetic Eye Exam     Diabetes Type: Type 2 and on insulin    Duration: 5 years               Hemoglobin A1C   Date Value Ref Range Status   07/12/2021 6.6 (H) 0.0 - 5.6 % Final     Comment:     Normal <5.7%   Prediabetes 5.7-6.4%    Diabetes 6.5% or higher     Note: Adopted from ADA consensus guidelines.   06/02/2021 6.7 (H) 0 - 5.6 % Final     Comment:     Normal <5.7% Prediabetes 5.7-6.4%  Diabetes 6.5% or higher - adopted from ADA   consensus guidelines.     12/03/2020 5.8 (H) 0 - 5.6 % Final     Comment:     Normal <5.7% Prediabetes 5.7-6.4%  Diabetes 6.5% or higher - adopted from ADA   consensus guidelines.     07/17/2020 6.6 (H) 0 - 5.6 % Final     Comment:     Normal <5.7% Prediabetes 5.7-6.4%  Diabetes 6.5% or higher - adopted from ADA   consensus guidelines.         Last Eye Exam: 12/4/2020  Dilated Previously: Yes, side effects of dilation explained today    What are you currently using to see?  Glasses - got stolen last year. Occasionally uses readers (unknown power)    Distance Vision Acuity: Noticed gradual change in both eyes    Near Vision Acuity: Not satisfied - reading is difficult    Eye Comfort: dry  Do you use eye drops? : Yes: Visine 2x/day as needed  Occupation or Hobbies: [employer]     Medical, surgical and family histories reviewed and updated 12/2/2021.       OBJECTIVE: See Ophthalmology exam    ASSESSMENT:    ICD-10-CM    1. Encounter for examination of eyes and vision with abnormal findings  Z01.01    2. Type 2 diabetes mellitus without retinopathy (H)  E11.9    3. Myopia of both eyes  H52.13     4. Regular astigmatism of right eye  H52.221    5. Presbyopia  H52.4       PLAN:    Archie Wallace aware  eye exam results will be sent to No Ref-Primary, Physician.  Patient Instructions   Patient educated on importance of good blood sugar control.  Letter sent to primary care provider with diabetic eye exam report.     Archie was advised of today's exam findings.  Fill glasses prescription  Allow 2 weeks to adapt to change in glasses  Wear glasses full time  Copy of glasses Rx provided today.    Return in 1 year for eye exam, or sooner if needed.    The effects of the dilating drops last for 4- 6 hours.  You will be more sensitive to light and vision will be blurry up close.  Mydriatic sunglasses were given if needed.      Rogerio Mcallister O.D.  [address] [place]  [address]    [phone]               Again, thank you for allowing me to participate in the care of your patient.        Sincerely,        Rogerio Mcallister, OD

## 2021-12-15 ENCOUNTER — APPOINTMENT (OUTPATIENT)
Dept: OPTOMETRY | Facility: CLINIC | Age: 46
End: 2021-12-15
Payer: COMMERCIAL

## 2021-12-15 ENCOUNTER — ANCILLARY PROCEDURE (OUTPATIENT)
Dept: GENERAL RADIOLOGY | Facility: CLINIC | Age: 46
End: 2021-12-15
Attending: NURSE PRACTITIONER
Payer: COMMERCIAL

## 2021-12-15 DIAGNOSIS — Z98.1 S/P CERVICAL SPINAL FUSION: ICD-10-CM

## 2021-12-15 PROCEDURE — 92341 FIT SPECTACLES BIFOCAL: CPT | Performed by: OPTOMETRIST

## 2021-12-15 PROCEDURE — 72040 X-RAY EXAM NECK SPINE 2-3 VW: CPT | Performed by: RADIOLOGY

## 2021-12-30 ENCOUNTER — TELEPHONE (OUTPATIENT)
Dept: ENDOCRINOLOGY | Facility: CLINIC | Age: 46
End: 2021-12-30

## 2021-12-30 NOTE — TELEPHONE ENCOUNTER
Called pt for 8:30 am virtual visit as he had yet to join video. Pt answered stating his car had just been towed and he would need to reschedule. Rescheduled for tomorrow at 10 am.    Kayla Mcdonnell RD, LD

## 2021-12-30 NOTE — PROGRESS NOTES
"Archie Wallace is a 46 year old male who is being evaluated via a billable video visit.      The patient has been notified of following:     \"This video visit will be conducted via a call between you and your physician/provider. We have found that certain health care needs can be provided without the need for an in-person physical exam.  This service lets us provide the care you need with a video conversation.  If a prescription is necessary we can send it directly to your pharmacy.  If lab work is needed we can place an order for that and you can then stop by our lab to have the test done at a later time.    Video visits are billed at different rates depending on your insurance coverage.  Please reach out to your insurance provider with any questions.    If during the course of the call the physician/provider feels a video visit is not appropriate, you will not be charged for this service.\"    Patient has given verbal consent for Video visit? Yes  How would you like to obtain your AVS? MyChart  Will anyone else be joining your video visit? No  {If patient encounters technical issues they should call [phone]      Video-Visit Details    Type of service:  Video Visit    Video Start Time: 10:02 AM   Video End Time: 10:17 AM    Originating Location (pt. Location): Home    Distant Location (provider location):  [facility] [place]     Platform used for Video Visit: [facility]    During this virtual visit the patient is located in [place], patient verifies this as the location during the entirety of this visit.       Bariatric Nutrition Consultation Note    Reason For Visit: Nutrition Assessment    Archie Wallace is a 46 year old presenting today for return bariatric nutrition consult.  Pt is interested in laparoscopic sleeve gastrectomy with Dr. Mata, expected surgery in D.  Patient is accompanied by self.  This is pt's 5th of 6 required nutrition visits prior to surgery.     Coordination " "note: Archie states his insurance plan will be changing the first of the month, encouraged him to check new plan for surgery coverage. Also provided him with Twin's number for assistance checking.     Pt referred by RAE Alejandro on September 7, 2021.  Patient with Co-morbidities of obesity including:  Type II DM yes (A1C 6.6 7/12/21)  Renal Failure no  Sleep apnea no  Hypertension yes  Dyslipidemia yes  Joint painno  Back pain no  GERD  yes    Support System Reviewed With Patient 9/7/2021   Who do you have in your support network that can be available to help you for the first 2 weeks after surgery? girlfriend   Who can you count on for support throughout your weight loss surgery journey? girlfriend       ANTHROPOMETRICS:  Estimated body mass index is 46.78 kg/m  as calculated from the following:    Height as of 9/7/21: 1.81 m (5' 11.25\").    Weight as of 9/7/21: 153.2 kg (337 lb 12.8 oz).    Current weight (pt reported): 334 lbs (-9 lbs from Oct, -3 lbs from initial)    Required weight loss goal pre-op: -20 lbs from initial consult weight (goal weight 317 lbs or less before surgery)       9/7/2021   I have tried the following methods to lose weight Watching portions or calories, Exercise, Atkins type diet (low carb/high protein), Slimfast, OTC Medications       Weight Loss Questions Reviewed With Patient 9/7/2021   How long have you been overweight? Since late teens through early 20's       SUPPLEMENT INFORMATION:  None    Medications for Weight Loss:  Topiramate - Pt reports he is not taking consistently due to feeling brain fog with higher dose, encouraged follow-up with PA to discuss, pt agreeable.     NUTRITION HISTORY:  No known food allergies. Lactose intolerant.     Checks BG TID in morning, noon and at night  Travels a lot - 2-3 times per month   Already having to chew to fine texture after thyroid surgery.     Doesn't eat if not hungry, leads to skipping daytime meals. Sometimes eating out of boredom. " "Waking in the night every 2-3 hours, will need something sweet (candy, Lil Odalys's cakes) to fall back asleep. This is likely contributing to daytime fullness.     Today - Focusing on eating smaller portions and using leaner cooking methods at dinner. Continues to miss daytime meals, eating only one meal per day at dinner. Working on not snacking when he wakes at night. Has found if he has a sweet before bed, will not need one overnight.      Progress Towards Previous Goals:  1). Eat 3 meals per day - Not met, continues to struggle with this. Working on reducing night-time eating to promote daytime hunger.    - Even if not hungry, have small daytime meal    - May use protein shake/bar  2). Use the 9\" Plate method to structure meals - Improving at dinner   - Focus on eating lean protein and serving of veggies/fruits at each meal   - Check out diabetesfoodhub.org for meal ideas   - May use a protein shake/bar or low-calorie frozen meal as back-up meal choice.   3) Avoid snacking after waking - Improving  4) Eliminate calorie-containing beverages - Improving. Switched to Gatorade/powerade zero, and alkaline water.       Eating Habits 9/7/2021   Do you have any dietary restrictions? Yes   Do you currently binge eat (eat a large amount of food in a short time)? Yes   Are you an emotional eater? Yes   Do you get up to eat after falling asleep? Yes   What foods do you crave? nothing       ADDITIONAL INFORMATION:  Had spine surgery and half thyroid removed in July, per 10/12 visit with neurosurg, lifting restrictions lifted and pt to begin PT.     Pt obtained a gym membership this past week, went twice so far. Plans to go 4-6 days per week. Doing swimming, treadmill, and some strengthening.     Dining Out History Reviewed With Patient 9/7/2021   How often do you dine out? A couple of times a week.   Where do you dine out? (select all that apply) fast food chains   What types of food do you order when you dine out? burgers "       Physical Activity Reviewed With Patient 9/7/2021   How often do you exercise? Never   What keeps you from being more active?  I am as active as I can possbily be, I have just had surgery on one or more of my joints, My ability to walk or move around is limited         NUTRITION DIAGNOSIS:  Obesity r/t long history of self-monitoring deficit and excessive energy intake aeb BMI >30 kg/m2. - improving/continues    INTERVENTION:  Intervention Provided/Education Provided:  Reviewed post-op diet guidelines, ways to help prepare for post-op diet guidelines pre-operatively, portion/calorie-control, mindful eating and sources of protein.  Reviewed progress towards previous goals.  Encouraged working on eating more structured meals and less snacking. Pt to focus on adding in one more daytime meal this month (2 meals per day to start, then will work on 3 consistent meals) to help with improving nutritional quality of diet and weight loss. Discussed example meals he could assemble using protein sources he currently has at home.   Reviewed moderate consistent carb intake to help with mgmt of BG, including sources of whole grains and appropriate serving sizes.   Reviewed surgery task list  Coordination of care - had scheduling reach out to pt to set up follow-up with RAE Alejandro, last seen in Sept, due for follow-up.   Patient demonstrates understanding.  AVS vis MyChart     Questions Reviewed With Patient 9/7/2021   How ready are you to make changes regarding your weight? Number 1 = Not ready at all to make changes up to 10 = very ready. 10   How confident are you that you can change? 1 = Not confident that you will be successful making changes up to 10 = very confident. 10       Expected Engagement: good    GOALS:  1). Add in daytime meal (lunch)   - Example: 3 oz tuna salad with 1-2 slice whole grain bread  2) Avoid snacking after waking  3) Eliminate calorie-containing beverages     Call Twin Skinner at [phone]  to discuss insurance coverage for bariatric surgery.  Please check with your insurance regarding bariatric surgery coverage also. Twin can also help you with scheduling psych eval if you are having difficulties.    The Plate Method  [email or web address]    Protein Sources for Weight Loss  [email or web address]     Carbohydrates  [email or web address]     Diet Guidelines after Weight Loss Surgery  [email or web address]     Tips for Low Sodium Diet  [email or web address]    Meal Replacement Shake Options:   *Protein Shake Criteria: no more than 210 Calories, at least 20 grams of protein, and less than 10 grams of sugar   [employer] smoothie (160 Calories, 20 g protein)   Premier Protein (160 Calories, 30 g protein)  Slim Fast Advanced Nutrition (180 Calories, 20 g protein)  Muscle Milk, lactose-free, 17 oz bottle (210 Calories, 30 g protein)  Integrated Supplements, no artificial sugars (110 Calories, 20 g protein)  Genepro, unflavored protein powder (60 Calories, 30 g protein)  Boost/Ensure Max (160 calories, 30 gm protein)   [employer] Core Power (170 calories, 26 gm protein)    Meal Replacement Bar Options:  [facility] Protein Shake (160 Calories, 15 g protein)  Quest Protein Bars (190 Calories, 20 g protein)  Built Bar (170 Calories, 15-20 g protein)  One Protein Bar (210 calories, 20 g protein)  [place] Signature Protein Bar ([employer]co) (190 Calories, 21 g protein)  Pure Protein Bars (180 Calories, 21 g protein)    Low Calorie Frozen Meals:  Lean Cuisine  Healthy Choice  Smart Ones  Jasson Mcclendon         Follow up: 1 month    Time spent with patient: 15 minutes.  Kayla Mcdonnell, RD, LD

## 2021-12-31 ENCOUNTER — TELEPHONE (OUTPATIENT)
Dept: ENDOCRINOLOGY | Facility: CLINIC | Age: 46
End: 2021-12-31

## 2021-12-31 ENCOUNTER — VIRTUAL VISIT (OUTPATIENT)
Dept: ENDOCRINOLOGY | Facility: CLINIC | Age: 46
End: 2021-12-31
Payer: COMMERCIAL

## 2021-12-31 DIAGNOSIS — E66.9 OBESITY: ICD-10-CM

## 2021-12-31 DIAGNOSIS — E11.65 TYPE 2 DIABETES MELLITUS WITH HYPERGLYCEMIA, WITHOUT LONG-TERM CURRENT USE OF INSULIN (H): ICD-10-CM

## 2021-12-31 DIAGNOSIS — Z71.3 NUTRITIONAL COUNSELING: Primary | ICD-10-CM

## 2021-12-31 PROCEDURE — 97803 MED NUTRITION INDIV SUBSEQ: CPT | Mod: 95 | Performed by: DIETITIAN, REGISTERED

## 2021-12-31 NOTE — LETTER
"12/31/2021       RE: Archie Wallace  [address]  [address]  [facility] [address]     Dear Colleague,    Thank you for referring your patient, Archie Wallace, to the [facility] [place] at [facility]. Please see a copy of my visit note below.    Archie Wallace is a 46 year old male who is being evaluated via a billable video visit.      The patient has been notified of following:     \"This video visit will be conducted via a call between you and your physician/provider. We have found that certain health care needs can be provided without the need for an in-person physical exam.  This service lets us provide the care you need with a video conversation.  If a prescription is necessary we can send it directly to your pharmacy.  If lab work is needed we can place an order for that and you can then stop by our lab to have the test done at a later time.    Video visits are billed at different rates depending on your insurance coverage.  Please reach out to your insurance provider with any questions.    If during the course of the call the physician/provider feels a video visit is not appropriate, you will not be charged for this service.\"    Patient has given verbal consent for Video visit? Yes  How would you like to obtain your AVS? MyChart  Will anyone else be joining your video visit? No  {If patient encounters technical issues they should call [phone]      Video-Visit Details    Type of service:  Video Visit    Video Start Time: 10:02 AM   Video End Time: 10:17 AM    Originating Location (pt. Location): Home    Distant Location (provider location):  [facility] [place]     Platform used for Video Visit: Cookstr    During this virtual visit the patient is located in [place], patient verifies this as the location during the entirety of this visit.       Bariatric Nutrition Consultation Note    Reason " "For Visit: Nutrition Assessment    Archie Wallace is a 46 year old presenting today for return bariatric nutrition consult.  Pt is interested in laparoscopic sleeve gastrectomy with Dr. Mata, expected surgery in TBD.  Patient is accompanied by self.  This is pt's 5th of 6 required nutrition visits prior to surgery.     Coordination note: Archie states his insurance plan will be changing the first of the month, encouraged him to check new plan for surgery coverage. Also provided him with [employer]'s number for assistance checking.     Pt referred by RAE Alejandro on September 7, 2021.  Patient with Co-morbidities of obesity including:  Type II DM yes (A1C 6.6 7/12/21)  Renal Failure no  Sleep apnea no  Hypertension yes  Dyslipidemia yes  Joint painno  Back pain no  GERD  yes    Support System Reviewed With Patient 9/7/2021   Who do you have in your support network that can be available to help you for the first 2 weeks after surgery? girlfriend   Who can you count on for support throughout your weight loss surgery journey? girlfriend       ANTHROPOMETRICS:  Estimated body mass index is 46.78 kg/m  as calculated from the following:    Height as of 9/7/21: 1.81 m (5' 11.25\").    Weight as of 9/7/21: 153.2 kg (337 lb 12.8 oz).    Current weight (pt reported): 334 lbs (-9 lbs from Oct, -3 lbs from initial)    Required weight loss goal pre-op: -20 lbs from initial consult weight (goal weight 317 lbs or less before surgery)       9/7/2021   I have tried the following methods to lose weight Watching portions or calories, Exercise, Atkins type diet (low carb/high protein), Slimfast, OTC Medications       Weight Loss Questions Reviewed With Patient 9/7/2021   How long have you been overweight? Since late teens through early 20's       SUPPLEMENT INFORMATION:  None    Medications for Weight Loss:  Topiramate - Pt reports he is not taking consistently due to feeling brain fog with higher dose, encouraged follow-up with PA to " "discuss, pt agreeable.     NUTRITION HISTORY:  No known food allergies. Lactose intolerant.     Checks BG TID in morning, noon and at night  Travels a lot - 2-3 times per month   Already having to chew to fine texture after thyroid surgery.     Doesn't eat if not hungry, leads to skipping daytime meals. Sometimes eating out of boredom. Waking in the night every 2-3 hours, will need something sweet (candy, Lil Odalys's cakes) to fall back asleep. This is likely contributing to daytime fullness.     Today - Focusing on eating smaller portions and using leaner cooking methods at dinner. Continues to miss daytime meals, eating only one meal per day at dinner. Working on not snacking when he wakes at night. Has found if he has a sweet before bed, will not need one overnight.      Progress Towards Previous Goals:  1). Eat 3 meals per day - Not met, continues to struggle with this. Working on reducing night-time eating to promote daytime hunger.    - Even if not hungry, have small daytime meal    - May use protein shake/bar  2). Use the 9\" Plate method to structure meals - Improving at dinner   - Focus on eating lean protein and serving of veggies/fruits at each meal   - Check out diabetesfoodhub.org for meal ideas   - May use a protein shake/bar or low-calorie frozen meal as back-up meal choice.   3) Avoid snacking after waking - Improving  4) Eliminate calorie-containing beverages - Improving. Switched to Gatorade/powerade zero, and alkaline water.       Eating Habits 9/7/2021   Do you have any dietary restrictions? Yes   Do you currently binge eat (eat a large amount of food in a short time)? Yes   Are you an emotional eater? Yes   Do you get up to eat after falling asleep? Yes   What foods do you crave? nothing       ADDITIONAL INFORMATION:  Had spine surgery and half thyroid removed in July, per 10/12 visit with neurosurg, lifting restrictions lifted and pt to begin PT.     Pt obtained a gym membership this past week, " went twice so far. Plans to go 4-6 days per week. Doing swimming, treadmill, and some strengthening.     Dining Out History Reviewed With Patient 9/7/2021   How often do you dine out? A couple of times a week.   Where do you dine out? (select all that apply) fast food chains   What types of food do you order when you dine out? jass       Physical Activity Reviewed With Patient 9/7/2021   How often do you exercise? Never   What keeps you from being more active?  I am as active as I can possbily be, I have just had surgery on one or more of my joints, My ability to walk or move around is limited     NUTRITION DIAGNOSIS:  Obesity r/t long history of self-monitoring deficit and excessive energy intake aeb BMI >30 kg/m2. - improving/continues    INTERVENTION:  Intervention Provided/Education Provided:  Reviewed post-op diet guidelines, ways to help prepare for post-op diet guidelines pre-operatively, portion/calorie-control, mindful eating and sources of protein.  Reviewed progress towards previous goals.  Encouraged working on eating more structured meals and less snacking. Pt to focus on adding in one more daytime meal this month (2 meals per day to start, then will work on 3 consistent meals) to help with improving nutritional quality of diet and weight loss. Discussed example meals he could assemble using protein sources he currently has at home.   Reviewed moderate consistent carb intake to help with mgmt of BG, including sources of whole grains and appropriate serving sizes.   Reviewed surgery task list  Coordination of care - had scheduling reach out to pt to set up follow-up with RAE Alejandro, last seen in Sept, due for follow-up.   Patient demonstrates understanding.  AVS vis Josephine     Questions Reviewed With Patient 9/7/2021   How ready are you to make changes regarding your weight? Number 1 = Not ready at all to make changes up to 10 = very ready. 10   How confident are you that you can change? 1 =  Not confident that you will be successful making changes up to 10 = very confident. 10       Expected Engagement: good    GOALS:  1). Add in daytime meal (lunch)   - Example: 3 oz tuna salad with 1-2 slice whole grain bread  2) Avoid snacking after waking  3) Eliminate calorie-containing beverages     Call Twin Skinner at [phone] to discuss insurance coverage for bariatric surgery.  Please check with your insurance regarding bariatric surgery coverage also. Twin can also help you with scheduling psych eval if you are having difficulties.    The Plate Method  [email or web address]    Protein Sources for Weight Loss  [email or web address]     Carbohydrates  [email or web address]     Diet Guidelines after Weight Loss Surgery  [email or web address]     Tips for Low Sodium Diet  [email or web address]    Meal Replacement Shake Options:   *Protein Shake Criteria: no more than 210 Calories, at least 20 grams of protein, and less than 10 grams of sugar   [employer] smoothie (160 Calories, 20 g protein)   Premier Protein (160 Calories, 30 g protein)  Slim Fast Advanced Nutrition (180 Calories, 20 g protein)  Muscle Milk, lactose-free, 17 oz bottle (210 Calories, 30 g protein)  Integrated Supplements, no artificial sugars (110 Calories, 20 g protein)  Genepro, unflavored protein powder (60 Calories, 30 g protein)  Boost/Ensure Max (160 calories, 30 gm protein)   [facility] Core Power (170 calories, 26 gm protein)    Meal Replacement Bar Options:   [facility] Protein Shake (160 Calories, 15 g protein)  Quest Protein Bars (190 Calories, 20 g protein)  Built Bar (170 Calories, 15-20 g protein)  One Protein Bar (210 calories, 20 g protein)  [place] Signature Protein Bar ([employer]) (190 Calories, 21 g protein)  Pure Protein Bars (180 Calories, 21 g protein)    Low Calorie Frozen Meals:  Lean Cuisine  Healthy Choice  Smart Ones  Jasson Mcclendon       Follow up: 1 month    Time  spent with patient: 15 minutes.          Again, thank you for allowing me to participate in the care of your patient.      Sincerely,    Kayla Mcdonenll RD

## 2022-01-05 PROBLEM — M54.6 BILATERAL THORACIC BACK PAIN: Status: RESOLVED | Noted: 2021-11-23 | Resolved: 2022-01-05

## 2022-01-05 PROBLEM — M54.2 NECK PAIN: Status: RESOLVED | Noted: 2021-11-23 | Resolved: 2022-01-05

## 2022-01-09 ENCOUNTER — HEALTH MAINTENANCE LETTER (OUTPATIENT)
Age: 47
End: 2022-01-09

## 2022-01-25 ENCOUNTER — TELEPHONE (OUTPATIENT)
Dept: ENDOCRINOLOGY | Facility: CLINIC | Age: 47
End: 2022-01-25
Payer: MEDICAID

## 2022-01-25 NOTE — TELEPHONE ENCOUNTER
Patient no show for today's video visit. Sent text with link to join. Called pt, no answer. Sent [employer] message, no response. Closed video after 15 mins. Provided pt with number to reschedule.     Kayla Mcdonnell RD, LD

## 2022-01-25 NOTE — PROGRESS NOTES
"Archie Wallace is a 46 year old male who is being evaluated via a billable video visit.      The patient has been notified of following:     \"This video visit will be conducted via a call between you and your physician/provider. We have found that certain health care needs can be provided without the need for an in-person physical exam.  This service lets us provide the care you need with a video conversation.  If a prescription is necessary we can send it directly to your pharmacy.  If lab work is needed we can place an order for that and you can then stop by our lab to have the test done at a later time.    Video visits are billed at different rates depending on your insurance coverage.  Please reach out to your insurance provider with any questions.    If during the course of the call the physician/provider feels a video visit is not appropriate, you will not be charged for this service.\"    Patient has given verbal consent for Video visit? Yes  How would you like to obtain your AVS? MyChart  Will anyone else be joining your video visit? No  {If patient encounters technical issues they should call [phone]      Video-Visit Details    Type of service:  Video Visit    Video Start Time: 8:05 AM  Video End Time: 8:17 AM    Originating Location (pt. Location): Home    Distant Location (provider location):  [facility] [place]     Platform used for Video Visit: Playful Data    During this virtual visit the patient is located in [place], patient verifies this as the location during the entirety of this visit.       Bariatric Nutrition Consultation Note    Reason For Visit: Nutrition Assessment    Archie Wallace is a 46 year old presenting today for return bariatric nutrition consult.  Pt is interested in laparoscopic sleeve gastrectomy with Dr. Mata, expected surgery in TBD.  Patient is accompanied by self.  This is pt's 6th of 6 minimum required nutrition visits prior to surgery. Recommending " "monthly follow-up until surgery.     Coordination note: Archie states his insurance plan will be changing the first of the month, encouraged him to check new plan for surgery coverage. Also provided him with Twin's number for assistance checking.     Pt referred by RAE Alejandro on September 7, 2021.  Patient with Co-morbidities of obesity including:  Type II DM yes (A1C 6.6 7/12/21)  Renal Failure no  Sleep apnea no  Hypertension yes  Dyslipidemia yes  Joint painno  Back pain no  GERD  yes    Support System Reviewed With Patient 9/7/2021   Who do you have in your support network that can be available to help you for the first 2 weeks after surgery? girlfriend   Who can you count on for support throughout your weight loss surgery journey? girlfriend       ANTHROPOMETRICS:  Estimated body mass index is 46.78 kg/m  as calculated from the following:    Height as of 9/7/21: 1.81 m (5' 11.25\").    Weight as of 9/7/21: 153.2 kg (337 lb 12.8 oz).    Current weight (pt reported): 348 lbs (+14 lbs over past 2-3 months, +11 lbs from initial)    Required weight loss goal pre-op: -20 lbs from initial consult weight (goal weight 317 lbs or less before surgery)       9/7/2021   I have tried the following methods to lose weight Watching portions or calories, Exercise, Atkins type diet (low carb/high protein), Slimfast, OTC Medications       Weight Loss Questions Reviewed With Patient 9/7/2021   How long have you been overweight? Since late teens through early 20's       SUPPLEMENT INFORMATION:  None    Medications for Weight Loss:  Topiramate - Pt reports he is not taking consistently due to feeling brain fog with higher dose, encouraged follow-up with PA to discuss, pt agreeable.     NUTRITION HISTORY:  No known food allergies. Lactose intolerant.     Checks BG TID in morning, noon and at night  Travels a lot - 2-3 times per month   Already having to chew to fine texture after thyroid surgery.     Doesn't eat if not hungry, " leads to skipping daytime meals. Sometimes eating out of boredom. Waking in the night every 2-3 hours, will need something sweet (candy, Lil Odalys's cakes) to fall back asleep. This is likely contributing to daytime fullness.     He has been focusing on eating smaller portions and using leaner cooking methods at dinner. Working on not snacking when he wakes at night. Is able to avoid some nights. Is drinking a protein shake in evening before bed to help avoid snacking. He is making progress with adding in another daytime meal. He has gained a significant amount of weight in past couple months and is not able to identify why, thinks may be related to thyroid.     Recent Diet Recall:  Breakfast: scrambled egg and slice of garsia  Lunch: skip  Dinner: sandwich   Late-night: protein shake   Snacks: overnight - still waking but trying to avoid snacking as able  Beverages: Water, regular gatorade, sugar-free gatorade  Dining Out:      Progress Towards Previous Goals:  1). Add in daytime meal (lunch) - Met, continues   - Example: 3 oz tuna salad with 1-2 slice whole grain bread  2) Avoid snacking after waking - Improving  3) Eliminate calorie-containing beverages - Improving, continues to drink regular gatorade       Eating Habits 9/7/2021   Do you have any dietary restrictions? Yes   Do you currently binge eat (eat a large amount of food in a short time)? Yes   Are you an emotional eater? Yes   Do you get up to eat after falling asleep? Yes   What foods do you crave? nothing       ADDITIONAL INFORMATION:  Had spine surgery and half thyroid removed in July, per 10/12 visit with neurosurg, lifting restrictions lifted and pt to begin PT.     He has started to go to the gym 4 times per week. Doing swimming, treadmill, and some strengthening.     Dining Out History Reviewed With Patient 9/7/2021   How often do you dine out? A couple of times a week.   Where do you dine out? (select all that apply) fast food chains   What types  of food do you order when you dine out? jass       Physical Activity Reviewed With Patient 9/7/2021   How often do you exercise? Never   What keeps you from being more active?  I am as active as I can possbily be, I have just had surgery on one or more of my joints, My ability to walk or move around is limited         NUTRITION DIAGNOSIS:  Obesity r/t long history of self-monitoring deficit and excessive energy intake aeb BMI >30 kg/m2. - continues    INTERVENTION:  Intervention Provided/Education Provided:  Reviewed post-op diet guidelines, ways to help prepare for post-op diet guidelines pre-operatively, portion/calorie-control, mindful eating and sources of protein.  Reviewed progress towards previous goals.  Discussed more aggressive strategy for weight loss now that he is 30+ lbs away from goal for surgery. He is to replace 1-2 meals per day with low-calorie protein shake. Encouraged pt continue to focus on 3 structured meals, limiting snacking, and switching all hydration to sugar-free options.  Reviewed moderate consistent carb intake to help with mgmt of BG, including sources high-fiber carbs.  Reviewed surgery task list.  Patient demonstrates understanding.  AVS vis MyChart     Questions Reviewed With Patient 9/7/2021   How ready are you to make changes regarding your weight? Number 1 = Not ready at all to make changes up to 10 = very ready. 10   How confident are you that you can change? 1 = Not confident that you will be successful making changes up to 10 = very confident. 10       Expected Engagement: good    GOALS:  1) Replace 1-2 meals per day with protein shake   2) Avoid snacking after waking  3) Eliminate calorie-containing beverages   4) Schedule a follow-up with RAE Alejandro  5) Call Twin Skinner at [phone] to discuss insurance coverage for bariatric surgery once your insurance changes.  6) Complete bariatric labs - call for a lab only appointment at [facility] lab. To find a  lab location near you, please call [phone]. Please let us know if orders need to be faxed to a non [facility] lab.  7) Obtain letter of support from your primary care doctor (Provider can submit through electronic medical record or fax to [phone])  8) Obtain clearances from sleep doctor and cardiologist (Provider can submit through electronic medical record or fax to [phone])    Meal Replacement Shake Options:   *Protein Shake Criteria: no more than 210 Calories, at least 20 grams of protein, and less than 10 grams of sugar   [facility] smoothie (160 Calories, 20 g protein)   Premier Protein (160 Calories, 30 g protein)  Slim Fast Advanced Nutrition (180 Calories, 20 g protein)  Muscle Milk, lactose-free, 17 oz bottle (210 Calories, 30 g protein)  Integrated Supplements, no artificial sugars (110 Calories, 20 g protein)  Genepro, unflavored protein powder (60 Calories, 30 g protein)  Boost/Ensure Max (160 calories, 30 gm protein)   [employer] Core Power (170 calories, 26 gm protein)    Meal Replacement Bar Options:  [facility] Protein Shake (160 Calories, 15 g protein)  Quest Protein Bars (190 Calories, 20 g protein)  Built Bar (170 Calories, 15-20 g protein)  One Protein Bar (210 calories, 20 g protein)  [place] Signature Protein Bar ([employer]) (190 Calories, 21 g protein)  Pure Protein Bars (180 Calories, 21 g protein)    Low Calorie Frozen Meals:  Lean Cuisine  Healthy Choice  Smart Ones  Jasson Mcclendon       Follow up: 1 month    Time spent with patient: 12 minutes.  Kayla Mcdonnell, JUWAN, LD

## 2022-01-27 ENCOUNTER — VIRTUAL VISIT (OUTPATIENT)
Dept: ENDOCRINOLOGY | Facility: CLINIC | Age: 47
End: 2022-01-27
Payer: MEDICAID

## 2022-01-27 DIAGNOSIS — E66.9 OBESITY: ICD-10-CM

## 2022-01-27 DIAGNOSIS — E11.65 TYPE 2 DIABETES MELLITUS WITH HYPERGLYCEMIA, WITHOUT LONG-TERM CURRENT USE OF INSULIN (H): ICD-10-CM

## 2022-01-27 DIAGNOSIS — Z71.3 NUTRITIONAL COUNSELING: Primary | ICD-10-CM

## 2022-01-27 PROCEDURE — 97803 MED NUTRITION INDIV SUBSEQ: CPT | Mod: 95 | Performed by: DIETITIAN, REGISTERED

## 2022-01-27 NOTE — LETTER
"1/27/2022       RE: Archie Wallace  [address]  [address]  [facility] [address]     Dear Colleague,    Thank you for referring your patient, Archie Wallace, to the [facility] [place] at [facility]. Please see a copy of my visit note below.    Archie Wallace is a 46 year old male who is being evaluated via a billable video visit.      The patient has been notified of following:     \"This video visit will be conducted via a call between you and your physician/provider. We have found that certain health care needs can be provided without the need for an in-person physical exam.  This service lets us provide the care you need with a video conversation.  If a prescription is necessary we can send it directly to your pharmacy.  If lab work is needed we can place an order for that and you can then stop by our lab to have the test done at a later time.    Video visits are billed at different rates depending on your insurance coverage.  Please reach out to your insurance provider with any questions.    If during the course of the call the physician/provider feels a video visit is not appropriate, you will not be charged for this service.\"    Patient has given verbal consent for Video visit? Yes  How would you like to obtain your AVS? MyChart  Will anyone else be joining your video visit? No  {If patient encounters technical issues they should call [phone]      Video-Visit Details    Type of service:  Video Visit    Video Start Time: 8:05 AM  Video End Time: 8:17 AM    Originating Location (pt. Location): Home    Distant Location (provider location):  [facility] [place]     Platform used for Video Visit: [employer]    During this virtual visit the patient is located in [place], patient verifies this as the location during the entirety of this visit.       Bariatric Nutrition Consultation Note    Reason For " "Visit: Nutrition Assessment    Archie Wallace is a 46 year old presenting today for return bariatric nutrition consult.  Pt is interested in laparoscopic sleeve gastrectomy with Dr. Mata, expected surgery in TBD.  Patient is accompanied by self.  This is pt's 6th of 6 minimum required nutrition visits prior to surgery. Recommending monthly follow-up until surgery.     Coordination note: Archie states his insurance plan will be changing the first of the month, encouraged him to check new plan for surgery coverage. Also provided him with Twin's number for assistance checking.     Pt referred by RAE Alejandro on September 7, 2021.  Patient with Co-morbidities of obesity including:  Type II DM yes (A1C 6.6 7/12/21)  Renal Failure no  Sleep apnea no  Hypertension yes  Dyslipidemia yes  Joint painno  Back pain no  GERD  yes    Support System Reviewed With Patient 9/7/2021   Who do you have in your support network that can be available to help you for the first 2 weeks after surgery? girlfriend   Who can you count on for support throughout your weight loss surgery journey? girlfriend       ANTHROPOMETRICS:  Estimated body mass index is 46.78 kg/m  as calculated from the following:    Height as of 9/7/21: 1.81 m (5' 11.25\").    Weight as of 9/7/21: 153.2 kg (337 lb 12.8 oz).    Current weight (pt reported): 348 lbs (+14 lbs over past 2-3 months, +11 lbs from initial)    Required weight loss goal pre-op: -20 lbs from initial consult weight (goal weight 317 lbs or less before surgery)       9/7/2021   I have tried the following methods to lose weight Watching portions or calories, Exercise, Atkins type diet (low carb/high protein), Slimfast, OTC Medications       Weight Loss Questions Reviewed With Patient 9/7/2021   How long have you been overweight? Since late teens through early 20's       SUPPLEMENT INFORMATION:  None    Medications for Weight Loss:  Topiramate - Pt reports he is not taking consistently due to feeling " brain fog with higher dose, encouraged follow-up with PA to discuss, pt agreeable.     NUTRITION HISTORY:  No known food allergies. Lactose intolerant.     Checks BG TID in morning, noon and at night  Travels a lot - 2-3 times per month   Already having to chew to fine texture after thyroid surgery.     Doesn't eat if not hungry, leads to skipping daytime meals. Sometimes eating out of boredom. Waking in the night every 2-3 hours, will need something sweet (candy, Lil Odalys's cakes) to fall back asleep. This is likely contributing to daytime fullness.     He has been focusing on eating smaller portions and using leaner cooking methods at dinner. Working on not snacking when he wakes at night. Is able to avoid some nights. Is drinking a protein shake in evening before bed to help avoid snacking. He is making progress with adding in another daytime meal. He has gained a significant amount of weight in past couple months and is not able to identify why, thinks may be related to thyroid.     Recent Diet Recall:  Breakfast: scrambled egg and slice of garsia  Lunch: skip  Dinner: sandwich   Late-night: protein shake   Snacks: overnight - still waking but trying to avoid snacking as able  Beverages: Water, regular gatorade, sugar-free gatorade  Dining Out:      Progress Towards Previous Goals:  1). Add in daytime meal (lunch) - Met, continues   - Example: 3 oz tuna salad with 1-2 slice whole grain bread  2) Avoid snacking after waking - Improving  3) Eliminate calorie-containing beverages - Improving, continues to drink regular gatorade       Eating Habits 9/7/2021   Do you have any dietary restrictions? Yes   Do you currently binge eat (eat a large amount of food in a short time)? Yes   Are you an emotional eater? Yes   Do you get up to eat after falling asleep? Yes   What foods do you crave? nothing       ADDITIONAL INFORMATION:  Had spine surgery and half thyroid removed in July, per 10/12 visit with neurosurg, lifting  restrictions lifted and pt to begin PT.     He has started to go to the gym 4 times per week. Doing swimming, treadmill, and some strengthening.     Dining Out History Reviewed With Patient 9/7/2021   How often do you dine out? A couple of times a week.   Where do you dine out? (select all that apply) fast food chains   What types of food do you order when you dine out? jass       Physical Activity Reviewed With Patient 9/7/2021   How often do you exercise? Never   What keeps you from being more active?  I am as active as I can possbily be, I have just had surgery on one or more of my joints, My ability to walk or move around is limited         NUTRITION DIAGNOSIS:  Obesity r/t long history of self-monitoring deficit and excessive energy intake aeb BMI >30 kg/m2. - continues    INTERVENTION:  Intervention Provided/Education Provided:  Reviewed post-op diet guidelines, ways to help prepare for post-op diet guidelines pre-operatively, portion/calorie-control, mindful eating and sources of protein.  Reviewed progress towards previous goals.  Discussed more aggressive strategy for weight loss now that he is 30+ lbs away from goal for surgery. He is to replace 1-2 meals per day with low-calorie protein shake. Encouraged pt continue to focus on 3 structured meals, limiting snacking, and switching all hydration to sugar-free options.  Reviewed moderate consistent carb intake to help with mgmt of BG, including sources high-fiber carbs.  Reviewed surgery task list.  Patient demonstrates understanding.  AVS vis MyChart     Questions Reviewed With Patient 9/7/2021   How ready are you to make changes regarding your weight? Number 1 = Not ready at all to make changes up to 10 = very ready. 10   How confident are you that you can change? 1 = Not confident that you will be successful making changes up to 10 = very confident. 10       Expected Engagement: good    GOALS:  1) Replace 1-2 meals per day with protein shake   2) Avoid  snacking after waking  3) Eliminate calorie-containing beverages   4) Schedule a follow-up with RAE Alejandro  5) Call Twin Uriahcristobal at [phone] to discuss insurance coverage for bariatric surgery once your insurance changes.  6) Complete bariatric labs - call for a lab only appointment at any [facility] lab. To find a lab location near you, please call [phone]. Please let us know if orders need to be faxed to a non [facility] lab.  7) Obtain letter of support from your primary care doctor (Provider can submit through electronic medical record or fax to [phone])  8) Obtain clearances from sleep doctor and cardiologist (Provider can submit through electronic medical record or fax to [phone])    Meal Replacement Shake Options:   *Protein Shake Criteria: no more than 210 Calories, at least 20 grams of protein, and less than 10 grams of sugar   [facility] smoothie (160 Calories, 20 g protein)   Premier Protein (160 Calories, 30 g protein)  Slim Fast Advanced Nutrition (180 Calories, 20 g protein)  Muscle Milk, lactose-free, 17 oz bottle (210 Calories, 30 g protein)  Integrated Supplements, no artificial sugars (110 Calories, 20 g protein)  Genepro, unflavored protein powder (60 Calories, 30 g protein)  Boost/Ensure Max (160 calories, 30 gm protein)   Fairlife Core Power (170 calories, 26 gm protein)    Meal Replacement Bar Options:  [facility] Protein Shake (160 Calories, 15 g protein)  Quest Protein Bars (190 Calories, 20 g protein)  Built Bar (170 Calories, 15-20 g protein)  One Protein Bar (210 calories, 20 g protein)  Ruiz Signature Protein Bar ([employer]co) (190 Calories, 21 g protein)  Pure Protein Bars (180 Calories, 21 g protein)    Low Calorie Frozen Meals:  Lean Cuisine  Healthy Choice  Smart Ones  Jasson Mcclendon       Follow up: 1 month    Time spent with patient: 12 minutes.  Kayla Mcdonnell, RD, LD      Again, thank you for allowing me to participate in the care of  your patient.      Sincerely,    Kayla Mcdonnell RD

## 2022-01-27 NOTE — PATIENT INSTRUCTIONS
Brian Almanza,    Follow-up with RD in 1 month.     Thank you,    Kayla Mcdonnell, JUWAN, LD  If you would like to schedule or reschedule an appointment with the RD, please call [phone]    Nutrition Goals  1) Replace 1-2 meals per day with protein shake   2) Avoid snacking after waking  3) Eliminate calorie-containing beverages   4) Schedule a follow-up with RAE Alejandro  5) Call Twin Skinner at [phone] to discuss insurance coverage for bariatric surgery once your insurance changes.  6) Complete bariatric labs - call for a lab only appointment at any [facility] lab. To find a lab location near you, please call [phone]. Please let us know if orders need to be faxed to a non [facility] lab.  7) Obtain letter of support from your primary care doctor (Provider can submit through electronic medical record or fax to [phone])  8) Obtain clearances from sleep doctor and cardiologist (Provider can submit through electronic medical record or fax to [phone])    Meal Replacement Shake Options:   *Protein Shake Criteria: no more than 210 Calories, at least 20 grams of protein, and less than 10 grams of sugar   [facility] smoothie (160 Calories, 20 g protein)   Premier Protein (160 Calories, 30 g protein)  Slim Fast Advanced Nutrition (180 Calories, 20 g protein)  Muscle Milk, lactose-free, 17 oz bottle (210 Calories, 30 g protein)  Integrated Supplements, no artificial sugars (110 Calories, 20 g protein)  Genepro, unflavored protein powder (60 Calories, 30 g protein)  Boost/Ensure Max (160 calories, 30 gm protein)   [facility] Core Power (170 calories, 26 gm protein)    Meal Replacement Bar Options:  [facility] Protein Shake (160 Calories, 15 g protein)  Quest Protein Bars (190 Calories, 20 g protein)  Built Bar (170 Calories, 15-20 g protein)  One Protein Bar (210 calories, 20 g protein)  Ruiz Signature Protein Bar ([employer]) (190 Calories, 21 g protein)  Pure Protein Bars (180 Calories, 21 g  protein)    Low Calorie Frozen Meals:  Lean Cuisine  Healthy Choice  Smart Ones  Jasson Jenningsmillicent Mcclendon         Interested in working with a health ?  Health coaches work with you to improve your overall health and wellbeing.  They look at the whole person, and may involve discussion of different areas of life, including, but not limited to the four pillars of health (sleep, exercise, nutrition, and stress management). Discuss with your care team if you would like to start working a health .    Health Coaching-3 Pack:    $99 for three health coaching visits    Visits may be done in person or via phone    Coaching is a partnership between the  and the client; Coaches do not prescribe or diagnose    Coaching helps inspire the client to reach his/her personal goals      COMPREHENSIVE WEIGHT MANAGEMENT PROGRAM  VIRTUAL SUPPORT GROUPS    For Support Group Information:      We offer support groups for patients who are working on weight loss and considering, preparing for or have had weight loss surgery.   There is no cost for this opportunity.  You are invited to attend the?Virtual Support Groups?provided by any of the following locations:    1. [facility] via Microsoft Teams with Alycia Oleary RN  2.   [place] via [employer] with Abe Lopez, PhD, LP  3.   [place] [facility] with Debi Carmona RN  4.   [facility] via Microsoft Teams with Debi Pedro [facility]    The following Support Group information can also be found on our website:  [email or web address]      [facility] Weight Loss Surgery Support Group    [facility] Weight Loss Surgery Support Group  The support group is a patient-lead forum that meets monthly to share experiences, encouragement and education. It is open to those who have had weight loss surgery, are scheduled for surgery, and those who are considering surgery.   WHEN: This group  "meets on the 3rd Wednesday of each month from 5:00PM - 6:00PM virtually using Microsoft Teams.   FACILITATOR: Led by Alycia Oleary, JUWAN, LD, RN, the program's Clinical Coordinator.   TO REGISTER: Please contact the clinic via [employer] or call the nurse line directly at [phone] to inform our staff that you would like an invite sent to you and to let us know the email you would like the invite sent to. Prior to the meeting, a link with directions on how to join the meeting will be sent to you.    2022 Meetings  January 19: \"Let's Talk\" a time for the group to share.  February 16: \"Let's Talk\" a time for the group to share.  March 16: Guest Speakers: Psychologists, Elisha Gatica, PhD,LP and Sabrina Lemons PsyD,  April 20: Guest Speaker: Health , Ann Kaye, [facility],CHES, [facility]  May 18: Guest Speaker: DietitianWalter, JUWAN, LP  [place] 15: \"Let's Talk\" a time for the group to share.  July 20: \"Let's Talk\" a time for the group to share.  August 17: TBA  September 21: TBA  October 19: Guest Speaker: Dr Alessio Salas MD Pulmonologist and Sleep Medicine Physician, \"Getting a Good Night's Sleep\".  November 16: TBA  December 21: TBA    [facility] Clin[facility] and [facility] Support Groups    Connections: Bariatric Care Support Group?  This is open to all [facility] (and those external to this program) pre- and post- operative bariatric surgery patients as well as their support system.   WHEN: This group meets the 2nd Tuesday of each month from 6:30 PM - 8:00 PM virtually using Microsoft Teams.   FACILITATOR: Led by Abe Lopez, Ph.D who is a Licensed Psychologist with the [facility] Comprehensive Weight Management Program.   TO REGISTER: Please send an email to Aeb Lopez, Ph.D., LP at?[email or web address]?if you would like an invitation to the group and to learn about using Microsoft Teams.    2022 Meetings  January 11: Mayra Frank, PharmD, Pharmacy Resident at [facility]" "Daniel, \"Medications and Bariatric Surgery\".  February 8: Open Forum  March 8  April 12  May 10  [place] 14    Connections: Post-Operative Bariatric Surgery Support Group  This is a support group for [facility] bariatric patients (and those external to [facility]) who have had bariatric surgery and are at least 3 months post-surgery.  WHEN: This support group meets the 4th Wednesday of the month from 11:00 AM - 12:00 PM virtually using Microsoft Teams.   FACILITATOR: Led by Certified Bariatric Nurse, Debi Carmona RN.   TO REGISTER: Please send an email to Debi at [email or web address] if you would like an invitation to the group and to learn about using Microsoft Teams.    2022 Meetings  January 26  February 23  March 23  April 27  May 25  [place] 22    [facility] Healthy Lifestyle Virtual Support Group    Healthy Lifestyle Virtual Support Group?  This is 60 minutes of small group guided discussion, support and resources. All are welcome who want a healthy lifestyle.  WHEN: This group meets monthly on a Friday from 12:30 PM - 1:30 PM virtually using Microsoft Teams.   FACILITATOR: Led by National Board Certified Health and , Debi Pedro [facility].   TO REGISTER: Please send an email to Debi at?[email or web address] to receive monthly invites to the group or if you have any questions about having a health .  Prior to the meeting, a link with directions on how to join the meeting will be sent to you.    2022 Meetings  January 21: Dodie Hackett MS, RN, CIC, CBN, \"Healthy Habits\"  February 25: Open Forum  March 18: \"Setting Limits and Boundaries\"  April 29: Stephanie Bradley RD, \"Meal Planning Made Easy\"  May 20: Open Forum  June: To be determined          "

## 2022-01-27 NOTE — LETTER
Date:January 27, 2022      Patient was self referred, no letter generated. Do not send.        [facility] Health Information

## 2022-02-03 ENCOUNTER — TELEPHONE (OUTPATIENT)
Dept: CARDIOLOGY | Facility: CLINIC | Age: 47
End: 2022-02-03
Payer: MEDICAID

## 2022-02-03 NOTE — TELEPHONE ENCOUNTER
Called and spoke to patient about setting up follow up with a cardiologist since Dr Rocha is on leave. Patient asked me to callback at a later date to schedule. I have set a reminder for 1 month.     Wong Rangel, EMT  Clinic Support  [facility]    [phone]    Employed by [facility] [employer]

## 2022-02-04 ENCOUNTER — CARE COORDINATION (OUTPATIENT)
Dept: ENDOCRINOLOGY | Facility: CLINIC | Age: 47
End: 2022-02-04
Payer: MEDICAID

## 2022-02-04 NOTE — PROGRESS NOTES
Tasklist updated and sent to patient via [employer].    Bariatric Task List    Fax:  Please fax all paperwork to: [phone] -     Status:  Is patient a candidate for bariatric surgery?:  patient is a candidate for bariatric surgery -     Cleared to schedule surgeon consult?:  cleared to schedule surgeon consult - Call [phone] to schedule visit with Dr Kavon Mata. bks   Status:  surgery evaluation in process -     Surgeon: Adolfo -     Tentative surgery month/year: TBD -        Insurance: Insurance:  Medicaid [place] - Call [phone] if insurance has changed the Call Center will update your chart.bks    Contact insurance to discuss coverage: Needed -          Patient Info: Initial Weight:  337 -     Date of Initial Weight/Height:  9/7/2021 -     Goal Weight (lbs):  317 -     Required Weight Loss:  20 -     Surgery Type:  sleeve gastrectomy -        Dietician Visits: Structured weight loss required by insurance?:  structured weight loss required -     Dietician Visit 1:  Completed - 9/7/21 SS - AS   Dietician Visit 2:  Completed - 10/5/21    Dietician Visit 3:  Completed - 11/02/21 & 11/30/21    Dietician Visit 4:  Completed - 12/31/21 in Epic. [facility]   Dietician Visit 5:  Completed - 1/27/22 in Epic. [facility]   Dietician Visit 6:  Needed - Data deleted   Dietician Visit additional:  Needed - Monthly until surgery for weight loss and to review postop diet teaching. - AS      Psychological Evaluation: Psych eval:  Needed - List and letter sent to pt 9/9/21 - AS      Lab Work: Complete Blood Count:  Needed - Ordered 9/7/21-  AS   Comprehensive Metabolic Panel:  Needed - Ordered 9/7/21-  AS   Vitamin D:  Needed - Ordered 9/7/21-  AS   PTH:  Needed - Ordered 9/7/21-  AS   Hgb A1c:  Completed - Ordered 9/7/21-  AS; 7/12/21=6.6 in Epic. [facility]    Lipids: Needed - Ordered 9/7/21-  AS    TSH (UCARE, [facility], [place] MA): Needed - Ordered 9/7/21-  AS      Consults/ Clearance Sleep Medicine:  Needed - Letter sent to pt 9/9/21 - AS   Cardiac:   "Needed - Letter sent to pt 9/9/21 -AS   Medical Weight Management: Needed - started ozempic with Stephania 9/7/21, call [phone] for follow-up visits. bks      Testing: Sleep Study:   -  If ordered by sleep provider.      PCP: Establish care with PCP:  Completed -     Follow up with PCP:    -     PCP letter of support:  Completed - Letter sent to pt 9/9/21 -AS; 9/15/21 PCP ltr from CECE Lopez,CNP-in Epic. bks      Patient Education:  Information Session:  Completed -     Given \"Making your decision\" handout?:  Yes - 9/9/21   Given \"A Roadmap to you Weight Loss Surgery\" handout?: Yes - 9/9/21   Given \"Get Well Loop\" information?: Yes - 9/9/21   Given support group information?:    -     Attended support group?:    -     Support plan in place?:  Completed -     Research consents signed?:  research consent not signed -        Additional Surgery Requirements: Other: Covid test 4 days prior to surgery. bks -     Other: Postop appts for 1 week and 31+ days. -        Final Tasks:  Before surgery online class:  Needed -     Before surgery online class website link:  [email or web address]   After surgery online class:  Needed -     After surgery online class website link:  [email or web address]   Nurse visit per clinic:  Needed - Call Pam at [phone] to schedule once you have a surgery date.   History and Physical per clinic:   -  Pre-Assessment Clinic   Final labs per clinic: Needed -        Notes: Please register for the Get Well Loop when you get an email invitation and a surgery date.     The Get Well Loop will give you information via email or text messages that can help you be more successful before and after surgery.  It can also help ans,wer any questions you may have.    Get Well Loop Information  [email or web address]       -            "

## 2022-03-06 ENCOUNTER — HEALTH MAINTENANCE LETTER (OUTPATIENT)
Age: 47
End: 2022-03-06

## 2022-03-06 NOTE — PROGRESS NOTES
"Archie Wallace is a 46 year old male who is being evaluated via a billable video visit.      The patient has been notified of following:     \"This video visit will be conducted via a call between you and your physician/provider. We have found that certain health care needs can be provided without the need for an in-person physical exam.  This service lets us provide the care you need with a video conversation.  If a prescription is necessary we can send it directly to your pharmacy.  If lab work is needed we can place an order for that and you can then stop by our lab to have the test done at a later time.    Video visits are billed at different rates depending on your insurance coverage.  Please reach out to your insurance provider with any questions.    If during the course of the call the physician/provider feels a video visit is not appropriate, you will not be charged for this service.\"    Patient has given verbal consent for Video visit? Yes  How would you like to obtain your AVS? MyChart  Will anyone else be joining your video visit? No  {If patient encounters technical issues they should call [phone]      Video-Visit Details    Type of service:  Video Visit    Video Start Time: 9:53 AM  Video End Time: 10:10 AM    Originating Location (pt. Location): Home    Distant Location (provider location):  [facility] [place]     Platform used for Video Visit: [employer]    During this virtual visit the patient is located in [place], patient verifies this as the location during the entirety of this visit.       Bariatric Nutrition Consultation Note    Reason For Visit: Nutrition Assessment    Archie Wallace is a 46 year old presenting today for return bariatric nutrition consult.  Pt is interested in laparoscopic sleeve gastrectomy with Dr. Mata, expected surgery in TBD.  Patient is accompanied by self.  Patient has completed 6 minimum required nutrition visits prior to surgery. " "Recommending monthly follow-up until surgery.     Coordination note: Archie states his insurance plan will be changing the first of the month, encouraged him to check new plan for surgery coverage. Also provided him with [employer] number for assistance checking.     Pt referred by RAE Alejandro on September 7, 2021.  Patient with Co-morbidities of obesity including:  Type II DM yes (A1C 6.6 7/12/21)  Renal Failure no  Sleep apnea no  Hypertension yes  Dyslipidemia yes  Joint painno  Back pain no  GERD  yes    Support System Reviewed With Patient 9/7/2021   Who do you have in your support network that can be available to help you for the first 2 weeks after surgery? girlfriend   Who can you count on for support throughout your weight loss surgery journey? girlfriend       ANTHROPOMETRICS:  Estimated body mass index is 46.78 kg/m  as calculated from the following:    Height as of 9/7/21: 1.81 m (5' 11.25\").    Weight as of 9/7/21: 153.2 kg (337 lb 12.8 oz).    Current weight (pt reported): 343 lbs (-5 lbs over past month, +6 lbs from initial)    Required weight loss goal pre-op: -20 lbs from initial consult weight (goal weight 317 lbs or less before surgery)       9/7/2021   I have tried the following methods to lose weight Watching portions or calories, Exercise, Atkins type diet (low carb/high protein), Slimfast, OTC Medications       Weight Loss Questions Reviewed With Patient 9/7/2021   How long have you been overweight? Since late teens through early 20's       SUPPLEMENT INFORMATION:  None    Medications for Weight Loss:  Topiramate - Pt reports he is not taking consistently due to feeling brain fog with higher dose, encouraged follow-up with PA to discuss, pt agreeable.     NUTRITION HISTORY:  No known food allergies. Lactose intolerant.     Checks BG TID in morning, noon and at night  Travels a lot - 2-3 times per month   Already having to chew to fine texture after thyroid surgery.     Doesn't eat if not " "hungry, leads to skipping daytime meals. Sometimes eating out of boredom. Waking in the night every 2-3 hours, will need something sweet (candy, [employer] cakes) to fall back asleep. This is likely contributing to daytime fullness.     Over past several months, he has been focusing on eating smaller portions and using leaner cooking methods at dinner. Working on not snacking when he wakes at night. Is able to avoid some nights. Is drinking a protein shake in evening before bed to help avoid snacking. He is making progress with adding in another daytime meal. He has gained a significant amount of weight in past couple months and is not able to identify why, thinks may be related to thyroid.     Today - He has started replacing 2 meals per day with Slimfast Keto Shake or Shakeology shake, and one whole-food meal mid day focused on lean protein and veggies. He may do another shake as an HS snack as needed. He reports he is no longer eating in the middle of the night. He wants to work on getting more physical activity in to help with mobility. Has access to a  at his gym. Did a consult to learn exercises.     Recent Diet Recall:  Breakfast: protein shake  Lunch: salad and chicken and boiled egg  Dinner: protiein shake   snack: protein shake, \"slim shake\" (calorie free beverage)  Beverages: Water, regular gatorade (less often), sugar-free gatorade, calorie free tea    Progress Towards Previous Goals:  1) Replace 1-2 meals per day with protein shake - Met, continues  2) Avoid snacking after waking - Met, continues  3) Eliminate calorie-containing beverages - Met, continues  4) Schedule a follow-up with RAE Alejandro - Not met  5) Call [employer] at [phone] to discuss insurance coverage for bariatric surgery once your insurance changes.- Met  6) Complete bariatric labs - call for a lab only appointment at [facility] lab. To find a lab location near you, please call [phone]. " Please let us know if orders need to be faxed to a [facility] lab. - Not met  7) Obtain letter of support from your primary care doctor (Provider can submit through electronic medical record or fax to [phone]) - Not met  8) Obtain clearances from sleep doctor and cardiologist (Provider can submit through electronic medical record or fax to [phone]) - Not met      Eating Habits 9/7/2021   Do you have any dietary restrictions? Yes   Do you currently binge eat (eat a large amount of food in a short time)? Yes   Are you an emotional eater? Yes   Do you get up to eat after falling asleep? Yes   What foods do you crave? nothing       ADDITIONAL INFORMATION:  Had spine surgery and half thyroid removed in July, per 10/12 visit with neurosurg, lifting restrictions lifted and pt to begin PT.     He has started to go to the gym 4 times per week, exercising for 45 mins total each time. Doing swimming, treadmill, and some strengthening. Trying to get in more walking outside of the gym.     Dining Out History Reviewed With Patient 9/7/2021   How often do you dine out? A couple of times a week.   Where do you dine out? (select all that apply) fast food chains   What types of food do you order when you dine out? jass       Physical Activity Reviewed With Patient 9/7/2021   How often do you exercise? Never   What keeps you from being more active?  I am as active as I can possbily be, I have just had surgery on one or more of my joints, My ability to walk or move around is limited         NUTRITION DIAGNOSIS:  Obesity r/t long history of self-monitoring deficit and excessive energy intake aeb BMI >30 kg/m2. - continues    INTERVENTION:  Intervention Provided/Education Provided:  Reviewed post-op diet guidelines, ways to help prepare for post-op diet guidelines pre-operatively, portion/calorie-control, mindful eating and sources of protein.  Reviewed progress towards previous goals.  Praised pt on  follow-through with nutrition goals this month. Encouraged he continue to replace 2 meals per day with low calorie protein shakes, and focusing third meal on lean protein and veggies. Discussed using protein shake/drink as snack as needed. He is to continue calorie free hydration.  Discussed adding 5 mins to each exercise session as able to increase total weekly exercise to 200 mins.   Reviewed surgery task list.  Patient demonstrates understanding.  AVS vis MyChart     Questions Reviewed With Patient 9/7/2021   How ready are you to make changes regarding your weight? Number 1 = Not ready at all to make changes up to 10 = very ready. 10   How confident are you that you can change? 1 = Not confident that you will be successful making changes up to 10 = very confident. 10       Expected Engagement: good    GOALS:  1) Replace 2 meals per day with protein shake   2) Avoid snacking after waking  3) Eliminate calorie-containing beverages   4) Add 5 mins onto each exercise session as able  5) Schedule a follow-up with RAE Alejandro  6) Complete bariatric labs - call for a lab only appointment at any [facility] lab. To find a lab location near you, please call [phone]. Please let us know if orders need to be faxed to a non [facility] lab.  7) Obtain letter of support from your primary care doctor (Provider can submit through electronic medical record or fax to [phone])  8) Obtain clearances from sleep doctor and cardiologist (Provider can submit through electronic medical record or fax to [phone])    Meal Replacement Shake Options:   *Protein Shake Criteria: no more than 210 Calories, at least 20 grams of protein, and less than 10 grams of sugar   [facility] smoothie (160 Calories, 20 g protein)   Premier Protein (160 Calories, 30 g protein)  Slim Fast Advanced Nutrition (180 Calories, 20 g protein)  Muscle Milk, lactose-free, 17 oz bottle (210 Calories, 30 g protein)  Integrated  Supplements, no artificial sugars (110 Calories, 20 g protein)  Genepro, unflavored protein powder (60 Calories, 30 g protein)  Boost/Ensure Max (160 calories, 30 gm protein)   [email or web address] Core Power (170 calories, 26 gm protein)    Meal Replacement Bar Options:   [facility] Protein Shake (160 Calories, 15 g protein)  Quest Protein Bars (190 Calories, 20 g protein)  Built Bar (170 Calories, 15-20 g protein)  One Protein Bar (210 calories, 20 g protein)  [place] Signature Protein Bar ([employer]) (190 Calories, 21 g protein)  Pure Protein Bars (180 Calories, 21 g protein)    Low Calorie Frozen Meals:  Lean Cuisine  Healthy Choice  Smart Ones  [facility]       Follow up: 1 month    Time spent with patient: 17 minutes.  Kayla Mcdonnell RD, LD

## 2022-03-07 ENCOUNTER — VIRTUAL VISIT (OUTPATIENT)
Dept: ENDOCRINOLOGY | Facility: CLINIC | Age: 47
End: 2022-03-07
Payer: COMMERCIAL

## 2022-03-07 DIAGNOSIS — Z71.3 NUTRITIONAL COUNSELING: Primary | ICD-10-CM

## 2022-03-07 DIAGNOSIS — E11.65 TYPE 2 DIABETES MELLITUS WITH HYPERGLYCEMIA, WITHOUT LONG-TERM CURRENT USE OF INSULIN (H): ICD-10-CM

## 2022-03-07 DIAGNOSIS — E66.9 OBESITY: ICD-10-CM

## 2022-03-07 PROCEDURE — 97803 MED NUTRITION INDIV SUBSEQ: CPT | Mod: 95 | Performed by: DIETITIAN, REGISTERED

## 2022-03-07 NOTE — PATIENT INSTRUCTIONS
Brian Almanza,    Follow-up with RD in 1 month.     Thank you,    Kayla Mcdonnell, RD, LD  If you would like to schedule or reschedule an appointment with the RD, please call [phone]    Nutrition Goals  1) Replace 2 meals per day with protein shake   2) Avoid snacking after waking  3) Eliminate calorie-containing beverages   4) Add 5 mins onto each exercise session as able  5) Schedule a follow-up with RAE Alejandro  6) Complete bariatric labs - call for a lab only appointment at any [facility] lab. To find a lab location near you, please call [phone]. Please let us know if orders need to be faxed to a non [facility] lab.  7) Obtain letter of support from your primary care doctor (Provider can submit through electronic medical record or fax to [phone])  8) Obtain clearances from sleep doctor and cardiologist (Provider can submit through electronic medical record or fax to [phone])    Interested in working with a health ?  Health coaches work with you to improve your overall health and wellbeing.  They look at the whole person, and may involve discussion of different areas of life, including, but not limited to the four pillars of health (sleep, exercise, nutrition, and stress management). Discuss with your care team if you would like to start working a health .    Health Coaching-3 Pack:    $99 for three health coaching visits    Visits may be done in person or via phone    Coaching is a partnership between the  and the client; Coaches do not prescribe or diagnose    Coaching helps inspire the client to reach his/her personal goals      COMPREHENSIVE WEIGHT MANAGEMENT PROGRAM  VIRTUAL SUPPORT GROUPS    For Support Group Information:      We offer support groups for patients who are working on weight loss and considering, preparing for or have had weight loss surgery.   There is no cost for this opportunity.  You are invited to attend the?Virtual Support Groups?provided  "by any of the following locations:    1. [facility] via [employer] Teams with Alycia Oleary RN  2.   [place] via [employer] with Abe Lopez, PhD, LP  3.   [place] via [employer] Teams with Debi Carmona RN  4.   [facility] via [employer] Teams with Debi Pedro [facility]    The following Support Group information can also be found on our website:  [email or web address]      [facility] Weight Loss Surgery Support Group    [facility] Weight Loss Surgery Support Group  The support group is a patient-lead forum that meets monthly to share experiences, encouragement and education. It is open to those who have had weight loss surgery, are scheduled for surgery, and those who are considering surgery.   WHEN: This group meets on the 3rd Wednesday of each month from 5:00PM - 6:00PM virtually using [employer] Teams.   FACILITATOR: Led by Alycia Oleary RD, KYRA, RN, the program's Clinical Coordinator.   TO REGISTER: Please contact the clinic via [email or web address] or call the nurse line directly at [phone] to inform our staff that you would like an invite sent to you and to let us know the email you would like the invite sent to. Prior to the meeting, a link with directions on how to join the meeting will be sent to you.    2022 Meetings  January 19: \"Let's Talk\" a time for the group to share.  February 16: \"Let's Talk\" a time for the group to share.  March 16: Guest Speakers: Psychologists, Elisha Gatica, PhD,LP and Sabrina Lemons, PsyD,  April 20: Guest Speaker: Health , Ann Kaye, [facility],CHES, CPT  May 18: Guest Speaker: DietitianWalter, JUWAN, LP  [place] 15: \"Let's Talk\" a time for the group to share.  July 20: \"Let's Talk\" a time for the group to share.  August 17: TBA  September 21: TBA  October 19: Guest Speaker: Dr Alessio Salas MD Pulmonologist and Sleep Medicine Physician, \"Getting a Good Night's Sleep\".  November 16: " "TBA  December 21: TBA    [facility] and [facility] Support Groups    Connections: Bariatric Care Support Group?  This is open to all [facility] (and those external to this program) pre- and post- operative bariatric surgery patients as well as their support system.   WHEN: This group meets the 2nd Tuesday of each month from 6:30 PM - 8:00 PM virtually using [employer] Teams.   FACILITATOR: Led by Abe Lopez, Ph.D who is a Licensed Psychologist with the [facility] Comprehensive Weight Management Program.   TO REGISTER: Please send an email to Abe Lopez, Ph.D., LP at?[email or web address]?if you would like an invitation to the group and to learn about using [employer] Teams.    2022 Meetings  January 11: Mayra Frank, PharmD, Pharmacy Resident at [facility], \"Medications and Bariatric Surgery\".  February 8: Open Forum  March 8  April 12  May 10  One[place] 14    Connections: Post-Operative Bariatric Surgery Support Group  This is a support group for [facility] bariatric patients (and those external to [facility]) who have had bariatric surgery and are at least 3 months post-surgery.  WHEN: This support group meets the 4th Wednesday of the month from 11:00 AM - 12:00 PM virtually using Microsoft Teams.   FACILITATOR: Led by Certified Bariatric Nurse, Debi Carmona RN.   TO REGISTER: Please send an email to Debi at [email or web address] if you would like an invitation to the group and to learn about using [employer] Teams.    2022 Meetings  January 26  February 23  March 23  April 27  May 25  One[place] 22    [facility] Healthy Lifestyle Virtual Support Group    Healthy Lifestyle Virtual Support Group?  This is 60 minutes of small group guided discussion, support and resources. All are welcome who want a healthy lifestyle.  WHEN: This group meets monthly on a Friday from 12:30 PM - 1:30 PM virtually using " "Microsoft Teams.   FACILITATOR: Led by National Board Certified Health and , Debi Pedro, [facility].   TO REGISTER: Please send an email to Debi [email or web address] to receive monthly invites to the group or if you have any questions about having a health .  Prior to the meeting, a link with directions on how to join the meeting will be sent to you.    2022 Meetings  January 21: Dodie Hackett MS, RN, CIC, CBN, \"Healthy Habits\"  February 25: Open Forum  March 18: \"Setting Limits and Boundaries\"  April 29: Stephanie Bradley RD, \"Meal Planning Made Easy\"  May 20: Open Forum  June: To be determined          "

## 2022-03-07 NOTE — LETTER
Date:March 7, 2022      Patient was self referred, no letter generated. Do not send.        [facility] Health Information

## 2022-03-07 NOTE — LETTER
"3/7/2022       RE: Archie Wallace  [address]  [address]  [facility] [address]     Dear Colleague,    Thank you for referring your patient, Archie Wallace, to the [facility] [place] at [facility]. Please see a copy of my visit note below.    Archie Wallace is a 46 year old male who is being evaluated via a billable video visit.      The patient has been notified of following:     \"This video visit will be conducted via a call between you and your physician/provider. We have found that certain health care needs can be provided without the need for an in-person physical exam.  This service lets us provide the care you need with a video conversation.  If a prescription is necessary we can send it directly to your pharmacy.  If lab work is needed we can place an order for that and you can then stop by our lab to have the test done at a later time.    Video visits are billed at different rates depending on your insurance coverage.  Please reach out to your insurance provider with any questions.    If during the course of the call the physician/provider feels a video visit is not appropriate, you will not be charged for this service.\"    Patient has given verbal consent for Video visit? Yes  How would you like to obtain your AVS? MyChart  Will anyone else be joining your video visit? No  {If patient encounters technical issues they should call [phone]      Video-Visit Details    Type of service:  Video Visit    Video Start Time: 9:53 AM  Video End Time: 10:10 AM    Originating Location (pt. Location): Home    Distant Location (provider location):  [facility] [place]     Platform used for Video Visit: Flowity    During this virtual visit the patient is located in [place], patient verifies this as the location during the entirety of this visit.       Bariatric Nutrition Consultation Note    Reason For " "Visit: Nutrition Assessment    Archie Wallace is a 46 year old presenting today for return bariatric nutrition consult.  Pt is interested in laparoscopic sleeve gastrectomy with Dr. Mata, expected surgery in D.  Patient is accompanied by self.  Patient has completed 6 minimum required nutrition visits prior to surgery. Recommending monthly follow-up until surgery.     Coordination note: Archie states his insurance plan will be changing the first of the month, encouraged him to check new plan for surgery coverage. Also provided him with [employer] number for assistance checking.     Pt referred by RAE Alejandro on September 7, 2021.  Patient with Co-morbidities of obesity including:  Type II DM yes (A1C 6.6 7/12/21)  Renal Failure no  Sleep apnea no  Hypertension yes  Dyslipidemia yes  Joint painno  Back pain no  GERD  yes    Support System Reviewed With Patient 9/7/2021   Who do you have in your support network that can be available to help you for the first 2 weeks after surgery? girlfriend   Who can you count on for support throughout your weight loss surgery journey? girlfriend       ANTHROPOMETRICS:  Estimated body mass index is 46.78 kg/m  as calculated from the following:    Height as of 9/7/21: 1.81 m (5' 11.25\").    Weight as of 9/7/21: 153.2 kg (337 lb 12.8 oz).    Current weight (pt reported): 343 lbs (-5 lbs over past month, +6 lbs from initial)    Required weight loss goal pre-op: -20 lbs from initial consult weight (goal weight 317 lbs or less before surgery)       9/7/2021   I have tried the following methods to lose weight Watching portions or calories, Exercise, Atkins type diet (low carb/high protein), Slimfast, OTC Medications       Weight Loss Questions Reviewed With Patient 9/7/2021   How long have you been overweight? Since late teens through early 20's       SUPPLEMENT INFORMATION:  None    Medications for Weight Loss:  Topiramate - Pt reports he is not taking consistently due to feeling brain " "fog with higher dose, encouraged follow-up with PA to discuss, pt agreeable.     NUTRITION HISTORY:  No known food allergies. Lactose intolerant.     Checks BG TID in morning, noon and at night  Travels a lot - 2-3 times per month   Already having to chew to fine texture after thyroid surgery.     Doesn't eat if not hungry, leads to skipping daytime meals. Sometimes eating out of boredom. Waking in the night every 2-3 hours, will need something sweet (candy, Lil Odalys's cakes) to fall back asleep. This is likely contributing to daytime fullness.     Over past several months, he has been focusing on eating smaller portions and using leaner cooking methods at dinner. Working on not snacking when he wakes at night. Is able to avoid some nights. Is drinking a protein shake in evening before bed to help avoid snacking. He is making progress with adding in another daytime meal. He has gained a significant amount of weight in past couple months and is not able to identify why, thinks may be related to thyroid.     Today - He has started replacing 2 meals per day with Slimfast Keto Shake or Shakeology shake, and one whole-food meal mid day focused on lean protein and veggies. He may do another shake as an HS snack as needed. He reports he is no longer eating in the middle of the night. He wants to work on getting more physical activity in to help with mobility. Has access to a  at his gym. Did a consult to learn exercises.     Recent Diet Recall:  Breakfast: protein shake  Lunch: salad and chicken and boiled egg  Dinner: protiein shake   snack: protein shake, \"slim shake\" (calorie free beverage)  Beverages: Water, regular gatorade (less often), sugar-free gatorade, calorie free tea    Progress Towards Previous Goals:  1) Replace 1-2 meals per day with protein shake - Met, continues  2) Avoid snacking after waking - Met, continues  3) Eliminate calorie-containing beverages - Met, continues  4) Schedule a " follow-up with RAE Alejandro - Not met  5) Call [employer] at [phone] to discuss insurance coverage for bariatric surgery once your insurance changes.- Met  6) Complete bariatric labs - call for a lab only appointment at any [facility] lab. To find a lab location near you, please call [phone]. Please let us know if orders need to be faxed to a non [facility] lab. - Not met  7) Obtain letter of support from your primary care doctor (Provider can submit through electronic medical record or fax to [phone]) - Not met  8) Obtain clearances from sleep doctor and cardiologist (Provider can submit through electronic medical record or fax to [phone]) - Not met      Eating Habits 9/7/2021   Do you have any dietary restrictions? Yes   Do you currently binge eat (eat a large amount of food in a short time)? Yes   Are you an emotional eater? Yes   Do you get up to eat after falling asleep? Yes   What foods do you crave? nothing       ADDITIONAL INFORMATION:  Had spine surgery and half thyroid removed in July, per 10/12 visit with neurosurg, lifting restrictions lifted and pt to begin PT.     He has started to go to the gym 4 times per week, exercising for 45 mins total each time. Doing swimming, treadmill, and some strengthening. Trying to get in more walking outside of the gym.     Dining Out History Reviewed With Patient 9/7/2021   How often do you dine out? A couple of times a week.   Where do you dine out? (select all that apply) fast food chains   What types of food do you order when you dine out? jass       Physical Activity Reviewed With Patient 9/7/2021   How often do you exercise? Never   What keeps you from being more active?  I am as active as I can possbily be, I have just had surgery on one or more of my joints, My ability to walk or move around is limited         NUTRITION DIAGNOSIS:  Obesity r/t long history of self-monitoring deficit and excessive energy intake aeb BMI  >30 kg/m2. - continues    INTERVENTION:  Intervention Provided/Education Provided:  Reviewed post-op diet guidelines, ways to help prepare for post-op diet guidelines pre-operatively, portion/calorie-control, mindful eating and sources of protein.  Reviewed progress towards previous goals.  Praised pt on follow-through with nutrition goals this month. Encouraged he continue to replace 2 meals per day with low calorie protein shakes, and focusing third meal on lean protein and veggies. Discussed using protein shake/drink as snack as needed. He is to continue calorie free hydration.  Discussed adding 5 mins to each exercise session as able to increase total weekly exercise to 200 mins.   Reviewed surgery task list.  Patient demonstrates understanding.  AVS vis MyChart     Questions Reviewed With Patient 9/7/2021   How ready are you to make changes regarding your weight? Number 1 = Not ready at all to make changes up to 10 = very ready. 10   How confident are you that you can change? 1 = Not confident that you will be successful making changes up to 10 = very confident. 10       Expected Engagement: good    GOALS:  1) Replace 2 meals per day with protein shake   2) Avoid snacking after waking  3) Eliminate calorie-containing beverages   4) Add 5 mins onto each exercise session as able  5) Schedule a follow-up with RAE Alejandro  6) Complete bariatric labs - call for a lab only appointment at any [facility] lab. To find a lab location near you, please call [phone]. Please let us know if orders need to be faxed to a non [facility] lab.  7) Obtain letter of support from your primary care doctor (Provider can submit through electronic medical record or fax to [phone])  8) Obtain clearances from sleep doctor and cardiologist (Provider can submit through electronic medical record or fax to [phone])    Meal Replacement Shake Options:   *Protein Shake Criteria: no more than 210 Calories, at  least 20 grams of protein, and less than 10 grams of sugar   [employer] [facility] smoothie (160 Calories, 20 g protein)   Premier Protein (160 Calories, 30 g protein)  Slim Fast Advanced Nutrition (180 Calories, 20 g protein)  Muscle Milk, lactose-free, 17 oz bottle (210 Calories, 30 g protein)  Integrated Supplements, no artificial sugars (110 Calories, 20 g protein)  Genepro, unflavored protein powder (60 Calories, 30 g protein)  Boost/Ensure Max (160 calories, 30 gm protein)   Anke Core Power (170 calories, 26 gm protein)    Meal Replacement Bar Options:  [employer] [facility] [employer] Protein Shake (160 Calories, 15 g protein)  Quest Protein Bars (190 Calories, 20 g protein)  Built Bar (170 Calories, 15-20 g protein)  One Protein Bar (210 calories, 20 g protein)  Gold Bar Signature Protein Bar ([employer]) (190 Calories, 21 g protein)  Pure Protein Bars (180 Calories, 21 g protein)    Low Calorie Frozen Meals:  Lean Cuisine  Healthy Choice  Smart Ones  Jasson Mcclendon       Follow up: 1 month    Time spent with patient: 17 minutes.  Kayla Mcdonnell RD, LD      Again, thank you for allowing me to participate in the care of your patient.      Sincerely,    Kayla Mcdonnell RD

## 2022-03-09 ENCOUNTER — MEDICAL CORRESPONDENCE (OUTPATIENT)
Dept: HEALTH INFORMATION MANAGEMENT | Facility: CLINIC | Age: 47
End: 2022-03-09
Payer: COMMERCIAL

## 2022-04-06 ENCOUNTER — OFFICE VISIT (OUTPATIENT)
Dept: CARDIOLOGY | Facility: CLINIC | Age: 47
End: 2022-04-06
Payer: COMMERCIAL

## 2022-04-06 VITALS
DIASTOLIC BLOOD PRESSURE: 100 MMHG | HEART RATE: 88 BPM | OXYGEN SATURATION: 98 % | SYSTOLIC BLOOD PRESSURE: 173 MMHG | WEIGHT: 315 LBS | BODY MASS INDEX: 48.2 KG/M2

## 2022-04-06 DIAGNOSIS — E11.65 TYPE 2 DIABETES MELLITUS WITH HYPERGLYCEMIA, WITHOUT LONG-TERM CURRENT USE OF INSULIN (H): ICD-10-CM

## 2022-04-06 DIAGNOSIS — I45.81 LONG QT INTERVAL SYNDROME: ICD-10-CM

## 2022-04-06 DIAGNOSIS — I25.118 CORONARY ARTERY DISEASE INVOLVING NATIVE HEART WITH OTHER FORM OF ANGINA PECTORIS, UNSPECIFIED VESSEL OR LESION TYPE (H): Primary | ICD-10-CM

## 2022-04-06 DIAGNOSIS — I25.2 HISTORY OF MI (MYOCARDIAL INFARCTION): ICD-10-CM

## 2022-04-06 PROCEDURE — 99215 OFFICE O/P EST HI 40 MIN: CPT | Performed by: STUDENT IN AN ORGANIZED HEALTH CARE EDUCATION/TRAINING PROGRAM

## 2022-04-06 NOTE — PATIENT INSTRUCTIONS
The following is a summary of your office visit today:    1. Please schedule your cardiac MRI with stress.      Nurse contact information:  Cardiology Care Coordinators:  Odalys Ewing RN and Estrada Amaya RN    [phone] Phone      [phone] Fax      HOW TO CHECK YOUR BLOOD PRESSURE AT HOME:     Avoid eating, smoking, and exercising for at least 30 minutes before taking a reading.    Be sure you have taken your BP medication at least 2-3 hours before you check it.     Sit quietly for 10 minutes before a reading.     Sit in a chair with your feet flat on the floor. Rest your  arm on a table so that the arm cuff is at the same level as your heart.    Remain still during the reading.    Record your blood pressure and pulse in a log and bring to your next appointment.     If you have had any blood work, imaging or other testing completed we will be in touch within 1-2 weeks regarding the results. If you have any questions, concerns or need to schedule a follow up, please contact us at [phone]. If you are needing refills please contact your pharmacy. For urgent after hour care please call the [facility] at [phone] or the [facility] at [phone] and ask to speak to the cardiologist on call.    It was a pleasure meeting with you today. Please let us know if there is anything else we can do for you so that we can be sure you are leaving completely satisfied with your care experience.     Your Cardiology Team at [facility]  RN Care Coordinators: Cha

## 2022-04-06 NOTE — PROGRESS NOTES
Chief complaint: cardiology evaluation pre bariatric surgery    HPI:   Archie Wallace is a 46 year old male with history of (+) HTN, Type II DM [10 years], (+) hypercholesterolemia, (+) prior cigar use, (+) fam Hx premature CAD.    He reports having an MI in 2004, stress nuclear study in 2005 that showed no focal fixed or reversible perfusion defects.      He is able to walk one and walks two flights of stairs at home every day, feels short of breath while doing it.     The patient's risk factor profile is: (-) HTN, (-) diabetes, (-) hyperlipidemia, (-) tobacco use, (-) family Hx CAD.     He denies any chest pain, PND, orthopnea, peripheral edema, palpitations, lightheadedness or syncope.     PAST MEDICAL HISTORY:  Past Medical History:   Diagnosis Date     CAD (coronary artery disease)     Premature artery disease noted     Hyperlipidaemia LDL goal <100      Hypertension 2007     Hypertension goal BP (blood pressure) < 140/90      Long QT interval syndrome      MEDICAL HISTORY OF -     History of rhabdomyolosis, from strenuous exercise     Mild persistent asthma      Obstructive sleep apnea      Sleep apnea syndrome     cpap     Sleep apnea syndrome      Type 2 diabetes, HbA1c goal < 7% (H)        CURRENT MEDICATIONS:  Current Outpatient Medications   Medication Sig Dispense Refill     albuterol (PROAIR HFA) 108 (90 Base) MCG/ACT inhaler Inhale 2 puffs into the lungs every 6 hours as needed 1 Inhaler 5     alcohol swab prep pads Use to swab area of injection/jessy as directed. 100 each 3     amLODIPine (NORVASC) 10 MG tablet Take 1 tablet (10 mg) by mouth daily 90 tablet 2     aspirin 81 MG EC tablet Take 1 tablet (81 mg) by mouth daily 90 tablet 3     blood glucose (ACCU-CHEK GUIDE) test strip Use to test blood sugar 1 time daily (vary the time before meals and bed). 50 strip 3     blood glucose (NO BRAND SPECIFIED) test strip Use to test blood sugar 1 times daily or as directed. To accompany: Blood Glucose Monitor  Brands: per insurance. 100 strip 6     blood glucose calibration (NO BRAND SPECIFIED) solution To accompany: Blood Glucose Monitor Brands: per insurance. 1 Bottle 3     cetirizine (ZYRTEC) 10 MG tablet Take 10 mg by mouth       cloNIDine (CATAPRES) 0.1 MG tablet Take 1 tablet (0.1 mg) by mouth 2 times daily 60 tablet 1     insulin glargine (LANTUS SOLOSTAR) 100 UNIT/ML pen Inject 14 Units Subcutaneous At Bedtime (has some) 15 mL 0     Insulin Pen Needle (PEN NEEDLES) 32G X 4 MM MISC 1 each daily 100 each 0     losartan (COZAAR) 100 MG tablet Take 1 tablet (100 mg) by mouth daily 90 tablet 2     methocarbamol (ROBAXIN) 500 MG tablet Take 1 tablet (500 mg) by mouth 3 times daily as needed for muscle spasms 30 tablet 1     order for DME Equipment being ordered: CPAP--replacement with using prior settings. Additional supplies as needed with replacement machine.    Will need to contact the patient for information needed about the prior CPAP settings and use. 1 Units o     ORDER FOR DME CPAP setting 19 cm H2O 1 Units 0     ORDER FOR DME CPAP 1 Device 0     thin (NO BRAND SPECIFIED) lancets Use with lanceting device. To accompany: Blood Glucose Monitor Brands: per insurance. 100 each 6     topiramate (TOPAMAX) 25 MG tablet Take 2 tablets (50 mg) by mouth 2 times daily 120 tablet 3     ACCU-CHEK GUIDE test strip USE TO TEST BLOOD SUGAR 3 TO FOUR TIMES DAILY 200 strip 1     Alcohol Swabs PADS 1 each daily 100 each 0     atorvastatin (LIPITOR) 40 MG tablet Take 1 tablet (40 mg) by mouth daily 90 tablet 3     atorvastatin (LIPITOR) 80 MG tablet        blood glucose monitoring (ACCU-CHEK FASTCLIX) lancets 1 each daily Use to test blood sugar 1 time daily. 102 each 3     blood glucose monitoring (NO BRAND SPECIFIED) meter device kit Use to test blood sugar 1 times daily or as directed. Preferred blood glucose meter OR supplies to accompany: Blood Glucose Monitor Brands: per insurance. 1 kit 0     blood glucose monitoring  (SOFTCLIX) lancets Use to test blood sugar 3-4 times daily. 100 each 4     Blood Glucose Monitoring Suppl (ACCU-CHEK GUIDE ME) w/Device KIT 1 Device daily Use to test blood sugar 1 times daily or as directed. Preferred blood glucose meter: per insurance. 1 kit 0     oxyCODONE-acetaminophen (PERCOCET) 5-325 MG tablet Take 1 tablet by mouth every 6 hours as needed for severe pain 30 tablet 0     semaglutide (OZEMPIC) 2 MG/1.5ML SOPN pen Inject 0.25 mg subcutaneously once weekly for 4 weeks then 0.5 mg once weekly. 3 mL 1       PAST SURGICAL HISTORY:  Past Surgical History:   Procedure Laterality Date     ANGIOGRAM       ANGIOPLASTY  2001    Coronary artery angioplasty//associated with mild AMI     DISCECTOMY, FUSION CERVICAL ANTERIOR THREE+ LEVELS, COMBINED N/A 7/16/2021    Procedure: Cervical 3 to Cervical 7 Anterior Cervical Discectomy and Fusion;  Surgeon: Jaquan De Leon MD;  Location: [facility] OR     NASAL SINUS SURGERY       THYROIDECTOMY Right 7/16/2021    Procedure: RIGHT THYROIDECTOMY;  Surgeon: Moshe Hernandez DO;  Location: [facility] OR     TONSILLECTOMY  2005    With uvuloectomy       ALLERGIES:     Allergies   Allergen Reactions     Oseltamivir Anaphylaxis and Rash     Biaxin [Clarithromycin]      Muscle breakdown     Bromaxefed Dm Rf Cough     Clarithromycin Other (See Comments)     Weakness     Robafen Dm Cgh-Chest [Robitussin Cough-Chest Dm] Difficulty breathing     Guaifenesin Rash     Metformin Rash     Reported breaking out with a skin reaction  Reported breaking out with a skin reaction  Reported breaking out with a skin reaction       FAMILY HISTORY:  Family History   Problem Relation Age of Onset     Diabetes Mother      Hypertension Mother      Glaucoma Mother      Diabetes Father      Diabetes Sister      Glaucoma Other      Macular Degeneration No family hx of        SOCIAL HISTORY:  Social History     Tobacco Use     Smoking status: Former Smoker     Types: Cigars     Quit date: 1/1/2000     Years  since quittin.2     Smokeless tobacco: Never Used     Tobacco comment: Smokes intermittently   Substance Use Topics     Alcohol use: Yes     Comment: occ/ mod     Drug use: Yes     Types: Marijuana     Comment: used marjuana a week ago       ROS:   A comprehensive 14 point review of systems is negative other than as mentioned in HPI.    Exam:  BP (!) 173/100 (BP Location: Left arm, Patient Position: Sitting, Cuff Size: Adult Large)   Pulse 88   Wt (!) 157.9 kg (348 lb)   SpO2 98%   BMI 48.20 kg/m    GENERAL APPEARANCE: healthy, alert and no distress  EYES: no icterus, no xanthelasmas  ENT: normal palate, mucosa moist, no central cyanosis  NECK: JVP not elevated  RESPIRATORY: lungs clear to auscultation - no rales, rhonchi or wheezes, no use of accessory muscles, no retractions, respirations are unlabored, normal respiratory rate  CARDIOVASCULAR: regular rhythm, normal S1 with physiologic split S2, no S3 or S4 and no murmur, click or rub.  GI: soft, non tender, bowel sounds normal,no abdominal bruits  EXTREMITIES: no edema, no bruits  NEURO: alert and oriented to person/place/time, normal speech, gait and affect  VASC: Radial, dorsalis pedis and posterior tibialis pulses 2+ bilaterally.  SKIN: no ecchymoses, no rashes.  PSYCH: cooperative, affect appropriate.     Labs:  Reviewed.     Testing/Procedures:  I personally visualized and interpreted:  2020 Stress echo    Normal, low-risk exercise echocardiogram without evidence of ischemia. While  heart rate was slightly submaximal (74% MPHR), a diagnostic workload was  achieved based on rate-pressure product (RPP 30,444.)     The target heart rate was not achieved. Exercise was terminated after 6:46  (m:ss) at a HR of 129 bpm (74% MPHR) due to hypertensive BP response. No  angina was elicited.  Normal heart rate and hypertensive BP response (baseline /98->236/98) to  exercise.  Normal biventricular size, thickness, and global systolic function  at  baseline, LVEF=60-65%.  With exercise, LVEF increased to >70% and LV cavity size decreased  appropriately.  No regional wall motion abnormalities are present at rest or with exercise.  No angina was elicited.  No ECG evidence of ischemia.  Functional capacity is reduced for age.    Assessment and Plan:   Archie Wallace is a 46 year old male with history of (+) HTN, Type II DM [10 years], (+) hypercholesterolemia, (+) prior cigar use, (+) fam Hx premature CAD.    1. Bariatric surgery is a non-emergent procedure that is of intermediate-risk based on ACC/AHA guidelines.    2. The patient currently has no active cardiac conditions [unstable coronary syndromes, decompensated HF, significant arrhythmias, or severe valvular disease (severe AS or symptomatic MS)].    3. Functional METs is 4 greater than or equal to 4 METs without symptoms. The patient's RCRI score is two.  Based on RCRI score, the risk of major cardiac risk complications are:  Two risk factors - 2.4 percent (95% CI 1.3-3.5 percent)     4. Continue beta-blockers and antiarrhythmics, but hold diuretics and ACE-I on morning of surgery.    5. Given prior history of myocardial infarction will further evaluate with cardiac MRI and stress, if no evidence of ischemia he should proceed with the surgery from the cardiovascular stand point.     Hypertension blood pressure not at goal but patient reports that is in significant pain in his neck and back and is waiting for an appointment for pain management.     Chart review time: 30 minutes  Visit time: 20 minutes  Total time spent: 50 minutes  >50% of this 20-minute visit were spent with the patient on in-person counseling and discussion regarding risk stratification.     The patient states understanding and is agreeable with plan.     Eliot Patrick MD  Cardiology    CC

## 2022-04-07 ENCOUNTER — TELEPHONE (OUTPATIENT)
Dept: ENDOCRINOLOGY | Facility: CLINIC | Age: 47
End: 2022-04-07
Payer: COMMERCIAL

## 2022-04-07 NOTE — TELEPHONE ENCOUNTER
Called pt as he had yet to join 7 am video visit. Pt answered stating he could not complete visit right now, taking his daughter to school. Rescheduled appt for 4/12 at 7am.     Kayla Mcdonnell RD, LD  Clinical Dietitian   Comprehensive Weight Management Program  [facility]

## 2022-04-11 NOTE — PROGRESS NOTES
"Archie Wallace is a 46 year old male who is being evaluated via a billable video visit.      The patient has been notified of following:     \"This video visit will be conducted via a call between you and your physician/provider. We have found that certain health care needs can be provided without the need for an in-person physical exam.  This service lets us provide the care you need with a video conversation.  If a prescription is necessary we can send it directly to your pharmacy.  If lab work is needed we can place an order for that and you can then stop by our lab to have the test done at a later time.    Video visits are billed at different rates depending on your insurance coverage.  Please reach out to your insurance provider with any questions.    If during the course of the call the physician/provider feels a video visit is not appropriate, you will not be charged for this service.\"    Patient has given verbal consent for Video visit? Yes  How would you like to obtain your AVS? MyChart  Will anyone else be joining your video visit? No  {If patient encounters technical issues they should call [phone]      Video-Visit Details    Type of service:  Video Visit    Video Start Time: 7:02 AM  Video End Time: 7:16 AM    Originating Location (pt. Location): Home    Distant Location (provider location):  [facility] [place]     Platform used for Video Visit: Drivy    During this virtual visit the patient is located in [place], patient verifies this as the location during the entirety of this visit.       Bariatric Nutrition Consultation Note    Reason For Visit: Nutrition Assessment    Archie Wallace is a 46 year old presenting today for return bariatric nutrition consult.  Pt is interested in laparoscopic sleeve gastrectomy with Dr. Mata, expected surgery in TBD.  Patient is accompanied by self.  Patient has completed 6 minimum required nutrition visits prior to surgery. " "Recommending monthly follow-up until surgery.     Pt referred by RAE Alejandro on September 7, 2021.  Patient with Co-morbidities of obesity including:  Type II DM yes (A1C 6.6 7/12/21)  Renal Failure no  Sleep apnea no  Hypertension yes  Dyslipidemia yes  Joint pain no  Back pain no  GERD  yes    Support System Reviewed With Patient 9/7/2021   Who do you have in your support network that can be available to help you for the first 2 weeks after surgery? girlfriend   Who can you count on for support throughout your weight loss surgery journey? girlfriend       ANTHROPOMETRICS:  Estimated body mass index is 46.78 kg/m  as calculated from the following:    Height as of 9/7/21: 1.81 m (5' 11.25\").    Weight as of 9/7/21: 153.2 kg (337 lb 12.8 oz).    Current weight (pt reported): 348 lbs (+5 lbs over past month, +11 lbs from initial)    Required weight loss goal pre-op: -20 lbs from initial consult weight (goal weight 317 lbs or less before surgery)       9/7/2021   I have tried the following methods to lose weight Watching portions or calories, Exercise, Atkins type diet (low carb/high protein), Slimfast, OTC Medications       Weight Loss Questions Reviewed With Patient 9/7/2021   How long have you been overweight? Since late teens through early 20's       SUPPLEMENT INFORMATION:  None    Medications for Weight Loss:  Topiramate - Pt reports taking morning and night    NUTRITION HISTORY:  No known food allergies. Lactose intolerant.     Checks BG TID in morning, noon and at night  Travels a lot - 2-3 times per month   Already having to chew to fine texture after thyroid surgery.     Doesn't eat if not hungry, leads to skipping daytime meals. Sometimes eating out of boredom. Waking in the night every 2-3 hours, will need something sweet (candy, Lil Odalys's cakes) to fall back asleep. This is likely contributing to daytime fullness.     Over past several months, he has been focusing on eating smaller portions and " using leaner cooking methods at dinner. Working on not snacking when he wakes at night. Is able to avoid some nights.     Intermittently replacing meals with Slimfast Keto Shake or Shakeology shake.     Today - Gained 5lbs this past month. He has recently tried to cut out third meal meal. Trying to avoid waking to snack, however continues to have a high-carb snack overnight 3 time per week.  He notes some knee pain that has reduced his PA as well.     Recent Diet Recall:  Breakfast: small bowl of cheerios;    Lunch: chicken; hot dog no bun   Dinner: skipping  Snack: snack cakes, chips, fruit   Beverages: Water, regular gatorade (less often), sugar-free gatorade, calorie free tea    Progress Towards Previous Goals:  1) Replace 2 meals per day with protein shake - Not consistently replacing   2) Avoid snacking after waking - Not met  3) Eliminate calorie-containing beverages - Improving  4) Add 5 mins onto each exercise session as able - not met  5) Schedule a follow-up with RAE Alejandro. [phone]. - Not met  6) Complete bariatric labs - call for a lab only appointment at any [facility] lab. To find a lab location near you, please call [phone]. Please let us know if orders need to be faxed to a non [facility] lab.  - Not met  7) Obtain letter of support from your primary care doctor (Provider can submit through electronic medical record or fax to [phone])  - Not met  8) Obtain clearances from sleep doctor and cardiologist (Provider can submit through electronic medical record or fax to [phone]) - Working on this clearance, had visit with cardiologist on 4/6/22, clearance pending stress test and cardiac MRI per [facility] note      Eating Habits 9/7/2021   Do you have any dietary restrictions? Yes   Do you currently binge eat (eat a large amount of food in a short time)? Yes   Are you an emotional eater? Yes   Do you get up to eat after falling asleep? Yes   What foods do you crave?  nothing       ADDITIONAL INFORMATION:  Had spine surgery and half thyroid removed in July, per 10/12 visit with neurosurg, lifting restrictions lifted and pt to begin PT.     He has started to go to the gym 4 times per week, exercising for 45 mins total each time. Doing swimming, treadmill, and some strengthening. Trying to get in more walking outside of the gym.     Dining Out History Reviewed With Patient 9/7/2021   How often do you dine out? A couple of times a week.   Where do you dine out? (select all that apply) fast food chains   What types of food do you order when you dine out? jass       Physical Activity Reviewed With Patient 9/7/2021   How often do you exercise? Never   What keeps you from being more active?  I am as active as I can possbily be, I have just had surgery on one or more of my joints, My ability to walk or move around is limited         NUTRITION DIAGNOSIS:  Obesity r/t long history of self-monitoring deficit and excessive energy intake aeb BMI >30 kg/m2. - continues    INTERVENTION:  Intervention Provided/Education Provided:  Reviewed post-op diet guidelines, ways to help prepare for post-op diet guidelines pre-operatively, portion/calorie-control, mindful eating and sources of protein.  Reviewed progress towards previous goals.  Discussed adjustments to diet to help lower carb intake. Discussed low-carb/high-protein snack ideas. Encouraged use of protein shake meal replacement if low-carb meal/snack is not planned.   Reviewed surgery task list.  Pt due for recheck of HgbA1c, ordered for him to complete with other [facility] labs.   Patient demonstrates understanding.  AVS vis MyChart     Questions Reviewed With Patient 9/7/2021   How ready are you to make changes regarding your weight? Number 1 = Not ready at all to make changes up to 10 = very ready. 10   How confident are you that you can change? 1 = Not confident that you will be successful making changes up to 10 = very confident. 10        Expected Engagement: good    GOALS:  1) Focus on protein and non-starchy veggies/fruit at meals   2) Avoid snacking after waking. If a snack is needed, use a protein snack  3) Eliminate calorie-containing beverages   4) Use the protein shake meal replacement if you do not have a high-protein meal/snack    - Mix protein powder with water instead of milk  5) Schedule a follow-up with RAE Alejandro. Call [phone].  6) Complete bariatric labs - call for a lab only appointment at any [facility] lab. To find a lab location near you, please call [phone]. Please let us know if orders need to be faxed to a non [facility] lab.  7) Obtain letter of support from your primary care doctor (Provider can submit through electronic medical record or fax to [phone])  8) Obtain clearances from sleep doctor and cardiologist (Provider can submit through electronic medical record or fax to [phone])    Protein Snack Ideas:  Left-over meat  Hard boiled egg  Part-skim cheese stick  Protein shake/bar  100 calorie pack of nuts  Tuna/chicken salad     Meal Replacement Bar Options:  [facility] Protein Shake (160 Calories, 15 g protein)  Quest Protein Bars (190 Calories, 20 g protein)  Built Bar (170 Calories, 15-20 g protein)  One Protein Bar (210 calories, 20 g protein)  [place] Signature Protein Bar ([employer]) (190 Calories, 21 g protein)  Pure Protein Bars (180 Calories, 21 g protein)        Follow up: 1 month    Time spent with patient: 14 minutes.  Kayla Mcdonnell RD, LD

## 2022-04-12 ENCOUNTER — VIRTUAL VISIT (OUTPATIENT)
Dept: ENDOCRINOLOGY | Facility: CLINIC | Age: 47
End: 2022-04-12
Payer: COMMERCIAL

## 2022-04-12 DIAGNOSIS — E11.65 TYPE 2 DIABETES MELLITUS WITH HYPERGLYCEMIA, WITHOUT LONG-TERM CURRENT USE OF INSULIN (H): ICD-10-CM

## 2022-04-12 DIAGNOSIS — Z71.3 NUTRITIONAL COUNSELING: Primary | ICD-10-CM

## 2022-04-12 DIAGNOSIS — E66.9 OBESITY: ICD-10-CM

## 2022-04-12 PROCEDURE — 97803 MED NUTRITION INDIV SUBSEQ: CPT | Mod: 95 | Performed by: DIETITIAN, REGISTERED

## 2022-04-12 NOTE — PATIENT INSTRUCTIONS
Brian Almanza,    Follow-up with RD in 1 month.     Thank you,    Kayla Mcdonnell, RD, LD  If you would like to schedule or reschedule an appointment with the RD, please call [phone]    Nutrition Goals  1) Focus on protein and non-starchy veggies/fruit at meals   2) Avoid snacking after waking. If a snack is needed, use a protein snack  3) Eliminate calorie-containing beverages   4) Use the protein shake meal replacement if you do not have a high-protein meal/snack    - Mix protein powder with water instead of milk  5) Schedule a follow-up with RAE Alejandro. Call .  6) Complete bariatric labs - call for a lab only appointment at any [facility] lab. To find a lab location near you, please call [phone]. Please let us know if orders need to be faxed to a non [facility] lab.  7) Obtain letter of support from your primary care doctor (Provider can submit through electronic medical record or fax to [phone])  8) Obtain clearances from sleep doctor and cardiologist (Provider can submit through electronic medical record or fax to [phone])    Protein Snack Ideas:  Left-over meat  Hard boiled egg  Part-skim cheese stick  Protein shake/bar  100 calorie pack of nuts  Tuna/chicken salad     Meal Replacement Bar Options:  [facility] Protein Shake (160 Calories, 15 g protein)  Quest Protein Bars (190 Calories, 20 g protein)  Built Bar (170 Calories, 15-20 g protein)  One Protein Bar (210 calories, 20 g protein)  [place] Signature Protein Bar ([employer]) (190 Calories, 21 g protein)  Pure Protein Bars (180 Calories, 21 g protein)    Interested in working with a health ?  Health coaches work with you to improve your overall health and wellbeing.  They look at the whole person, and may involve discussion of different areas of life, including, but not limited to the four pillars of health (sleep, exercise, nutrition, and stress management). Discuss with your care team if you would like to  start working a health .    Health Coaching-3 Pack:    $99 for three health coaching visits    Visits may be done in person or via phone    Coaching is a partnership between the  and the client; Coaches do not prescribe or diagnose    Coaching helps inspire the client to reach his/her personal goals      COMPREHENSIVE WEIGHT MANAGEMENT PROGRAM  VIRTUAL SUPPORT GROUPS    For Support Group Information:      We offer support groups for patients who are working on weight loss and considering, preparing for or have had weight loss surgery.   There is no cost for this opportunity.  You are invited to attend the?Virtual Support Groups?provided by any of the following locations:    [facility] via Microsoft Teams with Alycia Oleary RN  2.   [place] via [employer] with Abe Lopez, PhD, LP  3.   [place] via [employer] with Debi Carmona RN  4.   [facility] via Microsoft Teams with Debi Pedro [facility]    The following Support Group information can also be found on our website:  [email or web address]      [facility] Weight Loss Surgery Support Group    [facility] Weight Loss Surgery Support Group  The support group is a patient-lead forum that meets monthly to share experiences, encouragement and education. It is open to those who have had weight loss surgery, are scheduled for surgery, and those who are considering surgery.   WHEN: This group meets on the 3rd Wednesday of each month from 5:00PM - 6:00PM virtually using Microsoft Teams.   FACILITATOR: Led by Alycia Oleary RD, KYRA, RN, the program's Clinical Coordinator.   TO REGISTER: Please contact the clinic via BAUNAT or call the nurse line directly at [phone] to inform our staff that you would like an invite sent to you and to let us know the email you would like the invite sent to. Prior to the meeting, a link with directions on how to join the meeting  "will be sent to you.    2022 Meetings  January 19: \"Let's Talk\" a time for the group to share.  February 16: \"Let's Talk\" a time for the group to share.  March 16: Guest Speakers: Psychologists, Elisha Gatica, PhD, and Sabrina Lemons, PsGiuliana,  April 20: Guest Speaker: Health , Ann Kaye, FMCHC,CHES, CPT  May 18: Guest Speaker: DietitianWalter, RD, LP  [place] 15: \"Let's Talk\" a time for the group to share.  July 20: \"Let's Talk\" a time for the group to share.  August 17: TBA  September 21: TBA  October 19: Guest Speaker: Dr Alessio Salas MD Pulmonologist and Sleep Medicine Physician, \"Getting a Good Night's Sleep\".  November 16: TBA  December 21: TBA    [facility] Support Groups    Connections: Bariatric Care Support Group?  This is open to all [facility] (and those external to this program) pre- and post- operative bariatric surgery patients as well as their support system.   WHEN: This group meets the 2nd Tuesday of each month from 6:30 PM - 8:00 PM virtually using Microsoft Teams.   FACILITATOR: Led by Abe Lopez, Ph.D who is a Licensed Psychologist with the [facility].   TO REGISTER: Please send an email to Abe Lopez, Ph.D.,  at?[email or web address]?if you would like an invitation to the group and to learn about using [employer] Teams.    2022 Meetings  January 11: Mayra Frank, PharmD, Pharmacy Resident at [facility], \"Medications and Bariatric Surgery\".  February 8: Open Forum  March 8  April 12  May 10  [place] 14    Connections: Post-Operative Bariatric Surgery Support Group  This is a support group for [facility] bariatric patients (and those external to [facility]) who have had bariatric surgery and are at least 3 months post-surgery.  WHEN: This support group meets the 4th Wednesday of the month from 11:00 AM - 12:00 PM virtually using Microsoft Teams.   FACILITATOR: " "Led by Certified Bariatric Nurse, Debi Carmona RN.   TO REGISTER: Please send an email to Debi at [email or web address] if you would like an invitation to the group and to learn about using [employer] Teams.    2022 Meetings  January 26  February 23  March 23  April 27  May 25  [place] 22    [facility] Healthy Lifestyle Virtual Support Group    Healthy Lifestyle Virtual Support Group?  This is 60 minutes of small group guided discussion, support and resources. All are welcome who want a healthy lifestyle.  WHEN: This group meets monthly on a Friday from 12:30 PM - 1:30 PM virtually using Microsoft Teams.   FACILITATOR: Led by National Board Certified Health and , Debi Pedro, [facility].   TO REGISTER: Please send an email to Debi [email or web address] to receive monthly invites to the group or if you have any questions about having a health .  Prior to the meeting, a link with directions on how to join the meeting will be sent to you.    2022 Meetings  January 21: Dodie Hackett MS, RN, CIC, CBN, \"Healthy Habits\"  February 25: Open Forum  March 18: \"Setting Limits and Boundaries\"  April 29: Stephanie Bradley RD, \"Meal Planning Made Easy\"  May 20: Open Forum  June: To be determined        "

## 2022-04-12 NOTE — LETTER
"4/12/2022       RE: Archie Wallace  [address]  [address]  [facility] [address]     Dear Colleague,    Thank you for referring your patient, Archie Wallace, to the [facility] [place] at [facility]. Please see a copy of my visit note below.    Archie Wallace is a 46 year old male who is being evaluated via a billable video visit.      The patient has been notified of following:     \"This video visit will be conducted via a call between you and your physician/provider. We have found that certain health care needs can be provided without the need for an in-person physical exam.  This service lets us provide the care you need with a video conversation.  If a prescription is necessary we can send it directly to your pharmacy.  If lab work is needed we can place an order for that and you can then stop by our lab to have the test done at a later time.    Video visits are billed at different rates depending on your insurance coverage.  Please reach out to your insurance provider with any questions.    If during the course of the call the physician/provider feels a video visit is not appropriate, you will not be charged for this service.\"    Patient has given verbal consent for Video visit? Yes  How would you like to obtain your AVS? MyChart  Will anyone else be joining your video visit? No  {If patient encounters technical issues they should call [phone]      Video-Visit Details    Type of service:  Video Visit    Video Start Time: 7:02 AM  Video End Time: 7:16 AM    Originating Location (pt. Location): Home    Distant Location (provider location):  [facility] [place]     Platform used for Video Visit: BlueW[employer]    During this virtual visit the patient is located in [place], patient verifies this as the location during the entirety of this visit.       Bariatric Nutrition Consultation Note    Reason For " "Visit: Nutrition Assessment    Archie Wallace is a 46 year old presenting today for return bariatric nutrition consult.  Pt is interested in laparoscopic sleeve gastrectomy with Dr. Mata, expected surgery in TBD.  Patient is accompanied by self.  Patient has completed 6 minimum required nutrition visits prior to surgery. Recommending monthly follow-up until surgery.     Pt referred by RAE Alejandro on September 7, 2021.  Patient with Co-morbidities of obesity including:  Type II DM yes (A1C 6.6 7/12/21)  Renal Failure no  Sleep apnea no  Hypertension yes  Dyslipidemia yes  Joint pain no  Back pain no  GERD  yes    Support System Reviewed With Patient 9/7/2021   Who do you have in your support network that can be available to help you for the first 2 weeks after surgery? girlfriend   Who can you count on for support throughout your weight loss surgery journey? girlfriend       ANTHROPOMETRICS:  Estimated body mass index is 46.78 kg/m  as calculated from the following:    Height as of 9/7/21: 1.81 m (5' 11.25\").    Weight as of 9/7/21: 153.2 kg (337 lb 12.8 oz).    Current weight (pt reported): 348 lbs (+5 lbs over past month, +11 lbs from initial)    Required weight loss goal pre-op: -20 lbs from initial consult weight (goal weight 317 lbs or less before surgery)       9/7/2021   I have tried the following methods to lose weight Watching portions or calories, Exercise, Atkins type diet (low carb/high protein), Slimfast, OTC Medications       Weight Loss Questions Reviewed With Patient 9/7/2021   How long have you been overweight? Since late teens through early 20's       SUPPLEMENT INFORMATION:  None    Medications for Weight Loss:  Topiramate - Pt reports taking morning and night    NUTRITION HISTORY:  No known food allergies. Lactose intolerant.     Checks BG TID in morning, noon and at night  Travels a lot - 2-3 times per month   Already having to chew to fine texture after thyroid surgery.     Doesn't eat if " not hungry, leads to skipping daytime meals. Sometimes eating out of boredom. Waking in the night every 2-3 hours, will need something sweet (candy, Lil Odalys's cakes) to fall back asleep. This is likely contributing to daytime fullness.     Over past several months, he has been focusing on eating smaller portions and using leaner cooking methods at dinner. Working on not snacking when he wakes at night. Is able to avoid some nights.     Intermittently replacing meals with Slimfast Keto Shake or Shakeology shake.     Today - Gained 5lbs this past month. He has recently tried to cut out third meal meal. Trying to avoid waking to snack, however continues to have a high-carb snack overnight 3 time per week.  He notes some knee pain that has reduced his PA as well.     Recent Diet Recall:  Breakfast: small bowl of cheerios;    Lunch: chicken; hot dog no bun   Dinner: skipping  Snack: snack cakes, chips, fruit   Beverages: Water, regular gatorade (less often), sugar-free gatorade, calorie free tea    Progress Towards Previous Goals:  1) Replace 2 meals per day with protein shake - Not consistently replacing   2) Avoid snacking after waking - Not met  3) Eliminate calorie-containing beverages - Improving  4) Add 5 mins onto each exercise session as able - not met  5) Schedule a follow-up with RAE Alejandro. [phone]. - Not met  6) Complete bariatric labs - call for a lab only appointment at any [facility] lab. To find a lab location near you, please call [phone]. Please let us know if orders need to be faxed to a non [facility] lab.  - Not met  7) Obtain letter of support from your primary care doctor (Provider can submit through electronic medical record or fax to [phone])  - Not met  8) Obtain clearances from sleep doctor and cardiologist (Provider can submit through electronic medical record or fax to [phone]) - Working on this clearance, had visit with cardiologist on 4/6/22,  clearance pending stress test and cardiac MRI per [facility] note      Eating Habits 9/7/2021   Do you have any dietary restrictions? Yes   Do you currently binge eat (eat a large amount of food in a short time)? Yes   Are you an emotional eater? Yes   Do you get up to eat after falling asleep? Yes   What foods do you crave? nothing       ADDITIONAL INFORMATION:  Had spine surgery and half thyroid removed in July, per 10/12 visit with neurosurg, lifting restrictions lifted and pt to begin PT.     He has started to go to the gym 4 times per week, exercising for 45 mins total each time. Doing swimming, treadmill, and some strengthening. Trying to get in more walking outside of the gym.     Dining Out History Reviewed With Patient 9/7/2021   How often do you dine out? A couple of times a week.   Where do you dine out? (select all that apply) fast food chains   What types of food do you order when you dine out? jass       Physical Activity Reviewed With Patient 9/7/2021   How often do you exercise? Never   What keeps you from being more active?  I am as active as I can possbily be, I have just had surgery on one or more of my joints, My ability to walk or move around is limited         NUTRITION DIAGNOSIS:  Obesity r/t long history of self-monitoring deficit and excessive energy intake aeb BMI >30 kg/m2. - continues    INTERVENTION:  Intervention Provided/Education Provided:  Reviewed post-op diet guidelines, ways to help prepare for post-op diet guidelines pre-operatively, portion/calorie-control, mindful eating and sources of protein.  Reviewed progress towards previous goals.  Discussed adjustments to diet to help lower carb intake. Discussed low-carb/high-protein snack ideas. Encouraged use of protein shake meal replacement if low-carb meal/snack is not planned.   Reviewed surgery task list.  Pt due for recheck of HgbA1c, ordered for him to complete with other [facility] labs.   Patient demonstrates understanding.  AVS vis  Josephine     Questions Reviewed With Patient 9/7/2021   How ready are you to make changes regarding your weight? Number 1 = Not ready at all to make changes up to 10 = very ready. 10   How confident are you that you can change? 1 = Not confident that you will be successful making changes up to 10 = very confident. 10       Expected Engagement: good    GOALS:  1) Focus on protein and non-starchy veggies/fruit at meals   2) Avoid snacking after waking. If a snack is needed, use a protein snack  3) Eliminate calorie-containing beverages   4) Use the protein shake meal replacement if you do not have a high-protein meal/snack    - Mix protein powder with water instead of milk  5) Schedule a follow-up with RAE Alejandro. Call [phone].  6) Complete bariatric labs - call for a lab only appointment at any [facility] lab. To find a lab location near you, please call [phone]. Please let us know if orders need to be faxed to a non [facility] lab.  7) Obtain letter of support from your primary care doctor (Provider can submit through electronic medical record or fax to [phone])  8) Obtain clearances from sleep doctor and cardiologist (Provider can submit through electronic medical record or fax to [phone])    Protein Snack Ideas:  Left-over meat  Hard boiled egg  Part-skim cheese stick  Protein shake/bar  100 calorie pack of nuts  Tuna/chicken salad     Meal Replacement Bar Options:  [facility] Protein Shake (160 Calories, 15 g protein)  Quest Protein Bars (190 Calories, 20 g protein)  Built Bar (170 Calories, 15-20 g protein)  One Protein Bar (210 calories, 20 g protein)  Ethel Signature Protein Bar ([employer]) (190 Calories, 21 g protein)  Pure Protein Bars (180 Calories, 21 g protein)        Follow up: 1 month    Time spent with patient: 14 minutes.  Kayla Mcdonnell, JUWAN, LD      Again, thank you for allowing me to participate in the care of your patient.      Sincerely,    Kayla Mcdonnell,  RD

## 2022-04-12 NOTE — LETTER
Date:April 12, 2022      Patient was self referred, no letter generated. Do not send.        [facility] Health Information

## 2022-04-15 ENCOUNTER — LAB (OUTPATIENT)
Dept: LAB | Facility: CLINIC | Age: 47
End: 2022-04-15
Payer: COMMERCIAL

## 2022-04-15 DIAGNOSIS — E11.65 TYPE 2 DIABETES MELLITUS WITH HYPERGLYCEMIA, WITHOUT LONG-TERM CURRENT USE OF INSULIN (H): ICD-10-CM

## 2022-04-15 DIAGNOSIS — E66.01 CLASS 3 SEVERE OBESITY DUE TO EXCESS CALORIES WITH SERIOUS COMORBIDITY AND BODY MASS INDEX (BMI) OF 45.0 TO 49.9 IN ADULT (H): ICD-10-CM

## 2022-04-15 DIAGNOSIS — E66.813 CLASS 3 SEVERE OBESITY DUE TO EXCESS CALORIES WITH SERIOUS COMORBIDITY AND BODY MASS INDEX (BMI) OF 45.0 TO 49.9 IN ADULT (H): ICD-10-CM

## 2022-04-15 LAB
ALBUMIN SERPL-MCNC: 3.6 G/DL (ref 3.4–5)
ALP SERPL-CCNC: 56 U/L (ref 40–150)
ALT SERPL W P-5'-P-CCNC: 30 U/L (ref 0–70)
ANION GAP SERPL CALCULATED.3IONS-SCNC: 5 MMOL/L (ref 3–14)
AST SERPL W P-5'-P-CCNC: 16 U/L (ref 0–45)
BILIRUB SERPL-MCNC: 0.4 MG/DL (ref 0.2–1.3)
BUN SERPL-MCNC: 13 MG/DL (ref 7–30)
CALCIUM SERPL-MCNC: 9.3 MG/DL (ref 8.5–10.1)
CHLORIDE BLD-SCNC: 102 MMOL/L (ref 94–109)
CHOLEST SERPL-MCNC: 189 MG/DL
CO2 SERPL-SCNC: 31 MMOL/L (ref 20–32)
CREAT SERPL-MCNC: 0.86 MG/DL (ref 0.66–1.25)
DEPRECATED CALCIDIOL+CALCIFEROL SERPL-MC: 15 UG/L (ref 20–75)
ERYTHROCYTE [DISTWIDTH] IN BLOOD BY AUTOMATED COUNT: 13.9 % (ref 10–15)
FASTING STATUS PATIENT QL REPORTED: ABNORMAL
GFR SERPL CREATININE-BSD FRML MDRD: >90 ML/MIN/1.73M2
GLUCOSE BLD-MCNC: 160 MG/DL (ref 70–99)
HBA1C MFR BLD: 7.2 % (ref 0–5.6)
HCT VFR BLD AUTO: 47.5 % (ref 40–53)
HDLC SERPL-MCNC: 52 MG/DL
HGB BLD-MCNC: 15.2 G/DL (ref 13.3–17.7)
LDLC SERPL CALC-MCNC: 114 MG/DL
MCH RBC QN AUTO: 27.5 PG (ref 26.5–33)
MCHC RBC AUTO-ENTMCNC: 32 G/DL (ref 31.5–36.5)
MCV RBC AUTO: 86 FL (ref 78–100)
NONHDLC SERPL-MCNC: 137 MG/DL
PLATELET # BLD AUTO: 381 10E3/UL (ref 150–450)
POTASSIUM BLD-SCNC: 4.4 MMOL/L (ref 3.4–5.3)
PROT SERPL-MCNC: 8.2 G/DL (ref 6.8–8.8)
PTH-INTACT SERPL-MCNC: 27 PG/ML (ref 18–80)
RBC # BLD AUTO: 5.52 10E6/UL (ref 4.4–5.9)
SODIUM SERPL-SCNC: 138 MMOL/L (ref 133–144)
TRIGL SERPL-MCNC: 115 MG/DL
TSH SERPL DL<=0.005 MIU/L-ACNC: 2.08 MU/L (ref 0.4–4)
WBC # BLD AUTO: 6.1 10E3/UL (ref 4–11)

## 2022-04-15 PROCEDURE — 80053 COMPREHEN METABOLIC PANEL: CPT

## 2022-04-15 PROCEDURE — 36415 COLL VENOUS BLD VENIPUNCTURE: CPT

## 2022-04-15 PROCEDURE — 80061 LIPID PANEL: CPT

## 2022-04-15 PROCEDURE — 84443 ASSAY THYROID STIM HORMONE: CPT

## 2022-04-15 PROCEDURE — 83036 HEMOGLOBIN GLYCOSYLATED A1C: CPT

## 2022-04-15 PROCEDURE — 83970 ASSAY OF PARATHORMONE: CPT

## 2022-04-15 PROCEDURE — 85027 COMPLETE CBC AUTOMATED: CPT

## 2022-04-15 PROCEDURE — 82306 VITAMIN D 25 HYDROXY: CPT

## 2022-04-19 DIAGNOSIS — E55.9 VITAMIN D DEFICIENCY: Primary | ICD-10-CM

## 2022-04-27 ENCOUNTER — OFFICE VISIT (OUTPATIENT)
Dept: FAMILY MEDICINE | Facility: CLINIC | Age: 47
End: 2022-04-27
Payer: COMMERCIAL

## 2022-04-27 VITALS
DIASTOLIC BLOOD PRESSURE: 107 MMHG | SYSTOLIC BLOOD PRESSURE: 176 MMHG | BODY MASS INDEX: 44.1 KG/M2 | OXYGEN SATURATION: 98 % | HEART RATE: 86 BPM | TEMPERATURE: 98.6 F | WEIGHT: 315 LBS | HEIGHT: 71 IN

## 2022-04-27 DIAGNOSIS — M54.12 CERVICAL RADICULOPATHY: Primary | ICD-10-CM

## 2022-04-27 DIAGNOSIS — J45.40 MODERATE PERSISTENT ASTHMA WITHOUT COMPLICATION: ICD-10-CM

## 2022-04-27 DIAGNOSIS — E66.813 CLASS 3 SEVERE OBESITY DUE TO EXCESS CALORIES WITH SERIOUS COMORBIDITY AND BODY MASS INDEX (BMI) OF 45.0 TO 49.9 IN ADULT (H): ICD-10-CM

## 2022-04-27 DIAGNOSIS — F32.1 MODERATE MAJOR DEPRESSION (H): ICD-10-CM

## 2022-04-27 DIAGNOSIS — E11.65 TYPE 2 DIABETES MELLITUS WITH HYPERGLYCEMIA, WITHOUT LONG-TERM CURRENT USE OF INSULIN (H): ICD-10-CM

## 2022-04-27 DIAGNOSIS — E66.01 CLASS 3 SEVERE OBESITY DUE TO EXCESS CALORIES WITH SERIOUS COMORBIDITY AND BODY MASS INDEX (BMI) OF 45.0 TO 49.9 IN ADULT (H): ICD-10-CM

## 2022-04-27 DIAGNOSIS — G89.29 CHRONIC PAIN OF LEFT KNEE: ICD-10-CM

## 2022-04-27 DIAGNOSIS — E78.5 HYPERLIPIDEMIA LDL GOAL <70: ICD-10-CM

## 2022-04-27 DIAGNOSIS — I25.118 CORONARY ARTERY DISEASE OF NATIVE ARTERY OF NATIVE HEART WITH STABLE ANGINA PECTORIS (H): ICD-10-CM

## 2022-04-27 DIAGNOSIS — M17.12 PRIMARY OSTEOARTHRITIS OF LEFT KNEE: ICD-10-CM

## 2022-04-27 DIAGNOSIS — I10 HYPERTENSION GOAL BP (BLOOD PRESSURE) < 140/90: ICD-10-CM

## 2022-04-27 DIAGNOSIS — M25.562 CHRONIC PAIN OF LEFT KNEE: ICD-10-CM

## 2022-04-27 PROBLEM — I25.2 HISTORY OF MI (MYOCARDIAL INFARCTION): Status: RESOLVED | Noted: 2020-07-17 | Resolved: 2022-04-27

## 2022-04-27 PROBLEM — G95.9 CERVICAL MYELOPATHY (H): Status: RESOLVED | Noted: 2021-06-17 | Resolved: 2022-04-27

## 2022-04-27 PROBLEM — J39.2 NASOPHARYNGEAL MASS: Status: RESOLVED | Noted: 2020-12-17 | Resolved: 2022-04-27

## 2022-04-27 PROBLEM — G95.9 CERVICAL MYELOPATHY (H): Status: ACTIVE | Noted: 2021-06-17

## 2022-04-27 PROBLEM — J35.2 ADENOID HYPERTROPHY: Status: RESOLVED | Noted: 2020-12-17 | Resolved: 2022-04-27

## 2022-04-27 PROCEDURE — 99214 OFFICE O/P EST MOD 30 MIN: CPT | Performed by: FAMILY MEDICINE

## 2022-04-27 RX ORDER — CLONIDINE HYDROCHLORIDE 0.2 MG/1
0.2 TABLET ORAL 2 TIMES DAILY
Qty: 60 TABLET | Refills: 0 | Status: SHIPPED | OUTPATIENT
Start: 2022-04-27 | End: 2022-06-02

## 2022-04-27 RX ORDER — SEMAGLUTIDE 1.34 MG/ML
0.25 INJECTION, SOLUTION SUBCUTANEOUS
Qty: 6 ML | Refills: 1 | Status: SHIPPED | OUTPATIENT
Start: 2022-04-27 | End: 2022-06-14

## 2022-04-27 RX ORDER — ATORVASTATIN CALCIUM 40 MG/1
40 TABLET, FILM COATED ORAL DAILY
Qty: 90 TABLET | Refills: 3 | Status: SHIPPED | OUTPATIENT
Start: 2022-04-27 | End: 2024-03-26

## 2022-04-27 ASSESSMENT — ASTHMA QUESTIONNAIRES
ACT_TOTALSCORE: 9
ACT_TOTALSCORE: 9
QUESTION_4 LAST FOUR WEEKS HOW OFTEN HAVE YOU USED YOUR RESCUE INHALER OR NEBULIZER MEDICATION (SUCH AS ALBUTEROL): TWO OR THREE TIMES PER WEEK
QUESTION_1 LAST FOUR WEEKS HOW MUCH OF THE TIME DID YOUR ASTHMA KEEP YOU FROM GETTING AS MUCH DONE AT WORK, SCHOOL OR AT HOME: ALL OF THE TIME
QUESTION_2 LAST FOUR WEEKS HOW OFTEN HAVE YOU HAD SHORTNESS OF BREATH: MORE THAN ONCE A DAY
QUESTION_3 LAST FOUR WEEKS HOW OFTEN DID YOUR ASTHMA SYMPTOMS (WHEEZING, COUGHING, SHORTNESS OF BREATH, CHEST TIGHTNESS OR PAIN) WAKE YOU UP AT NIGHT OR EARLIER THAN USUAL IN THE MORNING: TWO OR THREE NIGHTS A WEEK
QUESTION_5 LAST FOUR WEEKS HOW WOULD YOU RATE YOUR ASTHMA CONTROL: POORLY CONTROLLED

## 2022-04-27 NOTE — PROGRESS NOTES
Assessment & Plan     (M54.12) Cervical radiculopathy  (primary encounter diagnosis)  Comment: prior cervical surgery discectomy and fusion on 7/16/21; last neurosurgery note reviewed   Plan: Neurosurgery Referral, XR Cervical Spine 2/3         Views         Offered trial of physical therapy     (M25.562,  G89.29) Chronic pain of left knee  Comment: Differential diagnoses would include: degenerative joint disease, meniscal injury   Plan: XR Knee Left 3 Views        Consider referral or physical therapy pending results     (F32.1) Moderate major depression (H)  Comment: prior therapy; confounded by chronic pain   Plan: offered treatment; follow-up with PCP     (I25.118) Coronary artery disease of native artery of native heart with stable angina pectoris (H)  (E78.5) Hyperlipidemia LDL goal <40  Plan: atorvastatin (LIPITOR) 40 MG tablet        Discussed risks and benefits of this medication.     (E11.65) Type 2 diabetes mellitus with hyperglycemia, without long-term current use of insulin (H)  Plan: semaglutide (OZEMPIC, 0.25 OR 0.5 MG/DOSE,) 2         MG/1.5ML SOPN pen        Discussed risks and benefits of this medication. Follow-up with PCP     (E66.01,  Z68.42) Class 3 severe obesity due to excess calories with serious comorbidity and body mass index (BMI) of 45.0 to 49.9 in adult (H)  Comment: also under care of bariatrics   Plan: semaglutide (OZEMPIC, 0.25 OR 0.5 MG/DOSE,) 2         MG/1.5ML SOPN pen        See above     (I10) Hypertension goal BP (blood pressure) < 140/90  Comment: uncontrolled   Plan: cloNIDine (CATAPRES) 0.2 MG tablet        Increase dose. Follow-up with PCP     (J45.40) Moderate persistent asthma without complication  Comment: uncontrolled - needs controller inhaler   Plan: mometasone-formoterol (DULERA) 100-5 MCG/ACT         inhaler        Discussed risks and benefits of this medication. Follow-up with PCP                BMI:   Estimated body mass index is 48.68 kg/m  as calculated from  "the following:    Height as of this encounter: 1.803 m (5' 11\").    Weight as of this encounter: 158.3 kg (349 lb).   Weight management plan: Discussed healthy diet and exercise guidelines     See Patient Instructions    Return in about 2 weeks (around 5/11/2022) for physical, blood pressure, asthma, depression.    Alyssa Bahena MD  [facility] OLIVA Almanza is a 46 year old who presents for the following health issues     Knee Pain    History of Present Illness       Back Pain:  He presents for follow up of back pain. Patient's back pain is a chronic problem.  Location of back pain:  Left lower back, right side of neck, left side of neck, left shoulder and left side of waist  Description of back pain: burning, shooting and stabbing  Back pain spreads: left knee, left shoulder and left side of neck    Since patient first noticed back pain, pain is: always present, but gets better and worse  Does back pain interfere with his job:  Yes      Diabetes:   He presents for follow up of diabetes.  He is checking home blood glucose a few times a week. He checks blood glucose before meals.  Blood glucose is sometimes over 200 and never under 70. He is aware of hypoglycemia symptoms including weakness and blurred vision. He has no concerns regarding his diabetes at this time.  He is having numbness in feet, burning in feet, excessive thirst, blurry vision and weight gain.         Reason for visit:  Back and left knee pain  Symptom onset:  1-2 weeks ago  Symptoms include:  Constant pain  Symptom intensity:  Severe  Symptom progression:  Worsening  Had these symptoms before:  No  What makes it worse:  When my knee slips  What makes it better:  Been taking pain meds but no help I did take a Percocet and they did help and I got some sleep    He eats 4 or more servings of fruits and vegetables daily.He consumes 2 sweetened beverage(s) daily.He exercises with enough effort to increase his heart rate 9 " "or less minutes per day.  He exercises with enough effort to increase his heart rate 3 or less days per week. He is missing 5 dose(s) of medications per week.             Review of Systems   CONSTITUTIONAL: NEGATIVE for fever, chills, change in weight  INTEGUMENTARY/SKIN: NEGATIVE for worrisome rashes, moles or lesions  EYES: NEGATIVE for vision changes or irritation  ENT/MOUTH: NEGATIVE for ear, mouth and throat problems  RESP: NEGATIVE for significant cough or SOB  CV: NEGATIVE for chest pain, palpitations or peripheral edema  MUSCULOSKELETAL: bilateral neck pain, left knee pain, upper extremity radicular symptoms   NEURO: weakness of upper extremities   PSYCHIATRIC: depressed mood      Objective    BP (!) 176/107 (BP Location: Left arm, Patient Position: Sitting, Cuff Size: Adult Large)   Pulse 86   Temp 98.6  F (37  C) (Oral)   Ht 1.803 m (5' 11\")   Wt (!) 158.3 kg (349 lb)   SpO2 98%   BMI 48.68 kg/m    Body mass index is 48.68 kg/m .  Physical Exam   GENERAL: alert, no distress and obese  EYES: Eyes grossly normal to inspection, PERRL and conjunctivae and sclerae normal  RESP: lungs clear to auscultation - no rales, rhonchi or wheezes  CV: regular rate and rhythm, normal S1 S2, no S3 or S4, no murmur, click or rub, no peripheral edema    MS: left knee effusion, pain with meniscal strain and tenderness over bilateral joint lines; antalgic gait; neck with reduced ROM    SKIN: no suspicious lesions or rashes and small abrasion/ulcer on left anterior shin   PSYCH: mentation appears normal, affect normal/bright    Lab on 04/15/2022   Component Date Value Ref Range Status     Hemoglobin A1C 04/15/2022 7.2 (A) 0.0 - 5.6 % Final    Normal <5.7%   Prediabetes 5.7-6.4%    Diabetes 6.5% or higher     Note: Adopted from ADA consensus guidelines.     TSH 04/15/2022 2.08  0.40 - 4.00 mU/L Final     Cholesterol 04/15/2022 189  <200 mg/dL Final     Triglycerides 04/15/2022 115  <150 mg/dL Final     Direct Measure HDL " 04/15/2022 52  >=40 mg/dL Final     LDL Cholesterol Calculated 04/15/2022 114 (A) <=100 mg/dL Final     Non HDL Cholesterol 04/15/2022 137 (A) <130 mg/dL Final     Patient Fasting > 8hrs? 04/15/2022 Unknown   Final     Parathyroid Hormone Intact 04/15/2022 27  18 - 80 pg/mL Final     Vitamin D, Total (25-Hydroxy) 04/15/2022 15 (A) 20 - 75 ug/L Final     Sodium 04/15/2022 138  133 - 144 mmol/L Final     Potassium 04/15/2022 4.4  3.4 - 5.3 mmol/L Final     Chloride 04/15/2022 102  94 - 109 mmol/L Final     Carbon Dioxide (CO2) 04/15/2022 31  20 - 32 mmol/L Final     Anion Gap 04/15/2022 5  3 - 14 mmol/L Final     Urea Nitrogen 04/15/2022 13  7 - 30 mg/dL Final     Creatinine 04/15/2022 0.86  0.66 - 1.25 mg/dL Final     Calcium 04/15/2022 9.3  8.5 - 10.1 mg/dL Final     Glucose 04/15/2022 160 (A) 70 - 99 mg/dL Final     Alkaline Phosphatase 04/15/2022 56  40 - 150 U/L Final     AST 04/15/2022 16  0 - 45 U/L Final     ALT 04/15/2022 30  0 - 70 U/L Final     Protein Total 04/15/2022 8.2  6.8 - 8.8 g/dL Final     Albumin 04/15/2022 3.6  3.4 - 5.0 g/dL Final     Bilirubin Total 04/15/2022 0.4  0.2 - 1.3 mg/dL Final     GFR Estimate 04/15/2022 >90  >60 mL/min/1.73m2 Final    Effective December 21, 2021 eGFRcr in adults is calculated using the 2021 CKD-EPI creatinine equation which includes age and gender (Maile et al., NEJM, DOI: 10.1056/ATSOrq0488407)     WBC Count 04/15/2022 6.1  4.0 - 11.0 10e3/uL Final     RBC Count 04/15/2022 5.52  4.40 - 5.90 10e6/uL Final     Hemoglobin 04/15/2022 15.2  13.3 - 17.7 g/dL Final     Hematocrit 04/15/2022 47.5  40.0 - 53.0 % Final     MCV 04/15/2022 86  78 - 100 fL Final     MCH 04/15/2022 27.5  26.5 - 33.0 pg Final     MCHC 04/15/2022 32.0  31.5 - 36.5 g/dL Final     RDW 04/15/2022 13.9  10.0 - 15.0 % Final     Platelet Count 04/15/2022 381  150 - 450 10e3/uL Final     No results found for this or any previous visit (from the past 24 hour(s)).

## 2022-04-29 NOTE — PROGRESS NOTES
ASSESSMENT & PLAN    Archie was seen today for pain.    Diagnoses and all orders for this visit:    Effusion of left knee    Primary osteoarthritis of left knee  -     Orthopedic  Referral      This issue is acute on chronic and Unchanged.    Discussed nature of degenerative arthrosis of the knee. Discussed symptom treatment with over-the-counter medications, ice or heat, topical treatments, and rest if needed. Discussed use of sleeve or wrap for comfort. Discussed benefits of exercise and physical therapy. Discussed injection therapy. Also briefly discussed future consideration of referral to orthopedic surgery for further evaluation and discussion of arthroplasty.    Plan:  - Today's Plan of Care:  Referral for left knee US guided aspiration and injection  Continue with relative rest and activity modification, Ice, Compression, and Elevation.  Can apply ice 10-15 minutes 3-4 times per day as needed. OTC medications as needed.  Home Exercise Program - range of motion, quad sets, straight leg raises    -We also discussed other future treatment options:  Consideration of Hylaruonic Acid injection (call first, requires insurance approval)  Referral to Physical Therapy  Referral to Orthopedic Surgery    Follow Up: as needed  - with PCP given high blood pressure    Concerning signs and symptoms were reviewed.  The patient expressed understanding of this management plan and all questions were answered at this time.    Thanks for the opportunity to participate in the care of this patient, I will keep you updated on their progress.    CC: Alyssa Birch MD [facility]  Sports Medicine Physician  [facility]  -----  Chief Complaint   Patient presents with     Left Knee - Pain       SUBJECTIVE  Archie Wallace is a/an 46 year old male who is seen in consultation at the request of  Alyssa Bahena M.D. for evaluation of left knee pain.    The patient is seen by  "themselves.    Onset: Past month, had noticed pain over the anterior knee. Moments of giving out. No acute injury onset.  Location of Pain: Left anterior knee. On occasion, shooting pain up the posterior knee. Constant burning pain and fells like a knife is \"twisting\" over his medial joint line  Worsened by: ambulating, at rest  Better with: nothing  Treatments tried: brace, massage, hot pack, cold pack  Associated symptoms: swelling and instability    Orthopedic/Surgical history: No  Social History/Occupation: Not currently working.    No family history pertinent to patient's problem today.    REVIEW OF SYSTEMS:  Review of Systems  Skin: no bruising, yes swelling  Musculoskeletal: as above  Neurologic: no numbness, paresthesias  Remainder of review of systems is negative including constitutional, CV, pulmonary, GI, except as noted in HPI or medical history.    OBJECTIVE:  BP (!) 176/108   Ht 1.803 m (5' 11\")   Wt (!) 158.3 kg (349 lb)   BMI 48.68 kg/m     General: healthy, alert and in no distress  HEENT: no scleral icterus or conjunctival erythema  Skin: no suspicious lesions or rash. No jaundice.  CV: distal perfusion intact  Resp: normal respiratory effort without conversational dyspnea   Psych: normal mood and affect  Gait: slightly antalgic gait  Neuro: Normal light sensory exam of lower extremity    Bilateral Knee exam  Inspection:      moderate effusion left    Patella:      Crepitus noted in the patellofemoral joint left    Tender:      medial joint line left    Non Tender:      remainder of knee area left    Knee ROM:      Range of motion limited in full flexion and full extension bilaterally    Strength:      5/5 with knee extension left    Special Tests:     Pain with Benjy left       neg (-) anterior drawer left       neg (-) posterior drawer left       neg (-) varus at 0 deg and 30 deg left       neg (-) valgus at 0 deg and 30 deg left    Gait:      antalgic gait    Lower Extremity Alignment:     "  Normal    Neurovascular:      2+ peripheral pulses bilaterally and brisk capillary refill       sensation grossly intact    RADIOLOGY:  I independently visualized and reviewed these images with the patient  Reviewed 3 view Knee XR from 4/27/2022 - moderate arthritis, worse in the lateral compartment    XR Knee Left 3 Views    Narrative    KNEE LEFT THREE VIEWS April 27, 2022 3:14 PM     HISTORY: Chronic knee pain.    COMPARISON: None.      Impression    IMPRESSION: Mild osteoarthrosis in the medial compartment. Moderate  osteoarthrosis in the lateral and patellofemoral compartments. Small  effusion. No fracture or calcified intra-articular body.    NORBERT MARIE MD         SYSTEM ID:  [id number]         Review of prior external note(s) from - PCP  Review of the result(s) of each unique test - XR

## 2022-05-02 ENCOUNTER — OFFICE VISIT (OUTPATIENT)
Dept: ORTHOPEDICS | Facility: CLINIC | Age: 47
End: 2022-05-02
Payer: COMMERCIAL

## 2022-05-02 VITALS
BODY MASS INDEX: 44.1 KG/M2 | WEIGHT: 315 LBS | HEIGHT: 71 IN | DIASTOLIC BLOOD PRESSURE: 108 MMHG | SYSTOLIC BLOOD PRESSURE: 176 MMHG

## 2022-05-02 DIAGNOSIS — M17.12 PRIMARY OSTEOARTHRITIS OF LEFT KNEE: ICD-10-CM

## 2022-05-02 DIAGNOSIS — M25.462 EFFUSION OF LEFT KNEE: Primary | ICD-10-CM

## 2022-05-02 PROCEDURE — 99243 OFF/OP CNSLTJ NEW/EST LOW 30: CPT | Performed by: PEDIATRICS

## 2022-05-02 ASSESSMENT — PAIN SCALES - GENERAL: PAINLEVEL: EXTREME PAIN (8)

## 2022-05-02 NOTE — LETTER
5/2/2022         RE: Archie Wallace  [address]  [address]  [place] [address]        Dear Colleague,    Thank you for referring your patient, Archie Wallace, to the [facility]. Please see a copy of my visit note below.    ASSESSMENT & PLAN    Archie was seen today for pain.    Diagnoses and all orders for this visit:    Effusion of left knee    Primary osteoarthritis of left knee  -     Orthopedic  Referral      This issue is acute on chronic and Unchanged.    Discussed nature of degenerative arthrosis of the knee. Discussed symptom treatment with over-the-counter medications, ice or heat, topical treatments, and rest if needed. Discussed use of sleeve or wrap for comfort. Discussed benefits of exercise and physical therapy. Discussed injection therapy. Also briefly discussed future consideration of referral to orthopedic surgery for further evaluation and discussion of arthroplasty.    Plan:  - Today's Plan of Care:  Referral for left knee US guided aspiration and injection  Continue with relative rest and activity modification, Ice, Compression, and Elevation.  Can apply ice 10-15 minutes 3-4 times per day as needed. OTC medications as needed.  Home Exercise Program - range of motion, quad sets, straight leg raises    -We also discussed other future treatment options:  Consideration of Hylaruonic Acid injection (call first, requires insurance approval)  Referral to Physical Therapy  Referral to Orthopedic Surgery    Follow Up: as needed  - with PCP given high blood pressure    Concerning signs and symptoms were reviewed.  The patient expressed understanding of this management plan and all questions were answered at this time.    Thanks for the opportunity to participate in the care of this patient, I will keep you updated on their progress.    CC: Alyssa Birch MD [facility]  Sports Medicine Physician  [facility]  "Orthopedics  -----  Chief Complaint   Patient presents with     Left Knee - Pain       SUBJECTIVE  Archie Wallace is a/an 46 year old male who is seen in consultation at the request of  Alyssa Bahena M.D. for evaluation of left knee pain.    The patient is seen by themselves.    Onset: Past month, had noticed pain over the anterior knee. Moments of giving out. No acute injury onset.  Location of Pain: Left anterior knee. On occasion, shooting pain up the posterior knee. Constant burning pain and fells like a knife is \"twisting\" over his medial joint line  Worsened by: ambulating, at rest  Better with: nothing  Treatments tried: brace, massage, hot pack, cold pack  Associated symptoms: swelling and instability    Orthopedic/Surgical history: No  Social History/Occupation: Not currently working.    No family history pertinent to patient's problem today.    REVIEW OF SYSTEMS:  Review of Systems  Skin: no bruising, yes swelling  Musculoskeletal: as above  Neurologic: no numbness, paresthesias  Remainder of review of systems is negative including constitutional, CV, pulmonary, GI, except as noted in HPI or medical history.    OBJECTIVE:  BP (!) 176/108   Ht 1.803 m (5' 11\")   Wt (!) 158.3 kg (349 lb)   BMI 48.68 kg/m     General: healthy, alert and in no distress  HEENT: no scleral icterus or conjunctival erythema  Skin: no suspicious lesions or rash. No jaundice.  CV: distal perfusion intact  Resp: normal respiratory effort without conversational dyspnea   Psych: normal mood and affect  Gait: slightly antalgic gait  Neuro: Normal light sensory exam of lower extremity    Bilateral Knee exam  Inspection:      moderate effusion left    Patella:      Crepitus noted in the patellofemoral joint left    Tender:      medial joint line left    Non Tender:      remainder of knee area left    Knee ROM:      Range of motion limited in full flexion and full extension bilaterally    Strength:      5/5 with knee extension " left    Special Tests:     Pain with Benjy left       neg (-) anterior drawer left       neg (-) posterior drawer left       neg (-) varus at 0 deg and 30 deg left       neg (-) valgus at 0 deg and 30 deg left    Gait:      antalgic gait    Lower Extremity Alignment:      Normal    Neurovascular:      2+ peripheral pulses bilaterally and brisk capillary refill       sensation grossly intact    RADIOLOGY:  I independently visualized and reviewed these images with the patient  Reviewed 3 view Knee XR from 4/27/2022 - moderate arthritis, worse in the lateral compartment    XR Knee Left 3 Views    Narrative    KNEE LEFT THREE VIEWS April 27, 2022 3:14 PM     HISTORY: Chronic knee pain.    COMPARISON: None.      Impression    IMPRESSION: Mild osteoarthrosis in the medial compartment. Moderate  osteoarthrosis in the lateral and patellofemoral compartments. Small  effusion. No fracture or calcified intra-articular body.    NORBERT MARIE MD         SYSTEM ID:  [id number]         Review of prior external note(s) from - PCP  Review of the result(s) of each unique test - XR               Again, thank you for allowing me to participate in the care of your patient.        Sincerely,        June Birch MD

## 2022-05-02 NOTE — PATIENT INSTRUCTIONS
Discussed nature of degenerative arthrosis of the knee. Discussed symptom treatment with over-the-counter medications, ice or heat, topical treatments, and rest if needed. Discussed use of sleeve or wrap for comfort. Discussed benefits of exercise and physical therapy. Discussed injection therapy. Also briefly discussed future consideration of referral to orthopedic surgery for further evaluation and discussion of arthroplasty.    Plan:  - Today's Plan of Care:  Referral for left knee US guided aspiration and injection  Continue with relative rest and activity modification, Ice, Compression, and Elevation.  Can apply ice 10-15 minutes 3-4 times per day as needed. OTC medications as needed.  Home Exercise Program - range of motion, quad sets, straight leg raises    -We also discussed other future treatment options:  Consideration of Hylaruonic Acid injection (call first, requires insurance approval)  Referral to Physical Therapy  Referral to Orthopedic Surgery    Follow Up: as needed  - with PCP given high blood pressure    If you have any further questions for your physician or physician s care team you can call [phone] and use option 3 to leave a voice message. Calls received during business hours will be returned same day.

## 2022-05-05 ENCOUNTER — OFFICE VISIT (OUTPATIENT)
Dept: NEUROSURGERY | Facility: CLINIC | Age: 47
End: 2022-05-05
Attending: FAMILY MEDICINE
Payer: COMMERCIAL

## 2022-05-05 VITALS
WEIGHT: 315 LBS | SYSTOLIC BLOOD PRESSURE: 180 MMHG | HEART RATE: 90 BPM | DIASTOLIC BLOOD PRESSURE: 110 MMHG | BODY MASS INDEX: 44.1 KG/M2 | OXYGEN SATURATION: 97 % | HEIGHT: 71 IN

## 2022-05-05 DIAGNOSIS — Z98.1 S/P CERVICAL SPINAL FUSION: Primary | ICD-10-CM

## 2022-05-05 PROCEDURE — 99213 OFFICE O/P EST LOW 20 MIN: CPT | Performed by: NURSE PRACTITIONER

## 2022-05-05 RX ORDER — OXYCODONE HYDROCHLORIDE 5 MG/1
5 TABLET ORAL EVERY 6 HOURS PRN
Qty: 10 TABLET | Refills: 0 | Status: SHIPPED | OUTPATIENT
Start: 2022-05-05 | End: 2022-08-10

## 2022-05-05 ASSESSMENT — PAIN SCALES - GENERAL: PAINLEVEL: EXTREME PAIN (8)

## 2022-05-05 NOTE — PROGRESS NOTES
"[facility]  Follow Up Visit     HPI: 10 months s/p C3-7 ACDF with Dr. De Leon. Patient reports increased neck pain that radiates to shoulders and left arm with tingling in 3rd and 4th digits. He feels weakness in arms and hands, left>right. He reports dropping objects and dexterity issues. He feels as though his symptoms have been gradually increasing over the past few months. He tried post op PT and continues with home exercises. Denies any falls, injuries, or incision concerns. He is requesting a pain medication to help with pain control in the meantime.     Current pain: 8/10    Exam:  Constitutional:  Alert, well nourished, NAD.  HEENT: Normocephalic, atraumatic.   Pulm:  Without shortness of breath   CV:  No pitting edema of BLE.     Vital Signs: BP (!) 182/116   Pulse 90   Ht 5' 11\" (1.803 m)   Wt (!) 349 lb (158.3 kg)   SpO2 97%   BMI 48.68 kg/m      Neurological:  Awake  Alert  Oriented x 3  Motor exam:  Shoulder Abduction:  Right:  5/5    Left:  5/5  Biceps:                      Right:  5/5    Left:  4+/5  Triceps:                     Right:  5/5    Left:  4+/5  Wrist Extensors:       Right:  5/5    Left:  5/5  Wrist Flexors:           Right:  5/5    Left:  5/5  Intrinsics:                  Right:  5/5    Left:  5/5    Able to spontaneously move BUE  Sensation intact throughout BUE    Incision: Well healed     Imaging: Cervical spine XR from 4/27/22 reveals stable and intact hardware     Assessment/Plan:   10 months s/p C3-7 ACDF. Patient reports increased neck pain that radiates to shoulders and left arm with tingling in 3rd and 4th digits. He feels weakness in arms and hands, left>right. He reports dropping objects and dexterity issues. He feels as though his symptoms have been gradually increasing over the past few months. XR reveals stable and intact hardware.     - Cervical spine MRI. Will call with results and next steps.  - One time refill of Oxycodone sent to pharmacy. "   - Continue with PT exercises    Discussed red flag symptoms and advised to seek medical attention if these develop. Patient voiced understanding and agreement.        Maria Luisa Henderson CNP  [facility]surgery  [address] [address]  Tel [phone]  Pager [phone]

## 2022-05-05 NOTE — LETTER
"    5/5/2022         RE: Archie Wallace  [address]  [address]  [place] [address]        Dear Colleague,    Thank you for referring your patient, Archie Wallace, to the [facility] [place]. Please see a copy of my visit note below.    [facility]  Follow Up Visit     HPI: 10 months s/p C3-7 ACDF with Dr. De Leon. Patient reports increased neck pain that radiates to shoulders and left arm with tingling in 3rd and 4th digits. He feels weakness in arms and hands, left>right. He reports dropping objects and dexterity issues. He feels as though his symptoms have been gradually increasing over the past few months. He tried post op PT and continues with home exercises. Denies any falls, injuries, or incision concerns. He is requesting a pain medication to help with pain control in the meantime.     Current pain: 8/10    Exam:  Constitutional:  Alert, well nourished, NAD.  HEENT: Normocephalic, atraumatic.   Pulm:  Without shortness of breath   CV:  No pitting edema of BLE.     Vital Signs: BP (!) 182/116   Pulse 90   Ht 5' 11\" (1.803 m)   Wt (!) 349 lb (158.3 kg)   SpO2 97%   BMI 48.68 kg/m      Neurological:  Awake  Alert  Oriented x 3  Motor exam:  Shoulder Abduction:  Right:  5/5    Left:  5/5  Biceps:                      Right:  5/5    Left:  4+/5  Triceps:                     Right:  5/5    Left:  4+/5  Wrist Extensors:       Right:  5/5    Left:  5/5  Wrist Flexors:           Right:  5/5    Left:  5/5  Intrinsics:                  Right:  5/5    Left:  5/5    Able to spontaneously move BUE  Sensation intact throughout BUE    Incision: Well healed     Imaging: Cervical spine XR from 4/27/22 reveals stable and intact hardware     Assessment/Plan:   10 months s/p C3-7 ACDF. Patient reports increased neck pain that radiates to shoulders and left arm with tingling in 3rd and 4th digits. He feels weakness in arms and hands, left>right. He reports dropping " objects and dexterity issues. He feels as though his symptoms have been gradually increasing over the past few months. XR reveals stable and intact hardware.     - Cervical spine MRI. Will call with results and next steps.  - One time refill of Oxycodone sent to pharmacy.   - Continue with PT exercises    Discussed red flag symptoms and advised to seek medical attention if these develop. Patient voiced understanding and agreement.        Maria Luisa Henderson CNP  [facility]  [address] [address]  Tel [phone]  Pager [phone]          Again, thank you for allowing me to participate in the care of your patient.        Sincerely,        Maria Luisa Henderson, NP

## 2022-05-05 NOTE — PATIENT INSTRUCTIONS
Order for cervical spine MRI. You can call to schedule at [phone]. I will call with the results and next steps.     One time refill of Oxycodone sent to pharmacy.     Please call our clinic if symptoms persist, change, or worsen at any time.    [facility] Neurosurgery  [address] [address]  Tel [phone]

## 2022-05-05 NOTE — NURSING NOTE
"Archie Wallace is a 46 year old male who presents for:  No chief complaint on file.       Initial Vitals:  BP (!) 182/116   Pulse 90   Ht 5' 11\" (1.803 m)   Wt (!) 349 lb (158.3 kg)   SpO2 97%   BMI 48.68 kg/m   Estimated body mass index is 48.68 kg/m  as calculated from the following:    Height as of this encounter: 5' 11\" (1.803 m).    Weight as of this encounter: 349 lb (158.3 kg).. Body surface area is 2.82 meters squared. BP completed using cuff size: large  Extreme Pain (8)    Nursing Comments:     Sincere Osborne MA      "

## 2022-05-05 NOTE — NURSING NOTE
Manual blood pressure retaken. 180/110 in right upper extremity.   He reports he takes 3 medications to treat his blood pressure and he took those meds today. He thinks his blood pressure is high because he is in pain. Explained he is at an increased risk for stroke with untreated high blood pressure  We recommend he go across the street to the urgent care for high blood pressure evaluation and follow up with his PCP.

## 2022-05-10 ENCOUNTER — OFFICE VISIT (OUTPATIENT)
Dept: ORTHOPEDICS | Facility: CLINIC | Age: 47
End: 2022-05-10
Payer: COMMERCIAL

## 2022-05-10 DIAGNOSIS — M17.12 PRIMARY OSTEOARTHRITIS OF LEFT KNEE: Primary | ICD-10-CM

## 2022-05-10 DIAGNOSIS — M25.462 EFFUSION OF LEFT KNEE: ICD-10-CM

## 2022-05-10 PROCEDURE — 20611 DRAIN/INJ JOINT/BURSA W/US: CPT | Mod: LT | Performed by: FAMILY MEDICINE

## 2022-05-10 RX ORDER — ROPIVACAINE HYDROCHLORIDE 5 MG/ML
4 INJECTION, SOLUTION EPIDURAL; INFILTRATION; PERINEURAL
Status: SHIPPED | OUTPATIENT
Start: 2022-05-10

## 2022-05-10 RX ORDER — BETAMETHASONE SODIUM PHOSPHATE AND BETAMETHASONE ACETATE 3; 3 MG/ML; MG/ML
6 INJECTION, SUSPENSION INTRA-ARTICULAR; INTRALESIONAL; INTRAMUSCULAR; SOFT TISSUE
Status: SHIPPED | OUTPATIENT
Start: 2022-05-10

## 2022-05-10 RX ADMIN — BETAMETHASONE SODIUM PHOSPHATE AND BETAMETHASONE ACETATE 6 MG: 3; 3 INJECTION, SUSPENSION INTRA-ARTICULAR; INTRALESIONAL; INTRAMUSCULAR; SOFT TISSUE at 14:27

## 2022-05-10 RX ADMIN — ROPIVACAINE HYDROCHLORIDE 4 ML: 5 INJECTION, SOLUTION EPIDURAL; INFILTRATION; PERINEURAL at 14:27

## 2022-05-10 NOTE — PATIENT INSTRUCTIONS
[facility] [place] [facility]    Procedure: Left knee aspiration/steroid injection     You may shower, however avoid swimming, tub baths or hot tubs for 24 hours following your procedure  You may have a mild to moderate increase in pain for several days following the injection.  It may take up to 14 days for the steroid medication to start working although you may feel the effect as early as a few days after the procedure.  You may use ice packs for 10-15 minutes, 3 to 4 times a day at the injection site for comfort  You may use anti-inflammatory medications (such as Ibuprofen or Aleve or Advil) or Tylenol for pain control if necessary  If you were fasting, you may resume your normal diet and medications after the procedure  If you have diabetes, check your blood sugar more frequently than usual as your blood sugar may be higher than normal for 10-14 days following a steroid injection. Contact your doctor who manages your diabetes if your blood sugar is higher than usual    If you experience any of the following, call [facility] @ [phone] or [phone]  -Fever over 100 degree F  -Swelling, bleeding, redness, drainage, warmth at the injection site  - New or worsening pain

## 2022-05-10 NOTE — LETTER
5/10/2022         RE: Archie Wallace  [address]  [address]  [place] [address]        Dear Colleague,    Thank you for referring your patient, Archie Wallace, to the [facility]. Please see a copy of my visit note below.    Large Joint Injection/Arthocentesis: L knee joint    Date/Time: 5/10/2022 2:27 PM  Performed by: Moise Cannon MD  Authorized by: Moise Cannon MD     Indications:  Pain  Needle Size:  25 G  Guidance: ultrasound    Approach:  Superolateral  Location:  Knee      Medications:  6 mg betamethasone acet & sod phos 6 (3-3) MG/ML; 4 mL ropivacaine 5 MG/ML  Aspirate amount (mL):  15  Aspirate:  Clear and yellow  Outcome:  Tolerated well, no immediate complications  Procedure discussed: discussed risks, benefits, and alternatives    Consent Given by:  Patient  Timeout: timeout called immediately prior to procedure    Prep: patient was prepped and draped in usual sterile fashion     Ultrasound images of procedure were permanently stored.      Patient reported improvement of pain after the numbing portion left knee aspiration/steroid injection.  Ultrasound guided images were permanently stored.  Aftercare instructions given to patient.  Plan to follow-up as previously discussed with referring provider.     Moise Cannon MD [facility]              Again, thank you for allowing me to participate in the care of your patient.        Sincerely,        Moise Cannon MD

## 2022-05-10 NOTE — PROGRESS NOTES
Large Joint Injection/Arthocentesis: L knee joint    Date/Time: 5/10/2022 2:27 PM  Performed by: Moise Cannon MD  Authorized by: Moise Cannon MD     Indications:  Pain  Needle Size:  25 G  Guidance: ultrasound    Approach:  Superolateral  Location:  Knee      Medications:  6 mg betamethasone acet & sod phos 6 (3-3) MG/ML; 4 mL ropivacaine 5 MG/ML  Aspirate amount (mL):  15  Aspirate:  Clear and yellow  Outcome:  Tolerated well, no immediate complications  Procedure discussed: discussed risks, benefits, and alternatives    Consent Given by:  Patient  Timeout: timeout called immediately prior to procedure    Prep: patient was prepped and draped in usual sterile fashion     Ultrasound images of procedure were permanently stored.      Patient reported improvement of pain after the numbing portion left knee aspiration/steroid injection.  Ultrasound guided images were permanently stored.  Aftercare instructions given to patient.  Plan to follow-up as previously discussed with referring provider.     Moise Cannon MD [facility]

## 2022-05-10 NOTE — PROGRESS NOTES
"Archie Wallace is a 46 year old male who is being evaluated via a billable video visit.      The patient has been notified of following:     \"This video visit will be conducted via a call between you and your physician/provider. We have found that certain health care needs can be provided without the need for an in-person physical exam.  This service lets us provide the care you need with a video conversation.  If a prescription is necessary we can send it directly to your pharmacy.  If lab work is needed we can place an order for that and you can then stop by our lab to have the test done at a later time.    Video visits are billed at different rates depending on your insurance coverage.  Please reach out to your insurance provider with any questions.    If during the course of the call the physician/provider feels a video visit is not appropriate, you will not be charged for this service.\"    Patient has given verbal consent for Video visit? Yes  How would you like to obtain your AVS? MyChart  Will anyone else be joining your video visit? No  {If patient encounters technical issues they should call [phone]      Video-Visit Details    Type of service:  Video Visit    Video Start Time: 8:10 AM  Video End Time: 8:36 AM    Originating Location (pt. Location): Home    Distant Location (provider location):  [facility] [place]     Platform used for Video Visit: [employer]    During this virtual visit the patient is located in [place], patient verifies this as the location during the entirety of this visit.       Bariatric Nutrition Consultation Note    Reason For Visit: Nutrition Assessment    Archie Wallace is a 46 year old presenting today for return bariatric nutrition consult.  Pt is interested in laparoscopic sleeve gastrectomy with Dr. Mata, expected surgery in TBD.  Patient is accompanied by self.  Patient has completed 6 minimum required nutrition visits prior to surgery. " "Recommending monthly follow-up until surgery.     Pt referred by RAE Alejandro on September 7, 2021.  Patient with Co-morbidities of obesity including:  Type II DM yes (A1C 6.6 7/12/21)  Renal Failure no  Sleep apnea no  Hypertension yes  Dyslipidemia yes  Joint pain no  Back pain no  GERD  yes    Support System Reviewed With Patient 9/7/2021   Who do you have in your support network that can be available to help you for the first 2 weeks after surgery? girlfriend   Who can you count on for support throughout your weight loss surgery journey? girlfriend       ANTHROPOMETRICS:  Estimated body mass index is 46.78 kg/m  as calculated from the following:    Height as of 9/7/21: 1.81 m (5' 11.25\").    Weight as of 9/7/21: 153.2 kg (337 lb 12.8 oz).    Recent clinic weight:  Estimated body mass index is 48.68 kg/m  as calculated from the following:    Height as of 5/5/22: 1.803 m (5' 11\").    Weight as of 5/5/22: 158.3 kg (349 lb).  (+1 lbs over past month, +12 lbs from initial)    Current weight (pt reported at home): 342 lbs     Required weight loss goal pre-op: -20 lbs from initial consult weight (goal weight 317 lbs or less before surgery)       9/7/2021   I have tried the following methods to lose weight Watching portions or calories, Exercise, Atkins type diet (low carb/high protein), Slimfast, OTC Medications       Weight Loss Questions Reviewed With Patient 9/7/2021   How long have you been overweight? Since late teens through early 20's       SUPPLEMENT INFORMATION:  None    Medications for Weight Loss:  Topiramate - Pt reports taking morning and night    NUTRITION HISTORY:  No known food allergies. Lactose intolerant.     Checks BG TID in morning, noon and at night  Travels a lot - 2-3 times per month   Already having to chew to fine texture after thyroid surgery.     Doesn't eat if not hungry, leads to skipping daytime meals. Sometimes eating out of boredom. Waking in the night every 2-3 hours, will need " something sweet (candy, Lil Odalys's cakes) to fall back asleep. This is likely contributing to daytime fullness.     Over past several months, he has been focusing on eating smaller portions and using leaner cooking methods at dinner. Working on not snacking when he wakes at night. Is able to avoid some nights.     Intermittently replacing meals with Slimfast Keto Shake or Shakeology shake.     April 2022 - Gained 5lbs this past month. He has recently tried to cut out third meal meal. Trying to avoid waking to snack, however continues to have a high-carb snack overnight 3 time per week.  He notes some knee pain that has reduced his physical activity as well.     Bar labs completed 4/15/22 - Hgb A1c up to 7.2 (6.6 last check 10 mo ago), LDL cholesterol up, Vitamin D 15 (Low), fasting . NP ordered once weekly high-dose vitamin D x 8wks, pt has yet to start.     Today - Has not been checking BG when home. Over past month, was doing OCTAVIANO 9am-6pm window and lost 6 lbs per his home scale. Going to sleep around 8 pm to help avoid eating. States the only times he ate outside of this window was when he forgot to eat and then needed to eat later.     Recent Diet Recall:  Breakfast: 9 am  Dietz and eggs and occ grits and cereal    Lunch: banana or may skip  Dinner: 6 pm lighter meal - crab and potatoes; beef roast; chicken in air fryer; fish    Beverages: Water, regular gatorade (64 oz/day), sugar-free gatorade, calorie free tea    Progress Towards Previous Goals:  1) Focus on protein and non-starchy veggies/fruit at meals   2) Avoid snacking after waking. If a snack is needed, use a protein snack - Met, helping to avoid going into the kitchen at night  3) Eliminate calorie-containing beverages - No change. He is having a tough time stopping regular gatorade, states nothing else quenches his thirst.  4) Use the protein shake meal replacement if you do not have a high-protein meal/snack    - Mix protein powder with water  instead of milk  5) Schedule a follow-up with RAE Alejandro. Call [phone]. - Met, scheduled in August   6) Complete bariatric labs - call for a lab only appointment at any [facility] lab. To find a lab location near you, please call [phone]. Please let us know if orders need to be faxed to a non [facility] lab. - Met, has yet to  vitamin D  7) Obtain letter of support from your primary care doctor (Provider can submit through electronic medical record or fax to [phone]) - Ran out of time to discuss today  8) Obtain clearances from sleep doctor and cardiologist (Provider can submit through electronic medical record or fax to [phone]) - Pending stress and cardiac MRI      Eating Habits 9/7/2021   Do you have any dietary restrictions? Yes   Do you currently binge eat (eat a large amount of food in a short time)? Yes   Are you an emotional eater? Yes   Do you get up to eat after falling asleep? Yes   What foods do you crave? nothing       ADDITIONAL INFORMATION:  Had spine surgery and half thyroid removed in July, per 10/12 visit with neurosurg, lifting restrictions lifted and pt to begin PT.     He has started to go to the gym 4 times per week, exercising for 45 mins total each time. Doing swimming, treadmill, and some strengthening. Trying to get in more walking outside of the gym.     Dining Out History Reviewed With Patient 9/7/2021   How often do you dine out? A couple of times a week.   Where do you dine out? (select all that apply) fast food chains   What types of food do you order when you dine out? jass       Physical Activity Reviewed With Patient 9/7/2021   How often do you exercise? Never   What keeps you from being more active?  I am as active as I can possbily be, I have just had surgery on one or more of my joints, My ability to walk or move around is limited         NUTRITION DIAGNOSIS:  Obesity r/t long history of self-monitoring deficit and excessive  energy intake aeb BMI >30 kg/m2. - continues    INTERVENTION:  Intervention Provided/Education Provided:  Reviewed post-op diet guidelines, ways to help prepare for post-op diet guidelines pre-operatively, portion/calorie-control, mindful eating and sources of protein.  Reviewed progress towards previous goals.  Encouraged continued time restricted eating. Discussed strategies for maintaining consistent eating pattern within this window.   Reviewed bariatric labs. Reordered vitamin D supplement for pt to /start, had been accidentally cancelled at another visit before he was able to .   Discussed alternative to sugar/calorie containing beverages.   Reviewed surgery task list.  Patient demonstrates understanding.  AVS vis MyChart     Questions Reviewed With Patient 9/7/2021   How ready are you to make changes regarding your weight? Number 1 = Not ready at all to make changes up to 10 = very ready. 10   How confident are you that you can change? 1 = Not confident that you will be successful making changes up to 10 = very confident. 10       Expected Engagement: good    GOALS:  1) Set an alert/reminder on your phone to eat meals between 9 am - 6pm  2) Bring a protein bar with you when out   3) Avoid snacking after waking. Avoid going in to the kitchen  4) Eliminate calorie-containing beverages  - try adding lemon/lime slices to water or sugar-free gatorade.   5) Obtain letter of support from your primary care doctor (Provider can submit through electronic medical record or fax to [phone])    Protein Snack Ideas:  Left-over meat  Hard boiled egg  Part-skim cheese stick  Protein shake/bar  100 calorie pack of nuts  Tuna/chicken salad     Meal Replacement Bar Options:  [facility] Protein Shake (160 Calories, 15 g protein)  Quest Protein Bars (190 Calories, 20 g protein)  Built Bar (170 Calories, 15-20 g protein)  One Protein Bar (210 calories, 20 g protein)  [place] Signature Protein Bar ([employer]) (190  Calories, 21 g protein)  Pure Protein Bars (180 Calories, 21 g protein)        Follow up: 1 month    Time spent with patient: 26 minutes.  Kayla Mcdonnell RD, LD

## 2022-05-12 ENCOUNTER — VIRTUAL VISIT (OUTPATIENT)
Dept: ENDOCRINOLOGY | Facility: CLINIC | Age: 47
End: 2022-05-12
Payer: COMMERCIAL

## 2022-05-12 DIAGNOSIS — E55.9 VITAMIN D DEFICIENCY: ICD-10-CM

## 2022-05-12 DIAGNOSIS — Z71.3 NUTRITIONAL COUNSELING: Primary | ICD-10-CM

## 2022-05-12 DIAGNOSIS — E66.9 OBESITY: ICD-10-CM

## 2022-05-12 DIAGNOSIS — E11.65 TYPE 2 DIABETES MELLITUS WITH HYPERGLYCEMIA, WITHOUT LONG-TERM CURRENT USE OF INSULIN (H): ICD-10-CM

## 2022-05-12 PROCEDURE — 97803 MED NUTRITION INDIV SUBSEQ: CPT | Mod: 95 | Performed by: DIETITIAN, REGISTERED

## 2022-05-12 NOTE — LETTER
"5/12/2022       RE: Archie Wallace  [address]  [address]  [place] [address]     Dear Colleague,    Thank you for referring your patient, Archie Wallace, to the [facility] [place] at [facility]. Please see a copy of my visit note below.    Archie Wallace is a 46 year old male who is being evaluated via a billable video visit.      The patient has been notified of following:     \"This video visit will be conducted via a call between you and your physician/provider. We have found that certain health care needs can be provided without the need for an in-person physical exam.  This service lets us provide the care you need with a video conversation.  If a prescription is necessary we can send it directly to your pharmacy.  If lab work is needed we can place an order for that and you can then stop by our lab to have the test done at a later time.    Video visits are billed at different rates depending on your insurance coverage.  Please reach out to your insurance provider with any questions.    If during the course of the call the physician/provider feels a video visit is not appropriate, you will not be charged for this service.\"    Patient has given verbal consent for Video visit? Yes  How would you like to obtain your AVS? MyChart  Will anyone else be joining your video visit? No  {If patient encounters technical issues they should call [phone]      Video-Visit Details    Type of service:  Video Visit    Video Start Time: 8:10 AM  Video End Time: 8:36 AM    Originating Location (pt. Location): Home    Distant Location (provider location):  [facility] [place]     Platform used for Video Visit: [employer]    During this virtual visit the patient is located in [place], patient verifies this as the location during the entirety of this visit.       Bariatric Nutrition Consultation Note    Reason For " "Visit: Nutrition Assessment    Archie Wallace is a 46 year old presenting today for return bariatric nutrition consult.  Pt is interested in laparoscopic sleeve gastrectomy with Dr. Mata, expected surgery in D.  Patient is accompanied by self.  Patient has completed 6 minimum required nutrition visits prior to surgery. Recommending monthly follow-up until surgery.     Pt referred by RAE Alejandro on September 7, 2021.  Patient with Co-morbidities of obesity including:  Type II DM yes (A1C 6.6 7/12/21)  Renal Failure no  Sleep apnea no  Hypertension yes  Dyslipidemia yes  Joint pain no  Back pain no  GERD  yes    Support System Reviewed With Patient 9/7/2021   Who do you have in your support network that can be available to help you for the first 2 weeks after surgery? girlfriend   Who can you count on for support throughout your weight loss surgery journey? girlfriend       ANTHROPOMETRICS:  Estimated body mass index is 46.78 kg/m  as calculated from the following:    Height as of 9/7/21: 1.81 m (5' 11.25\").    Weight as of 9/7/21: 153.2 kg (337 lb 12.8 oz).    Recent clinic weight:  Estimated body mass index is 48.68 kg/m  as calculated from the following:    Height as of 5/5/22: 1.803 m (5' 11\").    Weight as of 5/5/22: 158.3 kg (349 lb).  (+1 lbs over past month, +12 lbs from initial)    Current weight (pt reported at home): 342 lbs     Required weight loss goal pre-op: -20 lbs from initial consult weight (goal weight 317 lbs or less before surgery)       9/7/2021   I have tried the following methods to lose weight Watching portions or calories, Exercise, Atkins type diet (low carb/high protein), Slimfast, OTC Medications       Weight Loss Questions Reviewed With Patient 9/7/2021   How long have you been overweight? Since late teens through early 20's       SUPPLEMENT INFORMATION:  None    Medications for Weight Loss:  Topiramate - Pt reports taking morning and night    NUTRITION HISTORY:  No known food " allergies. Lactose intolerant.     Checks BG TID in morning, noon and at night  Travels a lot - 2-3 times per month   Already having to chew to fine texture after thyroid surgery.     Doesn't eat if not hungry, leads to skipping daytime meals. Sometimes eating out of boredom. Waking in the night every 2-3 hours, will need something sweet (candy, Lil Odalys's cakes) to fall back asleep. This is likely contributing to daytime fullness.     Over past several months, he has been focusing on eating smaller portions and using leaner cooking methods at dinner. Working on not snacking when he wakes at night. Is able to avoid some nights.     Intermittently replacing meals with Slimfast Keto Shake or Shakeology shake.     April 2022 - Gained 5lbs this past month. He has recently tried to cut out third meal meal. Trying to avoid waking to snack, however continues to have a high-carb snack overnight 3 time per week.  He notes some knee pain that has reduced his physical activity as well.     Bar labs completed 4/15/22 - Hgb A1c up to 7.2 (6.6 last check 10 mo ago), LDL cholesterol up, Vitamin D 15 (Low), fasting . NP ordered once weekly high-dose vitamin D x 8wks, pt has yet to start.     Today - Has not been checking BG when home. Over past month, was doing OCTAVIANO 9am-6pm window and lost 6 lbs per his home scale. Going to sleep around 8 pm to help avoid eating. States the only times he ate outside of this window was when he forgot to eat and then needed to eat later.     Recent Diet Recall:  Breakfast: 9 am  Dietz and eggs and occ grits and cereal    Lunch: banana or may skip  Dinner: 6 pm lighter meal - crab and potatoes; beef roast; chicken in air fryer; fish    Beverages: Water, regular gatorade (64 oz/day), sugar-free gatorade, calorie free tea    Progress Towards Previous Goals:  1) Focus on protein and non-starchy veggies/fruit at meals   2) Avoid snacking after waking. If a snack is needed, use a protein snack -  Met, helping to avoid going into the kitchen at night  3) Eliminate calorie-containing beverages - No change. He is having a tough time stopping regular gatorade, states nothing else quenches his thirst.  4) Use the protein shake meal replacement if you do not have a high-protein meal/snack    - Mix protein powder with water instead of milk  5) Schedule a follow-up with RAE Alejandro. Call [phone]. - Met, scheduled in August   6) Complete bariatric labs - call for a lab only appointment at any [facility] lab. To find a lab location near you, please call [phone]. Please let us know if orders need to be faxed to a non [facility] lab. - Met, has yet to  vitamin D  7) Obtain letter of support from your primary care doctor (Provider can submit through electronic medical record or fax to [phone]) - Ran out of time to discuss today  8) Obtain clearances from sleep doctor and cardiologist (Provider can submit through electronic medical record or fax to [phone]) - Pending stress and cardiac MRI      Eating Habits 9/7/2021   Do you have any dietary restrictions? Yes   Do you currently binge eat (eat a large amount of food in a short time)? Yes   Are you an emotional eater? Yes   Do you get up to eat after falling asleep? Yes   What foods do you crave? nothing       ADDITIONAL INFORMATION:  Had spine surgery and half thyroid removed in July, per 10/12 visit with neurosurg, lifting restrictions lifted and pt to begin PT.     He has started to go to the gym 4 times per week, exercising for 45 mins total each time. Doing swimming, treadmill, and some strengthening. Trying to get in more walking outside of the gym.     Dining Out History Reviewed With Patient 9/7/2021   How often do you dine out? A couple of times a week.   Where do you dine out? (select all that apply) fast food chains   What types of food do you order when you dine out? jass       Physical Activity Reviewed With  Patient 9/7/2021   How often do you exercise? Never   What keeps you from being more active?  I am as active as I can possbily be, I have just had surgery on one or more of my joints, My ability to walk or move around is limited         NUTRITION DIAGNOSIS:  Obesity r/t long history of self-monitoring deficit and excessive energy intake aeb BMI >30 kg/m2. - continues    INTERVENTION:  Intervention Provided/Education Provided:  Reviewed post-op diet guidelines, ways to help prepare for post-op diet guidelines pre-operatively, portion/calorie-control, mindful eating and sources of protein.  Reviewed progress towards previous goals.  Encouraged continued time restricted eating. Discussed strategies for maintaining consistent eating pattern within this window.   Reviewed bariatric labs. Reordered vitamin D supplement for pt to /start, had been accidentally cancelled at another visit before he was able to .   Discussed alternative to sugar/calorie containing beverages.   Reviewed surgery task list.  Patient demonstrates understanding.  AVS vis MyChart     Questions Reviewed With Patient 9/7/2021   How ready are you to make changes regarding your weight? Number 1 = Not ready at all to make changes up to 10 = very ready. 10   How confident are you that you can change? 1 = Not confident that you will be successful making changes up to 10 = very confident. 10       Expected Engagement: good    GOALS:  1) Set an alert/reminder on your phone to eat meals between 9 am - 6pm  2) Bring a protein bar with you when out   3) Avoid snacking after waking. Avoid going in to the kitchen  4) Eliminate calorie-containing beverages  - try adding lemon/lime slices to water or sugar-free gatorade.   5) Obtain letter of support from your primary care doctor (Provider can submit through electronic medical record or fax to [phone])    Protein Snack Ideas:  Left-over meat  Hard boiled egg  Part-skim cheese stick  Protein  shake/bar  100 calorie pack of nuts  Tuna/chicken salad     Meal Replacement Bar Options:  [facility] Protein Shake (160 Calories, 15 g protein)  Quest Protein Bars (190 Calories, 20 g protein)  Built Bar (170 Calories, 15-20 g protein)  One Protein Bar (210 calories, 20 g protein)  [place] Signature Protein Bar ([employer]) (190 Calories, 21 g protein)  Pure Protein Bars (180 Calories, 21 g protein)        Follow up: 1 month    Time spent with patient: 26 minutes.  Kayla Mcdonnell RD, LD

## 2022-05-12 NOTE — PATIENT INSTRUCTIONS
Brian Almanza,    Follow-up with RD in 1 month.     Thank you,    Kayla Mcdonnell, RD, LD  If you would like to schedule or reschedule an appointment with the RD, please call [phone]    Nutrition Goals  1) Set an alert/reminder on your phone to eat meals between 9 am - 6pm  2) Bring a protein bar with you when out   3) Avoid snacking after waking. Avoid going in to the kitchen  4) Eliminate calorie-containing beverages  - try adding lemon/lime slices to water or sugar-free gatorade.   5) Obtain letter of support from your primary care doctor (Provider can submit through electronic medical record or fax to [phone])    Protein Snack Ideas:  Left-over meat  Hard boiled egg  Part-skim cheese stick  Protein shake/bar  100 calorie pack of nuts  Tuna/chicken salad     Meal Replacement Bar Options:   [facility] Protein Shake (160 Calories, 15 g protein)  Quest Protein Bars (190 Calories, 20 g protein)  Built Bar (170 Calories, 15-20 g protein)  One Protein Bar (210 calories, 20 g protein)  [place] Signature Protein Bar ([employer]co) (190 Calories, 21 g protein)  Pure Protein Bars (180 Calories, 21 g protein)    Interested in working with a health ?  Health coaches work with you to improve your overall health and wellbeing.  They look at the whole person, and may involve discussion of different areas of life, including, but not limited to the four pillars of health (sleep, exercise, nutrition, and stress management). Discuss with your care team if you would like to start working a health .    Health Coaching-3 Pack:    $99 for three health coaching visits    Visits may be done in person or via phone    Coaching is a partnership between the  and the client; Coaches do not prescribe or diagnose    Coaching helps inspire the client to reach his/her personal goals      COMPREHENSIVE WEIGHT MANAGEMENT PROGRAM  VIRTUAL SUPPORT GROUPS    For Support Group Information:      We offer support groups for patients who are  "working on weight loss and considering, preparing for or have had weight loss surgery.   There is no cost for this opportunity.  You are invited to attend the?Virtual Support Groups?provided by any of the following locations:    [facility] via Microsoft Teams with Alycia Oleary RN  2.   [place] via RazorGator with Abe Lopez, PhD, LP  3.   [place] via RazorGator with Debi Carmona RN  4.   [facility] via [employer] Teams with Debi Pedro, [facility]    The following Support Group information can also be found on our website:  [email or web address]      [facility] Weight Loss Surgery Support Group    [facility] Weight Loss Surgery Support Group  The support group is a patient-lead forum that meets monthly to share experiences, encouragement and education. It is open to those who have had weight loss surgery, are scheduled for surgery, and those who are considering surgery.   WHEN: This group meets on the 3rd Wednesday of each month from 5:00PM - 6:00PM virtually using Microsoft Teams.   FACILITATOR: Led by Alycia Oleary RD, LD, RN, the program's Clinical Coordinator.   TO REGISTER: Please contact the clinic via Fobbler or call the nurse line directly at [phone] to inform our staff that you would like an invite sent to you and to let us know the email you would like the invite sent to. Prior to the meeting, a link with directions on how to join the meeting will be sent to you.    2022 Meetings  January 19: \"Let's Talk\" a time for the group to share.  February 16: \"Let's Talk\" a time for the group to share.  March 16: Guest Speakers: Psychologists, Elisha Gatica, PhD,LP and Sabrina Lemons, PsGiuliana,LP  April 20: Guest Speaker: Health , Ann Kaye, [facility],CHES, CPT  May 18: Guest Speaker: Dietitian, Walter Hatch, JUWAN, LP  [place] 15: \"Let's Talk\" a time for the group to share.  July 20: \"Let's Talk\" a time for the " "group to share.  August 17: TBA  September 21: TBA  October 19: Guest Speaker: Dr Alessio Salas MD Pulmonologist and Sleep Medicine Physician, \"Getting a Good Night's Sleep\".  November 16: TBA  December 21: TBA    [facility] Clinics and [facility] Support Groups    Connections: Bariatric Care Support Group?  This is open to all [facility] (and those external to this program) pre- and post- operative bariatric surgery patients as well as their support system.   WHEN: This group meets the 2nd Tuesday of each month from 6:30 PM - 8:00 PM virtually using Microsoft Teams.   FACILITATOR: Led by Abe Lopez, Ph.D who is a Licensed Psychologist with the [facility] Comprehensive Weight Management Program.   TO REGISTER: Please send an email to Abe Lopez, Ph.D., LP at?[email or web address]?if you would like an invitation to the group and to learn about using Microsoft Teams.    2022 Meetings  January 11: Mayra Frank, PharmD, Pharmacy Resident at [facility], \"Medications and Bariatric Surgery\".  February 8: Open Forum  March 8  April 12  May 10  [place] 14    Connections: Post-Operative Bariatric Surgery Support Group  This is a support group for [facility] bariatric patients (and those external to [facility]) who have had bariatric surgery and are at least 3 months post-surgery.  WHEN: This support group meets the 4th Wednesday of the month from 11:00 AM - 12:00 PM virtually using Microsoft Teams.   FACILITATOR: Led by Certified Bariatric Nurse, Debi Carmona RN.   TO REGISTER: Please send an email to Debi at [email or web address] if you would like an invitation to the group and to learn about using Microsoft Teams.    2022 Meetings  January 26  February 23  March 23  April 27  May 25  [place] 22    [facility] Healthy Lifestyle Virtual Support Group    Healthy Lifestyle Virtual Support Group?  This is 60 minutes of small " group guided discussion, support and resources. All are welcome who want a healthy lifestyle.  WHEN: This group meets monthly on a Friday from 12:30 PM - 1:30 PM virtually using Microsoft Teams.   FACILITATOR: Led by National Board Certified Health and , Debi Pedro [facility], [facility].   TO REGISTER: Please send an email to Debi [email or web address] to receive monthly invites to the group or if you have any questions about having a health .  Prior to the meeting, a link with directions on how to join the meeting will be sent to you.    2022 Meetings  MAY 20: OPEN FORUM  JUNE: 24:  Setting Limits and BoundariesDebi  July 29: OPEN FORUM  August 26: Special Guest Registered Dietician Taylor Rubi  Sept 30: OPEN FORUM  Oct 28, Montse Benitez National Board Certified Health   Nov 18: Navigating How to Eat Around the Holidays with JUWAN Bradley  Dec 16: Changing your relationship with movement with  Ann National Board Certified Health

## 2022-05-16 ENCOUNTER — TELEPHONE (OUTPATIENT)
Dept: ORTHOPEDICS | Facility: CLINIC | Age: 47
End: 2022-05-16

## 2022-05-16 ENCOUNTER — HOSPITAL ENCOUNTER (OUTPATIENT)
Dept: MRI IMAGING | Facility: CLINIC | Age: 47
Discharge: HOME | End: 2022-05-16
Attending: NURSE PRACTITIONER | Admitting: NURSE PRACTITIONER
Payer: COMMERCIAL

## 2022-05-16 PROCEDURE — 72141 MRI NECK SPINE W/O DYE: CPT

## 2022-05-16 NOTE — TELEPHONE ENCOUNTER
Synvisc one additional info needed  Received: 4 days ago  Marisela Manning Fsoc Be Pa  Good morning,     In order for [facility] to complete a review of the medication requested, they need additional information. Please see the letter that has been added to the , as it is a pretty lengthy request.     Thanks,   Marisela           Information received from the prior auth team.  Letter has been scanned into the media tab.     Patient of Dr. Birch, but has seen Dr. Cannon for injection, and this is who put in the referral for the Synvsic One.     Please advise on needing to answer these questions or if timing of steroid injection can be weighed.     Linda Anderson MS ATC

## 2022-05-16 NOTE — TELEPHONE ENCOUNTER
Synvisc was ordered by Dr. Cannon.    Per my documentation and initial note, patient likely needs follow up in clinic to document response to corticosteroid injection and additional treatments prior to proceeding with MOYA injection.    June Birch MD

## 2022-05-19 ENCOUNTER — MYC MEDICAL ADVICE (OUTPATIENT)
Dept: NEUROSURGERY | Facility: CLINIC | Age: 47
End: 2022-05-19
Payer: COMMERCIAL

## 2022-05-19 DIAGNOSIS — Z98.1 S/P CERVICAL SPINAL FUSION: Primary | ICD-10-CM

## 2022-05-19 NOTE — TELEPHONE ENCOUNTER
Per Maria Luisa Henderson, CNP MRI shows improved spinal canal patency s/p C3-7 ACDF, but he does have left C7-T1 foraminal stenosis.     Recommend PT and referral to [facility] for left C7-T1 DONTE. Follow-up with Dr. De Leon if symptoms persist or worsen.      Patient updated and given numbers to schedule. Orders placed.

## 2022-05-25 ENCOUNTER — TELEPHONE (OUTPATIENT)
Dept: FAMILY MEDICINE | Facility: CLINIC | Age: 47
End: 2022-05-25

## 2022-05-25 NOTE — TELEPHONE ENCOUNTER
Patient Quality Outreach    Patient is due for the following:   Colon Cancer Screening -  Colonoscopy  Depression  -  PHQ-9 Needed  Physical  - overdue since 10/10/17   Hypertension - BP check    NEXT STEPS:   Schedule a yearly physical and complete PHQ-9 sent via cafegive    Type of outreach:    Sent [employer] message.    Next Steps:  Reach out within 90 days via Phone.    Max number of attempts reached: No. Will try again in 90 days if patient still on fail list.    Questions for provider review:    None     Ela Berry MA

## 2022-05-26 NOTE — TELEPHONE ENCOUNTER
Patient messaged via [employer] that he can schedule a follow-up with me next available to discuss treatments including possible injections.    Moise Cannon MD

## 2022-05-31 DIAGNOSIS — J45.40 MODERATE PERSISTENT ASTHMA WITHOUT COMPLICATION: ICD-10-CM

## 2022-05-31 DIAGNOSIS — I10 HYPERTENSION GOAL BP (BLOOD PRESSURE) < 140/90: ICD-10-CM

## 2022-06-02 RX ORDER — CLONIDINE HYDROCHLORIDE 0.2 MG/1
0.2 TABLET ORAL 2 TIMES DAILY
Qty: 60 TABLET | Refills: 0 | Status: SHIPPED | OUTPATIENT
Start: 2022-06-02

## 2022-06-02 RX ORDER — MOMETASONE FUROATE AND FORMOTEROL FUMARATE DIHYDRATE 100; 5 UG/1; UG/1
2 AEROSOL RESPIRATORY (INHALATION) 2 TIMES DAILY
Qty: 13 G | Refills: 0 | Status: SHIPPED | OUTPATIENT
Start: 2022-06-02 | End: 2022-09-13

## 2022-06-02 NOTE — TELEPHONE ENCOUNTER
Patient due for appointment, [employer] message sent advising patient to schedule appointment for further refills.     Medication is being filled for 1 time refill only due to:  Patient needs to be seen because Per Dr. Bahena's last note patient due for appointment 5/11/22 .    Teena Kaur RN   [facility]

## 2022-06-10 NOTE — PROGRESS NOTES
"Archie Wallace is a 46 year old male who is being evaluated via a billable video visit.      The patient has been notified of following:     \"This video visit will be conducted via a call between you and your physician/provider. We have found that certain health care needs can be provided without the need for an in-person physical exam.  This service lets us provide the care you need with a video conversation.  If a prescription is necessary we can send it directly to your pharmacy.  If lab work is needed we can place an order for that and you can then stop by our lab to have the test done at a later time.    Video visits are billed at different rates depending on your insurance coverage.  Please reach out to your insurance provider with any questions.    If during the course of the call the physician/provider feels a video visit is not appropriate, you will not be charged for this service.\"    Patient has given verbal consent for Video visit? Yes  How would you like to obtain your AVS? MyChart  Will anyone else be joining your video visit? No  {If patient encounters technical issues they should call [phone]      Video-Visit Details    Type of service:  Video Visit    Video Start Time: 7:07 AM  Video End Time: 7:29 AM    Originating Location (pt. Location): Home    Distant Location (provider location):  [facility] [place]     Platform used for Video Visit: [employer]    During this virtual visit the patient is located in [place], patient verifies this as the location during the entirety of this visit.       Bariatric Nutrition Consultation Note    Reason For Visit: Nutrition Assessment    Archie Wallace is a 46 year old presenting today for return bariatric nutrition consult.  Pt is interested in laparoscopic sleeve gastrectomy with Dr. Mata, expected surgery in TBD.  Patient is accompanied by self.  Patient has completed 6 minimum required nutrition visits prior to surgery. " "Recommending monthly follow-up until surgery.     Pt referred by RAE Alejandro on September 7, 2021.  Patient with Co-morbidities of obesity including:  Type II DM yes   Renal Failure no  Sleep apnea no  Hypertension yes  Dyslipidemia yes  Joint pain no  Back pain no  GERD  yes    Support System Reviewed With Patient 9/7/2021   Who do you have in your support network that can be available to help you for the first 2 weeks after surgery? girlfriend   Who can you count on for support throughout your weight loss surgery journey? girlfriend       ANTHROPOMETRICS:  Estimated body mass index is 46.78 kg/m  as calculated from the following:    Height as of 9/7/21: 1.81 m (5' 11.25\").    Weight as of 9/7/21: 153.2 kg (337 lb 12.8 oz).    Recent clinic weight:  Estimated body mass index is 48.68 kg/m  as calculated from the following:    Height as of 5/5/22: 1.803 m (5' 11\").    Weight as of 5/5/22: 158.3 kg (349 lb).     Current weight (pt reported at home): 332 (-10 lbs this month, -5 lbs from initial)      Required weight loss goal pre-op: -20 lbs from initial consult weight (goal weight 317 lbs or less before surgery)       9/7/2021   I have tried the following methods to lose weight Watching portions or calories, Exercise, Atkins type diet (low carb/high protein), Slimfast, OTC Medications       Weight Loss Questions Reviewed With Patient 9/7/2021   How long have you been overweight? Since late teens through early 20's       SUPPLEMENT INFORMATION:  None    Medications for Weight Loss:  Topiramate - Pt reports taking morning and night  Ozempic - started with PCP    NUTRITION HISTORY:  No known food allergies. Lactose intolerant.    Already having to chew to fine texture after thyroid surgery.     Doesn't eat if not hungry, leads to skipping daytime meals. Sometimes eating out of boredom. Waking in the night every 2-3 hours, will need something sweet (candy, Lil [employer]alys's cakes) to fall back asleep. This is likely " contributing to daytime fullness.     Over past several months, he has been focusing on eating smaller portions and using leaner cooking methods at dinner. Working on not snacking when he wakes at night. Is able to avoid some nights.     Intermittently replacing meals with Slimfast Keto Shake or Shakeology shake.     April 2022 - Gained 5lbs this past month. He has recently tried to cut out third meal meal. Trying to avoid waking to snack, however continues to have a high-carb snack overnight 3 time per week.  He notes some knee pain that has reduced his physical activity as well.     Bar labs completed 4/15/22 - Hgb A1c up to 7.2 (6.6 last check 10 mo ago), LDL cholesterol up, Vitamin D 15 (Low), fasting . NP ordered once weekly high-dose vitamin D x 8wks, pt has yet to start.     May 2022 - Has not been checking BG when home. Over past month, was doing OCTAVIANO 9am-6pm window and lost 6 lbs per his home scale. Going to sleep around 8 pm to help avoid eating. States the only times he ate outside of this window was when he forgot to eat and then needed to eat later.     Today - No longer doing OCTAVIANO. Has noticed little appetite once starting Ozempic with PCP. Back to eating just one meal per day.     Recent Diet Recall:  Breakfast: skip  Lunch: skip  Dinner: burger  Snack: nuts (1.5 handful)    Beverages: Water, regular gatorade (64 oz/day), sugar-free gatorade, calorie free tea    Progress Towards Previous Goals:  1) Set an alert/reminder on your phone to eat meals between 9 am - 6pm - Not met  2) Bring a protein bar with you when out - Not met  3) Avoid snacking after waking. Avoid going in to the kitchen - Continues  4) Eliminate calorie-containing beverages  - try adding lemon/lime slices to water or sugar-free gatorade. - Improving, has switched regular Gatorade to sugar-free  5) Obtain letter of support from your primary care doctor (Provider can submit through electronic medical record or fax to [phone])  - Met      Eating Habits 9/7/2021   Do you have any dietary restrictions? Yes   Do you currently binge eat (eat a large amount of food in a short time)? Yes   Are you an emotional eater? Yes   Do you get up to eat after falling asleep? Yes   What foods do you crave? nothing       ADDITIONAL INFORMATION:  Had spine surgery and half thyroid removed in July 2021.    Has not been to the gym as much lately d/t pain, getting cortisone injections.    Dining Out History Reviewed With Patient 9/7/2021   How often do you dine out? A couple of times a week.   Where do you dine out? (select all that apply) fast food chains   What types of food do you order when you dine out? jass       Physical Activity Reviewed With Patient 9/7/2021   How often do you exercise? Never   What keeps you from being more active?  I am as active as I can possbily be, I have just had surgery on one or more of my joints, My ability to walk or move around is limited         NUTRITION DIAGNOSIS:  Obesity r/t long history of self-monitoring deficit and excessive energy intake aeb BMI >30 kg/m2. - continues    INTERVENTION:  Intervention Provided/Education Provided:  Reviewed post-op diet guidelines, ways to help prepare for post-op diet guidelines pre-operatively, portion/calorie-control, mindful eating and sources of protein.  Reviewed progress towards previous goals.  Instructed pt to follow the Modified Liquid Diet for pre-op weight loss.  Reviewed surgery task list.  Patient demonstrates understanding.  AVS vis MyChart     Questions Reviewed With Patient 9/7/2021   How ready are you to make changes regarding your weight? Number 1 = Not ready at all to make changes up to 10 = very ready. 10   How confident are you that you can change? 1 = Not confident that you will be successful making changes up to 10 = very confident. 10       Expected Engagement: good    GOALS:  1) Start Modified Liquid Diet:   - Breakfast: Protein bar/shake   - Lunch: 3-5 oz  lean protein + non-starchy vegetables (Or low-calorie frozen meal)   - Dinner: Protein bar/shake  2) Calorie/sugar-free beverages only for hydration  3) Set up bariatric psych evaluation    Protein Ideas:  Left-over meat  Hard boiled egg  Part-skim cheese stick  Protein shake/bar  100 calorie pack of nuts  Tuna/chicken salad     Protein Sources for Weight Loss  [email or web address]     Diet Guidelines after Weight Loss Surgery  [email or web address]     Meal Replacement Shake Options:   *Protein Shake Criteria: no more than 210 Calories, at least 20 grams of protein, and less than 10 grams of sugar   [facility] smoothie (160 Calories, 20 g protein)   Premier Protein (160 Calories, 30 g protein)  Slim Fast Advanced Nutrition (180 Calories, 20 g protein)  Muscle Milk, lactose-free, 17 oz bottle (210 Calories, 30 g protein)  Integrated Supplements, no artificial sugars (110 Calories, 20 g protein)  Genepro, unflavored protein powder (60 Calories, 30 g protein)  Boost/Ensure Max (160 calories, 30 gm protein)   [employer] Core Power (170 calories, 26 gm protein)  [employer]i's Elevation Protein Powder (180 calories, 30 gm protein)     Meal Replacement Bar Options:  [facility] Protein Shake (160 Calories, 15 g protein)  Quest Protein Bars (190 Calories, 20 g protein)  Built Bar (170 Calories, 15-20 g protein)  One Protein Bar (210 calories, 20 g protein)  [place] Signature Protein Bar ([employer]) (190 Calories, 21 g protein)  Pure Protein Bars (180 Calories, 21 g protein)    Low Calorie Frozen Meal:  Healthy Choice Power Bowls  Lean Cuisine  Smart Ones  Jasson Mcclendon        Follow up: 1 month    Time spent with patient: 22 minutes.  Kayla Mcdonnell, RD, LD

## 2022-06-13 ENCOUNTER — VIRTUAL VISIT (OUTPATIENT)
Dept: ENDOCRINOLOGY | Facility: CLINIC | Age: 47
End: 2022-06-13
Payer: COMMERCIAL

## 2022-06-13 DIAGNOSIS — E66.9 OBESITY: ICD-10-CM

## 2022-06-13 DIAGNOSIS — Z71.3 NUTRITIONAL COUNSELING: Primary | ICD-10-CM

## 2022-06-13 DIAGNOSIS — E11.65 TYPE 2 DIABETES MELLITUS WITH HYPERGLYCEMIA, WITHOUT LONG-TERM CURRENT USE OF INSULIN (H): ICD-10-CM

## 2022-06-13 PROCEDURE — 97803 MED NUTRITION INDIV SUBSEQ: CPT | Mod: 95 | Performed by: DIETITIAN, REGISTERED

## 2022-06-13 NOTE — PATIENT INSTRUCTIONS
Brian Almanza,    Follow-up with RD on 7/14 at 8 am    Thank you,    Kayla Mcdonnell, RD, LD  If you would like to schedule or reschedule an appointment with the RD, please call [phone]    Nutrition Goals  1) Start Modified Liquid Diet:   - Breakfast: Protein bar/shake   - Lunch: 3-5 oz lean protein + non-starchy vegetables (Or low-calorie frozen meal)   - Dinner: Protein bar/shake  2) Calorie/sugar-free beverages only for hydration  3) Set up bariatric psych evaluation    Protein Ideas:  Left-over meat  Hard boiled egg  Part-skim cheese stick  Protein shake/bar  100 calorie pack of nuts  Tuna/chicken salad     Protein Sources for Weight Loss  [email or web address]     Diet Guidelines after Weight Loss Surgery  [email or web address]     Meal Replacement Shake Options:   *Protein Shake Criteria: no more than 210 Calories, at least 20 grams of protein, and less than 10 grams of sugar   [facility] smoothie (160 Calories, 20 g protein)   Premier Protein (160 Calories, 30 g protein)  Slim Fast Advanced Nutrition (180 Calories, 20 g protein)  Muscle Milk, lactose-free, 17 oz bottle (210 Calories, 30 g protein)  Integrated Supplements, no artificial sugars (110 Calories, 20 g protein)  Genepro, unflavored protein powder (60 Calories, 30 g protein)  Boost/Ensure Max (160 calories, 30 gm protein)   [employer] Core Power (170 calories, 26 gm protein)  Aldi's Elevation Protein Powder (180 calories, 30 gm protein)     Meal Replacement Bar Options:  [facility] Protein Shake (160 Calories, 15 g protein)  Quest Protein Bars (190 Calories, 20 g protein)  Built Bar (170 Calories, 15-20 g protein)  One Protein Bar (210 calories, 20 g protein)  [place] Signature Protein Bar (Costco) (190 Calories, 21 g protein)  Pure Protein Bars (180 Calories, 21 g protein)    Low Calorie Frozen Meal:  Healthy Choice Power Bowls  Lean Cuisine  Smart Ones  Jasson Mcclendon    Interested in working with a health ?  Health coaches  work with you to improve your overall health and wellbeing.  They look at the whole person, and may involve discussion of different areas of life, including, but not limited to the four pillars of health (sleep, exercise, nutrition, and stress management). Discuss with your care team if you would like to start working a health .    Health Coaching-3 Pack:    $99 for three health coaching visits    Visits may be done in person or via phone    Coaching is a partnership between the  and the client; Coaches do not prescribe or diagnose    Coaching helps inspire the client to reach his/her personal goals      COMPREHENSIVE WEIGHT MANAGEMENT PROGRAM  VIRTUAL SUPPORT GROUPS    For Support Group Information:      We offer support groups for patients who are working on weight loss and considering, preparing for or have had weight loss surgery.   There is no cost for this opportunity.  You are invited to attend the?Virtual Support Groups?provided by any of the following locations:    [facility] via Microsoft Teams with Alycia Oleary RN  2.   [place] via [employer] with Abe Lopez, PhD, LP  3.   [place] via [employer] with Debi Carmona RN  4.   [facility] via Microsoft Teams with Debi Pedro [facility]    The following Support Group information can also be found on our website:  [email or web address]      [facility] Weight Loss Surgery Support Group    [facility] Weight Loss Surgery Support Group  The support group is a patient-lead forum that meets monthly to share experiences, encouragement and education. It is open to those who have had weight loss surgery, are scheduled for surgery, and those who are considering surgery.   WHEN: This group meets on the 3rd Wednesday of each month from 5:00PM - 6:00PM virtually using Microsoft Teams.   FACILITATOR: Led by Alycia Oleary, RD, LD, RN, the program's Clinical  "Coordinator.   TO REGISTER: Please contact the clinic via [employer] or call the nurse line directly at [phone] to inform our staff that you would like an invite sent to you and to let us know the email you would like the invite sent to. Prior to the meeting, a link with directions on how to join the meeting will be sent to you.    2022 Meetings  January 19: \"Let's Talk\" a time for the group to share.  February 16: \"Let's Talk\" a time for the group to share.  March 16: Guest Speakers: Psychologists, Elisha Gatica, PhD,LP and Sabrina Lemons PsyD,LP  April 20: Guest Speaker: Health , Ann Kaye, [facility],CHES, [facility]  May 18: Guest Speaker: DietitianWalter, RD, LP  [place] 15: \"Let's Talk\" a time for the group to share.  July 20: \"Let's Talk\" a time for the group to share.  August 17: TBA  September 21: TBA  October 19: Guest Speaker: Dr Alessio Salas MD Pulmonologist and Sleep Medicine Physician, \"Getting a Good Night's Sleep\".  November 16: TBA  December 21: TBA    [facility] Support Groups    Connections: Bariatric Care Support Group?  This is open to all [facility] (and those external to this program) pre- and post- operative bariatric surgery patients as well as their support system.   WHEN: This group meets the 2nd Tuesday of each month from 6:30 PM - 8:00 PM virtually using Microsoft Teams.   FACILITATOR: Led by Abe Lopez, Ph.D who is a Licensed Psychologist with the [facility] Comprehensive Weight Management Program.   TO REGISTER: Please send an email to Abe Lopez, Ph.D., LP at?[email or web address]?if you would like an invitation to the group and to learn about using Microsoft Teams.    2022 Meetings  January 11: Mayra Frank, PharmD, Pharmacy Resident at [facility], \"Medications and Bariatric Surgery\".  February 8: Open Forum  March 8  April 12  May 10  [place] 14    Connections: Post-Operative Bariatric Surgery Support " Group  This is a support group for [facility] bariatric patients (and those external to [facility]) who have had bariatric surgery and are at least 3 months post-surgery.  WHEN: This support group meets the 4th Wednesday of the month from 11:00 AM - 12:00 PM virtually using Microsoft Teams.   FACILITATOR: Led by Certified Bariatric Nurse, Debi Carmona RN.   TO REGISTER: Please send an email to Debi at [email or web address] if you would like an invitation to the group and to learn about using Microsoft Teams.    2022 Meetings  January 26  February 23  March 23  April 27  May 25  [place] 22    [facility] Healthy Lifestyle Virtual Support Group    Healthy Lifestyle Virtual Support Group?  This is 60 minutes of small group guided discussion, support and resources. All are welcome who want a healthy lifestyle.  WHEN: This group meets monthly on a Friday from 12:30 PM - 1:30 PM virtually using Microsoft Teams.   FACILITATOR: Led by National Board Certified Health and , Debi Pedro [facility].   TO REGISTER: Please send an email to Debi [email or web address] to receive monthly invites to the group or if you have any questions about having a health .  Prior to the meeting, a link with directions on how to join the meeting will be sent to you.    2022 Meetings  MAY 20: OPEN FORUM  JUNE: 24:  Setting Limits and BoundariesDebi  July 29: OPEN FORUM  August 26: Special Guest Registered Dietician Taylor Rubi  Sept 30: OPEN FORUM  Oct 28, GraMontse Fowler National Board Certified Health   Nov 18: Navigating How to Eat Around the Holidays with JUWAN Bradley  Dec 16: Changing your relationship with movement with  Ann National Board Certified Health

## 2022-06-13 NOTE — LETTER
"6/13/2022       RE: Archie Wallace  [address]  [address]  [place] [address]     Dear Colleague,    Thank you for referring your patient, Archie Wallace, to the [facility]NAGEMENT [facility] [place] at [facility]. Please see a copy of my visit note below.    Archie Wallace is a 46 year old male who is being evaluated via a billable video visit.      The patient has been notified of following:     \"This video visit will be conducted via a call between you and your physician/provider. We have found that certain health care needs can be provided without the need for an in-person physical exam.  This service lets us provide the care you need with a video conversation.  If a prescription is necessary we can send it directly to your pharmacy.  If lab work is needed we can place an order for that and you can then stop by our lab to have the test done at a later time.    Video visits are billed at different rates depending on your insurance coverage.  Please reach out to your insurance provider with any questions.    If during the course of the call the physician/provider feels a video visit is not appropriate, you will not be charged for this service.\"    Patient has given verbal consent for Video visit? Yes  How would you like to obtain your AVS? MyChart  Will anyone else be joining your video visit? No  {If patient encounters technical issues they should call [phone]      Video-Visit Details    Type of service:  Video Visit    Video Start Time: 7:07 AM  Video End Time: 7:29 AM    Originating Location (pt. Location): Home    Distant Location (provider location):  [facility]NA[facility] [place]     Platform used for Video Visit: SYLLETA    During this virtual visit the patient is located in [place], patient verifies this as the location during the entirety of this visit.       Bariatric Nutrition Consultation Note    Reason For " "Visit: Nutrition Assessment    Archie Wallace is a 46 year old presenting today for return bariatric nutrition consult.  Pt is interested in laparoscopic sleeve gastrectomy with Dr. Mata, expected surgery in D.  Patient is accompanied by self.  Patient has completed 6 minimum required nutrition visits prior to surgery. Recommending monthly follow-up until surgery.     Pt referred by RAE Alejandro on September 7, 2021.  Patient with Co-morbidities of obesity including:  Type II DM yes   Renal Failure no  Sleep apnea no  Hypertension yes  Dyslipidemia yes  Joint pain no  Back pain no  GERD  yes    Support System Reviewed With Patient 9/7/2021   Who do you have in your support network that can be available to help you for the first 2 weeks after surgery? girlfriend   Who can you count on for support throughout your weight loss surgery journey? girlfriend       ANTHROPOMETRICS:  Estimated body mass index is 46.78 kg/m  as calculated from the following:    Height as of 9/7/21: 1.81 m (5' 11.25\").    Weight as of 9/7/21: 153.2 kg (337 lb 12.8 oz).    Recent clinic weight:  Estimated body mass index is 48.68 kg/m  as calculated from the following:    Height as of 5/5/22: 1.803 m (5' 11\").    Weight as of 5/5/22: 158.3 kg (349 lb).     Current weight (pt reported at home): 332 (-10 lbs this month, -5 lbs from initial)      Required weight loss goal pre-op: -20 lbs from initial consult weight (goal weight 317 lbs or less before surgery)       9/7/2021   I have tried the following methods to lose weight Watching portions or calories, Exercise, Atkins type diet (low carb/high protein), Slimfast, OTC Medications       Weight Loss Questions Reviewed With Patient 9/7/2021   How long have you been overweight? Since late teens through early 20's       SUPPLEMENT INFORMATION:  None    Medications for Weight Loss:  Topiramate - Pt reports taking morning and night  Ozempic - started with PCP    NUTRITION HISTORY:  No known food " allergies. Lactose intolerant.    Already having to chew to fine texture after thyroid surgery.     Doesn't eat if not hungry, leads to skipping daytime meals. Sometimes eating out of boredom. Waking in the night every 2-3 hours, will need something sweet (candy, [employer]al[employer]'s cakes) to fall back asleep. This is likely contributing to daytime fullness.     Over past several months, he has been focusing on eating smaller portions and using leaner cooking methods at dinner. Working on not snacking when he wakes at night. Is able to avoid some nights.     Intermittently replacing meals with Slimfast Keto Shake or Shakeology shake.     April 2022 - Gained 5lbs this past month. He has recently tried to cut out third meal meal. Trying to avoid waking to snack, however continues to have a high-carb snack overnight 3 time per week.  He notes some knee pain that has reduced his physical activity as well.     Bar labs completed 4/15/22 - Hgb A1c up to 7.2 (6.6 last check 10 mo ago), LDL cholesterol up, Vitamin D 15 (Low), fasting . NP ordered once weekly high-dose vitamin D x 8wks, pt has yet to start.     May 2022 - Has not been checking BG when home. Over past month, was doing OCTAVIANO 9am-6pm window and lost 6 lbs per his home scale. Going to sleep around 8 pm to help avoid eating. States the only times he ate outside of this window was when he forgot to eat and then needed to eat later.     Today - No longer doing OCTAVIANO. Has noticed little appetite once starting Ozempic with PCP. Back to eating just one meal per day.     Recent Diet Recall:  Breakfast: skip  Lunch: skip  Dinner: burger  Snack: nuts (1.5 handful)    Beverages: Water, regular gatorade (64 oz/day), sugar-free gatorade, calorie free tea    Progress Towards Previous Goals:  1) Set an alert/reminder on your phone to eat meals between 9 am - 6pm - Not met  2) Bring a protein bar with you when out - Not met  3) Avoid snacking after waking. Avoid going in to the  kitchen - Continues  4) Eliminate calorie-containing beverages  - try adding lemon/lime slices to water or sugar-free gatorade. - Improving, has switched regular Gatorade to sugar-free  5) Obtain letter of support from your primary care doctor (Provider can submit through electronic medical record or fax to [phone]) - Met      Eating Habits 9/7/2021   Do you have any dietary restrictions? Yes   Do you currently binge eat (eat a large amount of food in a short time)? Yes   Are you an emotional eater? Yes   Do you get up to eat after falling asleep? Yes   What foods do you crave? nothing       ADDITIONAL INFORMATION:  Had spine surgery and half thyroid removed in July 2021.    Has not been to the gym as much lately d/t pain, getting cortisone injections.    Dining Out History Reviewed With Patient 9/7/2021   How often do you dine out? A couple of times a week.   Where do you dine out? (select all that apply) fast food chains   What types of food do you order when you dine out? jass       Physical Activity Reviewed With Patient 9/7/2021   How often do you exercise? Never   What keeps you from being more active?  I am as active as I can possbily be, I have just had surgery on one or more of my joints, My ability to walk or move around is limited         NUTRITION DIAGNOSIS:  Obesity r/t long history of self-monitoring deficit and excessive energy intake aeb BMI >30 kg/m2. - continues    INTERVENTION:  Intervention Provided/Education Provided:  Reviewed post-op diet guidelines, ways to help prepare for post-op diet guidelines pre-operatively, portion/calorie-control, mindful eating and sources of protein.  Reviewed progress towards previous goals.  Instructed pt to follow the Modified Liquid Diet for pre-op weight loss.  Reviewed surgery task list.  Patient demonstrates understanding.  AVS vis MyChart     Questions Reviewed With Patient 9/7/2021   How ready are you to make changes regarding your weight? Number 1  = Not ready at all to make changes up to 10 = very ready. 10   How confident are you that you can change? 1 = Not confident that you will be successful making changes up to 10 = very confident. 10       Expected Engagement: good    GOALS:  1) Start Modified Liquid Diet:   - Breakfast: Protein bar/shake   - Lunch: 3-5 oz lean protein + non-starchy vegetables (Or low-calorie frozen meal)   - Dinner: Protein bar/shake  2) Calorie/sugar-free beverages only for hydration  3) Set up bariatric psych evaluation    Protein Ideas:  Left-over meat  Hard boiled egg  Part-skim cheese stick  Protein shake/bar  100 calorie pack of nuts  Tuna/chicken salad     Protein Sources for Weight Loss  [email or web address]     Diet Guidelines after Weight Loss Surgery  [email or web address]     Meal Replacement Shake Options:   *Protein Shake Criteria: no more than 210 Calories, at least 20 grams of protein, and less than 10 grams of sugar   [facility] smoothie (160 Calories, 20 g protein)   Premier Protein (160 Calories, 30 g protein)  Slim Fast Advanced Nutrition (180 Calories, 20 g protein)  Muscle Milk, lactose-free, 17 oz bottle (210 Calories, 30 g protein)  Integrated Supplements, no artificial sugars (110 Calories, 20 g protein)  Genepro, unflavored protein powder (60 Calories, 30 g protein)  Boost/Ensure Max (160 calories, 30 gm protein)   [employer] Core Power (170 calories, 26 gm protein)  Aldi's Elevation Protein Powder (180 calories, 30 gm protein)     Meal Replacement Bar Options:  [facility] Protein Shake (160 Calories, 15 g protein)  Quest Protein Bars (190 Calories, 20 g protein)  Built Bar (170 Calories, 15-20 g protein)  One Protein Bar (210 calories, 20 g protein)  [place] Signature Protein Bar ([employer]) (190 Calories, 21 g protein)  Pure Protein Bars (180 Calories, 21 g protein)    Low Calorie Frozen Meal:  Healthy Choice Power Bowls  Lean Cuisine  Smart Ones  Jasson Mcclendon        Follow up: 1  month    Time spent with patient: 22 minutes.  Kayla Mcdonnell RD, LD

## 2022-06-28 ENCOUNTER — RADIOLOGY INJECTION OFFICE VISIT (OUTPATIENT)
Dept: PALLIATIVE MEDICINE | Facility: CLINIC | Age: 47
End: 2022-06-28
Attending: NURSE PRACTITIONER
Payer: COMMERCIAL

## 2022-06-28 VITALS — SYSTOLIC BLOOD PRESSURE: 180 MMHG | HEART RATE: 80 BPM | DIASTOLIC BLOOD PRESSURE: 112 MMHG

## 2022-06-28 DIAGNOSIS — M54.12 CERVICAL RADICULOPATHY: ICD-10-CM

## 2022-06-28 DIAGNOSIS — Z98.1 S/P CERVICAL SPINAL FUSION: ICD-10-CM

## 2022-06-28 PROCEDURE — 62321 NJX INTERLAMINAR CRV/THRC: CPT | Performed by: PAIN MEDICINE

## 2022-06-28 RX ORDER — DEXAMETHASONE SODIUM PHOSPHATE 10 MG/ML
10 INJECTION, SOLUTION INTRAMUSCULAR; INTRAVENOUS ONCE
Status: COMPLETED | OUTPATIENT
Start: 2022-06-28 | End: 2022-06-28

## 2022-06-28 RX ADMIN — DEXAMETHASONE SODIUM PHOSPHATE 10 MG: 10 INJECTION, SOLUTION INTRAMUSCULAR; INTRAVENOUS at 11:55

## 2022-06-28 ASSESSMENT — PAIN SCALES - GENERAL
PAINLEVEL: EXTREME PAIN (8)
PAINLEVEL: EXTREME PAIN (8)

## 2022-06-28 NOTE — PROGRESS NOTES
[facility] - Procedure Note    Date of Visit: 6/28/2022    Procedure performed: C7-T1 interlaminar epidural steroid injection with fluoroscopic guidance  Diagnosis: Cervical radiculitis/radiculopathy  : Moshe Hutchins MD  Anesthesia: none    Indications: Archie Wallace is a 46 year old male who is seen  for cervical epidural steroid injection. The patient describes bilateral neck pain radiating to his ue. The patient has been exhibiting symptoms consistent with cervical intraspinal inflammation and radiculopathy. Symptoms have been persistent, disabling, and intermittently severe. The patient reports minimal improvement with conservative treatment, including meds/pt/prior fusion    Cervical MRI reviewed    Allergies:      Allergies   Allergen Reactions     Oseltamivir Anaphylaxis and Rash     Biaxin [Clarithromycin]      Muscle breakdown     Bromaxefed Dm Rf Cough     Clarithromycin Other (See Comments)     Weakness     Robafen Dm Cgh-Chest [Robitussin Cough-Chest Dm] Difficulty breathing     Guaifenesin Rash     Metformin Rash     Reported breaking out with a skin reaction  Reported breaking out with a skin reaction  Reported breaking out with a skin reaction        Vitals:  BP (!) 180/112   Pulse 80     Review of Systems: The patient denies recent fever, chills, illness, use of antibiotics or anticoagulants. All other 10-point review of systems negative.     Procedure: The procedure and risks were explained, and informed written consent was obtained from the patient. Risks include but are not limited to: infection, bleeding, increased pain, and damage to soft tissue, nerve, muscle, and vasculature structures. After getting informed consent, patient was brought into the procedure suite and was placed in a prone position on the procedure table. A Pause for the Cause was performed. Patient was prepped and draped in sterile fashion.     The C7-T1 interspace was identified with use of  fluoroscopy in AP view. A 25-gauge, 1.5 inch needle was used to anesthetize the skin and subcutaneous tissue entry site with a total of 2 ml of 1% lidocaine. Under fluoroscopic visualization, a 22-gauge, 3.5 inch Tuohy epidural needle was slowly advanced towards the epidural space a few millimeters midline of midline. The latter part of the needle advancement was guided with fluoroscopy in the lateral view. The epidural space was identified using loss of resistance technique. After negative aspiration for heme and cerebrospinal fluid, a total of 1 mL of Omnipaque was injected to confirm needle placement. 9 mL of contrast was wasted. Epidurogram confirmed spread within the posterior epidural space. 2 ml of  NS,1 ml 10mg/ml of dexamethasone, and 1 ml of preservative free 0.25% bupivacaine was injected. The needle was removed.  Images were saved to PACS.    The patient tolerated the procedure well, and there was no evidence of procedural complications. No new sensory or motor deficits were noted following the procedure. The patient was stable and able to ambulate on discharge home. Post-procedure instructions were provided.     Pre-procedure pain score: 8/10 in the neck, 8/10 in the arm  Post-procedure pain score: 8/10 in the neck, 8/10 in the arm    Assessment/Plan: Archie Wallace is a 46 year old male s/p cervical interlaminar epidural steroid injection today for cervical spondylosis and radiculitis/radiculopathy.     Following today's procedure, the patient was advised to contact the [facility] for any of the following:   Fever, chills, or night sweats   New onset of pain, numbness, or weakness   Any questions/concerns regarding the procedure  If unable to contact the Pain Center, the patient was instructed to go to a local Emergency Room for any complications.     Follow-up with provider  in 2 weeks for post-procedure evaluation.    Moshe Hutchins MD   Pain Management    [facility]

## 2022-06-28 NOTE — NURSING NOTE
Discharge Information    IV Discontiued Time:  NA    Amount of Fluid Infused:  NA    Discharge Criteria = When patient returns to baseline or as per MD order    Consciousness:  Pt is fully awake    Circulation:  BP +/- 20% of pre-procedure level   Of note pre-procedure /132     Respiration:  Patient is able to breathe deeply    O2 Sat:  Patient is able to maintain O2 Sat >92% on room air    Activity:  Moves 4 extremities on command    Ambulation:  Patient is able to stand and walk or stand and pivot into wheelchair    Dressing:  Clean/dry or No Dressing    Notes:   Discharge instructions and AVS given to patient    Patient meets criteria for discharge?  YES    Admitted to PCU?  No    Responsible adult present to accompany patient home?  Yes    Signature/Title:    Juana Forde RN  RN Care Coordinator  [facility]

## 2022-06-28 NOTE — PATIENT INSTRUCTIONS
[facility]   Procedure Discharge Instructions    Today you saw:  Dr. Moshe Hutchins    You had an:  Epidural steroid injection       Medications used:  Lidocaine   Bupivacaine   Dexamethasone Omnipaque  Normal saline        Be cautious when walking. Numbness and/or weakness in the upper extremities may occur for up to 6-8 hours after the procedure due to effect of the local anesthetic  Do not drive for 6 hours. The effect of the local anesthetic could slow your reflexes.   You may resume your regular activities after 24 hours  Avoid strenuous activity for the first 24 hours  You may shower, however avoid swimming, tub baths or hot tubs for 24 hours following your procedure  You may have a mild to moderate increase in pain for several days following the injection.  It may take up to 14 days for the steroid medication to start working although you may feel the effect as early as a few days after the procedure.     You may use ice packs for 10-15 minutes, 3 to 4 times a day at the injection site for comfort  Do not use heat to painful areas for 6 to 8 hours. This will give the local anesthetic time to wear off and prevent you from accidentally burning your skin.   Unless you have been directed to avoid the use of anti-inflammatory medications (NSAIDS), you may use medications such as ibuprofen, Aleve or Tylenol for pain control if needed.   If you were fasting, you may resume your normal diet and medications after the procedure  If you have diabetes, check your blood sugar more frequently than usual as your blood sugar may be higher than normal for 10-14 days following a steroid injection. Contact your doctor who manages your diabetes if your blood sugar is higher than usual  Possible side effects of steroids that you may experience include flushing, elevated blood pressure, increased appetite, mild headaches and restlessness.  All of these symptoms will get better with time.  If you  experience any of the following, call the Pain Clinic during work hours (Mon-Friday 8-4:30 pm) at [phone] or the Provider Line after hours at [phone]:  -Fever over 100 degree F  -Swelling, bleeding, redness, drainage, warmth at the injection site  -Progressive weakness or numbness in your arms  -Loss of bowel or bladder function  -Unusual headache that is not relieved by Tylenol or other pain reliever  -Unusual new onset of pain that is not improving

## 2022-06-28 NOTE — NURSING NOTE
Pre-procedure Intake  If YES to any questions or NO to having a   Please complete laminated checklist and leave on the computer keyboard for Provider, verbally inform provider if able.    For SCS Trial, RFA's or any sedation procedure:  Have you been fasting? NA    If yes, for how long? NA    Are you taking any any blood thinners such as Coumadin, Warfarin, Jantoven, Pradaxa Xarelto, Eliquis, Edoxaban, Enoxaparin, Lovenox, Heparin, Arixtra, Fondaparinux, or Fragmin? OR Antiplatelet medication such as Plavix, Brilinta, or Effient?   No     If yes, when did you take your last dose? NA    Do you take aspirin?  Yes -   ASA    If cervical procedure, have you held aspirin for 6 days?   Yes    Do you have any allergies to contrast dye, iodine, steroid and/or numbing medications?  NO    Are you currently taking antibiotics or have an active infection?  NO    Have you had a fever/elevated temperature within the past week? NO    Are you currently taking oral steroids? NO    Do you have a ? Yes    Are you pregnant or breastfeeding?  Not Applicable    Have you received the COVID-19 vaccine? Yes    If yes, was it your 1st, 2nd or only dose needed? 2nd    Date of most recent vaccine: 10/20/21    Notify provider and RNs if systolic BP >170, diastolic BP >100, P >100 or O2 sats < 90%      Katherine Tucker CMA (AAMA)

## 2022-07-05 ENCOUNTER — TELEPHONE (OUTPATIENT)
Dept: FAMILY MEDICINE | Facility: CLINIC | Age: 47
End: 2022-07-05

## 2022-07-05 DIAGNOSIS — E11.65 TYPE 2 DIABETES MELLITUS WITH HYPERGLYCEMIA, WITHOUT LONG-TERM CURRENT USE OF INSULIN (H): ICD-10-CM

## 2022-07-05 NOTE — TELEPHONE ENCOUNTER
Prior Authorization Approval    Authorization Effective Date: 4/6/2022  Authorization Expiration Date: 7/5/2023  Medication: exenatide ER (BYDUREON BCISE) 2 MG/0.85ML auto-injector - APPROVED  Insurance Company: [employer] [place] - Phone [phone] Fax [phone]  Which Pharmacy is filling the prescription (Not needed for infusion/clinic administered): [id number] [address] [facility]   Pharmacy Notified: Yes  Patient Notified: Yes (pharmacy will notify patient when ready)

## 2022-07-05 NOTE — TELEPHONE ENCOUNTER
Prior Authorization Retail Medication Request    Medication/Dose: bydureon  ICD code (if different than what is on RX):    Previously Tried and Failed:    Rationale:      Insurance Name:  Blue plus  Insurance ID:  [id number]       Pharmacy Information (if different than what is on RX)  [facility]  Phone:  [phone]

## 2022-07-05 NOTE — TELEPHONE ENCOUNTER
Central Prior Authorization Team   Phone: [phone]      PA Initiation    Medication: exenatide ER (BYDUREON BCISE) 2 MG/0.85ML auto-injector - INITIATED  Insurance Company: [employer]ANG[employer] [place] - Phone [phone] Fax [phone]  Pharmacy Filling the Rx: [id number] - [address]  Filling Pharmacy Phone: [phone]  Filling Pharmacy Fax:    Start Date: 7/5/2022

## 2022-07-12 NOTE — PROGRESS NOTES
"Archie Wallace is a 46 year old male who is being evaluated via a billable video visit.      The patient has been notified of following:     \"This video visit will be conducted via a call between you and your physician/provider. We have found that certain health care needs can be provided without the need for an in-person physical exam.  This service lets us provide the care you need with a video conversation.  If a prescription is necessary we can send it directly to your pharmacy.  If lab work is needed we can place an order for that and you can then stop by our lab to have the test done at a later time.    Video visits are billed at different rates depending on your insurance coverage.  Please reach out to your insurance provider with any questions.    If during the course of the call the physician/provider feels a video visit is not appropriate, you will not be charged for this service.\"    Patient has given verbal consent for Video visit? Yes  How would you like to obtain your AVS? MyChart  Will anyone else be joining your video visit? No  {If patient encounters technical issues they should call [phone]      Video-Visit Details    Type of service:  Video Visit    Video Start Time: 8:08 AM  Video End Time: 8:23 AM    Originating Location (pt. Location): Home    Distant Location (provider location):  [facility] [place]     Platform used for Video Visit: [employer]    During this virtual visit the patient is located in [place], patient verifies this as the location during the entirety of this visit.       Bariatric Nutrition Consultation Note    Reason For Visit: Nutrition Assessment    Archie Wallace is a 46 year old presenting today for return bariatric nutrition consult.  Pt is interested in laparoscopic sleeve gastrectomy with Dr. Mata, expected surgery in TBD.  Patient is accompanied by self.  Patient has completed minimum required nutrition visits prior to surgery. Recommending " "monthly follow-up until surgery.     Pt referred by RAE Alejandro on September 7, 2021.  Patient with Co-morbidities of obesity including:  Type II DM yes   Renal Failure no  Sleep apnea no  Hypertension yes  Dyslipidemia yes  Joint pain no  Back pain no  GERD  yes    Support System Reviewed With Patient 9/7/2021   Who do you have in your support network that can be available to help you for the first 2 weeks after surgery? girlfriend   Who can you count on for support throughout your weight loss surgery journey? girlfriend       ANTHROPOMETRICS:  Estimated body mass index is 46.78 kg/m  as calculated from the following:    Height as of 9/7/21: 1.81 m (5' 11.25\").    Weight as of 9/7/21: 153.2 kg (337 lb 12.8 oz).    Current weight (pt reported at home scale): 352 lbs (+20 lbs this month,   +15 lbs from initial)      Required weight loss goal pre-op: -20 lbs from initial consult weight (goal weight 317 lbs or less before surgery)       9/7/2021   I have tried the following methods to lose weight Watching portions or calories, Exercise, Atkins type diet (low carb/high protein), Slimfast, OTC Medications       Weight Loss Questions Reviewed With Patient 9/7/2021   How long have you been overweight? Since late teens through early 20's       SUPPLEMENT INFORMATION:  None    Has not started high-dose vitamin D supplementation for repletion yet. States whenever he goes to pharmacy to , they do not have the order. Re-ordered supplement for pt 7/14/22.    Medications for Weight Loss:  Topiramate - Pt reports taking morning and night  Ozempic - started with PCP, however states he is now switching to a different medication (pt unsure which med)    NUTRITION HISTORY:  No known food allergies. Lactose intolerant.    Already having to chew to fine texture after thyroid surgery.     Doesn't eat if not hungry, leads to skipping daytime meals. Sometimes eating out of boredom. Waking in the night every 2-3 hours, will " need something sweet (candy, Lil Odalys's cakes) to fall back asleep. This is likely contributing to daytime fullness.     Over past several months, he has been focusing on eating smaller portions and using leaner cooking methods at dinner. Working on not snacking when he wakes at night. Is able to avoid some nights.     Intermittently replacing meals with Slimfast Keto Shake or Shakeology shake.     April 2022 - Gained 5lbs this past month. He has recently tried to cut out third meal meal. Trying to avoid waking to snack, however continues to have a high-carb snack overnight 3 time per week.  He notes some knee pain that has reduced his physical activity as well.     [facility] labs completed 4/15/22 - Hgb A1c up to 7.2 (6.6 last check 10 mo ago), LDL cholesterol up, Vitamin D 15 (Low), fasting . NP ordered once weekly high-dose vitamin D x 8wks, pt has yet to start.     May 2022 - Has not been checking BG when home. Over past month, was doing OCTAVIANO 9am-6pm window and lost 6 lbs per his home scale. Going to sleep around 8 pm to help avoid eating. States the only times he ate outside of this window was when he forgot to eat and then needed to eat later.     June 2022 - No longer doing OCTAVIANO. Has noticed little appetite once starting Ozempic with PCP. Back to eating just one meal per day.     Today - Appears weight is up 20 lbs this month, pt states he is unsure why. Continues Ozempic, states he missed one dose last week ,PCP switching to something else in . States has been eating 4 watermelon per week. Typically having 1-2 meals otherwise. He plans to reach out to his thyroid MD to notify of drastic wt gain this month.     Recent Diet Recall (yesterday):  Breakfast: skip  Lunch: 1 pm - bowl of cereal (Frosted Flakes)  Dinner: 7 pm - salad and shrimp pasta   Beverages: Water, regular gatorade and gatorade zero (64 oz/day), and Body Water      Progress Towards Previous Goals:  1) Start Modified Liquid Diet: - Not met, was  replacing meals with watermelon instead   - Breakfast: Protein bar/shake   - Lunch: 3-5 oz lean protein + non-starchy vegetables (Or low-calorie frozen meal)   - Dinner: Protein bar/shake  2) Calorie/sugar-free beverages only for hydration - Improving, has not totally eliminated regular Gatorade but is using sugar-free more often   3) Set up bariatric psych evaluation - Not met      Eating Habits 9/7/2021   Do you have any dietary restrictions? Yes   Do you currently binge eat (eat a large amount of food in a short time)? Yes   Are you an emotional eater? Yes   Do you get up to eat after falling asleep? Yes   What foods do you crave? nothing       ADDITIONAL INFORMATION:  Had spine surgery and half thyroid removed in July 2021.    Has not been to the gym as much lately d/t pain, getting cortisone injections.    Dining Out History Reviewed With Patient 9/7/2021   How often do you dine out? A couple of times a week.   Where do you dine out? (select all that apply) fast food chains   What types of food do you order when you dine out? jass       Physical Activity Reviewed With Patient 9/7/2021   How often do you exercise? Never   What keeps you from being more active?  I am as active as I can possbily be, I have just had surgery on one or more of my joints, My ability to walk or move around is limited         NUTRITION DIAGNOSIS:  Obesity r/t long history of self-monitoring deficit and excessive energy intake aeb BMI >30 kg/m2. - continues    INTERVENTION:  Intervention Provided/Education Provided:  Reviewed post-op diet guidelines, ways to help prepare for post-op diet guidelines pre-operatively, portion/calorie-control, mindful eating and sources of protein.  Reviewed progress towards previous goals.  Instructed pt to follow the Modified Liquid Diet for pre-op weight loss. Encouraged he replace a daytime meal with low-calorie protein shake. Discussed high-carb/sugar content of the portions of watermelon he has been  consuming. Encouraged limit of one watermelon per week.   Reviewed surgery task list.  Re-ordered vitamin D supplement for repletion   Patient demonstrates understanding.  AVS vis My[employer]     Questions Reviewed With Patient 9/7/2021   How ready are you to make changes regarding your weight? Number 1 = Not ready at all to make changes up to 10 = very ready. 10   How confident are you that you can change? 1 = Not confident that you will be successful making changes up to 10 = very confident. 10       Expected Engagement: good    GOALS:  1) Start Modified Liquid Diet:   - Breakfast: Protein bar/shake   - Lunch: 3-5 oz lean protein + non-starchy vegetables (Or low-calorie frozen meal)   - Dinner: Protein bar/shake  2) Calorie/sugar-free beverages only for hydration  3) Set up bariatric psych evaluation  4) Start vitamin D supplement (once weekly for 8 weeks)    Protein Ideas:  Left-over meat  Hard boiled egg  Part-skim cheese stick  Protein shake/bar  100 calorie pack of nuts  Tuna/chicken salad     Protein Sources for Weight Loss  [email or web address]     Diet Guidelines after Weight Loss Surgery  [email or web address]     Meal Replacement Shake Options:   *Protein Shake Criteria: no more than 210 Calories, at least 20 grams of protein, and less than 10 grams of sugar    [facility] smoothie (160 Calories, 20 g protein)   Premier Protein (160 Calories, 30 g protein)  Slim Fast Advanced Nutrition (180 Calories, 20 g protein)  Muscle Milk, lactose-free, 17 oz bottle (210 Calories, 30 g protein)  Integrated Supplements, no artificial sugars (110 Calories, 20 g protein)  Genepro, unflavored protein powder (60 Calories, 30 g protein)  Boost/Ensure Max (160 calories, 30 gm protein)   [employer] Core Power (170 calories, 26 gm protein)  Aldi's Elevation Protein Powder (180 calories, 30 gm protein)     Meal Replacement Bar Options:   [facility] Protein Shake (160 Calories, 15 g protein)  Quest Protein Bars (190  Calories, 20 g protein)  Built Bar (170 Calories, 15-20 g protein)  One Protein Bar (210 calories, 20 g protein)  [place] Signature Protein Bar (Costco) (190 Calories, 21 g protein)  Pure Protein Bars (180 Calories, 21 g protein)    Low Calorie Frozen Meal:  Healthy Choice Power Bowls  Lean Cuisine  Smart Ones  Jasson Mcclendon        Follow up: 1 month    Time spent with patient: 15 minutes.  Kayla Mcdonnell RD, LD

## 2022-07-14 ENCOUNTER — VIRTUAL VISIT (OUTPATIENT)
Dept: ENDOCRINOLOGY | Facility: CLINIC | Age: 47
End: 2022-07-14
Payer: COMMERCIAL

## 2022-07-14 DIAGNOSIS — E66.9 OBESITY: ICD-10-CM

## 2022-07-14 DIAGNOSIS — E11.65 TYPE 2 DIABETES MELLITUS WITH HYPERGLYCEMIA, WITHOUT LONG-TERM CURRENT USE OF INSULIN (H): ICD-10-CM

## 2022-07-14 DIAGNOSIS — Z71.3 NUTRITIONAL COUNSELING: Primary | ICD-10-CM

## 2022-07-14 DIAGNOSIS — E55.9 VITAMIN D DEFICIENCY: ICD-10-CM

## 2022-07-14 PROCEDURE — 97803 MED NUTRITION INDIV SUBSEQ: CPT | Mod: 95 | Performed by: DIETITIAN, REGISTERED

## 2022-07-14 NOTE — PATIENT INSTRUCTIONS
Brian Almanza,     Follow-up with RD on 8/23    Thank you,    Kayla Mcdonnell, RD, LD  If you would like to schedule or reschedule an appointment with the RD, please call [phone]    Nutrition Goals  1) Start Modified Liquid Diet:   - Breakfast: Protein bar/shake   - Lunch: 3-5 oz lean protein + non-starchy vegetables (Or low-calorie frozen meal)   - Dinner: Protein bar/shake  2) Calorie/sugar-free beverages only for hydration  3) Set up bariatric psych evaluation  4) Start vitamin D supplement (once weekly for 8 weeks)    Protein Ideas:  Left-over meat  Hard boiled egg  Part-skim cheese stick  Protein shake/bar  100 calorie pack of nuts  Tuna/chicken salad     Protein Sources for Weight Loss  [email or web address]     Diet Guidelines after Weight Loss Surgery  [email or web address]     Meal Replacement Shake Options:   *Protein Shake Criteria: no more than 210 Calories, at least 20 grams of protein, and less than 10 grams of sugar   [facility] smoothie (160 Calories, 20 g protein)   Premier Protein (160 Calories, 30 g protein)  Slim Fast Advanced Nutrition (180 Calories, 20 g protein)  Muscle Milk, lactose-free, 17 oz bottle (210 Calories, 30 g protein)  Integrated Supplements, no artificial sugars (110 Calories, 20 g protein)  Genepro, unflavored protein powder (60 Calories, 30 g protein)  Boost/Ensure Max (160 calories, 30 gm protein)   [employer] Core Power (170 calories, 26 gm protein)  Aldi's Elevation Protein Powder (180 calories, 30 gm protein)     Meal Replacement Bar Options:  [facility] Protein Shake (160 Calories, 15 g protein)  Quest Protein Bars (190 Calories, 20 g protein)  Built Bar (170 Calories, 15-20 g protein)  One Protein Bar (210 calories, 20 g protein)  [place] Signature Protein Bar ([employer]) (190 Calories, 21 g protein)  Pure Protein Bars (180 Calories, 21 g protein)    Low Calorie Frozen Meal:  Healthy Choice Power Bowls  Lean Cuisine  Smart Ones  Jasson Dobbs  Hakan      Interested in working with a health ?  Health coaches work with you to improve your overall health and wellbeing.  They look at the whole person, and may involve discussion of different areas of life, including, but not limited to the four pillars of health (sleep, exercise, nutrition, and stress management). Discuss with your care team if you would like to start working a health .    Health Coaching-3 Pack:    $99 for three health coaching visits    Visits may be done in person or via phone    Coaching is a partnership between the  and the client; Coaches do not prescribe or diagnose    Coaching helps inspire the client to reach his/her personal goals      COMPREHENSIVE WEIGHT MANAGEMENT PROGRAM  VIRTUAL SUPPORT GROUPS    For Support Group Information:      We offer support groups for patients who are working on weight loss and considering, preparing for or have had weight loss surgery.   There is no cost for this opportunity.  You are invited to attend the?Virtual Support Groups?provided by any of the following locations:    [facility] via Microsoft Teams with Alycia Oleary RN  2.   [place] via [employer] with Abe Lopez, PhD, LP  3.   [place] via [employer]paa with Debi Carmona RN  4.   [facility] via Microsoft Teams with Debi Pedro [facility]    The following Support Group information can also be found on our website:  [email or web address]      [facility] Weight Loss Surgery Support Group    [facility] Weight Loss Surgery Support Group  The support group is a patient-lead forum that meets monthly to share experiences, encouragement and education. It is open to those who have had weight loss surgery, are scheduled for surgery, and those who are considering surgery.   WHEN: This group meets on the 3rd Wednesday of each month from 5:00PM - 6:00PM virtually using Microsoft Teams.  "  FACILITATOR: Led by Alycia Oleary, JUWAN, LD, RN, the program's Clinical Coordinator.   TO REGISTER: Please contact the clinic via [employer] or call the nurse line directly at [phone] to inform our staff that you would like an invite sent to you and to let us know the email you would like the invite sent to. Prior to the meeting, a link with directions on how to join the meeting will be sent to you.    2022 Meetings  January 19: \"Let's Talk\" a time for the group to share.  February 16: \"Let's Talk\" a time for the group to share.  March 16: Guest Speakers: Psychologists, Elisha Gatica, PhD,LP and Sabrina Lemons PsyD,  April 20: Guest Speaker: Health , Ann Kaye, [facility],CHES, [facility]  May 18: Guest Speaker: Dietitian, Walter Hatch, JUWAN, LP  [place] 15: \"Let's Talk\" a time for the group to share.  July 20: \"Let's Talk\" a time for the group to share.  August 17: TBA  September 21: TBA  October 19: Guest Speaker: Dr Alessio Salas MD Pulmonologist and Sleep Medicine Physician, \"Getting a Good Night's Sleep\".  November 16: TBA  December 21: TBA    [facility] Support Groups    Connections: Bariatric Care Support Group?  This is open to all [facility] (and those external to this program) pre- and post- operative bariatric surgery patients as well as their support system.   WHEN: This group meets the 2nd Tuesday of each month from 6:30 PM - 8:00 PM virtually using Microsoft Teams.   FACILITATOR: Led by Abe Lopez, Ph.D who is a Licensed Psychologist with the [facility] Comprehensive Weight Management Program.   TO REGISTER: Please send an email to Abe Lopez, Ph.D.,  at?[email or web address]?if you would like an invitation to the group and to learn about using Microsoft Teams.    2022 Meetings  January 11: Mayra Frank, PharmD, Pharmacy Resident at [facility], \"Medications and Bariatric Surgery\".  February 8: Open Forum  March 8  April 12  May 10  [place] " 14    Connections: Post-Operative Bariatric Surgery Support Group  This is a support group for [facility] bariatric patients (and those external to [facility]) who have had bariatric surgery and are at least 3 months post-surgery.  WHEN: This support group meets the 4th Wednesday of the month from 11:00 AM - 12:00 PM virtually using Microsoft Teams.   FACILITATOR: Led by Certified Bariatric Nurse, Debi Carmona RN.   TO REGISTER: Please send an email to Debi at [email or web address] if you would like an invitation to the group and to learn about using Microsoft Teams.    2022 Meetings  January 26  February 23  March 23  April 27  May 25  [place] 22    [facility] Healthy Lifestyle Virtual Support Group    Healthy Lifestyle Virtual Support Group?  This is 60 minutes of small group guided discussion, support and resources. All are welcome who want a healthy lifestyle.  WHEN: This group meets monthly on a Friday from 12:30 PM - 1:30 PM virtually using Microsoft Teams.   FACILITATOR: Led by National Board Certified Health and , Debi Pedro [facility].   TO REGISTER: Please send an email to [email or web address] to receive monthly invites to the group or if you have any questions about having a health .  Prior to the meeting, a link with directions on how to join the meeting will be sent to you.    2022 Meetings  MAY 20: OPEN FORUM  JUNE: 24:  Setting Limits and BoundariesDebi  July 29: OPEN FORUM  August 26: Special Guest Registered Dietician Taylor Rubi  Sept 30: OPEN FORUM  Oct 28, Montse Benitez National Board Certified Health   Nov 18: Navigating How to Eat Around the Holidays with JUWAN Bradley  Dec 16: Changing your relationship with movement with  Ann National Board Certified Health

## 2022-07-14 NOTE — LETTER
"7/14/2022       RE: Archie Wallace  [address]  [facility] [address]     Dear Colleague,    Thank you for referring your patient, Archie Wallace, to the [facility] [place] at [facility]. Please see a copy of my visit note below.    Archie Wallace is a 46 year old male who is being evaluated via a billable video visit.      The patient has been notified of following:     \"This video visit will be conducted via a call between you and your physician/provider. We have found that certain health care needs can be provided without the need for an in-person physical exam.  This service lets us provide the care you need with a video conversation.  If a prescription is necessary we can send it directly to your pharmacy.  If lab work is needed we can place an order for that and you can then stop by our lab to have the test done at a later time.    Video visits are billed at different rates depending on your insurance coverage.  Please reach out to your insurance provider with any questions.    If during the course of the call the physician/provider feels a video visit is not appropriate, you will not be charged for this service.\"    Patient has given verbal consent for Video visit? Yes  How would you like to obtain your AVS? MyChart  Will anyone else be joining your video visit? No  {If patient encounters technical issues they should call [phone]      Video-Visit Details    Type of service:  Video Visit    Video Start Time: 8:08 AM  Video End Time: 8:23 AM    Originating Location (pt. Location): Home    Distant Location (provider location):  [facility] [place]     Platform used for Video Visit: [employer]    During this virtual visit the patient is located in [place], patient verifies this as the location during the entirety of this visit.       Bariatric Nutrition Consultation Note    Reason For " "Visit: Nutrition Assessment    Archie Wallace is a 46 year old presenting today for return bariatric nutrition consult.  Pt is interested in laparoscopic sleeve gastrectomy with Dr. Mata, expected surgery in D.  Patient is accompanied by self.  Patient has completed minimum required nutrition visits prior to surgery. Recommending monthly follow-up until surgery.     Pt referred by RAE Alejandro on September 7, 2021.  Patient with Co-morbidities of obesity including:  Type II DM yes   Renal Failure no  Sleep apnea no  Hypertension yes  Dyslipidemia yes  Joint pain no  Back pain no  GERD  yes    Support System Reviewed With Patient 9/7/2021   Who do you have in your support network that can be available to help you for the first 2 weeks after surgery? girlfriend   Who can you count on for support throughout your weight loss surgery journey? girlfriend       ANTHROPOMETRICS:  Estimated body mass index is 46.78 kg/m  as calculated from the following:    Height as of 9/7/21: 1.81 m (5' 11.25\").    Weight as of 9/7/21: 153.2 kg (337 lb 12.8 oz).    Current weight (pt reported at home scale): 352 lbs (+20 lbs this month,   +15 lbs from initial)      Required weight loss goal pre-op: -20 lbs from initial consult weight (goal weight 317 lbs or less before surgery)       9/7/2021   I have tried the following methods to lose weight Watching portions or calories, Exercise, Atkins type diet (low carb/high protein), Slimfast, OTC Medications       Weight Loss Questions Reviewed With Patient 9/7/2021   How long have you been overweight? Since late teens through early 20's       SUPPLEMENT INFORMATION:  None    Has not started high-dose vitamin D supplementation for repletion yet. States whenever he goes to pharmacy to , they do not have the order. Re-ordered supplement for pt 7/14/22.    Medications for Weight Loss:  Topiramate - Pt reports taking morning and night  Ozempic - started with PCP, however states he is now " switching to a different medication (pt unsure which med)    NUTRITION HISTORY:  No known food allergies. Lactose intolerant.    Already having to chew to fine texture after thyroid surgery.     Doesn't eat if not hungry, leads to skipping daytime meals. Sometimes eating out of boredom. Waking in the night every 2-3 hours, will need something sweet (candy, Lil Odalys's cakes) to fall back asleep. This is likely contributing to daytime fullness.     Over past several months, he has been focusing on eating smaller portions and using leaner cooking methods at dinner. Working on not snacking when he wakes at night. Is able to avoid some nights.     Intermittently replacing meals with Slimfast Keto Shake or Shakeology shake.     April 2022 - Gained 5lbs this past month. He has recently tried to cut out third meal meal. Trying to avoid waking to snack, however continues to have a high-carb snack overnight 3 time per week.  He notes some knee pain that has reduced his physical activity as well.     [facility] labs completed 4/15/22 - Hgb A1c up to 7.2 (6.6 last check 10 mo ago), LDL cholesterol up, Vitamin D 15 (Low), fasting . NP ordered once weekly high-dose vitamin D x 8wks, pt has yet to start.     May 2022 - Has not been checking BG when home. Over past month, was doing OCTAVIANO 9am-6pm window and lost 6 lbs per his home scale. Going to sleep around 8 pm to help avoid eating. States the only times he ate outside of this window was when he forgot to eat and then needed to eat later.     June 2022 - No longer doing OCTAVIANO. Has noticed little appetite once starting Ozempic with PCP. Back to eating just one meal per day.     Today - Appears weight is up 20 lbs this month, pt states he is unsure why. Continues Ozempic, states he missed one dose last week ,PCP switching to something else in . States has been eating 4 watermelon per week. Typically having 1-2 meals otherwise. He plans to reach out to his thyroid MD to notify of  drastic wt gain this month.     Recent Diet Recall (yesterday):  Breakfast: skip  Lunch: 1 pm - bowl of cereal (Frosted Flakes)  Dinner: 7 pm - salad and shrimp pasta   Beverages: Water, regular gatorade and gatorade zero (64 oz/day), and Body Water      Progress Towards Previous Goals:  1) Start Modified Liquid Diet: - Not met, was replacing meals with watermelon instead   - Breakfast: Protein bar/shake   - Lunch: 3-5 oz lean protein + non-starchy vegetables (Or low-calorie frozen meal)   - Dinner: Protein bar/shake  2) Calorie/sugar-free beverages only for hydration - Improving, has not totally eliminated regular Gatorade but is using sugar-free more often   3) Set up bariatric psych evaluation - Not met      Eating Habits 9/7/2021   Do you have any dietary restrictions? Yes   Do you currently binge eat (eat a large amount of food in a short time)? Yes   Are you an emotional eater? Yes   Do you get up to eat after falling asleep? Yes   What foods do you crave? nothing       ADDITIONAL INFORMATION:  Had spine surgery and half thyroid removed in July 2021.    Has not been to the gym as much lately d/t pain, getting cortisone injections.    Dining Out History Reviewed With Patient 9/7/2021   How often do you dine out? A couple of times a week.   Where do you dine out? (select all that apply) fast food chains   What types of food do you order when you dine out? jass       Physical Activity Reviewed With Patient 9/7/2021   How often do you exercise? Never   What keeps you from being more active?  I am as active as I can possbily be, I have just had surgery on one or more of my joints, My ability to walk or move around is limited         NUTRITION DIAGNOSIS:  Obesity r/t long history of self-monitoring deficit and excessive energy intake aeb BMI >30 kg/m2. - continues    INTERVENTION:  Intervention Provided/Education Provided:  Reviewed post-op diet guidelines, ways to help prepare for post-op diet guidelines  pre-operatively, portion/calorie-control, mindful eating and sources of protein.  Reviewed progress towards previous goals.  Instructed pt to follow the Modified Liquid Diet for pre-op weight loss. Encouraged he replace a daytime meal with low-calorie protein shake. Discussed high-carb/sugar content of the portions of watermelon he has been consuming. Encouraged limit of one watermelon per week.   Reviewed surgery task list.  Re-ordered vitamin D supplement for repletion   Patient demonstrates understanding.  AVS vis MyChart     Questions Reviewed With Patient 9/7/2021   How ready are you to make changes regarding your weight? Number 1 = Not ready at all to make changes up to 10 = very ready. 10   How confident are you that you can change? 1 = Not confident that you will be successful making changes up to 10 = very confident. 10       Expected Engagement: good    GOALS:  1) Start Modified Liquid Diet:   - Breakfast: Protein bar/shake   - Lunch: 3-5 oz lean protein + non-starchy vegetables (Or low-calorie frozen meal)   - Dinner: Protein bar/shake  2) Calorie/sugar-free beverages only for hydration  3) Set up bariatric psych evaluation  4) Start vitamin D supplement (once weekly for 8 weeks)    Protein Ideas:  Left-over meat  Hard boiled egg  Part-skim cheese stick  Protein shake/bar  100 calorie pack of nuts  Tuna/chicken salad     Protein Sources for Weight Loss  [email or web address]     Diet Guidelines after Weight Loss Surgery  [email or web address]     Meal Replacement Shake Options:   *Protein Shake Criteria: no more than 210 Calories, at least 20 grams of protein, and less than 10 grams of sugar   [facility] smoothie (160 Calories, 20 g protein)   Premier Protein (160 Calories, 30 g protein)  Slim Fast Advanced Nutrition (180 Calories, 20 g protein)  Muscle Milk, lactose-free, 17 oz bottle (210 Calories, 30 g protein)  Integrated Supplements, no artificial sugars (110 Calories, 20 g  protein)  Genepro, unflavored protein powder (60 Calories, 30 g protein)  Boost/Ensure Max (160 calories, 30 gm protein)   Fairlife Core Power (170 calories, 26 gm protein)  Aldi's Elevation Protein Powder (180 calories, 30 gm protein)     Meal Replacement Bar Options:  [facility] Protein Shake (160 Calories, 15 g protein)  Quest Protein Bars (190 Calories, 20 g protein)  Built Bar (170 Calories, 15-20 g protein)  One Protein Bar (210 calories, 20 g protein)  [place] Signature Protein Bar ([employer]) (190 Calories, 21 g protein)  Pure Protein Bars (180 Calories, 21 g protein)    Low Calorie Frozen Meal:  Healthy Choice Power Bowls  Lean Cuisine  Smart Ones  Jasson Mcclendon        Follow up: 1 month    Time spent with patient: 15 minutes.  Kayla Mcdonnell, JUWAN, LD

## 2022-08-09 PROBLEM — F11.90 CHRONIC, CONTINUOUS USE OF OPIOIDS: Status: ACTIVE | Noted: 2022-08-09

## 2022-08-10 ENCOUNTER — VIRTUAL VISIT (OUTPATIENT)
Dept: ENDOCRINOLOGY | Facility: CLINIC | Age: 47
End: 2022-08-10
Payer: COMMERCIAL

## 2022-08-10 ENCOUNTER — TELEPHONE (OUTPATIENT)
Dept: ENDOCRINOLOGY | Facility: CLINIC | Age: 47
End: 2022-08-10

## 2022-08-10 VITALS — HEIGHT: 71 IN | BODY MASS INDEX: 48.68 KG/M2

## 2022-08-10 DIAGNOSIS — E55.9 VITAMIN D DEFICIENCY: ICD-10-CM

## 2022-08-10 DIAGNOSIS — E66.813 CLASS 3 SEVERE OBESITY DUE TO EXCESS CALORIES WITHOUT SERIOUS COMORBIDITY WITH BODY MASS INDEX (BMI) OF 40.0 TO 44.9 IN ADULT (H): Primary | ICD-10-CM

## 2022-08-10 DIAGNOSIS — E11.65 TYPE 2 DIABETES MELLITUS WITH HYPERGLYCEMIA, WITHOUT LONG-TERM CURRENT USE OF INSULIN (H): ICD-10-CM

## 2022-08-10 DIAGNOSIS — G47.33 OBSTRUCTIVE SLEEP APNEA: ICD-10-CM

## 2022-08-10 DIAGNOSIS — E66.813 CLASS 3 SEVERE OBESITY DUE TO EXCESS CALORIES WITH SERIOUS COMORBIDITY AND BODY MASS INDEX (BMI) OF 45.0 TO 49.9 IN ADULT (H): Primary | ICD-10-CM

## 2022-08-10 DIAGNOSIS — E66.01 CLASS 3 SEVERE OBESITY DUE TO EXCESS CALORIES WITHOUT SERIOUS COMORBIDITY WITH BODY MASS INDEX (BMI) OF 40.0 TO 44.9 IN ADULT (H): Primary | ICD-10-CM

## 2022-08-10 DIAGNOSIS — E66.01 CLASS 3 SEVERE OBESITY DUE TO EXCESS CALORIES WITH SERIOUS COMORBIDITY AND BODY MASS INDEX (BMI) OF 45.0 TO 49.9 IN ADULT (H): Primary | ICD-10-CM

## 2022-08-10 PROCEDURE — 99214 OFFICE O/P EST MOD 30 MIN: CPT | Mod: 95 | Performed by: PHYSICIAN ASSISTANT

## 2022-08-10 ASSESSMENT — PAIN SCALES - GENERAL: PAINLEVEL: EXTREME PAIN (8)

## 2022-08-10 NOTE — Clinical Note
Can you start PA for Ozempic please?  Last time denied due to A1C < 7.  Now it is 7.2 so hopefully they cover.  Thanks

## 2022-08-10 NOTE — PROGRESS NOTES
Archie is a 46 year old who is being evaluated via a billable video visit.      How would you like to obtain your AVS? MyChart  If the video visit is dropped, the invitation should be resent by: [phone]  Will anyone else be joining your video visit? No    During this virtual visit the patient is located in [place], patient verifies this as the location during the entirety of this visit.     Video-Visit Details    Video Start Time: 1:33 PM    Type of service:  Video Visit    Video End Time:150    Originating Location (pt. Location): Home    Distant Location (provider location):  [facility]     Platform used for Video Visit: [employer]       30 minutes spent on the date of the encounter doing chart review, history and exam, documentation and further activities per the note          Pre-Bariatric Surgery Note    Agustin Parekh    Date: 8/10/2022     RE: Archie Wallace    MR#: [id number]   : 1975   Date of Visit: Aug 10, 2022    REASON FOR VISIT: Pre-operative evaluation for possible weight loss surgery    Dear Agustin Mendoza,    I had the pleasure of seeing your patient, Archie Wallace, in my preoperative bariatric clinic.    As you know, he is morbidly obese and considering weight loss surgery to treat obesity in association with his medical conditions of obesity.  His consult weight was 337.  He has lost ? pounds since his consult weight. He has not met his required pre-surgery weight. Please refer to initial consult note from date 21 for patient's weight history and co-morbidities.      Plan was for possible sleeve with Dr Mata  -has not met Dr Mata yet  -no psych eval yet  -cards, sleep, PCP done  -20lbs from 337 lbs    Frustrated with weight up and down  -tried topiramate stopped due to brain fog  -tried ozempic for one month.  Paid out of pocket. Was effective.  Stopped due to cost. Insurance didn't cover due to A1C <7 but now A1C 7.2    PLAN:  Retrial  ozempic 0.25mg x 4 weeks, then increase to 0.5mg weekly.  A1C 7.2.  Denied last time due to A1C <7  Schedule psych eval, call [facility]  See Dr Mata after psych eval and closer to weight loss goal ~330 or less  Vit D deficiency: recheck D at any [facility] lab Please call [phone] to schedule a lab only appointment at any [facility] lab.    Follow up 1 month Kayla Mcdonnell RD already scheduled  Follow up 1 month [facility] pharmacist Lauren Bloch to follow up ozempic start phone  Follow up 3 months Stephania return [facility] video    Assessment & Plan   Problem List Items Addressed This Visit        Endocrine Diagnoses    Class 3 severe obesity due to excess calories with serious comorbidity and body mass index (BMI) of 45.0 to 49.9 in adult (H) - Primary    Type 2 diabetes mellitus with hyperglycemia, without long-term current use of insulin (H)           Reviewed tasklist with patient yes    Most recent weights:  Wt Readings from Last 4 Encounters:   05/05/22 (!) 158.3 kg (349 lb)   05/02/22 (!) 158.3 kg (349 lb)   04/27/22 (!) 158.3 kg (349 lb)   04/06/22 (!) 157.9 kg (348 lb)         ROS    Past Medical History:   Diagnosis Date     CAD (coronary artery disease)     Premature artery disease noted     Class 3 severe obesity due to excess calories with serious comorbidity and body mass index (BMI) of 45.0 to 49.9 in adult (H) 02/18/2010    Bariatric surgery consult 9/7/21 Stephania Youssef PA-C Starting BMI 46 and wt 337 lbs      DDD (degenerative disc disease), cervical      History of MI (myocardial infarction) 07/17/2020     Hyperlipidemia LDL goal <40      Hypertension goal BP (blood pressure) < 140/90      Long QT interval syndrome      MEDICAL HISTORY OF -     History of rhabdomyolosis, from strenuous exercise     Mild persistent asthma      Obstructive sleep apnea      Primary osteoarthritis of left knee      Sleep apnea syndrome     cpap     Type 2 diabetes, HbA1c goal < 7% (H)      Vitamin D deficiency        Past Surgical  History:   Procedure Laterality Date     ANGIOGRAM       ANGIOPLASTY  2001    Coronary artery angioplasty//associated with mild AMI     DISCECTOMY, FUSION CERVICAL ANTERIOR THREE+ LEVELS, COMBINED N/A 7/16/2021    Procedure: Cervical 3 to Cervical 7 Anterior Cervical Discectomy and Fusion;  Surgeon: Jaquan De Leon MD;  Location: [facility] OR     NASAL SINUS SURGERY       THYROIDECTOMY Right 7/16/2021    Procedure: RIGHT THYROIDECTOMY;  Surgeon: Moshe Hernandez DO;  Location: [facility] OR     TONSILLECTOMY  2005    With uvuloectomy       Current Outpatient Medications   Medication     albuterol (PROAIR HFA) 108 (90 Base) MCG/ACT inhaler     amLODIPine (NORVASC) 10 MG tablet     aspirin 81 MG EC tablet     atorvastatin (LIPITOR) 40 MG tablet     cloNIDine (CATAPRES) 0.2 MG tablet     exenatide ER (BYDUREON BCISE) 2 MG/0.85ML auto-injector     exenatide ER (BYDUREON BCISE) 2 MG/0.85ML auto-injector     Insulin Pen Needle (PEN NEEDLES) 32G X 4 MM MISC     losartan (COZAAR) 100 MG tablet     mometasone-formoterol (DULERA) 100-5 MCG/ACT inhaler     vitamin D3 (CHOLECALCIFEROL) 1.25 MG (97664 UT) capsule     ACCU-CHEK GUIDE test strip     Alcohol Swabs PADS     blood glucose monitoring (SOFTCLIX) lancets     methocarbamol (ROBAXIN) 500 MG tablet     oxyCODONE (ROXICODONE) 5 MG tablet     topiramate (TOPAMAX) 25 MG tablet     Current Facility-Administered Medications   Medication     betamethasone acet & sod phos (CELESTONE) injection 6 mg     ropivacaine (NAROPIN) injection 4 mL       Allergies   Allergen Reactions     Oseltamivir Anaphylaxis and Rash     Biaxin [Clarithromycin]      Muscle breakdown     Bromaxefed Dm Rf Cough     Clarithromycin Other (See Comments)     Weakness     Robafen Dm Cgh-Chest [Robitussin Cough-Chest Dm] Difficulty breathing     Guaifenesin Rash     Metformin Rash     Reported breaking out with a skin reaction  Reported breaking out with a skin reaction  Reported breaking out with a skin reaction  "      Hospital Outpatient Visit on 06/27/2022   Component Date Value Ref Range Status     Ventricular Rate 06/27/2022 87  BPM Final     Atrial Rate 06/27/2022 87  BPM Final     RI Interval 06/27/2022 132  ms Final     QRS Duration 06/27/2022 100  ms Final     QT 06/27/2022 398  ms Final     QTc 06/27/2022 478  ms Final     P Axis 06/27/2022 118  degrees Final     R AXIS 06/27/2022 188  degrees Final     T Axis 06/27/2022 160  degrees Final     Interpretation ECG 06/27/2022    Final                    Value: Suspect arm lead reversal, interpretation assumes no reversal  Sinus rhythm  Lateral infarct , age undetermined  Abnormal ECG  When compared with ECG of 29-NOV-2005 07:27,  Questionable change in QRS axis  Lateral infarct is now Present  ST no longer elevated in Lateral leads  Confirmed by MD JACQUES, [facility] ([id number]) on 6/28/2022 6:01:40 PM           PHYSICAL EXAM:  Objective    Ht 1.803 m (5' 11\")   BMI 48.68 kg/m    Vitals - Patient Reported  Pain Score: Extreme Pain (8)  Pain Loc:  (Neck and back)        Physical Exam   GENERAL: Healthy, alert and no distress  EYES: Eyes grossly normal to inspection.  No discharge or erythema, or obvious scleral/conjunctival abnormalities.  RESP: No audible wheeze, cough, or visible cyanosis.  No visible retractions or increased work of breathing.    SKIN: Visible skin clear. No significant rash, abnormal pigmentation or lesions.  NEURO: Cranial nerves grossly intact.  Mentation and speech appropriate for age.  BACK: Full ROM  PSYCH: Mentation appears normal, affect normal/bright, judgement and insight intact, normal speech and appearance well-groomed.    Sleeve Gastrectomy: Risks and Side Effects    The complications or risks of surgery include but are not limited to: death, heart attack, infection in the surgical site (wound infection), abdomen (abscess), bladder (urinary tract infection), lungs (pneumonia), clots in legs (deep vein thrombosis) or lungs (pulmonary emboli),  " injury to the bowels or other organs, bowel obstruction, hernia at the incision and gastrointestional bleeding.    More specific risks related to vertical sleeve gastrectomy were detailed at the bariatric informational seminar and include the following: leak at the vertical sleeve staple line, leak at the anastomoses,  nausea, vomiting, and dehydration for several months,  adhesions causing bowel obstruction, rapid weight loss causing a higher rate of gallstone formation during the first 6 months after surgery, decreased absorption of vitamins and protein because of the reduced stomach size, weight regain if inappropriate food intake occurs, stricture, injury to other organs, hernia,  and ulcers.       Side effects of bariatric surgery include but are not limited to: abdominal pain, cramping, bloating, constipation, nausea, vomiting, diarrhea, difficulty swallowing,  dehydration, hair loss, excess skin, protein, iron and vitamin deficiencies, heartburn, transfer of addictions, increased anxiety and worsening depression.       I emphasized exercise and activity behavior along with appropriate food choice as the main foundation for weight loss with surgery providing surgical reinforcement of the appropriate behavior set.        Review of general surgery weight loss process    1. Complete preoperative requirements, including weight loss.  Final weight check to confirm MANDATORY weight loss requirement must be documented on a clinic scale.    2. Discuss prior authorization with .    3. History and physical evaluation by PCP of PAC clinic within 30 days of surgery date, preoperative class, and weight check (weigh-in visit) to be scheduled by patient.  Pre-anesthesia clinic for risk evaluation to be scheduled by anesthesia clinic.    4. We cannot guarantee that patient will qualify for surgery unless all preoperative requirements are met, prior authorization from primary insurance company is granted,  and insurance changes do not occur.    5. It is possible for patients to regain all weight after weight loss surgery unless they follow guidelines prescribed by our bariatric center.    6. All patients with gastrointestinal complaints after weight loss surgery must have complaints conveyed to the bariatric team for appropriate treatment.    7. Vitamin deficiencies may develop post-bariatric surgery and annual laboratory testing should be performed.    8. Persistent nausea/vomiting after bariatric surgery entails risk of thiamine deficiency and should be treated early.  Vitamin B12 deficiency may develop, especially after gastric bypass surgery and must be recognized.        If you have any questions about our plans please don't hesitate to contact me.    Sincerely,    Stephania Youssef PA-C      30 minutes spent on the date of the encounter doing chart review, history and exam, documentation and further activities per the note

## 2022-08-10 NOTE — LETTER
8/10/2022       RE: Archie Wallace  [address]  [place] [address]     Dear Colleague,    Thank you for referring your patient, Archie Wallace, to the [facility] [place] at [facility]. Please see a copy of my visit note below.    Archie is a 46 year old who is being evaluated via a billable video visit.      How would you like to obtain your AVS? MyChart  If the video visit is dropped, the invitation should be resent by: [phone]  Will anyone else be joining your video visit? No    During this virtual visit the patient is located in [place], patient verifies this as the location during the entirety of this visit.     Video-Visit Details    Video Start Time: 1:33 PM    Type of service:  Video Visit    Video End Time:150    Originating Location (pt. Location): Home    Distant Location (provider location):  [facility]     Platform used for Video Visit: [employer]       30 minutes spent on the date of the encounter doing chart review, history and exam, documentation and further activities per the note          Pre-Bariatric Surgery Note    Agustin Parekh    Date: 8/10/2022     RE: Archie Wallace    MR#: [id number]   : 1975   Date of Visit: Aug 10, 2022    REASON FOR VISIT: Pre-operative evaluation for possible weight loss surgery    Dear Agustin Mendoza,    I had the pleasure of seeing your patient, Archie Wallace, in my preoperative bariatric clinic.    As you know, he is morbidly obese and considering weight loss surgery to treat obesity in association with his medical conditions of obesity.  His consult weight was 337.  He has lost ? pounds since his consult weight. He has not met his required pre-surgery weight. Please refer to initial consult note from date 21 for patient's weight history and co-morbidities.      Plan was for possible sleeve with   Adolfo  -has not met Dr Mata yet  -no psych eval yet  -cards, sleep, PCP done  -20lbs from 337 lbs    Frustrated with weight up and down  -tried topiramate stopped due to brain fog  -tried ozempic for one month.  Paid out of pocket. Was effective.  Stopped due to cost. Insurance didn't cover due to A1C <7 but now A1C 7.2    PLAN:  Retrial ozempic 0.25mg x 4 weeks, then increase to 0.5mg weekly.  A1C 7.2.  Denied last time due to A1C <7  Schedule psych eval, call [facility]  See Dr Mata after psych eval and closer to weight loss goal ~330 or less  Vit D deficiency: recheck D at any  lab Please call [phone] to schedule a lab only appointment at any [facility] lab.    Follow up 1 month Kayla Mcdonnell RD already scheduled  Follow up 1 month [facility] pharmacist Lauren Bloch to follow up ozempic start phone  Follow up 3 months Stephania return M video    Assessment & Plan   Problem List Items Addressed This Visit        Endocrine Diagnoses    Class 3 severe obesity due to excess calories with serious comorbidity and body mass index (BMI) of 45.0 to 49.9 in adult (H) - Primary    Type 2 diabetes mellitus with hyperglycemia, without long-term current use of insulin (H)           Reviewed tasklist with patient yes    Most recent weights:  Wt Readings from Last 4 Encounters:   05/05/22 (!) 158.3 kg (349 lb)   05/02/22 (!) 158.3 kg (349 lb)   04/27/22 (!) 158.3 kg (349 lb)   04/06/22 (!) 157.9 kg (348 lb)         ROS    Past Medical History:   Diagnosis Date     CAD (coronary artery disease)     Premature artery disease noted     Class 3 severe obesity due to excess calories with serious comorbidity and body mass index (BMI) of 45.0 to 49.9 in adult (H) 02/18/2010    Bariatric surgery consult 9/7/21 Stephania Yousesf PA-C Starting BMI 46 and wt 337 lbs      DDD (degenerative disc disease), cervical      History of MI (myocardial infarction) 07/17/2020     Hyperlipidemia LDL goal <40      Hypertension goal BP (blood pressure) <  140/90      Long QT interval syndrome      MEDICAL HISTORY OF -     History of rhabdomyolosis, from strenuous exercise     Mild persistent asthma      Obstructive sleep apnea      Primary osteoarthritis of left knee      Sleep apnea syndrome     cpap     Type 2 diabetes, HbA1c goal < 7% (H)      Vitamin D deficiency        Past Surgical History:   Procedure Laterality Date     ANGIOGRAM       ANGIOPLASTY  2001    Coronary artery angioplasty//associated with mild AMI     DISCECTOMY, FUSION CERVICAL ANTERIOR THREE+ LEVELS, COMBINED N/A 7/16/2021    Procedure: Cervical 3 to Cervical 7 Anterior Cervical Discectomy and Fusion;  Surgeon: Jaquan De Leon MD;  Location: [facility] OR     NASAL SINUS SURGERY       THYROIDECTOMY Right 7/16/2021    Procedure: RIGHT THYROIDECTOMY;  Surgeon: Moshe Hernandez DO;  Location: [facility] OR     TONSILLECTOMY  2005    With uvuloectomy       Current Outpatient Medications   Medication     albuterol (PROAIR HFA) 108 (90 Base) MCG/ACT inhaler     amLODIPine (NORVASC) 10 MG tablet     aspirin 81 MG EC tablet     atorvastatin (LIPITOR) 40 MG tablet     cloNIDine (CATAPRES) 0.2 MG tablet     exenatide ER (BYDUREON BCISE) 2 MG/0.85ML auto-injector     exenatide ER (BYDUREON BCISE) 2 MG/0.85ML auto-injector     Insulin Pen Needle (PEN NEEDLES) 32G X 4 MM MISC     losartan (COZAAR) 100 MG tablet     mometasone-formoterol (DULERA) 100-5 MCG/ACT inhaler     vitamin D3 (CHOLECALCIFEROL) 1.25 MG (49203 UT) capsule     ACCU-CHEK GUIDE test strip     Alcohol Swabs PADS     blood glucose monitoring (SOFTCLIX) lancets     methocarbamol (ROBAXIN) 500 MG tablet     oxyCODONE (ROXICODONE) 5 MG tablet     topiramate (TOPAMAX) 25 MG tablet     Current Facility-Administered Medications   Medication     betamethasone acet & sod phos (CELESTONE) injection 6 mg     ropivacaine (NAROPIN) injection 4 mL       Allergies   Allergen Reactions     Oseltamivir Anaphylaxis and Rash     Biaxin [Clarithromycin]      Muscle  "breakdown     Bromaxefed Dm Rf Cough     Clarithromycin Other (See Comments)     Weakness     Robafen Dm Cgh-Chest [Robitussin Cough-Chest Dm] Difficulty breathing     Guaifenesin Rash     Metformin Rash     Reported breaking out with a skin reaction  Reported breaking out with a skin reaction  Reported breaking out with a skin reaction       Hospital Outpatient Visit on 06/27/2022   Component Date Value Ref Range Status     Ventricular Rate 06/27/2022 87  BPM Final     Atrial Rate 06/27/2022 87  BPM Final     KS Interval 06/27/2022 132  ms Final     QRS Duration 06/27/2022 100  ms Final     QT 06/27/2022 398  ms Final     QTc 06/27/2022 478  ms Final     P Axis 06/27/2022 118  degrees Final     R AXIS 06/27/2022 188  degrees Final     T Axis 06/27/2022 160  degrees Final     Interpretation ECG 06/27/2022    Final                    Value: Suspect arm lead reversal, interpretation assumes no reversal  Sinus rhythm  Lateral infarct , age undetermined  Abnormal ECG  When compared with ECG of 29-NOV-2005 07:27,  Questionable change in QRS axis  Lateral infarct is now Present  ST no longer elevated in Lateral leads  Confirmed by MD JACQUES, JAYME ([id number]) on 6/28/2022 6:01:40 PM           PHYSICAL EXAM:  Objective    Ht 1.803 m (5' 11\")   BMI 48.68 kg/m    Vitals - Patient Reported  Pain Score: Extreme Pain (8)  Pain Loc:  (Neck and back)        Physical Exam   GENERAL: Healthy, alert and no distress  EYES: Eyes grossly normal to inspection.  No discharge or erythema, or obvious scleral/conjunctival abnormalities.  RESP: No audible wheeze, cough, or visible cyanosis.  No visible retractions or increased work of breathing.    SKIN: Visible skin clear. No significant rash, abnormal pigmentation or lesions.  NEURO: Cranial nerves grossly intact.  Mentation and speech appropriate for age.  BACK: Full ROM  PSYCH: Mentation appears normal, affect normal/bright, judgement and insight intact, normal speech and appearance " well-groomed.    Sleeve Gastrectomy: Risks and Side Effects    The complications or risks of surgery include but are not limited to: death, heart attack, infection in the surgical site (wound infection), abdomen (abscess), bladder (urinary tract infection), lungs (pneumonia), clots in legs (deep vein thrombosis) or lungs (pulmonary emboli),  injury to the bowels or other organs, bowel obstruction, hernia at the incision and gastrointestional bleeding.    More specific risks related to vertical sleeve gastrectomy were detailed at the bariatric informational seminar and include the following: leak at the vertical sleeve staple line, leak at the anastomoses,  nausea, vomiting, and dehydration for several months,  adhesions causing bowel obstruction, rapid weight loss causing a higher rate of gallstone formation during the first 6 months after surgery, decreased absorption of vitamins and protein because of the reduced stomach size, weight regain if inappropriate food intake occurs, stricture, injury to other organs, hernia,  and ulcers.       Side effects of bariatric surgery include but are not limited to: abdominal pain, cramping, bloating, constipation, nausea, vomiting, diarrhea, difficulty swallowing,  dehydration, hair loss, excess skin, protein, iron and vitamin deficiencies, heartburn, transfer of addictions, increased anxiety and worsening depression.       I emphasized exercise and activity behavior along with appropriate food choice as the main foundation for weight loss with surgery providing surgical reinforcement of the appropriate behavior set.        Review of general surgery weight loss process    1. Complete preoperative requirements, including weight loss.  Final weight check to confirm MANDATORY weight loss requirement must be documented on a clinic scale.    2. Discuss prior authorization with .    3. History and physical evaluation by PCP of PAC clinic within 30 days of surgery  date, preoperative class, and weight check (weigh-in visit) to be scheduled by patient.  Pre-anesthesia clinic for risk evaluation to be scheduled by anesthesia clinic.    4. We cannot guarantee that patient will qualify for surgery unless all preoperative requirements are met, prior authorization from primary insurance company is granted, and insurance changes do not occur.    5. It is possible for patients to regain all weight after weight loss surgery unless they follow guidelines prescribed by our bariatric center.    6. All patients with gastrointestinal complaints after weight loss surgery must have complaints conveyed to the bariatric team for appropriate treatment.    7. Vitamin deficiencies may develop post-bariatric surgery and annual laboratory testing should be performed.    8. Persistent nausea/vomiting after bariatric surgery entails risk of thiamine deficiency and should be treated early.  Vitamin B12 deficiency may develop, especially after gastric bypass surgery and must be recognized.        If you have any questions about our plans please don't hesitate to contact me.    Sincerely,    Stephania Youssef PA-C      30 minutes spent on the date of the encounter doing chart review, history and exam, documentation and further activities per the note

## 2022-08-10 NOTE — LETTER
August 15, 2022      Archie Wallace  [address]   [place] [address]      Dear Weight Loss Surgery Clinic,     I am the primary care provider for Archie Wallace and I support him having weight loss surgery.     Sincerely,        Agustin Parekh MD

## 2022-08-10 NOTE — TELEPHONE ENCOUNTER
Central Prior Authorization Team   Phone: [phone]    PA Initiation    Medication: Ozempic  Insurance Company: Blue Plus PMAP - Phone [phone] Fax [phone]  Pharmacy Filling the Rx: [id number] [address]  Filling Pharmacy Phone: [phone]  Filling Pharmacy Fax: [phone]  Start Date: 8/10/2022

## 2022-08-10 NOTE — TELEPHONE ENCOUNTER
Called patient to schedule pre-bariatric surgery psychological evaluation with Dr. Francisco. Scheduled video visit on 10/18 at 9 a.m., to plan on 2 hours for interview followed by MMPI testing.

## 2022-08-10 NOTE — TELEPHONE ENCOUNTER
Ran test claim and PA is needed for Ozempic    Please submit PA for Ozempic and let Liaison know when Approved / Denied    [employer] [facility]  BIN: [id number]  ID: [id number]  GROUP: [id number]  PCN: [id number]

## 2022-08-10 NOTE — TELEPHONE ENCOUNTER
Prior Authorization Retail Medication Request    Medication/Dose: Ozempic  ICD code (if different than what is on RX):  Type 2 diabetes mellitus with hyperglycemia, without long-term current use of insulin (H) [E11.65]     Previously Tried and Failed:  History of diet and exercise, Watching portions or calories, Exercise, Atkins type diet (low carb/high protein), Slimfast, OTC Medications, Fen-Phen  Rationale:  I had the pleasure of seeing your patient, Archie Wallace, in my preoperative bariatric clinic.     As you know, he is morbidly obese and considering weight loss surgery to treat obesity in association with his medical conditions of obesity.  His consult weight was 337lbs. He has not met his required pre-surgery weight.     T2DM: Dx 20 years ago.A1C 7.2 from 4/15/22  Lantus 12 units evening just recently due to steroids for neck.  Hx of metformin stopped due to rash.  BRONWYN: uses CPAP regularly.Last visit Erica WILSON [facility] sleep clinic 5/5/20.  Needs follow up. [phone]   Cardiologist: Dr Pierre Rocha. CAD. Hx of MI 2004 with angio but no stent  HTN: high recently with pain from recent surgery.  High cholesterol: takes statin    Insurance Name:    Insurance ID:        Pharmacy Information (if different than what is on RX)  Name:  [id number] [place], [place] - [address] [place] [address] [address]  Phone:  [phone]

## 2022-08-10 NOTE — NURSING NOTE
"(   Chief Complaint   Patient presents with     RECHECK     Pre-surgery    )    ( Weight:  (patient unsure of weight) )  ( Height: 180.3 cm (5' 11\") )  (   )  (   )  (   )  (   )  (   )  (   )  (   )    (   )  (   )  (   )  (   )  (   )  (   )  (   )    (   Patient Active Problem List   Diagnosis     Low back pain     Obstructive sleep apnea     Class 3 severe obesity due to excess calories with serious comorbidity and body mass index (BMI) of 45.0 to 49.9 in adult (H)     Hypertension goal BP (blood pressure) < 140/90     Hyperlipidemia LDL goal <40     CAD (coronary artery disease)     Moderate persistent asthma     Long QT interval syndrome     Type 2 diabetes mellitus with hyperglycemia, without long-term current use of insulin (H)     Moderate major depression (H)     Primary osteoarthritis of left knee     Chronic, continuous use of opioids    )  (   Current Outpatient Medications   Medication Sig Dispense Refill     albuterol (PROAIR HFA) 108 (90 Base) MCG/ACT inhaler Inhale 2 puffs into the lungs every 6 hours as needed 1 Inhaler 5     amLODIPine (NORVASC) 10 MG tablet Take 1 tablet (10 mg) by mouth daily 90 tablet 2     aspirin 81 MG EC tablet Take 1 tablet (81 mg) by mouth daily 90 tablet 3     atorvastatin (LIPITOR) 40 MG tablet Take 1 tablet (40 mg) by mouth daily 90 tablet 3     cloNIDine (CATAPRES) 0.2 MG tablet Take 1 tablet (0.2 mg) by mouth 2 times daily +++NEEDS APPOINTMENT FOR FURTHER REFILLS+++ 60 tablet 0     exenatide ER (BYDUREON BCISE) 2 MG/0.85ML auto-injector Inject 2 mg Subcutaneous every 7 days 3.4 mL 0     exenatide ER (BYDUREON BCISE) 2 MG/0.85ML auto-injector Inject 2 mg Subcutaneous every 7 days 3.4 mL 3     Insulin Pen Needle (PEN NEEDLES) 32G X 4 MM MISC 1 each daily 100 each 0     losartan (COZAAR) 100 MG tablet Take 1 tablet (100 mg) by mouth daily 90 tablet 2     mometasone-formoterol (DULERA) 100-5 MCG/ACT inhaler Inhale 2 puffs into the lungs 2 times daily +++NEEDS APPOINTMENT " FOR FURTHER REFILLS+++ 13 g 0     vitamin D3 (CHOLECALCIFEROL) 1.25 MG (77833 UT) capsule Take 1 capsule (50,000 Units) by mouth every 7 days for 8 doses 8 capsule 0     ACCU-CHEK GUIDE test strip USE TO TEST BLOOD SUGAR 3 TO FOUR TIMES DAILY 200 strip 1     Alcohol Swabs PADS 1 each daily 100 each 0     blood glucose monitoring (SOFTCLIX) lancets Use to test blood sugar 3-4 times daily. 100 each 4     methocarbamol (ROBAXIN) 500 MG tablet Take 1 tablet (500 mg) by mouth 3 times daily as needed for muscle spasms (Patient not taking: No sig reported) 30 tablet 1     oxyCODONE (ROXICODONE) 5 MG tablet Take 1 tablet (5 mg) by mouth every 6 hours as needed for pain (Patient not taking: No sig reported) 10 tablet 0     topiramate (TOPAMAX) 25 MG tablet Take 2 tablets (50 mg) by mouth 2 times daily (Patient not taking: Reported on 8/10/2022) 120 tablet 3    )  ( Diabetes Eval:    )    ( Pain Eval:  Extreme Pain (8) )    ( Wound Eval:       )    (   History   Smoking Status     Former Smoker     Types: Cigars     Quit date: 1/1/2000   Smokeless Tobacco     Never Used     Comment: Smokes intermittently    )    ( Signed By:  Sharita Romero, EMT; August 10, 2022; 12:48 PM )

## 2022-08-11 NOTE — TELEPHONE ENCOUNTER
PRIOR AUTHORIZATION DENIED    Medication: Ozempic-PA DENIED     Denial Date: 8/11/2022    Denial Rational: Insurance states that patient must tried/failed 2 preferred: Bydureon BCise (auto injector), Byetta (pens), Victoza (pens), Janumet (tablets), Januvia (tablets), Jentadueto (tablts), Kombiglyze (extended release tablets), Onglyza (tablets), Tradjenta (tablets).           Appeal Information:

## 2022-08-15 ENCOUNTER — TELEPHONE (OUTPATIENT)
Dept: SURGERY | Facility: CLINIC | Age: 47
End: 2022-08-15

## 2022-08-15 NOTE — LETTER
2022    To: DANGELO    RE: Archie Wallace  [address]  [place] [address]  : 1975  MRN: [id number]  Policy #:   Member ID: [id number]     Payor: 4-BLUE PLUS Ph: [phone]     Benefit plan: [id number]-BLUE PLUS ADVANTAGE [place] Ph: [phone]     Group number: [id number]      To Whom It May Concern,    I am writing on behalf of my patient, Archie Wallace to document the medical necessity of Saxenda for the treatment of Class 3 severe obesity due to excess calories without serious comorbidity with body mass index (BMI) of 40.0 to 44.9 in adult. This letter provides information about the patient's medical history and diagnosis and a statement summarizing my treatment rationale.     Summary of Patient History and Diagnosis  I had the pleasure of seeing your patient, Archie Wallace, in my preoperative bariatric clinic.     As you know, he is morbidly obese and considering weight loss surgery to treat obesity in association with his medical conditions of obesity.  His consult weight was 337.  He has lost ? pounds since his consult weight. He has not met his required pre-surgery weight.     Frustrated with weight up and down  -tried topiramate stopped due to brain fog  -tried ozempic for one month.  Paid out of pocket. Was effective.  Stopped due to cost. Insurance didn't cover due to A1C <7 but now A1C 7.2  -ozempic is not available due to supply shortage      Treatment Rationale  Denial Rational: Insurance states that patient must tried/failed Phentermine (tablets, capsules). Phentermine is contraindicated due to HTN.       Duration  Up to 12 months.       Summary  In summary, Saxenda is medically necessary for this patient s medical condition. Please call my office at [phone] if I can provide you with any additional information to approve my request. I look forward to receiving your timely response and approval of this request.     Sincerely,    Stephania Youssef PA-C

## 2022-08-15 NOTE — TELEPHONE ENCOUNTER
Ran test claim for Saxenda and PA is needed    Please submit PA for Camron and let Liaison know when Approved / Denied      [facility]  BIN: [id number]  ID: [id number]  GROUP: [id number]  PCN: [id number]

## 2022-08-16 NOTE — TELEPHONE ENCOUNTER
Central Prior Authorization Team   Phone: [phone]    PA Initiation    Medication: Saxenda  Insurance Company: Blue Plus PMAP - Phone [phone] Fax [phone]  Pharmacy Filling the Rx: [id number] [place], [place] - [address]  Filling Pharmacy Phone: [phone]  Filling Pharmacy Fax: [phone]  Start Date: 8/16/2022

## 2022-08-17 ENCOUNTER — CARE COORDINATION (OUTPATIENT)
Dept: ENDOCRINOLOGY | Facility: CLINIC | Age: 47
End: 2022-08-17

## 2022-08-17 NOTE — TELEPHONE ENCOUNTER
PRIOR AUTHORIZATION DENIED    Medication: Saxenda-PA DENIED     Denial Date: 8/16/2022    Denial Rational: Insurance states that patient must tried/failed Phentermine (tablets, capsules).           Appeal Information:

## 2022-08-17 NOTE — PROGRESS NOTES
Tasklist updated and sent to patient via [employer].    Bariatric Task List  Fax:  Please fax all paperwork to: [phone] -     Status:  Is patient a candidate for bariatric surgery?:  patient is a candidate for bariatric surgery -     Cleared to schedule surgeon consult?:  cleared to schedule surgeon consult - Call [phone] to schedule visit with Dr Kavon Mata. bks   Status:  surgery evaluation in process -     Surgeon: Adolfo -     Tentative surgery month/year: TBD -        Insurance: Insurance:  [employer] [place] - Call [phone] if insurance has changed the Call Center will update your chart.bks    Contact insurance to discuss coverage: Needed -          Patient Info: Initial Weight:  337 -     Date of Initial Weight/Height:  9/7/2021 -     Goal Weight (lbs):  317 -     Required Weight Loss:  20 -     Surgery Type:  sleeve gastrectomy -        Dietician Visits: Structured weight loss required by insurance?:  structured weight loss required -     Dietician Visit 1:  Completed - 9/7/21  - AS   Dietician Visit 2:  Completed - 10/5/21    Dietician Visit 3:  Completed - 11/02/21 & 11/30/21    Dietician Visit 4:  Completed - 12/31/21 in Epic. [facility]   Dietician Visit 5:  Completed - 1/27/22 in Epic. [facility]   Dietician Visit 6:  Completed - 3/7/22 [facility]   Dietician Visit additional:  Needed - Monthly until surgery for weight loss and to review postop diet teaching. - AS   Clearance from dietician to see surgeon?:    -     Dietician Notes:  3/7/22, 4/12/22, 5/12/22, 6/13/22, 7/14/22, 8/23/22 -        Psychological Evaluation: Psych eval:  Needed - List and letter sent to pt 9/9/21 - AS; 10/18/22 Dr Francisco appt. [facility]      Lab Work: Complete Blood Count:  Completed - completed 4/15/22 - KB   Comprehensive Metabolic Panel:  Completed - completed 4/15/22 - KB   Vitamin D:  Needed - completed 4/15/22. Low (15). Recheck in 8 wks after start of suppl - KB   PTH:  Completed - completed 4/15/22 - KB   Hgb A1c:  Completed -  "completed 4/15/22 - KB    Lipids: Completed - completed 4/15/22 - KB    TSH ([facility], [facility], [place]): Completed - completed 4/15/22 - KB      Consults/ Clearance Sleep Medicine:  Needed - Letter sent to pt 9/9/21 - AS; 9/7/22 Jose Antonio Almanza MD appt. [facility]   Cardiac:  Needed - 4/6/22 Dr. Lee in epic pending stress test and cardiac MRI - KB   Medical Weight Management: Needed - started ozempic with Stephania 9/7/21, call [phone] for follow-up visits. [facility]      PCP: Establish care with PCP:  Completed -     Follow up with PCP:    -     PCP letter of support:  Completed - Letter sent to pt 9/9/21 -AS;8/15/22 jessica Parekh MD - in [facility]. [facility]      Patient Education:  Information Session:  Completed -     Given \"Making your decision\" handout?:  Yes - 9/9/21   Given \"A Roadmap to you Weight Loss Surgery\" handout?: Yes - 9/9/21   Given \"Get Well Loop\" information?: Yes - 9/9/21   Given support group information?:    -  Yes - 9/9/21   Attended support group?:    -     Support plan in place?:  Completed -     Research consents signed?:  research consent not signed -        Additional Surgery Requirements: Other: Covid test 4 days prior to surgery. bks -     Other: Postop appts for 1 week and 31+ days. -        Final Tasks: To complete after you have a surgery date. Before surgery online class:  Needed -     Before surgery online class website link:  [email or web address]   After surgery online class:  Needed -     After surgery online class website link:  [email or web address]   Nurse visit per clinic:  Needed - Call Pam at [phone] to schedule once you have a surgery date.   History and Physical per clinic:   -  Pre-Assessment Clinic visit   Final labs per clinic: Needed -        Notes: Please register for the Get Well Loop when you get an email invitation and a surgery date.     The Get Well Loop will give you information via email or text messages that can help you be more successful " "before and after surgery.  It can also help answer any questions you may have.    Get Well Loop Information  [email or web address]     CARDS: 4/6/22 Fransisca Patrick MD Notes, \"Continue beta-blockers and anti-arrhythmics, but hold diuretics and ACE-I on morning of surgery\".          "

## 2022-08-18 ENCOUNTER — TELEPHONE (OUTPATIENT)
Dept: ENDOCRINOLOGY | Facility: CLINIC | Age: 47
End: 2022-08-18

## 2022-08-18 NOTE — TELEPHONE ENCOUNTER
LVM and sent [employer] for scheduling f/u:  -1 month MTM pharmacist Lauren Bloch to follow up ozempic start phone  -Follow up 3 months Stephania return MWM video

## 2022-08-21 ENCOUNTER — HEALTH MAINTENANCE LETTER (OUTPATIENT)
Age: 47
End: 2022-08-21

## 2022-08-22 NOTE — TELEPHONE ENCOUNTER
Medication Appeal Request    Please initiate an appeal for the requested medication: Saxenda-PA DENIED     Has a letter of medical necessity been completed in EPIC?   yes    Any additional lab values/information to include?     Would you like to include any research articles?               If yes please include the hyperlink(s) below or fax to    [phone] for Specialty/Retail               [phone] for Infusion/Clinic Administered.                Include the patients name and MRN on the fax cover sheet.

## 2022-08-22 NOTE — TELEPHONE ENCOUNTER
Medication Appeal Initiation    We have initiated an appeal for the requested medication:  Medication: Saxenda-PA DENIED   Appeal Start Date:  8/22/2022  Insurance Company: [employer] - Phone [phone] Fax [phone]  Comments:

## 2022-08-24 NOTE — TELEPHONE ENCOUNTER
Received Letter from Insurance [facility] [employer] acknowledging Request on PA Appeal for medication has been received and written decision/outcome will within 30 days from date received on Appeal (Received on 8/22/2022) as noted below.

## 2022-08-30 NOTE — TELEPHONE ENCOUNTER
MEDICATION APPEAL APPROVED    Medication: Saxenda-PA DENIED, PA APPEAL APPROVED   Authorization Effective Date:  5/28/2022  Authorization Expiration Date:  8/26/2023  Approved Dose/Quantity:   Reference #:     Insurance Company: [employer] - Phone [phone] Fax [phone]  Expected CoPay:       CoPay Card Available:      Foundation Assistance Needed:    Which Pharmacy is filling the prescription (Not needed for infusion/clinic administered): [id number] - [address] AT SEC OF [address]      **Be Advised: Called pharmacy and pharmacy stated that they do not have a script on file for medication and is requesting script be sent to filling pharmacy to process and fill for patient. **Instructed pharmacy to notify patient when script is ready to /ship.** Pharmacy will notify the patient when medication is ready for .**

## 2022-09-07 ENCOUNTER — OFFICE VISIT (OUTPATIENT)
Dept: SLEEP MEDICINE | Facility: CLINIC | Age: 47
End: 2022-09-07
Payer: COMMERCIAL

## 2022-09-07 VITALS
HEART RATE: 86 BPM | OXYGEN SATURATION: 100 % | SYSTOLIC BLOOD PRESSURE: 179 MMHG | HEIGHT: 71 IN | BODY MASS INDEX: 44.1 KG/M2 | DIASTOLIC BLOOD PRESSURE: 102 MMHG | WEIGHT: 315 LBS

## 2022-09-07 DIAGNOSIS — G47.33 OSA (OBSTRUCTIVE SLEEP APNEA): Primary | ICD-10-CM

## 2022-09-07 DIAGNOSIS — G47.36 SLEEP RELATED HYPOVENTILATION IN CONDITIONS CLASSIFIED ELSEWHERE: ICD-10-CM

## 2022-09-07 PROCEDURE — 99215 OFFICE O/P EST HI 40 MIN: CPT | Performed by: INTERNAL MEDICINE

## 2022-09-07 NOTE — PROGRESS NOTES
Presenting History:     Mr. Archie Wallace is a 47 years old male, with a history of hypertension, coronary artery disease, diabetes mellitus type 2 and asthma, who presents to clinic for management of obstructive sleep apnea.    Patient has severe obstructive sleep apnea and is prescribed CPAP therapy.  According to the available records, his PSG through [facility] on 10/24/2004 at a weight of 298 pounds, showed an apnea-hypopnea index of 50.5/h and lowest oxygen saturation of 74%.  Currently, he is on CPAP at a pressure of 16 cm H2O.    Patient uses CPAP regularly, but complains of having nonrestorative sleep and excessive daytime sleepiness.  According to him, he wakes up briefly every hour and experiences breakthrough snoring and gasping episodes.  He frequently experiences morning headaches.    Bedtime is typically 10 PM. Usually it takes about 5 minutes to fall asleep with the mask on. Patient reports having frequent arousals through out the night and feeling un refreshed in the morning. Wake time is typically 5 AM.  Patient is using PAP therapy 6-7 hours per night. The patient is usually getting 6-7 hours of sleep per night.    He uses nasal mask.     He does not feel rested in the morning.    [place] Sleepiness Scale: 23/24    [employer]ironics  CPAP 16 cmH2O download:  30/30 total days of use. 0 nonuse days.  Average use 6 hours 26 minutes per day.  96.7% days with >4 hours use.  Large leak 1 mins per day average.  AHI 2.5.     He does not take any daytime naps but frequently has inadvertent naps if he is not actively involved in any activity. He does not drive due to his sleepiness.    Patient denies having significant restless leg symptoms.  He denies having sleepwalking, dream enactment behavior or other parasomnias.    He smokes 1 joint of cannabis most days around 6 PM.       Past Medical History:   Diagnosis Date     CAD (coronary artery disease)     Premature artery disease noted     Class 3  "severe obesity due to excess calories with serious comorbidity and body mass index (BMI) of 45.0 to 49.9 in adult (H) 02/18/2010    Bariatric surgery consult 9/7/21 Stephania Youssef PA-C Starting BMI 46 and wt 337 lbs      DDD (degenerative disc disease), cervical      History of MI (myocardial infarction) 07/17/2020     Hyperlipidemia LDL goal <40      Hypertension goal BP (blood pressure) < 140/90      Long QT interval syndrome      MEDICAL HISTORY OF -     History of rhabdomyolosis, from strenuous exercise     Mild persistent asthma      Obstructive sleep apnea      Primary osteoarthritis of left knee      Sleep apnea syndrome     cpap     Type 2 diabetes, HbA1c goal < 7% (H)      Vitamin D deficiency      BP (!) 179/102   Pulse 86   Ht 1.803 m (5' 11\")   Wt (!) 162.6 kg (358 lb 6.4 oz)   SpO2 100%   BMI 49.99 kg/m      Impression & Plan:     1. Severe obstructive sleep apnea  2. Severe Obesity     Patient has severe obstructive sleep apnea with a baseline apnea-hypopnea index of 50.5/h on his PSG from 2004.  Currently, he is on CPAP therapy at a pressure of 16 cm H2O.  Despite regular compliance with CPAP, patient complains of having frequent nocturnal arousals, nonrestorative sleep, morning headaches and excessive daytime sleepiness.  Download data from his CPAP is satisfactory with regular compliance and a residual AHI that is 2.5/h.  However, considering his symptoms of nonrestorative sleep daytime sleepiness and morning headaches, I recommend that we perform a titration PSG.  Most recent metabolic panel shows a CO2 level of 31.  We should consider if there is any sleep-related hypoventilation.    Plan:     -Titration PSG with transcutaneous CO2 monitoring for optimization of PAP therapy  -Follow-up after sleep study for further recommendations.    I spent a total of 45 minutes for this appointment on this date of service which include time spent before, during and after the visit for chart review, patient " care, counseling and coordination of care.    Dr. Jose Antonio Almanza

## 2022-09-07 NOTE — NURSING NOTE
"Chief Complaint   Patient presents with     CPAP Follow Up       Initial BP (!) 179/102   Pulse 86   Ht 1.803 m (5' 11\")   Wt (!) 162.6 kg (358 lb 6.4 oz)   SpO2 100%   BMI 49.99 kg/m   Estimated body mass index is 49.99 kg/m  as calculated from the following:    Height as of this encounter: 1.803 m (5' 11\").    Weight as of this encounter: 162.6 kg (358 lb 6.4 oz).    Medication Reconciliation: complete    ESS:23    Joaquina Valladares CNA    "

## 2022-09-08 ENCOUNTER — OFFICE VISIT (OUTPATIENT)
Dept: URGENT CARE | Facility: URGENT CARE | Age: 47
End: 2022-09-08
Payer: COMMERCIAL

## 2022-09-08 VITALS
HEART RATE: 92 BPM | BODY MASS INDEX: 50.39 KG/M2 | DIASTOLIC BLOOD PRESSURE: 90 MMHG | SYSTOLIC BLOOD PRESSURE: 165 MMHG | OXYGEN SATURATION: 96 % | WEIGHT: 315 LBS | TEMPERATURE: 97.8 F

## 2022-09-08 DIAGNOSIS — R07.89 CHEST TIGHTNESS: Primary | ICD-10-CM

## 2022-09-08 DIAGNOSIS — Z87.09 HISTORY OF ASTHMA: ICD-10-CM

## 2022-09-08 DIAGNOSIS — R06.02 SOB (SHORTNESS OF BREATH): ICD-10-CM

## 2022-09-08 DIAGNOSIS — Z86.79 HISTORY OF HEART DISEASE: ICD-10-CM

## 2022-09-08 PROCEDURE — 99215 OFFICE O/P EST HI 40 MIN: CPT | Performed by: PHYSICIAN ASSISTANT

## 2022-09-08 PROCEDURE — 93000 ELECTROCARDIOGRAM COMPLETE: CPT | Performed by: PHYSICIAN ASSISTANT

## 2022-09-08 ASSESSMENT — ENCOUNTER SYMPTOMS
SHORTNESS OF BREATH: 1
WHEEZING: 1
SORE THROAT: 0
SINUS PAIN: 0
FATIGUE: 0
CHEST TIGHTNESS: 1
RHINORRHEA: 0
CHILLS: 0
PALPITATIONS: 1
SINUS PRESSURE: 0
COUGH: 0
GASTROINTESTINAL NEGATIVE: 1
FEVER: 0

## 2022-09-08 NOTE — PROGRESS NOTES
Subjective   Archie is a 47 year old accompanied by his spouse, presenting for the following health issues:  Urgent Care, Respiratory Problems (Tightness in chest since about 5:30 this morning, SOB for about 2 days but gets worse ), and Shortness of Breath    HPI   Acute Illness  Acute illness concerns:   Onset/Duration: 4days  Symptoms:  Fever: No  Chills/Sweats: Yes  Headache (location?): No  Sinus Pressure: No  Conjunctivitis:  No  Ear Pain: no  Rhinorrhea: No  Congestion: YES  Sore Throat: No  Cough: no, but reports chest tightness, shortness of breath, palpitations.  No orthopnea, PND or peripheral edema.  No HA, visual disturbances, dizziness, one sided weakness or slurred speech.      Wheeze: YES  Decreased Appetite: No  Nausea: No  Vomiting: No  Diarrhea: No  Dysuria/Freq.: No  Dysuria or Hematuria: No  Fatigue/Achiness: No  Sick/Strep Exposure: No ill contacts.  Hx of asthma CAD.  Non-smoker.  Therapies tried and outcome: inhalers with minimal relief    Patient Active Problem List   Diagnosis     Low back pain     Obstructive sleep apnea     Class 3 severe obesity due to excess calories with serious comorbidity and body mass index (BMI) of 45.0 to 49.9 in adult (H)     Hypertension goal BP (blood pressure) < 140/90     Hyperlipidemia LDL goal <40     CAD (coronary artery disease)     Moderate persistent asthma     Long QT interval syndrome     Type 2 diabetes mellitus with hyperglycemia, without long-term current use of insulin (H)     Moderate major depression (H)     Primary osteoarthritis of left knee     Chronic, continuous use of opioids     Current Outpatient Medications   Medication     albuterol (PROAIR HFA) 108 (90 Base) MCG/ACT inhaler     amLODIPine (NORVASC) 10 MG tablet     aspirin 81 MG EC tablet     atorvastatin (LIPITOR) 40 MG tablet     cloNIDine (CATAPRES) 0.2 MG tablet     insulin pen needle (31G X 5 MM) 31G X 5 MM miscellaneous     Insulin Pen Needle (PEN NEEDLES) 32G X 4 MM MISC      liraglutide - Weight Management (SAXENDA) 18 MG/3ML pen     losartan (COZAAR) 100 MG tablet     methocarbamol (ROBAXIN) 500 MG tablet     mometasone-formoterol (DULERA) 100-5 MCG/ACT inhaler     semaglutide (OZEMPIC) 2 MG/1.5ML SOPN pen     Current Facility-Administered Medications   Medication     betamethasone acet & sod phos (CELESTONE) injection 6 mg     ropivacaine (NAROPIN) injection 4 mL        Allergies   Allergen Reactions     Oseltamivir Anaphylaxis and Rash     Biaxin [Clarithromycin]      Muscle breakdown     Bromaxefed Dm Rf Cough     Clarithromycin Other (See Comments)     Weakness     Robafen Dm Cgh-Chest [Robitussin Cough-Chest Dm] Difficulty breathing     Guaifenesin Rash     Metformin Rash     Reported breaking out with a skin reaction  Reported breaking out with a skin reaction  Reported breaking out with a skin reaction       Review of Systems   Constitutional: Negative for chills, fatigue and fever.   HENT: Positive for congestion. Negative for ear discharge, ear pain, hearing loss, rhinorrhea, sinus pressure, sinus pain and sore throat.    Respiratory: Positive for chest tightness, shortness of breath and wheezing. Negative for cough.    Cardiovascular: Positive for palpitations. Negative for chest pain and peripheral edema.   Gastrointestinal: Negative.    All other systems reviewed and are negative.           Objective    BP (!) 165/90 (BP Location: Right arm, Patient Position: Sitting, Cuff Size: Adult Large)   Pulse 92   Temp 97.8  F (36.6  C) (Tympanic)   Wt (!) 163.9 kg (361 lb 4.8 oz)   SpO2 96%   BMI 50.39 kg/m    Body mass index is 50.39 kg/m .  Physical Exam  Vitals and nursing note reviewed.   Constitutional:       General: He is not in acute distress.     Appearance: Normal appearance. He is obese. He is not ill-appearing.   HENT:      Head: Normocephalic and atraumatic.      Ears:      Comments: TMs are intact without any erythema or bulging bilaterally.  Airway is patent.      Nose: Nose normal.      Mouth/Throat:      Lips: Pink.      Mouth: Mucous membranes are moist.      Pharynx: Oropharynx is clear. Uvula midline. No pharyngeal swelling, oropharyngeal exudate, posterior oropharyngeal erythema or uvula swelling.      Tonsils: No tonsillar exudate or tonsillar abscesses.   Eyes:      General: No scleral icterus.     Conjunctiva/sclera: Conjunctivae normal.      Pupils: Pupils are equal, round, and reactive to light.   Neck:      Thyroid: No thyromegaly.   Cardiovascular:      Rate and Rhythm: Normal rate and regular rhythm.      Pulses: Normal pulses.      Heart sounds: Normal heart sounds, S1 normal and S2 normal. No murmur heard.    No friction rub. No gallop.   Pulmonary:      Effort: Pulmonary effort is normal. No tachypnea, accessory muscle usage, respiratory distress or retractions.      Breath sounds: Normal breath sounds and air entry. No stridor. No decreased breath sounds, wheezing, rhonchi or rales.   Musculoskeletal:      Cervical back: Normal range of motion and neck supple.   Lymphadenopathy:      Cervical: No cervical adenopathy.   Skin:     General: Skin is warm and dry.      Findings: No rash.   Neurological:      Mental Status: He is alert and oriented to person, place, and time.   Psychiatric:         Mood and Affect: Mood normal.         Behavior: Behavior normal.         Thought Content: Thought content normal.         Judgment: Judgment normal.       No results found for this or any previous visit (from the past 24 hour(s)).  CXR PA/lateral:  No infiltrates, effusions or pneumothorax.  No suspicious nodules or lesions. No fractures.  Per my read.   Will send for overread.  EKG:  NSR, normal axis, 93bpm.  No ST-T wave abnormalities.  No acute changes.  No old EKGs for comparison.  Personally reviewed.      Assessment/Plan:  Chest tightness:  Along with shortness of breath, hx of CAD and asthma.  H&P is concerning for acute MI vs PE vs covid vs asthma exacerbation.   Recommend further evaluation and management in the ER.  Will most likely need further workup with labs and/or imaging.  Patient has declined transportation via ambulance and will have family drive her/him.  Understands risks and benefits of ambulance transfer and s/he has declined.  Call 911 if worsening symptoms.  S/he plans to go to [place] [facility].  S/he left in stable condition with AVS in hand.  F/u with PCP after ER visit.   -     EKG 12-lead complete w/read - Clinics  -     XR Chest 2 Views    SOB (shortness of breath)    History of heart disease    History of asthma        Kelly See JASMEET Salas

## 2022-09-12 DIAGNOSIS — J45.40 MODERATE PERSISTENT ASTHMA WITHOUT COMPLICATION: ICD-10-CM

## 2022-09-13 RX ORDER — MOMETASONE FUROATE AND FORMOTEROL FUMARATE DIHYDRATE 100; 5 UG/1; UG/1
AEROSOL RESPIRATORY (INHALATION)
Qty: 13 G | Refills: 0 | Status: SHIPPED | OUTPATIENT
Start: 2022-09-13

## 2022-09-16 NOTE — TELEPHONE ENCOUNTER
Phone number cant be complete as dial, send patient a my chart message about medication and to make appointment for more refills    Giorgi Christianson MA

## 2022-10-11 ENCOUNTER — TELEPHONE (OUTPATIENT)
Dept: CARDIOLOGY | Facility: CLINIC | Age: 47
End: 2022-10-11

## 2022-10-11 NOTE — TELEPHONE ENCOUNTER
Called patient to review recent elevated blood pressure reading.  Per [place] Community Measures guidelines, patients blood pressure is out of parameters and recheck blood pressure is recommended.    Last Blood Pressure: 165/90  Last Heart Rate: 92  Date: 9/8/22  Location: Other Specialty    Today's Blood Pressure: 162/97  Today's Heart Rate: 97  Location: Home BP    Patient reported blood pressure updated in Epic. Blood pressure continues to fall outside of the [place] Community Measures guidelines.  Message sent to primary cardiology team for further review.

## 2022-10-13 ENCOUNTER — OFFICE VISIT (OUTPATIENT)
Dept: URGENT CARE | Facility: URGENT CARE | Age: 47
End: 2022-10-13
Payer: COMMERCIAL

## 2022-10-13 VITALS
HEART RATE: 75 BPM | DIASTOLIC BLOOD PRESSURE: 109 MMHG | HEIGHT: 72 IN | WEIGHT: 315 LBS | TEMPERATURE: 97.8 F | OXYGEN SATURATION: 98 % | BODY MASS INDEX: 42.66 KG/M2 | SYSTOLIC BLOOD PRESSURE: 169 MMHG

## 2022-10-13 DIAGNOSIS — E11.65 TYPE 2 DIABETES MELLITUS WITH HYPERGLYCEMIA, WITHOUT LONG-TERM CURRENT USE OF INSULIN (H): ICD-10-CM

## 2022-10-13 DIAGNOSIS — I10 HYPERTENSION GOAL BP (BLOOD PRESSURE) < 140/90: ICD-10-CM

## 2022-10-13 DIAGNOSIS — B02.9 HERPES ZOSTER WITHOUT COMPLICATION: Primary | ICD-10-CM

## 2022-10-13 PROCEDURE — 99214 OFFICE O/P EST MOD 30 MIN: CPT | Performed by: PHYSICIAN ASSISTANT

## 2022-10-13 RX ORDER — VALACYCLOVIR HYDROCHLORIDE 1 G/1
1000 TABLET, FILM COATED ORAL 3 TIMES DAILY
Qty: 21 TABLET | Refills: 0 | Status: SHIPPED | OUTPATIENT
Start: 2022-10-13 | End: 2022-10-20

## 2022-10-13 ASSESSMENT — ENCOUNTER SYMPTOMS
WHEEZING: 0
HEMATURIA: 0
NAUSEA: 0
RESPIRATORY NEGATIVE: 1
VOMITING: 0
NEUROLOGICAL NEGATIVE: 1
CHILLS: 0
ABDOMINAL PAIN: 0
CHEST TIGHTNESS: 0
SORE THROAT: 0
FEVER: 0
CARDIOVASCULAR NEGATIVE: 1
MUSCULOSKELETAL NEGATIVE: 1
CONSTITUTIONAL NEGATIVE: 1
MYALGIAS: 0
HEADACHES: 0
DIARRHEA: 0
SHORTNESS OF BREATH: 0
ALLERGIC/IMMUNOLOGIC NEGATIVE: 1
PALPITATIONS: 0
DYSURIA: 0
COUGH: 0
FREQUENCY: 0
GASTROINTESTINAL NEGATIVE: 1

## 2022-10-13 NOTE — PROGRESS NOTES
Chief Complaint:    Chief Complaint   Patient presents with     Blister     Pt said that he think that he has shingles.      Medical Decision Making:    Vital signs reviewed by Ric Obrien PA-C  BP (!) 169/109   Pulse 75   Temp 97.8  F (36.6  C) (Tympanic)   Ht 1.829 m (6')   Wt (!) 155.5 kg (342 lb 12.8 oz)   SpO2 98%   BMI 46.49 kg/m      Differential Diagnosis:  Rash:   Herpes zoster  Dermatitis  Rash  Varicella  Zoster      ASSESSMENT:     1. Herpes zoster without complication    2. Type 2 diabetes mellitus with hyperglycemia, without long-term current use of insulin (H)    3. Hypertension goal BP (blood pressure) < 140/90         PLAN:     Rash clinically consistent with shingles. No facial involvement or systemic symptoms.   Rx for valacyclovir sent in.   Patient instructed to monitor blood sugars closely with illness.  Continue taking your Semaglutide, and Liraglutide and follow up with PCP for any DM medication changes if needed.  Patient is hypertensive in clinic today.  Second BP reading was also above goal at 169/109.  Continue taking your Losartan and Amlodipine.  Patient instructed to follow up with PCP in the next week for BP recheck.  Sooner if symptoms worsen.  Worrisome symptoms discussed with instructions to go to the ED.  Patient verbalized understanding and agreed with this plan.    Labs:     No results found for any visits on 10/13/22.    Current Meds:    Current Outpatient Medications:      albuterol (PROAIR HFA) 108 (90 Base) MCG/ACT inhaler, Inhale 2 puffs into the lungs every 6 hours as needed, Disp: 1 Inhaler, Rfl: 5     amLODIPine (NORVASC) 10 MG tablet, Take 1 tablet (10 mg) by mouth daily, Disp: 90 tablet, Rfl: 2     aspirin 81 MG EC tablet, Take 1 tablet (81 mg) by mouth daily, Disp: 90 tablet, Rfl: 3     atorvastatin (LIPITOR) 40 MG tablet, Take 1 tablet (40 mg) by mouth daily, Disp: 90 tablet, Rfl: 3     cloNIDine (CATAPRES) 0.2 MG tablet, Take 1 tablet (0.2 mg) by mouth 2  times daily +++NEEDS APPOINTMENT FOR FURTHER REFILLS+++, Disp: 60 tablet, Rfl: 0     DULERA 100-5 MCG/ACT inhaler, INHALER 2 PUFFS BY MOUTH INTO THE LUNGS TWICE DAILY, Disp: 13 g, Rfl: 0     insulin pen needle (31G X 5 MM) 31G X 5 MM miscellaneous, Use 1 pen needles daily or as directed., Disp: 100 each, Rfl: 0     Insulin Pen Needle (PEN NEEDLES) 32G X 4 MM MISC, 1 each daily, Disp: 100 each, Rfl: 0     liraglutide - Weight Management (SAXENDA) 18 MG/3ML pen, Week 1: 0.6 mg subcutaneous daily, Week 2: 1.2 mg daily, Week 3: 1.8 mg daily, Week 4: 2.4 mg daily, Week 5 & on: 3 mg daily, Disp: 15 mL, Rfl: 0     losartan (COZAAR) 100 MG tablet, Take 1 tablet (100 mg) by mouth daily, Disp: 90 tablet, Rfl: 2     methocarbamol (ROBAXIN) 500 MG tablet, Take 1 tablet (500 mg) by mouth 3 times daily as needed for muscle spasms, Disp: 30 tablet, Rfl: 1     semaglutide (OZEMPIC) 2 MG/1.5ML SOPN pen, Inject 0.25 mg subcutaneously once weekly for 4 weeks then 0.5 mg once weekly., Disp: 1.5 mL, Rfl: 1     valACYclovir (VALTREX) 1000 mg tablet, Take 1 tablet (1,000 mg) by mouth 3 times daily for 7 days, Disp: 21 tablet, Rfl: 0    Current Facility-Administered Medications:      betamethasone acet & sod phos (CELESTONE) injection 6 mg, 6 mg, , , Moise Cannon MD, 6 mg at 05/10/22 1427     ropivacaine (NAROPIN) injection 4 mL, 4 mL, , , Moise Cannon MD, 4 mL at 05/10/22 1427    Allergies:  Allergies   Allergen Reactions     Oseltamivir Anaphylaxis and Rash     Biaxin [Clarithromycin]      Muscle breakdown     Bromaxefed Dm Rf Cough     Clarithromycin Other (See Comments)     Weakness     Robafen Dm Cgh-Chest [Robitussin Cough-Chest Dm] Difficulty breathing     Guaifenesin Rash     Metformin Rash     Reported breaking out with a skin reaction  Reported breaking out with a skin reaction  Reported breaking out with a skin reaction       SUBJECTIVE    HPI: Archie Wallace is an 47 year old male who presents for evaluation and  treatment of rash. The rash started one week ago and is very tender and localized to his left trunk and abdomen. He feels as though he is continuing to develop new lesions. He has tried OTC topicals with no relief. Patient denies fever, chills, new chemical or contact exposure or sick contacts. He does have a history of chickenpox, but has never had a rash similar to this before. Patient denies visual changes or similar rash to the face, head, or neck.     ROS:      Review of Systems   Constitutional: Negative.  Negative for chills and fever.   HENT: Negative.  Negative for sore throat.    Respiratory: Negative.  Negative for cough, chest tightness, shortness of breath and wheezing.    Cardiovascular: Negative.  Negative for chest pain and palpitations.   Gastrointestinal: Negative.  Negative for abdominal pain, diarrhea, nausea and vomiting.   Genitourinary: Negative for dysuria, frequency, hematuria and urgency.   Musculoskeletal: Negative.  Negative for myalgias.   Skin: Positive for rash.   Allergic/Immunologic: Negative.  Negative for immunocompromised state.   Neurological: Negative.  Negative for headaches.        Family History   Family History   Problem Relation Age of Onset     Diabetes Mother      Hypertension Mother      Glaucoma Mother      Diabetes Father      Diabetes Sister      Glaucoma Other      Macular Degeneration No family hx of        Social History  Social History     Socioeconomic History     Marital status:      Spouse name: Not on file     Number of children: Not on file     Years of education: Not on file     Highest education level: Not on file   Occupational History     Not on file   Tobacco Use     Smoking status: Former     Types: Cigars     Quit date: 2000     Years since quittin.7     Smokeless tobacco: Never     Tobacco comments:     Smokes intermittently   Vaping Use     Vaping Use: Not on file   Substance and Sexual Activity     Alcohol use: Yes     Comment: occ/  mod     Drug use: Yes     Types: Marijuana     Comment: used marjuana a week ago     Sexual activity: Not Currently     Partners: Female   Other Topics Concern     Parent/sibling w/ CABG, MI or angioplasty before 65F 55M? No      Service No     Blood Transfusions No     Caffeine Concern No     Occupational Exposure No     Hobby Hazards No     Sleep Concern Yes     Comment: BRONWYN     Stress Concern No     Weight Concern Yes     Comment: Morbidly obese     Special Diet No     Back Care Yes     Exercise Yes     Comment: Active at work in maintenance     Bike Helmet Not Asked     Seat Belt Yes     Self-Exams Yes   Social History Narrative     Not on file     Social Determinants of Health     Financial Resource Strain: Not on file   Food Insecurity: Not on file   Transportation Needs: Not on file   Physical Activity: Not on file   Stress: Not on file   Social Connections: Not on file   Intimate Partner Violence: Not on file   Housing Stability: Not on file        Surgical History:  Past Surgical History:   Procedure Laterality Date     ANGIOGRAM       ANGIOPLASTY  2001    Coronary artery angioplasty//associated with mild AMI     DISCECTOMY, FUSION CERVICAL ANTERIOR THREE+ LEVELS, COMBINED N/A 7/16/2021    Procedure: Cervical 3 to Cervical 7 Anterior Cervical Discectomy and Fusion;  Surgeon: Jaquan De Leon MD;  Location: [facility] OR     NASAL SINUS SURGERY       THYROIDECTOMY Right 7/16/2021    Procedure: RIGHT THYROIDECTOMY;  Surgeon: Moshe Hernandez DO;  Location: [facility] OR     TONSILLECTOMY  2005    With uvuloectomy        Problem List:  Patient Active Problem List   Diagnosis     Low back pain     Obstructive sleep apnea     Class 3 severe obesity due to excess calories with serious comorbidity and body mass index (BMI) of 45.0 to 49.9 in adult (H)     Hypertension goal BP (blood pressure) < 140/90     Hyperlipidemia LDL goal <40     CAD (coronary artery disease)     Moderate persistent asthma     Long QT interval  syndrome     Type 2 diabetes mellitus with hyperglycemia, without long-term current use of insulin (H)     Moderate major depression (H)     Primary osteoarthritis of left knee     Chronic, continuous use of opioids           OBJECTIVE:     Vital signs noted and reviewed by Ric Obrien PA-C  BP (!) 169/109   Pulse 75   Temp 97.8  F (36.6  C) (Tympanic)   Ht 1.829 m (6')   Wt (!) 155.5 kg (342 lb 12.8 oz)   SpO2 98%   BMI 46.49 kg/m           Physical Exam  Vitals and nursing note reviewed.   Constitutional:       General: He is not in acute distress.     Appearance: He is well-developed. He is not ill-appearing, toxic-appearing or diaphoretic.   HENT:      Head: Normocephalic and atraumatic.      Right Ear: Hearing normal. Tympanic membrane is not perforated, erythematous, retracted or bulging.      Left Ear: Hearing normal. Tympanic membrane is not perforated, erythematous, retracted or bulging.      Nose: Nose normal. No mucosal edema, congestion or rhinorrhea.      Mouth/Throat:      Pharynx: No oropharyngeal exudate or posterior oropharyngeal erythema.      Tonsils: No tonsillar exudate or tonsillar abscesses. 0 on the right. 0 on the left.   Eyes:      General: No scleral icterus.        Right eye: No discharge.         Left eye: No discharge.      Extraocular Movements: Extraocular movements intact.      Conjunctiva/sclera: Conjunctivae normal.   Cardiovascular:      Rate and Rhythm: Normal rate and regular rhythm.      Heart sounds: Normal heart sounds, S1 normal and S2 normal. Heart sounds not distant.   Pulmonary:      Effort: Pulmonary effort is normal.      Breath sounds: Normal breath sounds. No decreased breath sounds.   Abdominal:      Palpations: Abdomen is soft.      Tenderness: There is no guarding.   Musculoskeletal:         General: Normal range of motion.      Cervical back: Normal range of motion and neck supple. No rigidity.   Skin:     General: Skin is warm and dry.      Findings:  Rash present.      Comments: Vesicular dermatomal rash distributed along left flank and abdomen   Neurological:      General: No focal deficit present.      Mental Status: He is alert and oriented to person, place, and time.      Gait: Gait normal.   Psychiatric:         Attention and Perception: He is attentive.         Mood and Affect: Mood normal.         Speech: Speech normal.         Behavior: Behavior normal. Behavior is cooperative.         Thought Content: Thought content normal.         Judgment: Judgment normal.          ABE GhoshS    Ric Obrien PA-C  10/13/2022, 9:35 AM

## 2022-10-18 ENCOUNTER — VIRTUAL VISIT (OUTPATIENT)
Dept: NEUROPSYCHOLOGY | Facility: CLINIC | Age: 47
End: 2022-10-18
Payer: COMMERCIAL

## 2022-10-18 DIAGNOSIS — F54 PSYCHOLOGICAL FACTORS AFFECTING MEDICAL CONDITION: ICD-10-CM

## 2022-10-18 DIAGNOSIS — E66.01 MORBID OBESITY (H): ICD-10-CM

## 2022-10-18 DIAGNOSIS — Z01.818 PREPROCEDURAL EXAMINATION: Primary | ICD-10-CM

## 2022-10-18 PROCEDURE — 99207 PR NO CHARGE LOS: CPT

## 2022-10-18 NOTE — PROGRESS NOTES
Mr. Wallace completed the interview portion of the psychological evaluation remotely via video. He asked to rescheduled the testing portion since he needed to be at work. He was scheduled to complete the testing on 10/19/2022. Full report to follow, pending completion of the evaluation.

## 2022-10-19 ENCOUNTER — VIRTUAL VISIT (OUTPATIENT)
Dept: NEUROPSYCHOLOGY | Facility: CLINIC | Age: 47
End: 2022-10-19
Payer: COMMERCIAL

## 2022-10-19 DIAGNOSIS — E66.01 MORBID OBESITY (H): ICD-10-CM

## 2022-10-19 DIAGNOSIS — Z01.818 PREPROCEDURAL EXAMINATION: Primary | ICD-10-CM

## 2022-10-19 DIAGNOSIS — F54 PSYCHOLOGICAL FACTORS AFFECTING MEDICAL CONDITION: ICD-10-CM

## 2022-10-19 PROCEDURE — 96138 PSYCL/NRPSYC TECH 1ST: CPT | Mod: 95

## 2022-10-19 PROCEDURE — 96139 PSYCL/NRPSYC TST TECH EA: CPT | Mod: 95

## 2022-10-19 PROCEDURE — 96130 PSYCL TST EVAL PHYS/QHP 1ST: CPT | Mod: 95

## 2022-10-19 PROCEDURE — 90791 PSYCH DIAGNOSTIC EVALUATION: CPT | Mod: 95
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
## 2022-10-19 NOTE — NURSING NOTE
Pt was seen for neuropsychological evaluation at the request of Stephania Youssef PA-C, for the purposes of diagnostic clarification and treatment planning. 64 minutes of test administration and scoring were provided by this writer. Please see Dr. Veronica Francisco's report for a full interpretation of the findings.    Sedrick Jones MA, CSP
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
## 2022-10-26 DIAGNOSIS — E66.813 CLASS 3 SEVERE OBESITY DUE TO EXCESS CALORIES WITHOUT SERIOUS COMORBIDITY WITH BODY MASS INDEX (BMI) OF 40.0 TO 44.9 IN ADULT (H): ICD-10-CM

## 2022-10-26 DIAGNOSIS — E66.01 CLASS 3 SEVERE OBESITY DUE TO EXCESS CALORIES WITHOUT SERIOUS COMORBIDITY WITH BODY MASS INDEX (BMI) OF 40.0 TO 44.9 IN ADULT (H): ICD-10-CM

## 2022-10-28 NOTE — TELEPHONE ENCOUNTER
Refill denied at this time. Patient needs appointment with [facility] and Stephania Youssef PA-C. [employer] message sent to patient to schedule appointment.

## 2022-10-28 NOTE — TELEPHONE ENCOUNTER
Pt needs appt, pt connie Youssef- pharm called  LCV:8-10-22 9 RTC 3 M)  FYI to clinic-- adjusting dose- unsure of start date  FYI to scheduling      Recieved refill request for Liraglutide . Patient needs appointment scheduled prior to any refills. Clinic Coordinator notified and will follow up with the patient as appropriate. The pharmacy has been notified that the medication will not be refilled prior to an appointment being scheduled.

## 2022-11-01 ENCOUNTER — MYC REFILL (OUTPATIENT)
Dept: FAMILY MEDICINE | Facility: CLINIC | Age: 47
End: 2022-11-01

## 2022-11-01 DIAGNOSIS — I10 HYPERTENSION GOAL BP (BLOOD PRESSURE) < 140/90: ICD-10-CM

## 2022-11-01 RX ORDER — CLONIDINE HYDROCHLORIDE 0.2 MG/1
0.2 TABLET ORAL 2 TIMES DAILY
Qty: 60 TABLET | Refills: 0 | OUTPATIENT
Start: 2022-11-01

## 2022-11-21 ENCOUNTER — HEALTH MAINTENANCE LETTER (OUTPATIENT)
Age: 47
End: 2022-11-21

## 2022-12-06 ENCOUNTER — THERAPY VISIT (OUTPATIENT)
Dept: SLEEP MEDICINE | Facility: CLINIC | Age: 47
End: 2022-12-06
Payer: COMMERCIAL

## 2022-12-06 DIAGNOSIS — G47.33 OSA (OBSTRUCTIVE SLEEP APNEA): ICD-10-CM

## 2022-12-06 PROCEDURE — 95811 POLYSOM 6/>YRS CPAP 4/> PARM: CPT | Performed by: INTERNAL MEDICINE

## 2022-12-07 NOTE — PATIENT INSTRUCTIONS
[place] [facility]    1. Your sleep study will be reviewed by a sleep physician within the next few days.     2. Please follow up in the sleep clinic as scheduled, or, make an appointment with your sleep provider to be seen within two weeks to discuss the results of the sleep study.    3. If you have any questions or problems with your treatment plan, please contact your sleep clinic provider at [phone] to further manage your condition.    4. Please review your attached medication list, and, at your follow-up appointment advise your sleep clinic provider about any changes.    5. Go to [email or web address] for more information about your sleep problems.    Dona Massey, RPSGT  December 7, 2022

## 2022-12-15 ENCOUNTER — CARE COORDINATION (OUTPATIENT)
Dept: ENDOCRINOLOGY | Facility: CLINIC | Age: 47
End: 2022-12-15

## 2022-12-15 NOTE — PROGRESS NOTES
Tasklist updated and sent to patient via [employer].    Bariatric Task List  Fax:  Please fax all paperwork to: [phone] -     Status:  Is patient a candidate for bariatric surgery?:  patient is a candidate for bariatric surgery -     Cleared to schedule surgeon consult?:  cleared to schedule surgeon consult - Call [phone] to schedule visit with Dr Kavon Mata. bks   Status:  surgery evaluation in process -     Surgeon: Adolfo -     Tentative surgery month/year: Schedule when at 327 lbs or less and sleep clearance done. -        Insurance: Insurance:  [employer] [place] - Call [phone] if insurance has changed the Call Center will update your chart.bks    Contact insurance to discuss coverage: Needed -          Patient Info: Initial Weight:  337 -     Date of Initial Weight/Height:  9/7/2021 -     Goal Weight (lbs):  317 -     Required Weight Loss:  20 -     Surgery Type:  sleeve gastrectomy -        Dietician Visits: Structured weight loss required by insurance?:  structured weight loss required -     Dietician Visit 1:  Completed - 9/7/21  - [facility]   Dietician Visit 2:  Completed - 10/5/21    Dietician Visit 3:  Completed - 11/02/21 & 11/30/21    Dietician Visit 4:  Completed - 12/31/21 in Epic. [facility]   Dietician Visit 5:  Completed - 1/27/22 in Epic. [facility]   Dietician Visit 6:  Completed - 3/7/22 [facility]   Dietician Visit additional:  Needed - Monthly until surgery for weight loss and to review postop diet teaching. - [facility]   Clearance from dietician to see surgeon?:    -     Dietician Notes:  3/7/22, 4/12/22, 5/12/22, 6/13/22, 7/14/22, -        Psychological Evaluation: Psych eval:  Completed - List and letter sent to pt 9/9/21 - AS; 10/18/22 Dr Francisco appt- cleared.  [facility]      Lab Work: Complete Blood Count:  Completed - completed 4/15/22 - KB   Comprehensive Metabolic Panel:  Completed - completed 4/15/22 - KB   Vitamin D:  Needed - completed 4/15/22. Low (15). Recheck in 8 wks after start of suppl - KB   PTH:  " Completed - completed 4/15/22 - KB   Hgb A1c:  Completed - completed 4/15/22 - KB    Lipids: Completed - completed 4/15/22 - KB    TSH ([facility], [facility], [place]): Completed - completed 4/15/22 - KB      Consults/ Clearance Sleep Medicine:  Needed - Letter sent to pt 9/9/21 - AS; 9/7/22 Jose Antonio Almanza MD appt. bks   Cardiac:  Cleared - 4/6/22 Dr. Lee in epic pending stress test and cardiac MRI - KB   Medical Weight Management: Completed - started ozempic with Stephania 9/7/21, call [phone] for follow-up visits. [facility]      Testing: Sleep Study:   -  12/6/22 done. [facility]      PCP: Establish care with PCP:  Completed -     PCP letter of support:  Completed - Letter sent to pt 9/9/21 -AS;8/15/22 jessica Parekh MD - in [facility]      Patient Education:  Information Session:  Completed -     Given \"Making your decision\" handout?:  Yes - 9/9/21   Given \"A Roadmap to you Weight Loss Surgery\" handout?: Yes - 9/9/21   Given \"Get Well Loop\" information?: Yes - 9/9/21   Given support group information?:    -     Attended support group?:    -     Support plan in place?:  Completed -     Research consents signed?:  research consent not signed -        Additional Surgery Requirements: Other: Covid test 4 days prior to surgery. bks -     Other: Postop appts for 1 week and 31+ days. -        Final Tasks:  Before surgery online class:  Needed -     Before surgery online class website link:  [email or web address]   After surgery online class:  Needed -     After surgery online class website link:  [email or web address]   Nurse visit per clinic:  Needed - Preop Nurse Class   History and Physical per clinic:   -  PreAssessment Clinic   Final labs per clinic: Needed -        Notes: Please register for the Get Well Loop when you get an email invitation and a surgery date.     The Get Well Loop will give you information via email or text messages that can help you be more successful before and after surgery.  It " "can also help answer any questions you may have.    Get Well Loop Information  [email or web address]     CARDS: 4/6/22 Fransisca Patrick MD Notes, \"Continue beta-blockers and anti-arrhythmics, but hold diuretics and ACE-I on morning of surgery\".     -            "

## 2022-12-16 ENCOUNTER — TELEPHONE (OUTPATIENT)
Dept: SLEEP MEDICINE | Facility: CLINIC | Age: 47
End: 2022-12-16

## 2022-12-16 DIAGNOSIS — G47.33 OSA (OBSTRUCTIVE SLEEP APNEA): Primary | ICD-10-CM

## 2022-12-16 NOTE — TELEPHONE ENCOUNTER
Patient was contacted on the phone.    He continues to do well with regular use of CPAP.  At this time, he is set to a CPAP pressure of 16 cm H2O.  Patient complains of having inadequate pressure.  Titration PSG showed adequate treatment of sleep apnea with a CPAP pressure of 16, but there were occasional post arousal central apneas and obstructive hypopneas at this pressure.  Based on his report of inadequate pressure, I will increase CPAP pressure to 17 cm H2O.    Patient is undergoing work-up for bariatric surgery.  From sleep medicine, he is cleared for weight loss surgery.  He should continue regular use of CPAP at 17 cm H2O.    Plan:     1.  Patient is cleared for weight loss surgery  2.  Continue regular CPAP therapy with pressure setting of 17 cm H2O    SLEEP APNEA   Perioperative Management      -Patients with severe sleep apnea who receive sedation/narcotics may need airway support whenever sleeping without intubation.     Structured approach:    1.  ANALGESIA  Consider NSAID, titratable dosing of opiod without sedative to minimize sedation, dexmedetomidine for agitation.  2. OXYGENATION  Use titratable oxygen to maintain SpO2 90-94%.  3. POSITION  Elevate head of bed 45 degrees or place in lateral position.  4.  MONITORING  Monitor in supervised area with continuous oximetry and ECG with SpO2 alarm set at 85% and.   5. AIRWAY    Apply previous effective CPAP of 17 cm H2O.     Intubate if there continues to be evidence of airway obstruction, uncontrolled hypercapnia or risk of vomiting with loss of consciousness.

## 2022-12-17 NOTE — PROCEDURES
" SLEEP STUDY INTERPRETATION  TITRATION STUDY      Patient: MANPREET GONZALEZ  YOB: 1975  Study Date: 12/6/2022  MRN: [id number]  Referring Provider: Self  Ordering Provider: MD Almanza Rakesh    Indications for Polysomnography: The patient is a 47 year old Male who is 5' 11\" and weighs 319.0 lbs. His BMI is 44.7, Calion sleepiness scale 23 and neck circumference is 46 cm cm. A polysomnogram with PAP titration was performed to correct for sleep apnea.    Polysomnogram Data: A full night polysomnogram recorded the standard physiologic parameters including EEG, EOG, EMG, ECG, nasal and oral airflow. Respiratory parameters of chest and abdominal movements were recorded with respiratory inductance plethysmography. Oxygen saturation was recorded by pulse oximetry. Hypopnea scoring rule used: 1B 4%.    Treatment PSG  Sleep Architecture: Fragmented sleep with reduced sleep efficiency.   The total recording time of the polysomnogram was 381.5 minutes. The total sleep time was 301.5 minutes. Sleep latency was decreased at 0.3 minutes without the use of a sleep aid. REM latency was 43.0 minutes. Arousal index was increased at 28.7 arousals per hour. Sleep efficiency was decreased at 79.0%. Wake after sleep onset was 79.5 minutes. The patient spent 15.9% of total sleep time in Stage N1, 46.1% in Stage N2, 13.8% in Stage N3, and 24.2% in REM. Time in REM supine was 73.0 minutes.    Respiration: The patient was titrated at pressures ranging from CPAP 12 cmH2O up to CPAP 16 cmH2O. The final pressure achieved was CPAP 16 cmH2O with a residual AHI of 9.5 events per hour. Time in REM supine on final pressure was 39.5 minutes.     This titration was considered:  - Good (residual AHI < 10 events per hour or 50% decrease if baseline AHI > 15 events per hour and including REM supine sleep at final pressure).    Snoring - was reported as absent on treatment.    Respiratory rate and pattern - was notable for normal respiratory " rate and pattern.    Sustained Sleep Associated Hypoventilation - Transcutaneous carbon dioxide monitoring was used; however significant hypoventilation was not present with a maximum change from 46 to 53.0 mmHg and 0 minutes at or greater than 55 mmHg.    Sleep Associated Hypoxemia - (Greater than 5 minutes O2 sat at or below 88%) was not present. Baseline oxygen saturation was 94.3%. Lowest oxygen saturation was 89.0%. Time spent less than or equal to 88% was 0 minutes. Time spent less than or equal to 89% was 0.5 minutes.    Movement Activity: Negative for movement abnormalities.     Periodic Limb Activity - There were 9 PLMs during the entire study. The PLM index was 1.8 movements per hour. The PLM Arousal Index was - per hour.    REM EMG Activity - Excessive transient/sustained muscle activity was not present.    Nocturnal Behavior - Abnormal sleep related behaviors were not noted during/arising out of NREM / REM sleep.     Bruxism - None apparent.    Cardiac Summary: Sinus rhythm with frequent PVCs.   The average pulse rate was 63.2 bpm. The minimum pulse rate was 50.0 bpm while the maximum pulse rate was 89.0 bpm. Arrhythmias were noted.    Assessment:     This titration PSG shows adequate treatment of patient's sleep apnea and hypoxemia with CPAP at a pressure of 16 cm H2O, except for occasional post arousal central apneas and obstructive hypopneas at this pressure.  Transcutaneous CO2 monitoring did not meet criteria for significant hypoventilation.    Recommendations:    Treatment can be continued with CPAP at 17 cmH2O. Patient should continue regular adherence to therapy and regular follow up.    Weight management (if BMI > 30).    Diagnostic Codes:   Obstructive Sleep Apnea G47.33  Repetitive Intrusions Into Sleep F51.8    12/6/2022 [place] Titration Sleep Study (319.0 lbs) - Treatment was titrated to a pressure of CPAP 16 with an AHI of 11.8. Time Spent in REM supine at this pressure was  39.5.    _____________________________________   Electronically Signed By: Jose Antonio Almanza MD 12/16/2022

## 2022-12-19 ENCOUNTER — CARE COORDINATION (OUTPATIENT)
Dept: ENDOCRINOLOGY | Facility: CLINIC | Age: 47
End: 2022-12-19

## 2022-12-19 LAB — SLPCOMP: NORMAL

## 2022-12-19 NOTE — PROGRESS NOTES
Tasklist updated and sent to patient via [employer].    Bariatric Task List  Fax:  Please fax all paperwork to: [phone] -     Status:  Is patient a candidate for bariatric surgery?:  patient is a candidate for bariatric surgery -     Cleared to schedule surgeon consult?:  cleared to schedule surgeon consult - Call [phone] to schedule visit with Dr Kavon Mata. bks   Status:  surgery evaluation in process -     Surgeon: Adolfo -     Tentative surgery month/year: To be determined. Call [facility] at [phone] when 327lbs or less. -        Insurance: Insurance:  [employer] [place] - Call [phone] if insurance has changed the Call Center will update your chart.bks    Contact insurance to discuss coverage: Needed -          Patient Info: Initial Weight:  337 -     Date of Initial Weight/Height:  9/7/2021 -     Goal Weight (lbs):  317 -     Required Weight Loss:  20 -     Surgery Type:  sleeve gastrectomy -        Dietician Visits: Structured weight loss required by insurance?:  structured weight loss required - Need 6 visits within a year of surgery. [facility]   Dietician Visit 1:  Completed - 9/7/21  - [facility]   Dietician Visit 2:  Completed - 10/5/21    Dietician Visit 3:  Completed - 11/02/21 & 11/30/21    Dietician Visit 4:  Completed - 12/31/21 in Epic. [facility]   Dietician Visit 5:  Completed - 1/27/22 in Epic. [facility]   Dietician Visit 6:  Completed - 3/7/22 [facility]   Dietician Visit additional:  Needed - Monthly until surgery for weight loss and to review postop diet teaching. - [facility]   Dietician Notes:  3/7/22, 4/12/22, 5/12/22, 6/13/22, 7/14/22, Call [phone] to schedule. [facility] -        Psychological Evaluation: Psych eval:  Completed - List and letter sent to pt 9/9/21 - AS; 10/18/22 Dr Francisco appt. [facility]      Lab Work: Complete Blood Count:  Completed - completed 4/15/22 - KB   Comprehensive Metabolic Panel:  Completed - completed 4/15/22 - KB   Vitamin D:  Completed - completed 4/15/22. Low (15). Recheck in 8 wks after  "start of suppl - KB   PTH:  Completed - completed 4/15/22 - KB   Hgb A1c:  Completed - completed 4/15/22 - KB    Lipids: Completed - completed 4/15/22 - KB    TSH ([facility], [facility], [place]): Completed - completed 4/15/22 - KB      Consults/ Clearance Sleep Medicine:  Completed - Letter sent to pt 9/9/21 - AS; 12/6/22 Jose Antonio Almanza MD-cleared, see phone visit. bks   Cardiac:  Completed - 4/6/22 Dr. Lee -cleared - KB   Medical Weight Management: Completed - started ozempic with Stephania 9/7/21, call [phone] for follow-up visits. bks      PCP: Establish care with PCP:  Completed -     PCP letter of support:  Completed - Letter sent to pt 9/9/21 -AS;8/15/22 jessica Parekh MD - in Epic. bks      Stopping Smoking/ Alcohol Use: Other: Quit THC -     Quit date:   - September 1, 2022      Patient Education:  Information Session:  Completed -     Given \"Making your decision\" handout?:  Yes - 9/9/21   Given \"A Roadmap to you Weight Loss Surgery\" handout?: Yes - 9/9/21   Given \"Get Well Loop\" information?: Yes - 9/9/21   Given support group information?:    -     Attended support group?:    -     Support plan in place?:  Completed -        Additional Surgery Requirements: Other: Covid test 4 days prior to surgery ONLY if symptoms. bks -     Other: Call Center to schedule postop appts for 1 week and 31+ days. bks -        Final Tasks:  Before surgery online class:  Needed -     Before surgery online class website link:  [email or web address]   After surgery online class:  Needed -     After surgery online class website link:  [email or web address]   Nurse visit per clinic:  Needed - Pre-op Nurse Class. bks   History and Physical per clinic:   -  Pre-Assessment Clinic. bks   Final labs per clinic: Needed -        Notes: Please register for the Get Well Loop when you get an email invitation and a surgery date.     The Get Well Loop will give you information via email or text messages that can " "help you be more successful before and after surgery.  It can also help answer any questions you may have.  Get Well Loop Information  [email or web address]     CARDS: 4/6/22 Fransisca Patrick MD Notes, \"Continue beta-blockers and anti-arrhythmics, but hold diuretics and ACE-I on morning of surgery\".            "

## 2022-12-25 ENCOUNTER — HEALTH MAINTENANCE LETTER (OUTPATIENT)
Age: 47
End: 2022-12-25

## 2022-12-26 ENCOUNTER — TELEPHONE (OUTPATIENT)
Dept: SLEEP MEDICINE | Facility: CLINIC | Age: 47
End: 2022-12-26

## 2022-12-26 NOTE — TELEPHONE ENCOUNTER
CALLED PT AND DISCUSS THE WAIT LIST PROCESS. PT EXPRESSED UNDERSTANDING. ASKED THE PT IF THE DEVICE HAS BEEN REGISTERED WITH [facility] AND THE PT STATED NO BECAUSE HE DOES NOT WANT ONE FROM [facility]. LET THE PT KNOW ONCE A MACHINE BECOMES AVAILABLE HE WILL RECEIVE A CALL TO SCHEDULE.

## 2023-01-12 ENCOUNTER — OFFICE VISIT (OUTPATIENT)
Dept: DERMATOLOGY | Facility: CLINIC | Age: 48
End: 2023-01-12
Payer: COMMERCIAL

## 2023-01-12 DIAGNOSIS — L30.4 INTERTRIGO: Primary | ICD-10-CM

## 2023-01-12 DIAGNOSIS — R21 RASH: ICD-10-CM

## 2023-01-12 LAB
KOH PREPARATION: NORMAL
KOH PREPARATION: NORMAL

## 2023-01-12 PROCEDURE — 99204 OFFICE O/P NEW MOD 45 MIN: CPT | Performed by: NURSE PRACTITIONER

## 2023-01-12 PROCEDURE — 87220 TISSUE EXAM FOR FUNGI: CPT

## 2023-01-12 RX ORDER — TRIAMCINOLONE ACETONIDE 1 MG/G
OINTMENT TOPICAL 2 TIMES DAILY
Qty: 80 G | Refills: 0 | Status: SHIPPED | OUTPATIENT
Start: 2023-01-12 | End: 2023-03-23

## 2023-01-12 RX ORDER — DESONIDE 0.5 MG/G
CREAM TOPICAL 2 TIMES DAILY
Qty: 60 G | Refills: 0 | Status: SHIPPED | OUTPATIENT
Start: 2023-01-12 | End: 2023-03-23

## 2023-01-12 RX ORDER — KETOCONAZOLE 20 MG/G
CREAM TOPICAL
Qty: 60 G | Refills: 11 | Status: SHIPPED | OUTPATIENT
Start: 2023-01-12

## 2023-01-12 NOTE — PATIENT INSTRUCTIONS
Abdominal folds: (intertrigo is the rash name) desonide cream and ketoconazole cream twice daily when rash is itchy. When the itching resolves I would keep using the ketoconazole cream only once daily and do no keep using the desonide cream.     Back and feet: (eczema) use triamcinolone 0.1% cream  twice daily until rash and itching goes away then stop. For the feet only, I would  urea 40% cream nightly as well.      Stop using soap to your body except armpits and groin if you feel that is needed.  Use a moisturizer at least once daily such as Vaseline/petroleum jelly, Cetaphil cream, Cera Ve Cream. Continue to do this even when the rash is gone, it will help prevent it from flaring.       You can repeat these steps if the rash comes back but if it doesn't heal them in 1 month I want to see you back.      Come back in 1 month unless rash is completely gone and you are not longer using the steroid creams (desonide and triamcinolone)

## 2023-01-12 NOTE — LETTER
1/12/2023         RE: Archie Wallace  [address]  [place] [address]        Dear Colleague,    Thank you for referring your patient, Archie Wallace, to the [facility]. Please see a copy of my visit note below.    [facility] Dermatology Note  Encounter Date: Jan 12, 2023  Office Visit     Reviewed patients past medical history and pertinent chart review prior to patients visit today.     Dermatology Problem List:  Rash. KOH sent. Given triamcinolone 0.1% ointment if KOH negative 01/12/23   Intertrigo, abdominal folds. Desonide and ketoconazole given.     ____________________________________________    Assessment & Plan:     # Intertrigo. Discussed that this is very common in the skin folds and is due to the areas being warm and moist.  Discussed techniques to ensure area stays as dry as possible but that this is not always possible.  I gave her prescription for ketoconazole 2% cream as well as desonide 0.05% cream to apply 1-2 times daily until rash resolves. When rash is fully resolved okay to continue ketoconazole as much as needed to keep rash from flaring as it will tend to be recurrent for most people. Councled about use and side effects of thinning of the skin, striae, erythema, and worsening of rash with steroid use.        # Rash. Tinea vs more likely Eczematous dermatitis of feet and favor eczematous dermatitis on back    -KOH sent to lab today from scrapping of feet. Await results prior to starting treatment.   -if KOH is negative patient will proceed with the following:     -Start triamcinolone 0.1% ointment twice daily for 2-4 weeks, decreasing as patients rash improves, repeat cycle for flares. If not fully resolved in 1 month, patient advised to return to clinic for reevaluation. Discussed risk of skin atrophy with long term chronic use. Not for face, armpits or groin.   -For the feet, start urea 40% cream OTC nightly.   -When bathing,  use gentle soap such as Dove for Sensitive Skin and lukewarm water on amrpits, groin, and other visibly dirty areas, all other areas let the water run over but no soap is necessary. Pat skin dry instead of vigorous rubbing. Encouraged regular use of an emollient such as Vaseline, Cera Ve Cream or Cetaphil Cream to the entire body.     Return to clinic in 1 month if not fully resolved, sooner PRN for new or worsening conditions.     Tiffanie Christie, APRN CNP on 1/12/2023 at 2:10 PM   _______________________________________    CC: Rash (Back, feet, and stomach (spreading). )    HPI:  Mr. Archie Wallace is a(n) 47 year old male who presents today as a new patient for rash. It started about 6 months ago on his feet. It has been very itchy. Then it spread his lower legs (which has since resolved) and then on to stomach and back. He tried OTC hydrocortisone BID for a couple of month but doesn't think it is helping very much except for keeping the skin moist. He then tried someone elses prescription of triamcinolone once daily for 1 week. He thinks that may have helped but he wasn't sure how often he could use it. He tried antifungal cream once daily to the back, feet and stomach for about 2 months as well. Nothing has gotten it go go away.     Patient is otherwise feeling well, without additional skin concerns.      Physical Exam:  SKIN: Focused examination of feet, abdomen, back, mons pubis was performed.  - lichenification and hyperpigmentation with a few small erosions on the abdominal fold extending onto mons pubis  -back has lichenification with some hyperkeratotic papules on mid low back  -feet have large flakes and peeling skin of all bilateral feet and between toes  -some thick yellowing of several toenails with subungual debris    - No other lesions of concern on areas examined.     Medications:  Current Outpatient Medications   Medication     albuterol (PROAIR HFA) 108 (90 Base) MCG/ACT inhaler     amLODIPine  (NORVASC) 10 MG tablet     aspirin 81 MG EC tablet     atorvastatin (LIPITOR) 40 MG tablet     cloNIDine (CATAPRES) 0.2 MG tablet     DULERA 100-5 MCG/ACT inhaler     insulin pen needle (31G X 5 MM) 31G X 5 MM miscellaneous     Insulin Pen Needle (PEN NEEDLES) 32G X 4 MM MISC     liraglutide - Weight Management (SAXENDA) 18 MG/3ML pen     losartan (COZAAR) 100 MG tablet     methocarbamol (ROBAXIN) 500 MG tablet     semaglutide (OZEMPIC) 2 MG/1.5ML SOPN pen     valACYclovir (VALTREX) 1000 mg tablet     Current Facility-Administered Medications   Medication     betamethasone acet & sod phos (CELESTONE) injection 6 mg     ropivacaine (NAROPIN) injection 4 mL      Past Medical History:   Patient Active Problem List   Diagnosis     Low back pain     Obstructive sleep apnea     Class 3 severe obesity due to excess calories with serious comorbidity and body mass index (BMI) of 45.0 to 49.9 in adult (H)     Hypertension goal BP (blood pressure) < 140/90     Hyperlipidemia LDL goal <40     CAD (coronary artery disease)     Moderate persistent asthma     Long QT interval syndrome     Type 2 diabetes mellitus with hyperglycemia, without long-term current use of insulin (H)     Moderate major depression (H)     Primary osteoarthritis of left knee     Chronic, continuous use of opioids     Past Medical History:   Diagnosis Date     CAD (coronary artery disease)     Premature artery disease noted     Class 3 severe obesity due to excess calories with serious comorbidity and body mass index (BMI) of 45.0 to 49.9 in adult (H) 02/18/2010    Bariatric surgery consult 9/7/21 Stephania Youssef PA-C Starting BMI 46 and wt 337 lbs      DDD (degenerative disc disease), cervical      History of MI (myocardial infarction) 07/17/2020     Hyperlipidemia LDL goal <40      Hypertension goal BP (blood pressure) < 140/90      Long QT interval syndrome      MEDICAL HISTORY OF -     History of rhabdomyolosis, from strenuous exercise     Mild persistent  asthma      Obstructive sleep apnea      Primary osteoarthritis of left knee      Sleep apnea syndrome     cpap     Type 2 diabetes, HbA1c goal < 7% (H)      Vitamin D deficiency        CC No referring provider defined for this encounter. on close of this encounter.       Again, thank you for allowing me to participate in the care of your patient.        Sincerely,        CECE Prasad CNP

## 2023-01-12 NOTE — PROGRESS NOTES
[facility] Dermatology Note  Encounter Date: Jan 12, 2023  Office Visit     Reviewed patients past medical history and pertinent chart review prior to patients visit today.     Dermatology Problem List:  Rash. KOH sent. Given triamcinolone 0.1% ointment if KOH negative 01/12/23   Intertrigo, abdominal folds. Desonide and ketoconazole given.     ____________________________________________    Assessment & Plan:     # Intertrigo. Discussed that this is very common in the skin folds and is due to the areas being warm and moist.  Discussed techniques to ensure area stays as dry as possible but that this is not always possible.  I gave her prescription for ketoconazole 2% cream as well as desonide 0.05% cream to apply 1-2 times daily until rash resolves. When rash is fully resolved okay to continue ketoconazole as much as needed to keep rash from flaring as it will tend to be recurrent for most people. Councled about use and side effects of thinning of the skin, striae, erythema, and worsening of rash with steroid use.        # Rash. Tinea vs more likely Eczematous dermatitis of feet and favor eczematous dermatitis on back    -KOH sent to lab today from scrapping of feet. Await results prior to starting treatment.   -if KOH is negative patient will proceed with the following:     -Start triamcinolone 0.1% ointment twice daily for 2-4 weeks, decreasing as patients rash improves, repeat cycle for flares. If not fully resolved in 1 month, patient advised to return to clinic for reevaluation. Discussed risk of skin atrophy with long term chronic use. Not for face, armpits or groin.   -For the feet, start urea 40% cream OTC nightly.   -When bathing, use gentle soap such as Dove for Sensitive Skin and lukewarm water on amrpits, groin, and other visibly dirty areas, all other areas let the water run over but no soap is necessary. Pat skin dry instead of vigorous rubbing. Encouraged regular use of an  emollient such as Vaseline, Cera Ve Cream or Cetaphil Cream to the entire body.     Return to clinic in 1 month if not fully resolved, sooner PRN for new or worsening conditions.     Tiffanie Christie, APRN CNP on 1/12/2023 at 2:10 PM   _______________________________________    CC: Rash (Back, feet, and stomach (spreading). )    HPI:  Mr. Archie Wallace is a(n) 47 year old male who presents today as a new patient for rash. It started about 6 months ago on his feet. It has been very itchy. Then it spread his lower legs (which has since resolved) and then on to stomach and back. He tried OTC hydrocortisone BID for a couple of month but doesn't think it is helping very much except for keeping the skin moist. He then tried someone elses prescription of triamcinolone once daily for 1 week. He thinks that may have helped but he wasn't sure how often he could use it. He tried antifungal cream once daily to the back, feet and stomach for about 2 months as well. Nothing has gotten it go go away.     Patient is otherwise feeling well, without additional skin concerns.      Physical Exam:  SKIN: Focused examination of feet, abdomen, back, mons pubis was performed.  - lichenification and hyperpigmentation with a few small erosions on the abdominal fold extending onto mons pubis  -back has lichenification with some hyperkeratotic papules on mid low back  -feet have large flakes and peeling skin of all bilateral feet and between toes  -some thick yellowing of several toenails with subungual debris    - No other lesions of concern on areas examined.     Medications:  Current Outpatient Medications   Medication     albuterol (PROAIR HFA) 108 (90 Base) MCG/ACT inhaler     amLODIPine (NORVASC) 10 MG tablet     aspirin 81 MG EC tablet     atorvastatin (LIPITOR) 40 MG tablet     cloNIDine (CATAPRES) 0.2 MG tablet     DULERA 100-5 MCG/ACT inhaler     insulin pen needle (31G X 5 MM) 31G X 5 MM miscellaneous     Insulin Pen Needle (PEN  NEEDLES) 32G X 4 MM MISC     liraglutide - Weight Management (SAXENDA) 18 MG/3ML pen     losartan (COZAAR) 100 MG tablet     methocarbamol (ROBAXIN) 500 MG tablet     semaglutide (OZEMPIC) 2 MG/1.5ML SOPN pen     valACYclovir (VALTREX) 1000 mg tablet     Current Facility-Administered Medications   Medication     betamethasone acet & sod phos (CELESTONE) injection 6 mg     ropivacaine (NAROPIN) injection 4 mL      Past Medical History:   Patient Active Problem List   Diagnosis     Low back pain     Obstructive sleep apnea     Class 3 severe obesity due to excess calories with serious comorbidity and body mass index (BMI) of 45.0 to 49.9 in adult (H)     Hypertension goal BP (blood pressure) < 140/90     Hyperlipidemia LDL goal <40     CAD (coronary artery disease)     Moderate persistent asthma     Long QT interval syndrome     Type 2 diabetes mellitus with hyperglycemia, without long-term current use of insulin (H)     Moderate major depression (H)     Primary osteoarthritis of left knee     Chronic, continuous use of opioids     Past Medical History:   Diagnosis Date     CAD (coronary artery disease)     Premature artery disease noted     Class 3 severe obesity due to excess calories with serious comorbidity and body mass index (BMI) of 45.0 to 49.9 in adult (H) 02/18/2010    Bariatric surgery consult 9/7/21 Stephania Youssef PA-C Starting BMI 46 and wt 337 lbs      DDD (degenerative disc disease), cervical      History of MI (myocardial infarction) 07/17/2020     Hyperlipidemia LDL goal <40      Hypertension goal BP (blood pressure) < 140/90      Long QT interval syndrome      MEDICAL HISTORY OF -     History of rhabdomyolosis, from strenuous exercise     Mild persistent asthma      Obstructive sleep apnea      Primary osteoarthritis of left knee      Sleep apnea syndrome     cpap     Type 2 diabetes, HbA1c goal < 7% (H)      Vitamin D deficiency        CC No referring provider defined for this encounter. on close of  this encounter.

## 2023-01-13 ENCOUNTER — TELEPHONE (OUTPATIENT)
Dept: DERMATOLOGY | Facility: CLINIC | Age: 48
End: 2023-01-13
Payer: COMMERCIAL

## 2023-01-13 DIAGNOSIS — L30.4 INTERTRIGO: Primary | ICD-10-CM

## 2023-01-13 NOTE — TELEPHONE ENCOUNTER
"PA started for desonide (DESOWEN) 0.05 % external cream   .  To submit the PA:  1. Go to [email or web address] and click Enter a key  2. Enter the pt's last name and date of birth and the key   Patient last name:LISA   :1975   Key:[id number]  3. Complete the form and click \"Send to Plan\"    "

## 2023-01-13 NOTE — TELEPHONE ENCOUNTER
Prior Authorization Specialty Medication Request    Medication/Dose: Desonide 0.05% Cream          Joyce SANDOVAL RN  [facility]  [phone]

## 2023-01-18 RX ORDER — HYDROCORTISONE 2.5 %
CREAM (GRAM) TOPICAL 2 TIMES DAILY
Qty: 30 G | Refills: 2 | Status: SHIPPED | OUTPATIENT
Start: 2023-01-18 | End: 2023-03-23

## 2023-01-18 NOTE — TELEPHONE ENCOUNTER
Sent hydrocortisone 2.5% cream in place of desonide. Please let patient know this is the alternative to use.

## 2023-01-18 NOTE — TELEPHONE ENCOUNTER
PRIOR AUTHORIZATION DENIED    Medication: Desonide 0.05% Cream- DENIED     Denial Date: 1/18/2023    Denial Rational: Patient must have a history of trial & failure to 3 formulary alternatives or have a contraindication or intolerance to the formulary alternatives:      Appeal Information: If provider would like to appeal please provide a letter of medical necessity.

## 2023-01-18 NOTE — TELEPHONE ENCOUNTER
S/w pt and gave Vee's message below.  Advised rx for hydrocortisone 2.5% was sent to [facility].    Pt states understanding.    Jeanine DIOP RN  [facility]  [phone]

## 2023-01-18 NOTE — TELEPHONE ENCOUNTER
Central Prior Authorization Team  Phone: [phone]    PA Initiation    Medication: Desonide 0.05% Cream  Insurance Company: [employer] - Phone [phone] Fax [phone]  Pharmacy Filling the Rx: [id number] [address]  Filling Pharmacy Phone: [phone]  Filling Pharmacy Fax:    Start Date: 1/17/2023

## 2023-02-09 ENCOUNTER — OFFICE VISIT (OUTPATIENT)
Dept: DERMATOLOGY | Facility: CLINIC | Age: 48
End: 2023-02-09
Payer: COMMERCIAL

## 2023-02-09 DIAGNOSIS — L30.1 DYSHIDROTIC ECZEMA: ICD-10-CM

## 2023-02-09 DIAGNOSIS — L30.4 INTERTRIGO: Primary | ICD-10-CM

## 2023-02-09 DIAGNOSIS — B35.1 ONYCHOMYCOSIS: ICD-10-CM

## 2023-02-09 DIAGNOSIS — Z79.899 MEDICATION MANAGEMENT: ICD-10-CM

## 2023-02-09 LAB
ALBUMIN SERPL-MCNC: 4.1 G/DL (ref 3.4–5)
ALP SERPL-CCNC: 47 U/L (ref 40–150)
ALT SERPL W P-5'-P-CCNC: 44 U/L (ref 0–70)
AST SERPL W P-5'-P-CCNC: 19 U/L (ref 0–45)
BILIRUB DIRECT SERPL-MCNC: 0.2 MG/DL (ref 0–0.2)
BILIRUB SERPL-MCNC: 0.6 MG/DL (ref 0.2–1.3)
PROT SERPL-MCNC: 8.5 G/DL (ref 6.8–8.8)

## 2023-02-09 PROCEDURE — 36415 COLL VENOUS BLD VENIPUNCTURE: CPT | Performed by: NURSE PRACTITIONER

## 2023-02-09 PROCEDURE — 80076 HEPATIC FUNCTION PANEL: CPT | Performed by: NURSE PRACTITIONER

## 2023-02-09 PROCEDURE — 99214 OFFICE O/P EST MOD 30 MIN: CPT | Performed by: NURSE PRACTITIONER

## 2023-02-09 RX ORDER — TERBINAFINE HYDROCHLORIDE 250 MG/1
250 TABLET ORAL DAILY
Qty: 42 TABLET | Refills: 0 | Status: SHIPPED | OUTPATIENT
Start: 2023-02-09 | End: 2023-03-23

## 2023-02-09 RX ORDER — BETAMETHASONE DIPROPIONATE 0.5 MG/G
OINTMENT, AUGMENTED TOPICAL 2 TIMES DAILY
Qty: 50 G | Refills: 1 | Status: SHIPPED | OUTPATIENT
Start: 2023-02-09 | End: 2023-07-13

## 2023-02-09 NOTE — PATIENT INSTRUCTIONS
Abdominal folds: (intertrigo is the rash name) hydrocortisone 2.5% cream and ketoconazole cream twice daily when rash is itchy only. When the itching resolves I would keep using the ketoconazole cream only once daily and do no keep using the desonide cream.     Palms and feet: (eczema) use triamcinolone 0.1% cream  twice daily until rash and itching goes away then stop. For the feet only, I would  urea 40% cream from [facility] and use it nightly as well.      Stop using soap to your body except armpits and groin if you feel that is needed.  Use a moisturizer at least once daily such as Vaseline/petroleum jelly, Cetaphil cream, Cera Ve Cream. Continue to do this even when the rash is gone, it will help prevent it from flaring.     Back, try over the counter sarna original lotion when you feel itchy. This tricks your nerves into not feeling so itchy. Keep in fridge to keep it cold if that helps the itching as well.     You can repeat these steps if the rash comes back but if it doesn't heal them in 1 month I want to see you back.      Terbinifine 250 mg once daily for 6 weeks for toenail fungus, then another blood draw and I will send a refill at that time.

## 2023-02-09 NOTE — LETTER
2/9/2023         RE: Archie Wallace  [address]  [place] [address]        Dear Colleague,    Thank you for referring your patient, Archie Wallace, to the [facility]. Please see a copy of my visit note below.    [facility] Dermatology Note  Encounter Date: Feb 9, 2023  Office Visit     Reviewed patients past medical history and pertinent chart review prior to patients visit today.     Dermatology Problem List:  Rash. KOH sent. Given triamcinolone 0.1% ointment if KOH negative 01/12/23   Intertrigo, abdominal folds. Desonide and ketoconazole given.     ____________________________________________    Assessment & Plan:     # Intertrigo, well controlled today.  Advised patient to continue use of ketoconazole 2% cream daily and if it does not reach flare he can decrease use to every 2 to 3 days.  If the rash flares he will begin use of ketoconazole 2% cream and hydrocortisone 2.5% cream at the same time but will stop the hydrocortisone when the rash is resolved. Councled about use and side effects of thinning of the skin, striae, erythema, and worsening of rash with steroid use.        #Dyshidrotic eczema of the hands and feet   -Given betamethasone augmented formula  0.05% ointment to use BID until resolved and then BID PRN for flares.  Councled about use and side effects of thinning of the skin, striae, erythema, and worsening of rash.   Not for use in places other than hands or feet.   -apply steroid ointment at night to rashy areas, and petroleum jelly to the rest of the hands/feet. Cover with cotton gloves overnight during sleep.   -reapply another time during the day when patient can keep on for 1+ hours without washing hands  -wear gloves when washing dishes, using cleaning supplies, or when hands would otherwise be immersed in soapy water.   -each time patient washes hands they should apply a moisturizing cream immediately afterwards. Examples  include Cetaphil cream, Cera Ve Cream or Vanicream.     Return to clinic in 1 month if not fully resolved, sooner PRN for new or worsening conditions.     # Onychomycosis. Discussed treatment options with patient today including no treatment, topical therapies, and oral therapy with Lamisil. Side effects of lamisil discussed focusing on potential for liver damage and dysfunction and need for labs prior to initiation of therapy and half way through the treatment. Discussed watching for any signs of liver abnormality including but not limited to jaundice, darkening of urine, pain in the abdomen.  Patient is interested in treatment with Lamisil. Start lamisil 250 mg daily for 6 weeks, then repeat LFT and continue for another 6 weeks. Patient is agreeable with this plan and will away results.      Follow up in 6 weeks    CECE Prasad CNP on 1/12/2023 at 2:10 PM   _______________________________________    CC: Derm Problem (No improvement with current tx, still very dry and flaking/Back slight improvement in discoloration and itching /Out of Triam 0.01 % ointment, using Ketoconazole )    HPI:  Mr. Archie Wallace is a(n) 47 year old male who presents today as a return patient for follow-up of intertrigo and rash on the feet and back.  He was last seen by me on 1/13/2023 at which time I gave him both ketoconazole 2% cream and hydrocortisone 2.5% cream to use on the intertrigo rash on the abdominal folds.  I also gave him triamcinolone 0.1% ointment for the rash on the back and the feet and advised him to use urea 40% cream to the feet nightly.  KOH was negative from the feet last visit as well.  Since this visit he has seen improvement in the rash on his back.  He still has some itching periodically but feels like the rash is nice and flat not flaky or raised.  The rash on the feet is still very bothersome to him.  He has been using the triamcinolone only on an as-needed basis because the pharmacist told him  that he should not be using it every day long-term as it can thin his skin.  He would like to discuss his toenail fungus as well as it is very deforming and he is using a  to file down his nails otherwise he cannot clip.      Patient is otherwise feeling well, without additional skin concerns.      Physical Exam:  SKIN: Focused examination of feet, abdomen, back, mons pubis was performed.  - lichenification and hyperpigmentation on the abdominal fold extending onto mons pubis without erosions or erythema  -back has lichenification with but no desquamation of skin or lichenification mid low back, significantly improved  -feet have large flakes and peeling skin of all bilateral feet and between toes  -Right palm has some desquamation and erythema of skin  -some thick yellowing of several toenails with subungual debris and lots of thickening of the left great toenail    - No other lesions of concern on areas examined.     Medications:  Current Outpatient Medications   Medication     augmented betamethasone dipropionate (DIPROLENE-AF) 0.05 % external ointment     hydrocortisone 2.5 % cream     ketoconazole (NIZORAL) 2 % external cream     triamcinolone (KENALOG) 0.1 % external ointment     albuterol (PROAIR HFA) 108 (90 Base) MCG/ACT inhaler     amLODIPine (NORVASC) 10 MG tablet     aspirin 81 MG EC tablet     atorvastatin (LIPITOR) 40 MG tablet     cloNIDine (CATAPRES) 0.2 MG tablet     desonide (DESOWEN) 0.05 % external cream     DULERA 100-5 MCG/ACT inhaler     insulin pen needle (31G X 5 MM) 31G X 5 MM miscellaneous     Insulin Pen Needle (PEN NEEDLES) 32G X 4 MM MISC     liraglutide - Weight Management (SAXENDA) 18 MG/3ML pen     losartan (COZAAR) 100 MG tablet     methocarbamol (ROBAXIN) 500 MG tablet     semaglutide (OZEMPIC) 2 MG/1.5ML SOPN pen     valACYclovir (VALTREX) 1000 mg tablet     Current Facility-Administered Medications   Medication     betamethasone acet & sod phos (CELESTONE) injection 6 mg      ropivacaine (NAROPIN) injection 4 mL      Past Medical History:   Patient Active Problem List   Diagnosis     Low back pain     Obstructive sleep apnea     Class 3 severe obesity due to excess calories with serious comorbidity and body mass index (BMI) of 45.0 to 49.9 in adult (H)     Hypertension goal BP (blood pressure) < 140/90     Hyperlipidemia LDL goal <40     CAD (coronary artery disease)     Moderate persistent asthma     Long QT interval syndrome     Type 2 diabetes mellitus with hyperglycemia, without long-term current use of insulin (H)     Moderate major depression (H)     Primary osteoarthritis of left knee     Chronic, continuous use of opioids     Past Medical History:   Diagnosis Date     CAD (coronary artery disease)     Premature artery disease noted     Class 3 severe obesity due to excess calories with serious comorbidity and body mass index (BMI) of 45.0 to 49.9 in adult (H) 02/18/2010    Bariatric surgery consult 9/7/21 Stephania Youssef PA-C Starting BMI 46 and wt 337 lbs      DDD (degenerative disc disease), cervical      History of MI (myocardial infarction) 07/17/2020     Hyperlipidemia LDL goal <40      Hypertension goal BP (blood pressure) < 140/90      Long QT interval syndrome      MEDICAL HISTORY OF -     History of rhabdomyolosis, from strenuous exercise     Mild persistent asthma      Obstructive sleep apnea      Primary osteoarthritis of left knee      Sleep apnea syndrome     cpap     Type 2 diabetes, HbA1c goal < 7% (H)      Vitamin D deficiency        CC No referring provider defined for this encounter. on close of this encounter.       Again, thank you for allowing me to participate in the care of your patient.        Sincerely,        Tiffanie Christie, CECE CNP

## 2023-02-09 NOTE — PROGRESS NOTES
[facility] Dermatology Note  Encounter Date: Feb 9, 2023  Office Visit     Reviewed patients past medical history and pertinent chart review prior to patients visit today.     Dermatology Problem List:  Rash. KOH sent. Given triamcinolone 0.1% ointment if KOH negative 01/12/23   Intertrigo, abdominal folds. Desonide and ketoconazole given.     ____________________________________________    Assessment & Plan:     # Intertrigo, well controlled today.  Advised patient to continue use of ketoconazole 2% cream daily and if it does not reach flare he can decrease use to every 2 to 3 days.  If the rash flares he will begin use of ketoconazole 2% cream and hydrocortisone 2.5% cream at the same time but will stop the hydrocortisone when the rash is resolved. Councled about use and side effects of thinning of the skin, striae, erythema, and worsening of rash with steroid use.        #Dyshidrotic eczema of the hands and feet   -Given betamethasone augmented formula  0.05% ointment to use BID until resolved and then BID PRN for flares.  Councled about use and side effects of thinning of the skin, striae, erythema, and worsening of rash.   Not for use in places other than hands or feet.   -apply steroid ointment at night to rashy areas, and petroleum jelly to the rest of the hands/feet. Cover with cotton gloves overnight during sleep.   -reapply another time during the day when patient can keep on for 1+ hours without washing hands  -wear gloves when washing dishes, using cleaning supplies, or when hands would otherwise be immersed in soapy water.   -each time patient washes hands they should apply a moisturizing cream immediately afterwards. Examples include Cetaphil cream, Cera Ve Cream or Vanicream.     Return to clinic in 1 month if not fully resolved, sooner PRN for new or worsening conditions.     # Onychomycosis. Discussed treatment options with patient today including no treatment, topical therapies,  and oral therapy with Lamisil. Side effects of lamisil discussed focusing on potential for liver damage and dysfunction and need for labs prior to initiation of therapy and half way through the treatment. Discussed watching for any signs of liver abnormality including but not limited to jaundice, darkening of urine, pain in the abdomen.  Patient is interested in treatment with Lamisil. Start lamisil 250 mg daily for 6 weeks, then repeat LFT and continue for another 6 weeks. Patient is agreeable with this plan and will away results.      Follow up in 6 weeks    CECE Prasad CNP on 1/12/2023 at 2:10 PM   _______________________________________    CC: Derm Problem (No improvement with current tx, still very dry and flaking/Back slight improvement in discoloration and itching /Out of Triam 0.01 % ointment, using Ketoconazole )    HPI:  Mr. Archie Wallace is a(n) 47 year old male who presents today as a return patient for follow-up of intertrigo and rash on the feet and back.  He was last seen by me on 1/13/2023 at which time I gave him both ketoconazole 2% cream and hydrocortisone 2.5% cream to use on the intertrigo rash on the abdominal folds.  I also gave him triamcinolone 0.1% ointment for the rash on the back and the feet and advised him to use urea 40% cream to the feet nightly.  KOH was negative from the feet last visit as well.  Since this visit he has seen improvement in the rash on his back.  He still has some itching periodically but feels like the rash is nice and flat not flaky or raised.  The rash on the feet is still very bothersome to him.  He has been using the triamcinolone only on an as-needed basis because the pharmacist told him that he should not be using it every day long-term as it can thin his skin.  He would like to discuss his toenail fungus as well as it is very deforming and he is using a  to file down his nails otherwise he cannot clip.      Patient is otherwise feeling  well, without additional skin concerns.      Physical Exam:  SKIN: Focused examination of feet, abdomen, back, mons pubis was performed.  - lichenification and hyperpigmentation on the abdominal fold extending onto mons pubis without erosions or erythema  -back has lichenification with but no desquamation of skin or lichenification mid low back, significantly improved  -feet have large flakes and peeling skin of all bilateral feet and between toes  -Right palm has some desquamation and erythema of skin  -some thick yellowing of several toenails with subungual debris and lots of thickening of the left great toenail    - No other lesions of concern on areas examined.     Medications:  Current Outpatient Medications   Medication     augmented betamethasone dipropionate (DIPROLENE-AF) 0.05 % external ointment     hydrocortisone 2.5 % cream     ketoconazole (NIZORAL) 2 % external cream     triamcinolone (KENALOG) 0.1 % external ointment     albuterol (PROAIR HFA) 108 (90 Base) MCG/ACT inhaler     amLODIPine (NORVASC) 10 MG tablet     aspirin 81 MG EC tablet     atorvastatin (LIPITOR) 40 MG tablet     cloNIDine (CATAPRES) 0.2 MG tablet     desonide (DESOWEN) 0.05 % external cream     DULERA 100-5 MCG/ACT inhaler     insulin pen needle (31G X 5 MM) 31G X 5 MM miscellaneous     Insulin Pen Needle (PEN NEEDLES) 32G X 4 MM MISC     liraglutide - Weight Management (SAXENDA) 18 MG/3ML pen     losartan (COZAAR) 100 MG tablet     methocarbamol (ROBAXIN) 500 MG tablet     semaglutide (OZEMPIC) 2 MG/1.5ML SOPN pen     valACYclovir (VALTREX) 1000 mg tablet     Current Facility-Administered Medications   Medication     betamethasone acet & sod phos (CELESTONE) injection 6 mg     ropivacaine (NAROPIN) injection 4 mL      Past Medical History:   Patient Active Problem List   Diagnosis     Low back pain     Obstructive sleep apnea     Class 3 severe obesity due to excess calories with serious comorbidity and body mass index (BMI) of  45.0 to 49.9 in adult (H)     Hypertension goal BP (blood pressure) < 140/90     Hyperlipidemia LDL goal <40     CAD (coronary artery disease)     Moderate persistent asthma     Long QT interval syndrome     Type 2 diabetes mellitus with hyperglycemia, without long-term current use of insulin (H)     Moderate major depression (H)     Primary osteoarthritis of left knee     Chronic, continuous use of opioids     Past Medical History:   Diagnosis Date     CAD (coronary artery disease)     Premature artery disease noted     Class 3 severe obesity due to excess calories with serious comorbidity and body mass index (BMI) of 45.0 to 49.9 in adult (H) 02/18/2010    Bariatric surgery consult 9/7/21 Stephania Youssef PA-C Starting BMI 46 and wt 337 lbs      DDD (degenerative disc disease), cervical      History of MI (myocardial infarction) 07/17/2020     Hyperlipidemia LDL goal <40      Hypertension goal BP (blood pressure) < 140/90      Long QT interval syndrome      MEDICAL HISTORY OF -     History of rhabdomyolosis, from strenuous exercise     Mild persistent asthma      Obstructive sleep apnea      Primary osteoarthritis of left knee      Sleep apnea syndrome     cpap     Type 2 diabetes, HbA1c goal < 7% (H)      Vitamin D deficiency        CC No referring provider defined for this encounter. on close of this encounter.

## 2023-03-23 ENCOUNTER — OFFICE VISIT (OUTPATIENT)
Dept: DERMATOLOGY | Facility: CLINIC | Age: 48
End: 2023-03-23
Payer: COMMERCIAL

## 2023-03-23 DIAGNOSIS — B35.1 ONYCHOMYCOSIS: ICD-10-CM

## 2023-03-23 DIAGNOSIS — Z79.899 MEDICATION MANAGEMENT: ICD-10-CM

## 2023-03-23 DIAGNOSIS — B35.1 ONYCHOMYCOSIS: Primary | ICD-10-CM

## 2023-03-23 DIAGNOSIS — L30.1 DYSHIDROTIC ECZEMA: ICD-10-CM

## 2023-03-23 DIAGNOSIS — L30.4 INTERTRIGO: ICD-10-CM

## 2023-03-23 LAB
ALBUMIN SERPL-MCNC: 3.5 G/DL (ref 3.4–5)
ALP SERPL-CCNC: 41 U/L (ref 40–150)
ALT SERPL W P-5'-P-CCNC: 29 U/L (ref 0–70)
AST SERPL W P-5'-P-CCNC: 14 U/L (ref 0–45)
BILIRUB DIRECT SERPL-MCNC: 0.1 MG/DL (ref 0–0.2)
BILIRUB SERPL-MCNC: 0.4 MG/DL (ref 0.2–1.3)
PROT SERPL-MCNC: 7.4 G/DL (ref 6.8–8.8)

## 2023-03-23 PROCEDURE — 36415 COLL VENOUS BLD VENIPUNCTURE: CPT | Performed by: NURSE PRACTITIONER

## 2023-03-23 PROCEDURE — 99213 OFFICE O/P EST LOW 20 MIN: CPT | Performed by: NURSE PRACTITIONER

## 2023-03-23 PROCEDURE — 80076 HEPATIC FUNCTION PANEL: CPT | Performed by: NURSE PRACTITIONER

## 2023-03-23 RX ORDER — TERBINAFINE HYDROCHLORIDE 250 MG/1
250 TABLET ORAL DAILY
Qty: 42 TABLET | Refills: 0 | Status: SHIPPED | OUTPATIENT
Start: 2023-03-23

## 2023-03-23 RX ORDER — HYDROCORTISONE 2.5 %
CREAM (GRAM) TOPICAL 2 TIMES DAILY
Qty: 30 G | Refills: 2 | Status: SHIPPED | OUTPATIENT
Start: 2023-03-23

## 2023-03-23 NOTE — LETTER
3/23/2023         RE: Archie Wallace  [address]  [place] [address]        Dear Colleague,    Thank you for referring your patient, Archie Wallace, to the [facility]. Please see a copy of my visit note below.    [facility] Dermatology Note  Encounter Date: Mar 23, 2023  Office Visit     Reviewed patients past medical history and pertinent chart review prior to patients visit today.     Dermatology Problem List:  Dyshidrotic eczema, hands and feet. Given triamcinolone 0.1% ointment 2/9/2023, KOH negative 01/12/23   Intertrigo, abdominal folds.  Hydrocortisone 2.5% cream and ketoconazole keeps controlled  Onychomycosis, toenails.  On Lamisil currently 3/23/2023     ____________________________________________    Assessment & Plan:     # Intertrigo, well controlled today.  Advised patient to continue use of ketoconazole 2% cream daily. If the rash flares he will begin use of ketoconazole 2% cream and hydrocortisone 2.5% cream at the same time but will stop the hydrocortisone when the rash is resolved. Councled about use and side effects of thinning of the skin, striae, erythema, and worsening of rash with steroid use.        #Dyshidrotic eczema of the hands and feet, hands are clear, feet are improved  -Continue betamethasone augmented formula  0.05% ointment but decrease to once daily use of the feet until cleared and then discontinue and use as needed only for flares.  He should not be using this to his hands as they are clear of rash right now.  Use for flares only.  Councled about use and side effects of thinning of the skin, striae, erythema, and worsening of rash.   Not for use in places other than hands or feet.   -When flared, apply steroid ointment at night to rashy areas, and petroleum jelly to the rest of the hands/feet. Cover with cotton gloves overnight during sleep.  When the hands are well controlled continue this with petroleum jelly  only and discontinue the steroids.  -wear gloves when washing dishes, using cleaning supplies, or when hands would otherwise be immersed in soapy water.   -each time patient washes hands they should apply a moisturizing cream immediately afterwards. Examples include Cetaphil cream, Cera Ve Cream or Vanicream.       # Onychomycosis.patient will have LFTs drawn today after appointment.  If normal he will continue lamisil 250 mg daily for 6 more weeks then we will wait 6 to 12 months for the nails to grow out and see if they are still infected with fungus or if they have grown out clear.  Follow-up if not clear.    Follow up PRN    Vee Christie, JELLY  Dermatology   _______________________________________    CC: Derm Problem (50-60% improvement on hands and feet and in groin and back folds/Triamcinolone and Ketoconazole in folds ( needs refill)/Betamethasone ointment 0.05% to hands and feet)    HPI:  Mr. Archie Wallace is a(n) 47 year old male who presents today as a return patient for follow-up of intertrigo,  Toenail fungus, and dyshidrotic eczema of the hands and the feet.  He has been using betamethasone augmented formula ointment to the hands and feet twice daily and says the hands are clear of rash and the feet are still a little bit itchy and peeling.  We did a KOH in January which was negative for fungus.  He is greatly improved and happy with the improvement.  He also feels like the intertrigo on the abdominal fold is well controlled with the ketoconazole 2% cream.  He has not been using the hydrocortisone 2.5% cream while the rash is controlled but continues to use the ketoconazole cream  once daily.  The toenails he thinks are improving as well.  He has been on the oral Lamisil for about 6 weeks.  He has not noticed any side effects, abdominal pain, changing of his urine color or any other signs of liver damage.    Patient is otherwise feeling well, without additional skin concerns.      Physical Exam:  SKIN:  Focused examination of feet, abdomen, back, mons pubis was performed.  -feet has mild skin desquamation of the soles, no interdigital webbed space peeling.  Some mild erythema but greatly improved since last visit  -Hands are clear of any rash or peeling of skin today  -some thick yellowing of several toenails with subungual debris and lots of thickening of the left great toenail.  There are a few toenails that appear to have normal colored nail growth at the base of the nail today.  Great toenails look unchanged.    - No other lesions of concern on areas examined.     Medications:  Current Outpatient Medications   Medication     augmented betamethasone dipropionate (DIPROLENE-AF) 0.05 % external ointment     hydrocortisone 2.5 % cream     ketoconazole (NIZORAL) 2 % external cream     albuterol (PROAIR HFA) 108 (90 Base) MCG/ACT inhaler     amLODIPine (NORVASC) 10 MG tablet     aspirin 81 MG EC tablet     atorvastatin (LIPITOR) 40 MG tablet     cloNIDine (CATAPRES) 0.2 MG tablet     DULERA 100-5 MCG/ACT inhaler     insulin pen needle (31G X 5 MM) 31G X 5 MM miscellaneous     Insulin Pen Needle (PEN NEEDLES) 32G X 4 MM MISC     liraglutide - Weight Management (SAXENDA) 18 MG/3ML pen     losartan (COZAAR) 100 MG tablet     methocarbamol (ROBAXIN) 500 MG tablet     semaglutide (OZEMPIC) 2 MG/1.5ML SOPN pen     terbinafine (LAMISIL) 250 MG tablet     valACYclovir (VALTREX) 1000 mg tablet     Current Facility-Administered Medications   Medication     betamethasone acet & sod phos (CELESTONE) injection 6 mg     ropivacaine (NAROPIN) injection 4 mL      Past Medical History:   Patient Active Problem List   Diagnosis     Low back pain     Obstructive sleep apnea     Class 3 severe obesity due to excess calories with serious comorbidity and body mass index (BMI) of 45.0 to 49.9 in adult (H)     Hypertension goal BP (blood pressure) < 140/90     Hyperlipidemia LDL goal <40     CAD (coronary artery disease)     Moderate  persistent asthma     Long QT interval syndrome     Type 2 diabetes mellitus with hyperglycemia, without long-term current use of insulin (H)     Moderate major depression (H)     Primary osteoarthritis of left knee     Chronic, continuous use of opioids     Past Medical History:   Diagnosis Date     CAD (coronary artery disease)     Premature artery disease noted     Class 3 severe obesity due to excess calories with serious comorbidity and body mass index (BMI) of 45.0 to 49.9 in adult (H) 02/18/2010    Bariatric surgery consult 9/7/21 Stephania Youssef PA-C Starting BMI 46 and wt 337 lbs      DDD (degenerative disc disease), cervical      History of MI (myocardial infarction) 07/17/2020     Hyperlipidemia LDL goal <40      Hypertension goal BP (blood pressure) < 140/90      Long QT interval syndrome      MEDICAL HISTORY OF -     History of rhabdomyolosis, from strenuous exercise     Mild persistent asthma      Obstructive sleep apnea      Primary osteoarthritis of left knee      Sleep apnea syndrome     cpap     Type 2 diabetes, HbA1c goal < 7% (H)      Vitamin D deficiency        CC No referring provider defined for this encounter. on close of this encounter.       Again, thank you for allowing me to participate in the care of your patient.        Sincerely,        Tiffanie Christie, CECE CNP

## 2023-03-23 NOTE — PROGRESS NOTES
[facility] Dermatology Note  Encounter Date: Mar 23, 2023  Office Visit     Reviewed patients past medical history and pertinent chart review prior to patients visit today.     Dermatology Problem List:  Dyshidrotic eczema, hands and feet. Given triamcinolone 0.1% ointment 2/9/2023, KOH negative 01/12/23   Intertrigo, abdominal folds.  Hydrocortisone 2.5% cream and ketoconazole keeps controlled  Onychomycosis, toenails.  On Lamisil currently 3/23/2023     ____________________________________________    Assessment & Plan:     # Intertrigo, well controlled today.  Advised patient to continue use of ketoconazole 2% cream daily. If the rash flares he will begin use of ketoconazole 2% cream and hydrocortisone 2.5% cream at the same time but will stop the hydrocortisone when the rash is resolved. Councled about use and side effects of thinning of the skin, striae, erythema, and worsening of rash with steroid use.        #Dyshidrotic eczema of the hands and feet, hands are clear, feet are improved  -Continue betamethasone augmented formula  0.05% ointment but decrease to once daily use of the feet until cleared and then discontinue and use as needed only for flares.  He should not be using this to his hands as they are clear of rash right now.  Use for flares only.  Councled about use and side effects of thinning of the skin, striae, erythema, and worsening of rash.   Not for use in places other than hands or feet.   -When flared, apply steroid ointment at night to rashy areas, and petroleum jelly to the rest of the hands/feet. Cover with cotton gloves overnight during sleep.  When the hands are well controlled continue this with petroleum jelly only and discontinue the steroids.  -wear gloves when washing dishes, using cleaning supplies, or when hands would otherwise be immersed in soapy water.   -each time patient washes hands they should apply a moisturizing cream immediately afterwards. Examples  include Cetaphil cream, Cera Ve Cream or Vanicream.       # Onychomycosis.patient will have LFTs drawn today after appointment.  If normal he will continue lamisil 250 mg daily for 6 more weeks then we will wait 6 to 12 months for the nails to grow out and see if they are still infected with fungus or if they have grown out clear.  Follow-up if not clear.    Follow up PRN    Vee Christie CNP  Dermatology   _______________________________________    CC: Derm Problem (50-60% improvement on hands and feet and in groin and back folds/Triamcinolone and Ketoconazole in folds ( needs refill)/Betamethasone ointment 0.05% to hands and feet)    HPI:  Mr. Archie Wallace is a(n) 47 year old male who presents today as a return patient for follow-up of intertrigo,  Toenail fungus, and dyshidrotic eczema of the hands and the feet.  He has been using betamethasone augmented formula ointment to the hands and feet twice daily and says the hands are clear of rash and the feet are still a little bit itchy and peeling.  We did a KOH in January which was negative for fungus.  He is greatly improved and happy with the improvement.  He also feels like the intertrigo on the abdominal fold is well controlled with the ketoconazole 2% cream.  He has not been using the hydrocortisone 2.5% cream while the rash is controlled but continues to use the ketoconazole cream  once daily.  The toenails he thinks are improving as well.  He has been on the oral Lamisil for about 6 weeks.  He has not noticed any side effects, abdominal pain, changing of his urine color or any other signs of liver damage.    Patient is otherwise feeling well, without additional skin concerns.      Physical Exam:  SKIN: Focused examination of feet, abdomen, back, mons pubis was performed.  -feet has mild skin desquamation of the soles, no interdigital webbed space peeling.  Some mild erythema but greatly improved since last visit  -Hands are clear of any rash or peeling of skin  today  -some thick yellowing of several toenails with subungual debris and lots of thickening of the left great toenail.  There are a few toenails that appear to have normal colored nail growth at the base of the nail today.  Great toenails look unchanged.    - No other lesions of concern on areas examined.     Medications:  Current Outpatient Medications   Medication     augmented betamethasone dipropionate (DIPROLENE-AF) 0.05 % external ointment     hydrocortisone 2.5 % cream     ketoconazole (NIZORAL) 2 % external cream     albuterol (PROAIR HFA) 108 (90 Base) MCG/ACT inhaler     amLODIPine (NORVASC) 10 MG tablet     aspirin 81 MG EC tablet     atorvastatin (LIPITOR) 40 MG tablet     cloNIDine (CATAPRES) 0.2 MG tablet     DULERA 100-5 MCG/ACT inhaler     insulin pen needle (31G X 5 MM) 31G X 5 MM miscellaneous     Insulin Pen Needle (PEN NEEDLES) 32G X 4 MM MISC     liraglutide - Weight Management (SAXENDA) 18 MG/3ML pen     losartan (COZAAR) 100 MG tablet     methocarbamol (ROBAXIN) 500 MG tablet     semaglutide (OZEMPIC) 2 MG/1.5ML SOPN pen     terbinafine (LAMISIL) 250 MG tablet     valACYclovir (VALTREX) 1000 mg tablet     Current Facility-Administered Medications   Medication     betamethasone acet & sod phos (CELESTONE) injection 6 mg     ropivacaine (NAROPIN) injection 4 mL      Past Medical History:   Patient Active Problem List   Diagnosis     Low back pain     Obstructive sleep apnea     Class 3 severe obesity due to excess calories with serious comorbidity and body mass index (BMI) of 45.0 to 49.9 in adult (H)     Hypertension goal BP (blood pressure) < 140/90     Hyperlipidemia LDL goal <40     CAD (coronary artery disease)     Moderate persistent asthma     Long QT interval syndrome     Type 2 diabetes mellitus with hyperglycemia, without long-term current use of insulin (H)     Moderate major depression (H)     Primary osteoarthritis of left knee     Chronic, continuous use of opioids     Past  Medical History:   Diagnosis Date     CAD (coronary artery disease)     Premature artery disease noted     Class 3 severe obesity due to excess calories with serious comorbidity and body mass index (BMI) of 45.0 to 49.9 in adult (H) 02/18/2010    Bariatric surgery consult 9/7/21 Stephania Youssef PA-C Starting BMI 46 and wt 337 lbs      DDD (degenerative disc disease), cervical      History of MI (myocardial infarction) 07/17/2020     Hyperlipidemia LDL goal <40      Hypertension goal BP (blood pressure) < 140/90      Long QT interval syndrome      MEDICAL HISTORY OF -     History of rhabdomyolosis, from strenuous exercise     Mild persistent asthma      Obstructive sleep apnea      Primary osteoarthritis of left knee      Sleep apnea syndrome     cpap     Type 2 diabetes, HbA1c goal < 7% (H)      Vitamin D deficiency        CC No referring provider defined for this encounter. on close of this encounter.

## 2023-03-27 ENCOUNTER — TELEPHONE (OUTPATIENT)
Dept: DERMATOLOGY | Facility: CLINIC | Age: 48
End: 2023-03-27
Payer: COMMERCIAL

## 2023-03-27 NOTE — TELEPHONE ENCOUNTER
----- Message from CECE James CNP sent at 3/23/2023  2:54 PM CDT -----  Mychart message sent     Rosa Elena Almanza, your lab work looks great. I sent a refill of the terbinifine/lamisil to terry in [place] that you had on file. Finish this next refill then you can stop and wait for the nails to grow out.

## 2023-03-27 NOTE — TELEPHONE ENCOUNTER
Attempted to call pt and phone just rang with no vm.  Not able to leave message.  My chart message sent via Vee Christie which pt has not reviewed.  Letter mailed home with Vee's message below.    Jeanine DIPO RN  [facility]  [phone]

## 2023-03-27 NOTE — LETTER
March 27, 2023      Archie Wallace  [address]   [place] [address]          Dear Archie,     Your lab work looks great. I sent a refill of the terbinifine/lamisil to terry in [place] that you had on file. Finish this next refill then you can stop and wait for the nails to grow out.  If you have any questions/concerns please call the clinic at [phone] and ask to speak with a dermatology nurse.      Sincerely,    CECE Prasad CNP

## 2023-04-16 ENCOUNTER — HEALTH MAINTENANCE LETTER (OUTPATIENT)
Age: 48
End: 2023-04-16

## 2023-04-21 ENCOUNTER — DOCUMENTATION ONLY (OUTPATIENT)
Dept: SLEEP MEDICINE | Facility: CLINIC | Age: 48
End: 2023-04-21
Payer: COMMERCIAL

## 2023-04-21 DIAGNOSIS — G47.33 OBSTRUCTIVE SLEEP APNEA (ADULT) (PEDIATRIC): Primary | ICD-10-CM

## 2023-04-22 NOTE — PROGRESS NOTES
Patient was offered choice of vendor and chose Pending sale to [facility].  Patient Archie Wallace was set up at [facility] on April 22, 2023. Patient received a Resmed Airsense 10 Pressures were set at 17 cm H2O.   Patient s ramp is 8 cm H2O for Auto and FLEX/EPR is EPR, 2.  Patient received a [employer] Mask name: DREAMWEAR NASAL FITPACK  Nasal mask size Standard, heated tubing and heated humidifier.  Patient has the following compliance requirements: usage only   Michael Butler

## 2023-07-13 DIAGNOSIS — L30.1 DYSHIDROTIC ECZEMA: ICD-10-CM

## 2023-07-13 RX ORDER — BETAMETHASONE DIPROPIONATE 0.5 MG/G
OINTMENT, AUGMENTED TOPICAL 2 TIMES DAILY
Qty: 50 G | Refills: 1 | Status: SHIPPED | OUTPATIENT
Start: 2023-07-13

## 2023-07-13 NOTE — TELEPHONE ENCOUNTER
Requested Prescriptions   Pending Prescriptions Disp Refills     augmented betamethasone dipropionate (DIPROLENE-AF) 0.05 % external ointment 50 g 1     Sig: Apply topically 2 times daily To hand and foot rash. Once itching is gone then stop. Decrease use after 1 month.       There is no refill protocol information for this order        Last Written Prescription Date:  2/9/23  Last Fill Quantity: 50gm,  # refills; 1   Last office visit: 3/23/2023 ; last virtual visit: Visit date not found with prescribing provider:       Future Office Visit:      Tyson CARRILLO

## 2023-07-13 NOTE — TELEPHONE ENCOUNTER
Ok per protocol. Spoke with patient and he is using as needed for flares which helps.     Joyce SANDOVAL RN BSN  [facility]at[facility]  [phone]

## 2023-08-14 ENCOUNTER — TELEPHONE (OUTPATIENT)
Dept: SURGERY | Facility: CLINIC | Age: 48
End: 2023-08-14
Payer: COMMERCIAL

## 2023-08-15 NOTE — TELEPHONE ENCOUNTER
No need to renew PA for Saxenda. Patient hasn't been seen for 1 year. Rx was sent for one month supply only. No future visit scheduled. [employer] message sent to patient to schedule visit.

## 2023-09-17 ENCOUNTER — HEALTH MAINTENANCE LETTER (OUTPATIENT)
Age: 48
End: 2023-09-17

## 2023-11-28 ENCOUNTER — ALLIED HEALTH/NURSE VISIT (OUTPATIENT)
Dept: FAMILY MEDICINE | Facility: CLINIC | Age: 48
End: 2023-11-28
Payer: COMMERCIAL

## 2023-11-28 DIAGNOSIS — E11.65 TYPE 2 DIABETES MELLITUS WITH HYPERGLYCEMIA, WITHOUT LONG-TERM CURRENT USE OF INSULIN (H): Primary | ICD-10-CM

## 2023-11-28 PROCEDURE — 86580 TB INTRADERMAL TEST: CPT

## 2023-11-28 PROCEDURE — 99207 PR NO CHARGE NURSE ONLY: CPT

## 2023-11-28 NOTE — PROGRESS NOTES
"Patient is here today for a Mantoux (TST) test placement.    Is there a current order in the chart? No. Placed order according to standing order (reference the \"Skin Test- Tuberculosis Screening- Ambulatory Care\" standing order in Policy Tech). Review the Inclusion and Exclusion Criteria.        Inclusion Criteria  Pre-employment screening for healthcare workers and correctional facility staff - Administer two-step TST. Patient to return for second test in 1-3 weeks after first test is read.     Exclusion Criteria  None - Place order for Mantoux (TST) test per standing order.    Reason for Mantoux (TST) in patient's own words: work     Patient needs form signed? No - form not needed per patient.    Instructed patient to wait for 15 minutes post injection and to report any reactions immediately to staff.    Told patient to return to clinic in 48-72 hours to have Mantoux (TST) read.          "

## 2023-11-30 ENCOUNTER — ALLIED HEALTH/NURSE VISIT (OUTPATIENT)
Dept: FAMILY MEDICINE | Facility: CLINIC | Age: 48
End: 2023-11-30
Payer: COMMERCIAL

## 2023-11-30 DIAGNOSIS — Z11.1 SCREENING EXAMINATION FOR PULMONARY TUBERCULOSIS: Primary | ICD-10-CM

## 2023-11-30 LAB
PPDINDURATION: 4 MM (ref 0–4.99)
PPDREDNESS: NORMAL

## 2023-11-30 PROCEDURE — 99207 PR NO CHARGE NURSE ONLY: CPT

## 2023-11-30 NOTE — LETTER
[address] [address]  [phone]          11/30/2023          To Whom it May Concern:     Archie Wallace, 1975 has had a mantoux on 11/28/2023 and had it read within time frame on 11/30/2023.      Mantoux result is NEGATIVE:  Lab Results   Component Value Date    PPDREDNESS Not Present 11/30/2023    PPDINDURATIO 4 11/30/2023         Please contact the clinic for questions or concerns.        Sincerely,          BILL LESLIE

## 2023-11-30 NOTE — PROGRESS NOTES
Patient is here today for a Mantoux (TST) test results.    Did patient return to clinic 48-72 hours from Mantoux (TST) placement: Yes - patient had to wait an hour in the lobby because he arrived at 9am when mantoux was placed at 1003am    PPD Induration   Date Value Ref Range Status   11/30/2023 4 0 - 4.99 mm Final     PPD Redness   Date Value Ref Range Status   11/30/2023 Not Present  Final           Induration Size? Induration <5mm - Enter results in Enter/Edit Activity. Route results to ordering provider.     Patient needs form signed? No    Patient reports having previously had the BCG Vaccine: No    Does patient need a two step? No    Patient needed this for work.    Erin Meyers RN

## 2023-12-29 ENCOUNTER — OFFICE VISIT (OUTPATIENT)
Dept: FAMILY MEDICINE | Facility: CLINIC | Age: 48
End: 2023-12-29
Payer: COMMERCIAL

## 2023-12-29 VITALS
TEMPERATURE: 97.9 F | HEART RATE: 78 BPM | HEIGHT: 72 IN | WEIGHT: 315 LBS | DIASTOLIC BLOOD PRESSURE: 98 MMHG | OXYGEN SATURATION: 99 % | SYSTOLIC BLOOD PRESSURE: 162 MMHG | RESPIRATION RATE: 18 BRPM | BODY MASS INDEX: 42.66 KG/M2

## 2023-12-29 DIAGNOSIS — E11.65 TYPE 2 DIABETES MELLITUS WITH HYPERGLYCEMIA, WITHOUT LONG-TERM CURRENT USE OF INSULIN (H): ICD-10-CM

## 2023-12-29 DIAGNOSIS — Z12.5 SCREENING FOR PROSTATE CANCER: ICD-10-CM

## 2023-12-29 DIAGNOSIS — G47.33 OSA (OBSTRUCTIVE SLEEP APNEA): ICD-10-CM

## 2023-12-29 DIAGNOSIS — I10 HYPERTENSION GOAL BP (BLOOD PRESSURE) < 140/90: ICD-10-CM

## 2023-12-29 DIAGNOSIS — E66.01 CLASS 3 SEVERE OBESITY WITH SERIOUS COMORBIDITY AND BODY MASS INDEX (BMI) OF 45.0 TO 49.9 IN ADULT, UNSPECIFIED OBESITY TYPE (H): ICD-10-CM

## 2023-12-29 DIAGNOSIS — E66.813 CLASS 3 SEVERE OBESITY WITH SERIOUS COMORBIDITY AND BODY MASS INDEX (BMI) OF 45.0 TO 49.9 IN ADULT, UNSPECIFIED OBESITY TYPE (H): ICD-10-CM

## 2023-12-29 DIAGNOSIS — R07.9 CHEST PAIN, UNSPECIFIED TYPE: Primary | ICD-10-CM

## 2023-12-29 DIAGNOSIS — I49.8 FLUTTERING HEART: ICD-10-CM

## 2023-12-29 DIAGNOSIS — I25.10 CORONARY ARTERY DISEASE INVOLVING NATIVE HEART WITHOUT ANGINA PECTORIS, UNSPECIFIED VESSEL OR LESION TYPE: ICD-10-CM

## 2023-12-29 LAB
ALBUMIN SERPL BCG-MCNC: 4 G/DL (ref 3.5–5.2)
ALP SERPL-CCNC: 47 U/L (ref 40–150)
ALT SERPL W P-5'-P-CCNC: 23 U/L (ref 0–70)
ANION GAP SERPL CALCULATED.3IONS-SCNC: 10 MMOL/L (ref 7–15)
AST SERPL W P-5'-P-CCNC: 23 U/L (ref 0–45)
BILIRUB SERPL-MCNC: 0.5 MG/DL
BUN SERPL-MCNC: 10.9 MG/DL (ref 6–20)
CALCIUM SERPL-MCNC: 9.3 MG/DL (ref 8.6–10)
CHLORIDE SERPL-SCNC: 101 MMOL/L (ref 98–107)
CHOLEST SERPL-MCNC: 195 MG/DL
CREAT SERPL-MCNC: 0.79 MG/DL (ref 0.67–1.17)
CREAT UR-MCNC: 87 MG/DL
DEPRECATED HCO3 PLAS-SCNC: 28 MMOL/L (ref 22–29)
EGFRCR SERPLBLD CKD-EPI 2021: >90 ML/MIN/1.73M2
FASTING STATUS PATIENT QL REPORTED: YES
GLUCOSE SERPL-MCNC: 114 MG/DL (ref 70–99)
HBA1C MFR BLD: 6.9 % (ref 0–5.6)
HDLC SERPL-MCNC: 67 MG/DL
LDLC SERPL CALC-MCNC: 120 MG/DL
MICROALBUMIN UR-MCNC: <12 MG/L
MICROALBUMIN/CREAT UR: NORMAL MG/G{CREAT}
NONHDLC SERPL-MCNC: 128 MG/DL
POTASSIUM SERPL-SCNC: 3.9 MMOL/L (ref 3.4–5.3)
PROT SERPL-MCNC: 7.2 G/DL (ref 6.4–8.3)
PSA SERPL DL<=0.01 NG/ML-MCNC: 0.34 NG/ML (ref 0–2.5)
SODIUM SERPL-SCNC: 139 MMOL/L (ref 135–145)
TRIGL SERPL-MCNC: 42 MG/DL
TROPONIN T SERPL HS-MCNC: 12 NG/L
TSH SERPL DL<=0.005 MIU/L-ACNC: 2.03 UIU/ML (ref 0.3–4.2)

## 2023-12-29 PROCEDURE — 80053 COMPREHEN METABOLIC PANEL: CPT | Performed by: FAMILY MEDICINE

## 2023-12-29 PROCEDURE — G0103 PSA SCREENING: HCPCS | Performed by: FAMILY MEDICINE

## 2023-12-29 PROCEDURE — 84484 ASSAY OF TROPONIN QUANT: CPT | Performed by: FAMILY MEDICINE

## 2023-12-29 PROCEDURE — 93000 ELECTROCARDIOGRAM COMPLETE: CPT | Performed by: FAMILY MEDICINE

## 2023-12-29 PROCEDURE — 80061 LIPID PANEL: CPT | Performed by: FAMILY MEDICINE

## 2023-12-29 PROCEDURE — 99214 OFFICE O/P EST MOD 30 MIN: CPT | Performed by: FAMILY MEDICINE

## 2023-12-29 PROCEDURE — 36415 COLL VENOUS BLD VENIPUNCTURE: CPT | Performed by: FAMILY MEDICINE

## 2023-12-29 PROCEDURE — 84443 ASSAY THYROID STIM HORMONE: CPT | Performed by: FAMILY MEDICINE

## 2023-12-29 PROCEDURE — 82043 UR ALBUMIN QUANTITATIVE: CPT | Performed by: FAMILY MEDICINE

## 2023-12-29 PROCEDURE — 83036 HEMOGLOBIN GLYCOSYLATED A1C: CPT | Performed by: FAMILY MEDICINE

## 2023-12-29 PROCEDURE — 82570 ASSAY OF URINE CREATININE: CPT | Performed by: FAMILY MEDICINE

## 2023-12-29 ASSESSMENT — ANXIETY QUESTIONNAIRES
7. FEELING AFRAID AS IF SOMETHING AWFUL MIGHT HAPPEN: NOT AT ALL
6. BECOMING EASILY ANNOYED OR IRRITABLE: MORE THAN HALF THE DAYS
3. WORRYING TOO MUCH ABOUT DIFFERENT THINGS: MORE THAN HALF THE DAYS
4. TROUBLE RELAXING: NEARLY EVERY DAY
IF YOU CHECKED OFF ANY PROBLEMS ON THIS QUESTIONNAIRE, HOW DIFFICULT HAVE THESE PROBLEMS MADE IT FOR YOU TO DO YOUR WORK, TAKE CARE OF THINGS AT HOME, OR GET ALONG WITH OTHER PEOPLE: SOMEWHAT DIFFICULT
5. BEING SO RESTLESS THAT IT IS HARD TO SIT STILL: MORE THAN HALF THE DAYS
1. FEELING NERVOUS, ANXIOUS, OR ON EDGE: MORE THAN HALF THE DAYS
2. NOT BEING ABLE TO STOP OR CONTROL WORRYING: MORE THAN HALF THE DAYS
GAD7 TOTAL SCORE: 13
GAD7 TOTAL SCORE: 13

## 2023-12-29 ASSESSMENT — PAIN SCALES - GENERAL: PAINLEVEL: SEVERE PAIN (6)

## 2023-12-29 ASSESSMENT — PATIENT HEALTH QUESTIONNAIRE - PHQ9
SUM OF ALL RESPONSES TO PHQ QUESTIONS 1-9: 9
10. IF YOU CHECKED OFF ANY PROBLEMS, HOW DIFFICULT HAVE THESE PROBLEMS MADE IT FOR YOU TO DO YOUR WORK, TAKE CARE OF THINGS AT HOME, OR GET ALONG WITH OTHER PEOPLE: SOMEWHAT DIFFICULT
SUM OF ALL RESPONSES TO PHQ QUESTIONS 1-9: 9

## 2023-12-29 ASSESSMENT — ASTHMA QUESTIONNAIRES: ACT_TOTALSCORE: 17

## 2023-12-29 NOTE — PATIENT INSTRUCTIONS
Hypertension:      Goal BP is < 140/90      Start Losartan 100 mg daily, check BP daily, if not at goal; add Amlodipine 10 mg daily      Send in BP reading weekly for 2 weeks  If chest pain worsens go to the ER

## 2023-12-29 NOTE — PROGRESS NOTES
Assessment & Plan   Archie is a 49 yo M with ddd, neck, dm2, mdd, htn, hpld, cad, long QT, s/p cervical diskectomy and fusion 7/16/2021 and s/p right thyroidectomy  here for chest pain.    Chest pain, unspecified type  EKG showing ectopic beats  - EKG 12-lead complete w/read - Clinics  - Troponin T, High Sensitivity; Future  - Echocardiogram Exercise Stress; Future  - Troponin T, High Sensitivity    Fluttering heart  EKG shows ectopic beats; possibly related to hypertension with LV hypertrophy (the presence of LVH has been associated with a higher prevalence of PVCs in patients with hypertension); will evaluate with echo   - EKG 12-lead complete w/read - Clinics  - Troponin T, High Sensitivity; Future  - Echocardiogram Exercise Stress; Future  - Troponin T, High Sensitivity    Hypertension goal BP (blood pressure) < 140/90   Uncontrolled, not been taking medications; will restart Losartan and if not at goal in 1-2 weeks will add Amlodipine  - COMPREHENSIVE METABOLIC PANEL; Future  - COMPREHENSIVE METABOLIC PANEL    Type 2 diabetes mellitus with hyperglycemia, without long-term current use of insulin (H)   Not gtaking any medications, been trying to eat healthy, will check A1c  - HEMOGLOBIN A1C; Future  - Albumin Random Urine Quantitative with Creat Ratio; Future  - Adult Eye  Referral; Future  - HEMOGLOBIN A1C  - Albumin Random Urine Quantitative with Creat Ratio    Coronary artery disease involving native heart without angina pectoris, unspecified vessel or lesion type   LDL check and get BP under control  - Lipid panel reflex to direct LDL Non-fasting; Future  - Lipid panel reflex to direct LDL Non-fasting    Class 3 severe obesity with serious comorbidity and body mass index (BMI) of 45.0 to 49.9 in adult, unspecified obesity type (H)  Counseled to make better food choices, exercise as tolerated, and lose weight. Possibly affecting his breathing  - TSH with free T4 reflex; Future  - TSH with free T4  reflex    BRONWYN (obstructive sleep apnea)   -  Has CPAP    Screening for prostate cancer  - PSA, screen; Future  - PSA, screen     BMI:   Estimated body mass index is 48.18 kg/m  as calculated from the following:    Height as of this encounter: 1.829 m (6').    Weight as of this encounter: 161.1 kg (355 lb 4 oz).   Weight management plan: Discussed healthy diet and exercise guidelines  :[id number]}  See Patient Instructions    Agustin Parekh MD  [facility] OLIVA Almanza is a 48 year old, presenting for the following health issues:  Chest Pain (And irregular heartbeat)      Chest pain x 2 months  Left sided, feels like a pressure eases in about   Happens daily  If does some lifting or going upstairs.  Feels like has irregular heart beats/fluttering    Elevated BP   He was on BP medications; quit due to frequency; last took in jUly  BP Readings from Last 6 Encounters:   12/29/23 (!) 162/98   10/13/22 (!) 169/109   09/08/22 (!) 165/90   09/07/22 (!) 179/102   06/28/22 (!) 180/112   06/27/22 (!) 186/103      Weight:     Wt Readings from Last 4 Encounters:   12/29/23 (!) 161.1 kg (355 lb 4 oz)   10/13/22 (!) 155.5 kg (342 lb 12.8 oz)   09/08/22 (!) 163.9 kg (361 lb 4.8 oz)   09/07/22 (!) 162.6 kg (358 lb 6.4 oz)      Smoking    Quit 20 yrs ago        4/1/2020     7:59 AM 4/27/2022     2:19 PM 12/29/2023     8:25 AM   ACT Total Scores   ACT TOTAL SCORE (Goal Greater than or Equal to 20) 5 9 17   In the past 12 months, how many times did you visit the emergency room for your asthma without being admitted to the hospital? 0 0 0   In the past 12 months, how many times were you hospitalized overnight because of your asthma? 0 0 0            12/29/2023     8:33 AM   Additional Questions   Roomed by Coby Mathews   Accompanied by Wife       History of Present Illness       Back Pain:  He presents for follow up of back pain. Patient's back pain is a chronic problem.  Location of back pain:  Right  lower back, left lower back, right side of neck and right buttock  Description of back pain: sharp and shooting  Back pain spreads: right buttocks    Since patient first noticed back pain, pain is: rapidly worsening  Does back pain interfere with his job:  Yes       Mental Health Follow-up:  Patient presents to follow-up on Anxiety.    Patient's anxiety since last visit has been:  No change  The patient is not having other symptoms associated with anxiety.  Any significant life events: job concerns, financial concerns and health concerns  Patient is feeling anxious or having panic attacks.  Patient has no concerns about alcohol or drug use.    Reason for visit:  Chest pain, heart beating fast, back pains , shortness of breath  Symptom onset:  More than a month  Symptoms include:  SOB, rapid heartbeat, chest and back pain & tightning of chest  Symptom intensity:  Moderate  Symptom progression:  Staying the same  Had these symptoms before:  No  What makes it worse:  No  What makes it better:  Laying down,maybe aspirin are helping    He eats 2-3 servings of fruits and vegetables daily.He consumes 2 sweetened beverage(s) daily.He exercises with enough effort to increase his heart rate 9 or less minutes per day.  He exercises with enough effort to increase his heart rate 3 or less days per week.   He is taking medications regularly.     Review of Systems   Cardiovascular:  Positive for chest pain.         Objective    BP (!) 162/98   Pulse 78   Temp 97.9  F (36.6  C) (Oral)   Resp 18   Ht 1.829 m (6')   Wt (!) 161.1 kg (355 lb 4 oz)   SpO2 99%   BMI 48.18 kg/m    Body mass index is 48.18 kg/m .  Physical Exam   GENERAL: obese, alert and no distress  RESP: lungs clear to auscultation - no rales, rhonchi or wheezes  CV: regular rate and rhythm, no murmur, click or rub, no peripheral edema   ABDOMEN: soft, obese,  no masses and bowel sounds normal  MS: no gross musculoskeletal defects noted, no edema  PSYCH:  mentation appears normal, affect normal/bright

## 2024-01-03 ENCOUNTER — APPOINTMENT (OUTPATIENT)
Dept: OPTOMETRY | Facility: CLINIC | Age: 49
End: 2024-01-03
Payer: COMMERCIAL

## 2024-01-09 ENCOUNTER — TELEPHONE (OUTPATIENT)
Dept: OPTOMETRY | Facility: CLINIC | Age: 49
End: 2024-01-09
Payer: COMMERCIAL

## 2024-01-09 DIAGNOSIS — H04.123 DRY EYES: Primary | ICD-10-CM

## 2024-01-09 PROCEDURE — 99207 PR NO CHARGE LOS: CPT | Performed by: OPTOMETRIST

## 2024-01-12 ENCOUNTER — APPOINTMENT (OUTPATIENT)
Dept: OPTOMETRY | Facility: CLINIC | Age: 49
End: 2024-01-12
Payer: COMMERCIAL

## 2024-01-12 PROCEDURE — 92341 FIT SPECTACLES BIFOCAL: CPT | Performed by: OPTOMETRIST

## 2024-01-17 ENCOUNTER — ANCILLARY PROCEDURE (OUTPATIENT)
Dept: CARDIOLOGY | Facility: CLINIC | Age: 49
End: 2024-01-17
Attending: FAMILY MEDICINE
Payer: COMMERCIAL

## 2024-01-17 DIAGNOSIS — I49.8 FLUTTERING HEART: ICD-10-CM

## 2024-01-17 DIAGNOSIS — R07.9 CHEST PAIN, UNSPECIFIED TYPE: ICD-10-CM

## 2024-01-17 PROCEDURE — 93017 CV STRESS TEST TRACING ONLY: CPT | Performed by: INTERNAL MEDICINE

## 2024-01-17 PROCEDURE — 93325 DOPPLER ECHO COLOR FLOW MAPG: CPT | Mod: 26 | Performed by: INTERNAL MEDICINE

## 2024-01-17 PROCEDURE — 93325 DOPPLER ECHO COLOR FLOW MAPG: CPT | Mod: TC | Performed by: INTERNAL MEDICINE

## 2024-01-17 PROCEDURE — 93350 STRESS TTE ONLY: CPT | Mod: 26 | Performed by: INTERNAL MEDICINE

## 2024-01-17 PROCEDURE — 93321 DOPPLER ECHO F-UP/LMTD STD: CPT | Mod: 26 | Performed by: INTERNAL MEDICINE

## 2024-01-17 PROCEDURE — 93321 DOPPLER ECHO F-UP/LMTD STD: CPT | Mod: TC | Performed by: INTERNAL MEDICINE

## 2024-01-17 PROCEDURE — 99207 PR STATISTIC IV PUSH SINGLE INITIAL SUBSTANCE: CPT | Performed by: INTERNAL MEDICINE

## 2024-01-17 PROCEDURE — 93018 CV STRESS TEST I&R ONLY: CPT | Performed by: INTERNAL MEDICINE

## 2024-01-17 PROCEDURE — 93350 STRESS TTE ONLY: CPT | Mod: TC | Performed by: INTERNAL MEDICINE

## 2024-01-17 PROCEDURE — 93352 ADMIN ECG CONTRAST AGENT: CPT | Performed by: INTERNAL MEDICINE

## 2024-01-17 PROCEDURE — 93016 CV STRESS TEST SUPVJ ONLY: CPT | Performed by: INTERNAL MEDICINE

## 2024-01-17 RX ADMIN — Medication 3 ML: at 10:59

## 2024-01-19 NOTE — ADDENDUM NOTE
Addended by: JAMEEL DILLON on: 1/19/2024 07:22 AM     Modules accepted: Orders    
Scattered rhonchi, expiratory wheeze

## 2024-02-19 ENCOUNTER — TELEPHONE (OUTPATIENT)
Dept: CARDIOLOGY | Facility: CLINIC | Age: 49
End: 2024-02-19
Payer: COMMERCIAL

## 2024-03-21 ENCOUNTER — TELEPHONE (OUTPATIENT)
Dept: CARDIOLOGY | Facility: CLINIC | Age: 49
End: 2024-03-21
Payer: COMMERCIAL

## 2024-03-25 ENCOUNTER — TELEPHONE (OUTPATIENT)
Dept: CARDIOLOGY | Facility: CLINIC | Age: 49
End: 2024-03-25
Payer: COMMERCIAL

## 2024-03-25 NOTE — TELEPHONE ENCOUNTER
Called patient from wait list to schedule follow up appointment with JENNYFER. Patient agreeable to scheduling.    Adalgisa ALEXIS, KACI

## 2024-03-26 ENCOUNTER — OFFICE VISIT (OUTPATIENT)
Dept: CARDIOLOGY | Facility: CLINIC | Age: 49
End: 2024-03-26
Payer: COMMERCIAL

## 2024-03-26 VITALS
HEART RATE: 81 BPM | BODY MASS INDEX: 42.66 KG/M2 | DIASTOLIC BLOOD PRESSURE: 104 MMHG | HEIGHT: 72 IN | OXYGEN SATURATION: 98 % | WEIGHT: 315 LBS | SYSTOLIC BLOOD PRESSURE: 180 MMHG

## 2024-03-26 DIAGNOSIS — E11.65 TYPE 2 DIABETES MELLITUS WITH HYPERGLYCEMIA, WITHOUT LONG-TERM CURRENT USE OF INSULIN (H): ICD-10-CM

## 2024-03-26 DIAGNOSIS — R00.2 PALPITATIONS: Primary | ICD-10-CM

## 2024-03-26 DIAGNOSIS — E78.5 HYPERLIPIDEMIA LDL GOAL <70: ICD-10-CM

## 2024-03-26 DIAGNOSIS — I10 HYPERTENSION GOAL BP (BLOOD PRESSURE) < 140/90: ICD-10-CM

## 2024-03-26 DIAGNOSIS — I25.118 CORONARY ARTERY DISEASE OF NATIVE ARTERY OF NATIVE HEART WITH STABLE ANGINA PECTORIS (H): ICD-10-CM

## 2024-03-26 PROCEDURE — 93246 EXT ECG>7D<15D RECORDING: CPT

## 2024-03-26 PROCEDURE — 99215 OFFICE O/P EST HI 40 MIN: CPT

## 2024-03-26 RX ORDER — AMLODIPINE BESYLATE 10 MG/1
10 TABLET ORAL DAILY
Qty: 90 TABLET | Refills: 2 | Status: SHIPPED | OUTPATIENT
Start: 2024-03-26

## 2024-03-26 RX ORDER — ATORVASTATIN CALCIUM 40 MG/1
40 TABLET, FILM COATED ORAL DAILY
Qty: 90 TABLET | Refills: 3 | Status: SHIPPED | OUTPATIENT
Start: 2024-03-26

## 2024-03-26 RX ORDER — LOSARTAN POTASSIUM 100 MG/1
100 TABLET ORAL DAILY
Qty: 90 TABLET | Refills: 2 | Status: SHIPPED | OUTPATIENT
Start: 2024-03-26

## 2024-03-26 NOTE — PATIENT INSTRUCTIONS
Take your medicines every day, as directed     Changes made today:  Start your amlodipine and atorvastatin again  Send us a blood pressure log in 2 weeks   2 week angelita        Cardiology Care Coordinators:      Antonio VALDIVIA RN     Cardiology Rooming staff:  Adalgisa AZAR CNA    Phone  [phone]      Fax [phone]    To Contact us     During Business Hours:  [phone]     If you are needing refills please contact your pharmacy.     For urgent after hour care please call the [facility] at [phone] or the [facility] at [phone] and ask to speak to the cardiologist on call.            HOW TO CHECK YOUR BLOOD PRESSURE AT HOME:     Avoid eating, smoking, and exercising for at least 30 minutes before taking a reading.     Be sure you have taken your BP medication at least 2-3 hours before you check it.      Sit quietly for 10 minutes before a reading.      Sit in a chair with your feet flat on the floor. Rest your  arm on a table so that the arm cuff is at the same level as your heart.     Remain still during the reading.  Record your blood pressure and pulse in a log and bring to your next appointment.       Use [employer] allows you to communicate directly with your heart team through secure messaging.  [employer] can be accessed any time on your phone, computer, or tablet.  If you need assistance, we'd be happy to help!             Keep your Heart Appointments:     Follow-up in 2 months

## 2024-03-26 NOTE — NURSING NOTE
Chief Complaint   Patient presents with    Follow Up     follow up, previous Dr. Richard pt       Initial BP (!) 180/123 (BP Location: Right arm, Patient Position: Chair, Cuff Size: Adult Large)   Pulse 81   Ht 1.829 m (6')   Wt (!) 153.3 kg (338 lb)   SpO2 98%   BMI 45.84 kg/m   Estimated body mass index is 45.84 kg/m  as calculated from the following:    Height as of this encounter: 1.829 m (6').    Weight as of this encounter: 153.3 kg (338 lb)..  BP completed using cuff size: large    Adalgisa ALEXIS, VF

## 2024-03-26 NOTE — PROGRESS NOTES
Archie arrived here on 3/26/2024 for 14 day Zio monitor placement per ordering provider Isabella Nunez PA-C for the diagnosis of palpitations. Patient s skin was prepped per protocol. Zio monitor was placed. Instructions were reviewed with and given to the patient. Patient verbalized understanding of wear, troubleshooting, and monitor return instructions.    Adalgisa ALEXIS, VF

## 2024-03-26 NOTE — PROGRESS NOTES
[facility] Cardiology   Cardiology Clinic Note      HPI:   Mr. Archie Wallace is a pleasant 48 year old male with medical history pertinent for HTN, DM2, HLD, BRONWYN, and CAD s/p MI (2004). He presents to cardiology clinic with chest pain and palpitations.    He reports having an MI in 2004 and believes he had a coronary angiogram and PCI but does not recall having a stent placed.  He had a stress nuclear study in 2005 that showed no focal fixed or reversible perfusion defects.  The LVEF was 54%.  He had an ECHO in 2014 showing LVEF 60% with borderline concentric LVH.  He has not history of CHF, arrhythmia, valvular heart disease.  The patient has no Hx of PAD or cerebrovascular disease.  He had a negative stress echo in 12/2020.   Patient underwent a stress echocardiogram on 1/17/24. He was unable to complete the exercise portion due to hypertension (233/108) and leg pain. The resting echo showed normal biventricular size, thickness, and global systolic function with LVEF=60-65%.    Patient presented to PCP in December with complaints of chest pain, work up thus far has not been convincing for ACS. He reports continued chest discomfort that occurs approximately every other day. It lasts about 10-15 sec and he describes it as a twinge or pressure that is accompanied by rapid heart rate. He reports feeling like this sensation takes his breath away.   Blood pressure in office today 180/123, 180/104 on recheck. He reports this has been pretty normal for him compared to what he gets at home. He has only been taking his Losartan, but has not been taking any of his other prescribed antihypertensives. He reports he regularly uses his CPAP, but that he feels as if he may need new parts.   Patient's weight 338lbs. He reports that 1-2 years ago, his weight was down to 296lb after he made drastic dietary changes following a stroke in his mother in law. He then underwent neck surgery which per patient included removal of a portion of  his thyroid. After that, he gained ~60lbs.       Today in clinic, he denies  dizziness, syncope, or lower extremity edema.       PAST MEDICAL HISTORY:  Past Medical History:   Diagnosis Date    CAD (coronary artery disease)     Premature artery disease noted    Class 3 severe obesity due to excess calories with serious comorbidity and body mass index (BMI) of 45.0 to 49.9 in adult (H) 2010    Bariatric surgery consult 21 Stephania Youssef PA-C Starting BMI 46 and wt 337 lbs     DDD (degenerative disc disease), cervical     History of MI (myocardial infarction) 2020    Hyperlipidemia LDL goal <40     Hypertension goal BP (blood pressure) < 140/90     Long QT interval syndrome     MEDICAL HISTORY OF -     History of rhabdomyolosis, from strenuous exercise    Mild persistent asthma     Obstructive sleep apnea     Primary osteoarthritis of left knee     Sleep apnea syndrome     cpap    Type 2 diabetes, HbA1c goal < 7% (H)     Vitamin D deficiency        FAMILY HISTORY:  Family History   Problem Relation Age of Onset    Diabetes Mother     Hypertension Mother     Glaucoma Mother     Diabetes Father     Diabetes Sister     Glaucoma Other     Macular Degeneration No family hx of        SOCIAL HISTORY:  Social History     Socioeconomic History    Marital status:    Tobacco Use    Smoking status: Former     Types: Cigars     Quit date: 2000     Years since quittin.2    Smokeless tobacco: Never    Tobacco comments:     Smokes intermittently   Substance and Sexual Activity    Alcohol use: Yes     Comment: occ/ mod    Drug use: Yes     Types: Marijuana     Comment: used marjuana a week ago    Sexual activity: Not Currently     Partners: Female   Other Topics Concern    Parent/sibling w/ CABG, MI or angioplasty before 65F 55M? No     Service No    Blood Transfusions No    Caffeine Concern No    Occupational Exposure No    Hobby Hazards No    Sleep Concern Yes     Comment: BRONWYN    Stress Concern  No    Weight Concern Yes     Comment: Morbidly obese    Special Diet No    Back Care Yes    Exercise Yes     Comment: Active at work in maintenance    Seat Belt Yes    Self-Exams Yes     Social Determinants of Health     Financial Resource Strain: Low Risk  (12/29/2023)    Financial Resource Strain     Within the past 12 months, have you or your family members you live with been unable to get utilities (heat, electricity) when it was really needed?: No   Food Insecurity: Low Risk  (12/29/2023)    Food Insecurity     Within the past 12 months, did you worry that your food would run out before you got money to buy more?: No     Within the past 12 months, did the food you bought just not last and you didn t have money to get more?: No   Transportation Needs: Low Risk  (12/29/2023)    Transportation Needs     Within the past 12 months, has lack of transportation kept you from medical appointments, getting your medicines, non-medical meetings or appointments, work, or from getting things that you need?: No   Housing Stability: Low Risk  (12/29/2023)    Housing Stability     Do you have housing? : Yes     Are you worried about losing your housing?: No       CURRENT MEDICATIONS:  albuterol (PROAIR HFA) 108 (90 Base) MCG/ACT inhaler, Inhale 2 puffs into the lungs every 6 hours as needed  aspirin 81 MG EC tablet, Take 1 tablet (81 mg) by mouth daily  DULERA 100-5 MCG/ACT inhaler, INHALER 2 PUFFS BY MOUTH INTO THE LUNGS TWICE DAILY  methocarbamol (ROBAXIN) 500 MG tablet, Take 1 tablet (500 mg) by mouth 3 times daily as needed for muscle spasms  terbinafine (LAMISIL) 250 MG tablet, Take 1 tablet (250 mg) by mouth daily  augmented betamethasone dipropionate (DIPROLENE-AF) 0.05 % external ointment, Apply topically 2 times daily To hand and foot rash. Once itching is gone then stop. Decrease use after 1 month. (Patient not taking: Reported on 12/29/2023)  cloNIDine (CATAPRES) 0.2 MG tablet, Take 1 tablet (0.2 mg) by mouth 2 times  daily +++NEEDS APPOINTMENT FOR FURTHER REFILLS+++ (Patient not taking: Reported on 12/29/2023)  hydrocortisone 2.5 % cream, Apply topically 2 times daily When rash is flared only then stop (Patient not taking: Reported on 12/29/2023)  insulin pen needle (31G X 5 MM) 31G X 5 MM miscellaneous, Use 1 pen needles daily or as directed. (Patient not taking: Reported on 12/29/2023)  Insulin Pen Needle (PEN NEEDLES) 32G X 4 MM MISC, 1 each daily (Patient not taking: Reported on 12/29/2023)  ketoconazole (NIZORAL) 2 % external cream, Apply to rash twice daily to rash in abdominal fold until rash resolves. Then use daily to prevent flares. (Patient not taking: Reported on 12/29/2023)  liraglutide - Weight Management (SAXENDA) 18 MG/3ML pen, Week 1: 0.6 mg subcutaneous daily, Week 2: 1.2 mg daily, Week 3: 1.8 mg daily, Week 4: 2.4 mg daily, Week 5 & on: 3 mg daily (Patient not taking: Reported on 12/29/2023)  semaglutide (OZEMPIC) 2 MG/1.5ML SOPN pen, Inject 0.25 mg subcutaneously once weekly for 4 weeks then 0.5 mg once weekly. (Patient not taking: Reported on 12/29/2023)  valACYclovir (VALTREX) 1000 mg tablet, Take 1 tablet (1,000 mg) by mouth 3 times daily for 7 days    betamethasone acet & sod phos (CELESTONE) injection 6 mg  ropivacaine (NAROPIN) injection 4 mL        ROS:   Refer to HPI    EXAM:  BP (!) 180/104   Pulse 81   Ht 1.829 m (6')   Wt (!) 153.3 kg (338 lb)   SpO2 98%   BMI 45.84 kg/m    GENERAL: Appears comfortable, in no acute distress.   HEENT: Eye symmetrical, no discharge or icterus bilaterally.  CV: RRR, +S1S2, no murmur, rub, or gallop  RESPIRATORY: Respirations regular, even, and unlabored. Lungs CTA throughout.   EXTREMITIES: no peripheral edema. 2+ bilateral pedal pulses.   NEUROLOGIC: Alert and oriented x 3. No focal deficits.   MUSCULOSKELETAL: No joint swelling or tenderness.   SKIN: No jaundice. No rashes or lesions.     Labs, reviewed with patient in clinic today:  CBC RESULTS:  Lab Results  "  Component Value Date    WBC 6.1 04/15/2022    WBC 5.0 04/21/2020    RBC 5.52 04/15/2022    RBC 5.22 04/21/2020    HGB 15.2 04/15/2022    HGB 14.4 12/03/2020    HCT 47.5 04/15/2022    HCT 45.9 04/21/2020    MCV 86 04/15/2022    MCV 88 04/21/2020    MCH 27.5 04/15/2022    MCH 27.8 04/21/2020    MCHC 32.0 04/15/2022    MCHC 31.6 04/21/2020    RDW 13.9 04/15/2022    RDW 12.4 04/21/2020     04/15/2022     04/21/2020       CMP RESULTS:  Lab Results   Component Value Date     12/29/2023     06/02/2021    POTASSIUM 3.9 12/29/2023    POTASSIUM 4.4 04/15/2022    POTASSIUM 4.0 06/02/2021    CHLORIDE 101 12/29/2023    CHLORIDE 102 04/15/2022    CHLORIDE 105 06/02/2021    CO2 28 12/29/2023    CO2 31 04/15/2022    CO2 31 06/02/2021    ANIONGAP 10 12/29/2023    ANIONGAP 5 04/15/2022    ANIONGAP 4 06/02/2021     (H) 12/29/2023     (H) 04/15/2022     (H) 06/02/2021    BUN 10.9 12/29/2023    BUN 13 04/15/2022    BUN 14 06/02/2021    CR 0.79 12/29/2023    CR 0.87 06/02/2021    GFRESTIMATED >90 12/29/2023    GFRESTIMATED >90 06/02/2021    GFRESTBLACK >90 06/02/2021    JOSHUA 9.3 12/29/2023    JOSHUA 8.9 06/02/2021    BILITOTAL 0.5 12/29/2023    BILITOTAL 0.6 07/17/2020    ALBUMIN 4.0 12/29/2023    ALBUMIN 3.5 03/23/2023    ALBUMIN 3.4 07/17/2020    ALKPHOS 47 12/29/2023    ALKPHOS 45 07/17/2020    ALT 23 12/29/2023    ALT 38 06/02/2021    AST 23 12/29/2023    AST 11 07/17/2020        INR RESULTS:  Lab Results   Component Value Date    INR 1.1 01/06/2014       No results found for: \"MAG\"  No results found for: \"NTBNPI\"  No results found for: \"NTBNP\"    LIPIDS:  Lab Results   Component Value Date    CHOL 195 12/29/2023    CHOL 169 07/17/2020     Lab Results   Component Value Date    HDL 67 12/29/2023    HDL 55 07/17/2020     Lab Results   Component Value Date     12/29/2023    LDL 99 06/02/2021     07/17/2020     Lab Results   Component Value Date    TRIG 42 12/29/2023    TRIG 50 " 2020     Lab Results   Component Value Date    CHOLHDLRATIO 3.3 2011       Echocardiogram:  Recent Results (from the past 4320 hour(s))   Echocardiogram Exercise Stress    Narrative    [id number]  Formerly [facility], later [facility]  [id number]  [id number]^SANTANA^JAMEEL^AALIYAH     [facility], Echocardiography Laboratory  [address] [place] [address]     Name: MANPREET GONZALEZ  MRN: [id number]  : 1975  Study Date: 2024 11:03 AM  Age: 48 yrs  Gender: Male  Patient Location: [facility]  Reason For Study: Chest pain, unspecified type, Fluttering heart  Ordering Physician: JAMEEL DILLON  Referring Physician: JAMEEL DILLON  Performed By: Dona French RDCS     BSA: 2.7 m2  Height: 72 in  Weight: 355 lb  HR: 86  BP: 166/99 mmHg  ______________________________________________________________________________  Procedure  Stress Echo Bike with two dimensional, color and spectral Doppler performed.  ______________________________________________________________________________  Interpretation Summary  Non-diagnostic exercise echocardiogram due to failure to achieve the target  heart rate.  The target heart rate was not achieved. Exercise was terminated at maximal  heart rate of 118 (69% age-predicted max) due to hypertensive BP response.  Blunted heart rate and hypertensive BP response to exercise.  Normal biventricular size, thickness, and global systolic function at  baseline, LVEF=60-65%.  With exercise at a below-diagnostic workload, LVEF increased to >70% and LV  cavity size decreased appropriately.  No regional wall motion abnormalities are present at rest or with exercise at  a below-diagnostic workload.  No angina was elicited at a below-diagnostic workload.  No ECG evidence of ischemia at a below-diagnostic workload. Occasional PVCs  were present throughtout the study.  Functional capacity is reduced for age.  No significant valvular abnormalities are noted on screening Doppler  exam.  The aortic root and visualized ascending aorta are normal.  ______________________________________________________________________________  Stress  There was a hypertensive BP response to exercise.  Limiting Symptom: leg pain.  Peak MVO2 11.2 ml/kg/min .  Percent predicted MVO2 39 %.  RPP 71128.  Maximum workload 125 turk.  Exercise was stopped due to hypertension.  The patient exhibited leg pain during exercise.  Target Heart Rate was not achieved due to reasons as specified.  Normal heart rate response to exercise.  Peak pictures taken while patient was biking.     Stress Results        Protocol:  Bike Stress Echo        Maximum Predicted HR:   171 bpm        Target HR: 145 bpm                 % Maximum Predicted HR: 69 %                             StageDurationHeart Rate  BP                               (mm:ss)   (bpm)                          REST   0:00      86    166/99                          PEAK   6:34     118    233/108                         Stress Duration:   6:34 mm:ss *                      Maximum Stress HR: 118 bpm *     Contrast  Optison (NDC #9389-7813-57) given intravenously. Patient was given 3.0 ml  mixture of 3 ml Optison and 6 ml saline. 6.0 ml wasted. IV start location  LAC .  ______________________________________________________________________________  MMode/2D Measurements & Calculations  asc Aorta Diam: 3.7 cm  Asc Ao diam index BSA (cm/m2): 1.4  Asc Ao diam index Ht(cm/m): 2.0     Doppler Measurements & Calculations  MV E max danielle: 62.1 cm/sec  MV A max danielle: 51.4 cm/sec  MV E/A: 1.2  MV dec time: 0.18 sec  PA acc time: 0.11 sec  TR max danielle: 274.2 cm/sec  TR max P.4 mmHg     ______________________________________________________________________________  Report approved by: Hardeep Self 2024 12:44 PM             Assessment and Plan:   Mr. Wallace is a 48 year old male with a PMH of HTN, DM2, HLD, BRONWYN, and CAD s/p MI ()      # CAD s/p MI ()  # HLD   #  Palpitations  Most recent lipid panel 12/29/23: , HDL 67, , TG 42. Patient has not been taking his lipitor for ~ 1 year  - Resume Atorvastatin 40mg daily   - Continue ASA 81mg daily   - 2 week zio monitor    # HTN  Patient reports his blood pressures tend to run 180s/100s. He was restarted on Losartan in December with little improvement in his blood pressure control   - Resume Amlodipine 10mg daily  - Continue Losartan 100mg daily   - RN to check in 2 weeks for blood pressure check, if still uncontrolled, resume Clonidine 0.2mg daily     # BRONWYN  # Obesity  Encouraged patient to follow up with his PCP regarding CPAP supplies. Patient also requesting to resume his Ozempic for weight loss; encouraged follow up with PCP.         Follow up:  2 months   Chart review time today: 20 minutes  Visit time today: 20 minutes  Total time spent today: 40 minutes        Isabella Nunez PA-C  General Cardiology   03/26/24

## 2024-04-13 PROCEDURE — 93248 EXT ECG>7D<15D REV&INTERPJ: CPT | Performed by: INTERNAL MEDICINE

## 2024-04-14 ENCOUNTER — HEALTH MAINTENANCE LETTER (OUTPATIENT)
Age: 49
End: 2024-04-14

## 2024-04-19 ENCOUNTER — TELEPHONE (OUTPATIENT)
Dept: CARDIOLOGY | Facility: CLINIC | Age: 49
End: 2024-04-19
Payer: COMMERCIAL

## 2024-04-19 NOTE — TELEPHONE ENCOUNTER
Called patient to schedule new ep appointment with Dr. Markham. Patient agreeable to scheduling.    Adalgisa ALEXIS, KACI

## 2024-04-19 NOTE — TELEPHONE ENCOUNTER
Called pt and left detailed message regarding Zio patch results and recommendation for referral to EP for further input/workup.     Also sent pt Mychart message.     Message sent to schedulers to please call pt to set up New EP appt.     Paula Gastelum RN

## 2024-04-19 NOTE — TELEPHONE ENCOUNTER
----- Message from Isabella Nunez PA-C sent at 4/18/2024  3:13 PM CDT -----  Can we get this patient hooked up with EP with his high PVC burden?

## 2024-04-26 ENCOUNTER — TELEPHONE (OUTPATIENT)
Dept: FAMILY MEDICINE | Facility: CLINIC | Age: 49
End: 2024-04-26
Payer: COMMERCIAL

## 2024-04-26 NOTE — TELEPHONE ENCOUNTER
Patient Quality Outreach    Patient is due for the following:   Diabetes -  A1C  Colon Cancer Screening  Physical Preventive Adult Physical    Next Steps:   Schedule a Adult Preventative    Type of outreach:    Sent [facility] message.      Questions for provider review:    None           JULIAN CARVALHO, CMA

## 2024-06-23 ENCOUNTER — HEALTH MAINTENANCE LETTER (OUTPATIENT)
Age: 49
End: 2024-06-23

## 2024-08-07 ENCOUNTER — TELEPHONE (OUTPATIENT)
Dept: FAMILY MEDICINE | Facility: CLINIC | Age: 49
End: 2024-08-07
Payer: COMMERCIAL

## 2024-08-07 NOTE — LETTER
August 7, 2024    To  Archie Wallace  [address]   [place] [address]      Your team at [facility] cares about your health. We have reviewed your chart and based on our findings; we are making the following recommendations to better manage your health.     You are in particular need of attention regarding the following:     Schedule a DIABETIC FOLLOW UP appointment for Office Visit. Patients with diabetes should see their provider regularly.  HYPERTENSION FOLLOW UP: Office Visit  PREVENTATIVE VISIT: Physical    If you have already completed these items, please contact the clinic via phone or   MyChart so your care team can review and update your records. Thank you for   choosing [facility] for your healthcare needs. For any questions,   concerns, or to schedule an appointment please contact our clinic.    Healthy Regards,      Your [facility] Care Team

## 2024-08-07 NOTE — TELEPHONE ENCOUNTER
Patient Quality Outreach    Patient is due for the following:   Diabetes -  A1C, Diabetic Follow-Up Visit, and Foot Exam  Asthma  -  ACT needed  Hypertension -  BP check  Physical Preventive Adult Physical  Chronic Opioid Use -  Treatment Agreement (CSA), Urine Drug Screen, MEGAN-7, and PHQ-9    Next Steps:   Schedule a Adult Preventative    Type of outreach:    Sent letter.    Next Steps:  Reach out within 90 days via Phone.    Max number of attempts reached: No. Will try again in 90 days if patient still on fail list.    Questions for provider review:    None           Gabbi Davis MA         [Home] : at home, [unfilled] , at the time of the visit. [Medical Office: ([facility])___] : at the medical office located in  [Verbal consent obtained from patient] : the patient, [unfilled] [FreeTextEntry1] : 75 year old man with BPH, history of urinary retention and newly diagnosed metastatic prostate cancer.\par \par Regarding prostate cancer, he had MRI at [place] on 10/18/2021. 5.9 x 6.1 x 7.6 cm; volume 142 mL prostate with PIRADS 5 lesion measuring 1.6 cm Left posterolateral midgland peripheral zone. No LAD No EPE : The lesion broadly abuts capsule without gross EPE, multiple enhancing bone lesions involving left posterior superior iliac spine and L5, subchondral femoral head bone lesions unchanged since 2017 and are probably AVN.He had targeted biopsy that was positive for Maira GG4 cancer (lesion left mid pzpl). The standard biopsy detected GG4 cancer which did overlap with the targeted biopsy. 2/12 cores. location(s). LPA GG4, RPA GG1\par \par PSA 7/6/21: 20.66 ng/ml\par \par Bone scan positive at multiple sites. He is scheduled to see Dr. Reed to discuss treatment for metastatic prostate cancer. He has not started ADT yet.\par \par He has long history of LUTS as well. He was taking saw palmetto for a long time. His symptoms are primarily weak stream, straining, urinary frequency, nocturia and need to defecate when urinating. He has elevated PVRs, up to 600 cc in the office. He went into urinary retention after prostate biopsy, but is now voiding. He started flomax and finasteride recently and has noticed improved urinary stream with those medications. No fevers, chills, dysuria, hematuria.\par \par PMH: DM, HTN, CAD, peripheral angiopathy\par PSH:none\par FAMHX:no prostate cancer\par SOCIAL: nonsmoker, no ETOH, retired  from [employer] \par ROS: negative 10 system review

## 2024-09-01 ENCOUNTER — HEALTH MAINTENANCE LETTER (OUTPATIENT)
Age: 49
End: 2024-09-01

## 2024-10-14 ENCOUNTER — OFFICE VISIT (OUTPATIENT)
Dept: FAMILY MEDICINE | Facility: CLINIC | Age: 49
End: 2024-10-14
Payer: COMMERCIAL

## 2024-10-14 VITALS
BODY MASS INDEX: 42.66 KG/M2 | TEMPERATURE: 98.5 F | DIASTOLIC BLOOD PRESSURE: 105 MMHG | HEART RATE: 78 BPM | SYSTOLIC BLOOD PRESSURE: 168 MMHG | HEIGHT: 72 IN | OXYGEN SATURATION: 97 % | WEIGHT: 315 LBS | RESPIRATION RATE: 19 BRPM

## 2024-10-14 DIAGNOSIS — F32.1 MODERATE MAJOR DEPRESSION (H): ICD-10-CM

## 2024-10-14 DIAGNOSIS — E66.813 CLASS 3 SEVERE OBESITY WITH SERIOUS COMORBIDITY AND BODY MASS INDEX (BMI) OF 45.0 TO 49.9 IN ADULT, UNSPECIFIED OBESITY TYPE (H): ICD-10-CM

## 2024-10-14 DIAGNOSIS — Z12.5 SCREENING FOR PROSTATE CANCER: ICD-10-CM

## 2024-10-14 DIAGNOSIS — I10 HYPERTENSION GOAL BP (BLOOD PRESSURE) < 140/90: ICD-10-CM

## 2024-10-14 DIAGNOSIS — R20.2 NUMBNESS AND TINGLING: ICD-10-CM

## 2024-10-14 DIAGNOSIS — E66.01 CLASS 3 SEVERE OBESITY WITH SERIOUS COMORBIDITY AND BODY MASS INDEX (BMI) OF 45.0 TO 49.9 IN ADULT, UNSPECIFIED OBESITY TYPE (H): ICD-10-CM

## 2024-10-14 DIAGNOSIS — R20.0 NUMBNESS AND TINGLING: ICD-10-CM

## 2024-10-14 DIAGNOSIS — M47.22 OSTEOARTHRITIS OF SPINE WITH RADICULOPATHY, CERVICAL REGION: Primary | ICD-10-CM

## 2024-10-14 DIAGNOSIS — M54.10 RADICULOPATHY AFFECTING UPPER EXTREMITY: ICD-10-CM

## 2024-10-14 DIAGNOSIS — E11.65 TYPE 2 DIABETES MELLITUS WITH HYPERGLYCEMIA, WITHOUT LONG-TERM CURRENT USE OF INSULIN (H): ICD-10-CM

## 2024-10-14 LAB
ALBUMIN SERPL BCG-MCNC: 4 G/DL (ref 3.5–5.2)
ALP SERPL-CCNC: 52 U/L (ref 40–150)
ALT SERPL W P-5'-P-CCNC: 26 U/L (ref 0–70)
ANION GAP SERPL CALCULATED.3IONS-SCNC: 11 MMOL/L (ref 7–15)
AST SERPL W P-5'-P-CCNC: 18 U/L (ref 0–45)
BILIRUB SERPL-MCNC: 0.5 MG/DL
BUN SERPL-MCNC: 10.3 MG/DL (ref 6–20)
CALCIUM SERPL-MCNC: 9.3 MG/DL (ref 8.8–10.4)
CHLORIDE SERPL-SCNC: 101 MMOL/L (ref 98–107)
CHOLEST SERPL-MCNC: 173 MG/DL
CREAT SERPL-MCNC: 0.78 MG/DL (ref 0.67–1.17)
EGFRCR SERPLBLD CKD-EPI 2021: >90 ML/MIN/1.73M2
EST. AVERAGE GLUCOSE BLD GHB EST-MCNC: 194 MG/DL
FASTING STATUS PATIENT QL REPORTED: YES
FASTING STATUS PATIENT QL REPORTED: YES
GLUCOSE SERPL-MCNC: 184 MG/DL (ref 70–99)
HBA1C MFR BLD: 8.4 % (ref 0–5.6)
HCO3 SERPL-SCNC: 28 MMOL/L (ref 22–29)
HDLC SERPL-MCNC: 60 MG/DL
LDLC SERPL CALC-MCNC: 100 MG/DL
NONHDLC SERPL-MCNC: 113 MG/DL
POTASSIUM SERPL-SCNC: 3.8 MMOL/L (ref 3.4–5.3)
PROT SERPL-MCNC: 7.5 G/DL (ref 6.4–8.3)
PSA SERPL DL<=0.01 NG/ML-MCNC: 0.33 NG/ML (ref 0–3.5)
SODIUM SERPL-SCNC: 140 MMOL/L (ref 135–145)
TRIGL SERPL-MCNC: 63 MG/DL

## 2024-10-14 PROCEDURE — 84244 ASSAY OF RENIN: CPT | Mod: 90 | Performed by: FAMILY MEDICINE

## 2024-10-14 PROCEDURE — 36415 COLL VENOUS BLD VENIPUNCTURE: CPT | Performed by: FAMILY MEDICINE

## 2024-10-14 PROCEDURE — 83036 HEMOGLOBIN GLYCOSYLATED A1C: CPT | Performed by: FAMILY MEDICINE

## 2024-10-14 PROCEDURE — 96127 BRIEF EMOTIONAL/BEHAV ASSMT: CPT | Performed by: FAMILY MEDICINE

## 2024-10-14 PROCEDURE — 82088 ASSAY OF ALDOSTERONE: CPT | Performed by: FAMILY MEDICINE

## 2024-10-14 PROCEDURE — 80053 COMPREHEN METABOLIC PANEL: CPT | Performed by: FAMILY MEDICINE

## 2024-10-14 PROCEDURE — 99214 OFFICE O/P EST MOD 30 MIN: CPT | Performed by: FAMILY MEDICINE

## 2024-10-14 PROCEDURE — 80061 LIPID PANEL: CPT | Performed by: FAMILY MEDICINE

## 2024-10-14 PROCEDURE — G0103 PSA SCREENING: HCPCS | Performed by: FAMILY MEDICINE

## 2024-10-14 PROCEDURE — 99000 SPECIMEN HANDLING OFFICE-LAB: CPT | Performed by: FAMILY MEDICINE

## 2024-10-14 RX ORDER — SPIRONOLACTONE 25 MG/1
25 TABLET ORAL DAILY
Qty: 90 TABLET | OUTPATIENT
Start: 2024-10-14

## 2024-10-14 RX ORDER — SPIRONOLACTONE 25 MG/1
25 TABLET ORAL DAILY
Qty: 30 TABLET | Refills: 2 | Status: SHIPPED | OUTPATIENT
Start: 2024-10-14

## 2024-10-14 RX ORDER — METHYLPREDNISOLONE 4 MG/1
TABLET ORAL
Qty: 21 TABLET | Refills: 0 | Status: SHIPPED | OUTPATIENT
Start: 2024-10-14 | End: 2024-11-11

## 2024-10-14 ASSESSMENT — ASTHMA QUESTIONNAIRES
QUESTION_2 LAST FOUR WEEKS HOW OFTEN HAVE YOU HAD SHORTNESS OF BREATH: ONCE OR TWICE A WEEK
QUESTION_5 LAST FOUR WEEKS HOW WOULD YOU RATE YOUR ASTHMA CONTROL: POORLY CONTROLLED
EMERGENCY_ROOM_LAST_YEAR_TOTAL: ONE
ACT_TOTALSCORE: 15
QUESTION_1 LAST FOUR WEEKS HOW MUCH OF THE TIME DID YOUR ASTHMA KEEP YOU FROM GETTING AS MUCH DONE AT WORK, SCHOOL OR AT HOME: SOME OF THE TIME
QUESTION_3 LAST FOUR WEEKS HOW OFTEN DID YOUR ASTHMA SYMPTOMS (WHEEZING, COUGHING, SHORTNESS OF BREATH, CHEST TIGHTNESS OR PAIN) WAKE YOU UP AT NIGHT OR EARLIER THAN USUAL IN THE MORNING: ONCE A WEEK
QUESTION_4 LAST FOUR WEEKS HOW OFTEN HAVE YOU USED YOUR RESCUE INHALER OR NEBULIZER MEDICATION (SUCH AS ALBUTEROL): TWO OR THREE TIMES PER WEEK
ACT_TOTALSCORE: 15

## 2024-10-14 ASSESSMENT — PAIN SCALES - GENERAL: PAINLEVEL: EXTREME PAIN (8)

## 2024-10-14 ASSESSMENT — PATIENT HEALTH QUESTIONNAIRE - PHQ9
SUM OF ALL RESPONSES TO PHQ QUESTIONS 1-9: 11
10. IF YOU CHECKED OFF ANY PROBLEMS, HOW DIFFICULT HAVE THESE PROBLEMS MADE IT FOR YOU TO DO YOUR WORK, TAKE CARE OF THINGS AT HOME, OR GET ALONG WITH OTHER PEOPLE: VERY DIFFICULT
SUM OF ALL RESPONSES TO PHQ QUESTIONS 1-9: 11

## 2024-10-14 NOTE — PROGRESS NOTES
Assessment & Plan     Osteoarthritis of spine with radiculopathy, cervical region   Reviewed Imaging and prior treatments, will do medrol dose pack; might affect blood sugars and BP but this is temporary and refer him to spine specialist for further evaluation  - Spine  Referral  - methylPREDNISolone (MEDROL DOSEPAK) 4 MG tablet therapy pack  Dispense: 21 tablet; Refill: 0    Radiculopathy affecting upper extremity (left)  - Spine  Referral  - methylPREDNISolone (MEDROL DOSEPAK) 4 MG tablet therapy pack  Dispense: 21 tablet; Refill: 0    Numbness and tingling   Nerve impingement  - Spine  Referral    Hypertension goal BP (blood pressure) < 140/90   BP been uncontrolled for a long time, reviewed complications of uncontrolled BP, discussed adding Aldactone, start with 25 mg and can go up as needed. Will monitor home BPs.   - Comprehensive metabolic panel (BMP + Alb, Alk Phos, ALT, AST, Total. Bili, TP)  - Aldosterone  - Renin activity  - Aldosterone Renin Ratio  - spironolactone (ALDACTONE) 25 MG tablet  Dispense: 30 tablet; Refill: 2  - Comprehensive metabolic panel (BMP + Alb, Alk Phos, ALT, AST, Total. Bili, TP)  - Aldosterone Renin Ratio  - Potassium    Type 2 diabetes mellitus with hyperglycemia, without long-term current use of insulin (H)   Due for A1c check,  - HEMOGLOBIN A1C  - Lipid Profile (Chol, Trig, HDL, LDL calc)  - Comprehensive metabolic panel (BMP + Alb, Alk Phos, ALT, AST, Total. Bili, TP)  - HEMOGLOBIN A1C  - Lipid Profile (Chol, Trig, HDL, LDL calc)  - Comprehensive metabolic panel (BMP + Alb, Alk Phos, ALT, AST, Total. Bili, TP)    Moderate major depression (H)   - states health issues limiting ability to enjoy things he would like to do.    Class 3 severe obesity with serious comorbidity and body mass index (BMI) of 45.0 to 49.9 in adult, unspecified obesity type (H)   - Counseled to make better food choices, exercise as tolerated, and lose weight. On a GLP1 as  well    Screening for prostate cancer  - PSA, screen  - PSA, screen      BMI  Estimated body mass index is 49.15 kg/m  as calculated from the following:    Height as of this encounter: 1.829 m (6').    Weight as of this encounter: 164.4 kg (362 lb 6.4 oz).   Weight management plan: Discussed healthy diet and exercise guidelines      See Patient Instructions    10/14: body pain, numbness, tingling, bp, seen er 9/5, ortho 7/249 (rt knee). Last A1c 6.9      Subjective   Archie is a 49 year old, presenting for the following health issues:  Discuss Pain  Numbness/Tingling     ER Parasthesia: 9/5/24  CT head with no findings of subdural hematoma, epidural hematoma, intraparenchymal hemorrhage, subarachnoid hemorrhage, or skull fracture. CTA with no signs of LVO, dissection, or aneurysm. MRI imaging showed subtle curvilinear areas of restricted diffusion at the medial periphery of both frontal lobes anteriorly and left frontal parietal region. Because of the possibility of ischemia I discussed the case with the on-call neurologist. It was felt that this is unlikely to represent ischemia. Unclear as to precise etiology of his symptoms at this time. Plan to discharge home and follow-up with PCP. If any new or worsening symptoms he will return to ER.     MR    MPRESSION:  1.  Interval C3-C7 anterior cervical discectomy and fusion. Patency of  the spinal canal has improved. No high-grade spinal canal stenosis.  2.  Unchanged T2 prolongation in the left hemicord at C4-5.  3.  Unchanged moderate to severe left C7-T1 neural foraminal stenosis.  4.  Interval right thyroidectomy.      HTN: uncontrolled on Losartan 100 mg, Amlodipine 10 mg , Clonidine 0.2 mg twice daily  BP Readings from Last 6 Encounters:   10/14/24 (!) 178/113   03/26/24 (!) 180/104   12/29/23 (!) 162/98   10/13/22 (!) 169/109   09/08/22 (!) 165/90   09/07/22 (!) 179/102      Diabetes Mellitus:   Well controlled.      10/14/2024     8:14 AM   Additional Questions    Roomed by natanael Osborne ma   Accompanied by 1     HPI     LUE: Numbness and tingling x 2 month. Also has neckpain, occasionally loses strength; *5% of the, in certain positions.          Review of Systems  Constitutional, HEENT, cardiovascular, pulmonary, gi and gu systems are negative, except as otherwise noted.      Objective    BP (!) 178/113 (BP Location: Right arm, Cuff Size: Adult Large)   Pulse 78   Temp 98.5  F (36.9  C) (Temporal)   Resp 19   Ht 1.829 m (6')   Wt (!) 164.4 kg (362 lb 6.4 oz)   SpO2 97%   BMI 49.15 kg/m    Body mass index is 49.15 kg/m .  Physical Exam   GENERAL: alert and no distress  RESP: lungs clear to auscultation - no rales, rhonchi or wheezes  CV: regular rate and rhythm, no murmur, click or rub, no peripheral edema  ABDOMEN: soft, nontender, no masses and bowel sounds normal  MS: uncomfortable, changing positions, no gross musculoskeletal defects noted, no edema          Signed Electronically by: Agustin Parekh MD

## 2024-10-15 LAB — ALDOST SERPL-MCNC: 7.7 NG/DL (ref 0–31)

## 2024-10-17 LAB
ALDOST/RENIN PLAS-RTO: 9.6 {RATIO} (ref 0–25)
RENIN PLAS-CCNC: 0.8 NG/ML/HR

## 2024-11-11 ENCOUNTER — OFFICE VISIT (OUTPATIENT)
Dept: PHYSICAL MEDICINE AND REHAB | Facility: CLINIC | Age: 49
End: 2024-11-11
Attending: FAMILY MEDICINE
Payer: COMMERCIAL

## 2024-11-11 VITALS — OXYGEN SATURATION: 95 % | DIASTOLIC BLOOD PRESSURE: 82 MMHG | HEART RATE: 77 BPM | SYSTOLIC BLOOD PRESSURE: 170 MMHG

## 2024-11-11 DIAGNOSIS — R20.0 LEFT SIDED NUMBNESS: Primary | ICD-10-CM

## 2024-11-11 DIAGNOSIS — Z98.1 S/P CERVICAL SPINAL FUSION: ICD-10-CM

## 2024-11-11 DIAGNOSIS — M54.10 RADICULOPATHY AFFECTING UPPER EXTREMITY: ICD-10-CM

## 2024-11-11 DIAGNOSIS — R20.2 NUMBNESS AND TINGLING: ICD-10-CM

## 2024-11-11 DIAGNOSIS — R20.0 NUMBNESS AND TINGLING: ICD-10-CM

## 2024-11-11 DIAGNOSIS — M47.22 OSTEOARTHRITIS OF SPINE WITH RADICULOPATHY, CERVICAL REGION: ICD-10-CM

## 2024-11-11 PROCEDURE — 99204 OFFICE O/P NEW MOD 45 MIN: CPT | Performed by: NURSE PRACTITIONER

## 2024-11-11 RX ORDER — LORAZEPAM 1 MG/1
TABLET ORAL
Qty: 4 TABLET | Refills: 0 | Status: SHIPPED | OUTPATIENT
Start: 2024-11-11

## 2024-11-11 RX ORDER — NAPROXEN 500 MG/1
500 TABLET ORAL 2 TIMES DAILY WITH MEALS
Qty: 60 TABLET | Refills: 0 | Status: SHIPPED | OUTPATIENT
Start: 2024-11-11

## 2024-11-11 RX ORDER — GABAPENTIN 300 MG/1
CAPSULE ORAL
Qty: 90 CAPSULE | Refills: 2 | Status: SHIPPED | OUTPATIENT
Start: 2024-11-11

## 2024-11-11 ASSESSMENT — PAIN SCALES - GENERAL: PAINLEVEL_OUTOF10: EXTREME PAIN (8)

## 2024-11-11 NOTE — PATIENT INSTRUCTIONS
Prescribed Gabapentin today, 300 mg tablets, to be titrated up to 1 tablets 3 times a day as tolerated for your nerve pain. Please follow Gabapentin dosing chart below.    Gabapentin 300mg Dosing Chart    DATE  MORNING AFTERNOON BEDTIME    Day 1 0 0 1    Day 2 0 0 1    Day 3 0 0 1    Day 4 1 0 1    Day 5 1 0 1    Day 6 1 0 1    Day 7 1 1 1    Day 8 1 1 1    Day 9 1 1 1     Continue medication, taking 1 capsules three times daily    Please call if you have any questions regarding how to take your medication  Clinic Phone # [phone]        Keep an eye on your blood pressure, if goes higher than current (170/82), would stop naproxen.    Prescribed naproxen 500mg today. Please take as prescribed, as needed for pain control as well as to aid in decreasing inflammation.   Take medication with a full glass of water and with food.   *Do not take Advil, Ibuprofen, Aleve, or Naproxen while taking this medication as it can cause organ failure if taken together*  This medication does have risks if taken long-term, these risks include: gastrointestinal irritation, kidney dysfunction, and cardiovascular effects.  We will check your kidney function as needed while you're on this medication.  Stop taking this medication if you have intolerable side effects or blood in the stool.       Imaging (Xray, CT, or MRI) has been ordered today.   Radiology will call you to schedule. Please call below if you do not hear from them in the next couple of days.     [facility] Radiology Scheduling:  Please call [phone] to schedule your image(s) (select option #1).    There are 3 different locations:    [facility]  [address] [place] [id number]    [facility] [place]  [address], [address]   [facility] [address]    [place] [id number] [place] [id number]       ~Please call our [facility] Nurse Navigation line [phone] with any questions or concerns  about your treatment plan, if symptoms worsen and you would like to be seen urgently, or if you have any new or worsening numbness, weakness, or problems controlling bladder and bowel function.  ~You are also welcome to contact Vickie Andersen via [employer], but please be aware that responses to [employer] message may take 2-3 days due to the high volume of patients seen in clinic.

## 2024-11-11 NOTE — PROGRESS NOTES
ASSESSMENT: Archie Wallace is a 49 year old male presents for consultation at the request of PCP Agustin Parekh, who presents today for new patient evaluation of :    -osteoarthritis of spine with radiculopathy, cervical region  -numbness and tingling  -radiculopathy affecting upper extremity    Patient endorses progressively worsening left side numbness. He has dec reflexes, decreased sensation throughout the left upper extremity, left torso, as well as left thigh on exa. Mike EHL weakness, which I suspect is unrelated. Recommend checking cervical fusion hardware with CT and also a cervical MRI. Given his symptoms, I would suggest neurosurgery follow up appt prior to conservative care. Gabapentin and naproxen sent to his pharmacy today. Did discuss his htn and recommended stopping naproxen if it worsens his bp. He will check this at home.          5/5/2022     2:00 PM   OSWESTRY DISABILITY INDEX   Count 10   Sum 37   Oswestry Score (%) 74 %            Diagnoses and all orders for this visit:  Left sided numbness  -     CT Cervical Spine w/o Contrast; Future  -     MR Cervical Spine w/o & w Contrast; Future  Osteoarthritis of spine with radiculopathy, cervical region  -     Spine  Referral  Numbness and tingling  -     Spine  Referral  Radiculopathy affecting upper extremity (left)  -     Spine  Referral  -     naproxen (NAPROSYN) 500 MG tablet; Take 1 tablet (500 mg) by mouth 2 times daily (with meals).  -     gabapentin (NEURONTIN) 300 MG capsule; 300mg po at bedtime x 3 days, then twice daily x 3 days, then three times daily ongoing  -     LORazepam (ATIVAN) 1 MG tablet; Take 1 tab po 60mins prior to scan, take 2nd tab 20mins prior to scan. Repeat these instructions for 2nd scan IF SCANS ARE ON DIFFERENT DAYS. Do not take all 4 tabs on the same day. Do not drive  -     CT Cervical Spine w/o Contrast; Future  -     MR Cervical Spine w/o & w Contrast; Future  S/P cervical spinal  fusion  -     CT Cervical Spine w/o Contrast; Future  -     MR Cervical Spine w/o & w Contrast; Future       PLAN:  Reviewed spine anatomy and disease process. Discussed diagnosis and treatment options with the patient today. A shared decision making model was used. The patient's values and choices were respected. The following represents what was discussed and decided upon by the provider and the patient.     -DIAGNOSTIC TESTS:  Images were personally reviewed and interpreted and explained to patient today using spine model.   --Cervical CT without contrast ordered today   -MRI cervical spine without contrast ordered today    -PHYSICAL THERAPY:    -Has already completed a course of PT since his surgery  -would consider an additional course depending on results    -MEDICATIONS:    -start gabapentin  -start naproxen (if bp no higher than current. they will check regularly at home)  -ativan 1mg sent for pre-imaging sedation. 4 tabs sent since expectation is 2 separate radiology appts  Discussed multiple medication options today with patient. Discussed risks, side effects, and proper use of medications. Patient verbalized understanding.    -INTERVENTIONS:    -Did not discuss    -PATIENT EDUCATION: Total time of 40 minutes, on the day of service, spent with the patient, reviewing the chart, placing orders, and documenting.   -Today we also discussed the issues related to the pros and cons of the current treatment plan.    -FOLLOW-UP:  we will call with results of imaging    Advised patient to call the [facility] if symptoms worsen or if they develop red flag symptoms such as numbness, weakness, severe pain uncontrolled by current pain med regimen, or any new or worsening problems controlling bladder and bowel function.   ______________________________________________________________________    SUBJECTIVE:   Archie Wallace  is a 49 year old male on asa 81mg with hx C3-7 ACDF by Dr Marie in 7/16/21, type 2 diabetes, htn,  hyperlipidemia, cad, asthma, long QT interval syndrome, right thyroidectomy, who presents today for new patient evaluation of osteoarthritis of spine with radiculopathy, cervical region, numbness and tingling, radiculopathy affecting upper extremity -    In the last 2-3 mos, patient has experienced progressively worsening numbness, tingling, and pain of his left arm and leg, and torso. Worse in the middle 3 digits of his hand. Left leg numbness usually stops mid-shin, but sometimes goes down into the ankle. He reports weakness of his left arm and leg in the last few mos as well. His pain is an 8/10 today, 10 at worst, 6 at best. Worse with walking, bending, lifting, sleeping. Nothing makes it better. It is constant.  His balance is off, and legs will give out. He has fallen a few times.      He does report that he had this before his surgery, but it had gotten better after surgery and just started getting worse in the last few mos.    He did a course of PT after his surgery and he has tried doing some of these exercises but these have not helped    He has tried taking some ibuprofen and tylenol, and robaxin. None of these are helpful.    He did have a C7-T1 IL DONTE in 6/28/22 with Dr Hutchins  Last cervical MRI May 2022    -Treatment to Date:     -Medications:  Ibuprofen  Tylenol  robaxin    Current Outpatient Medications   Medication Sig Dispense Refill    albuterol (PROAIR HFA) 108 (90 Base) MCG/ACT inhaler Inhale 2 puffs into the lungs every 6 hours as needed 1 Inhaler 5    gabapentin (NEURONTIN) 300 MG capsule 300mg po at bedtime x 3 days, then twice daily x 3 days, then three times daily ongoing 90 capsule 2    LORazepam (ATIVAN) 1 MG tablet Take 1 tab po 60mins prior to scan, take 2nd tab 20mins prior to scan. Repeat these instructions for 2nd scan IF SCANS ARE ON DIFFERENT DAYS. Do not take all 4 tabs on the same day. Do not drive 4 tablet 0    naproxen (NAPROSYN) 500 MG tablet Take 1 tablet (500 mg) by mouth 2  times daily (with meals). 60 tablet 0    amLODIPine (NORVASC) 10 MG tablet Take 1 tablet (10 mg) by mouth daily 90 tablet 2    aspirin 81 MG EC tablet Take 1 tablet (81 mg) by mouth daily 90 tablet 3    augmented betamethasone dipropionate (DIPROLENE-AF) 0.05 % external ointment Apply topically 2 times daily To hand and foot rash. Once itching is gone then stop. Decrease use after 1 month. (Patient not taking: Reported on 12/29/2023) 50 g 1    cloNIDine (CATAPRES) 0.2 MG tablet Take 1 tablet (0.2 mg) by mouth 2 times daily +++NEEDS APPOINTMENT FOR FURTHER REFILLS+++ 60 tablet 0    DULERA 100-5 MCG/ACT inhaler INHALER 2 PUFFS BY MOUTH INTO THE LUNGS TWICE DAILY (Patient not taking: Reported on 10/14/2024) 13 g 0    hydrocortisone 2.5 % cream Apply topically 2 times daily When rash is flared only then stop (Patient not taking: Reported on 12/29/2023) 30 g 2    insulin pen needle (31G X 5 MM) 31G X 5 MM miscellaneous Use 1 pen needles daily or as directed. (Patient not taking: Reported on 12/29/2023) 100 each 0    Insulin Pen Needle (PEN NEEDLES) 32G X 4 MM MISC 1 each daily (Patient not taking: Reported on 12/29/2023) 100 each 0    ketoconazole (NIZORAL) 2 % external cream Apply to rash twice daily to rash in abdominal fold until rash resolves. Then use daily to prevent flares. (Patient not taking: Reported on 12/29/2023) 60 g 11    losartan (COZAAR) 100 MG tablet Take 1 tablet (100 mg) by mouth daily 90 tablet 2    spironolactone (ALDACTONE) 25 MG tablet Take 1 tablet (25 mg) by mouth daily. 30 tablet 2    terbinafine (LAMISIL) 250 MG tablet Take 1 tablet (250 mg) by mouth daily (Patient not taking: Reported on 10/14/2024) 42 tablet 0     Current Facility-Administered Medications   Medication Dose Route Frequency Provider Last Rate Last Admin    betamethasone acet & sod phos (CELESTONE) injection 6 mg  6 mg   Moise Cannon MD   6 mg at 05/10/22 1427    ropivacaine (NAROPIN) injection 4 mL  4 mL   Davis  Moise MCKEON MD   4 mL at 05/10/22 1427       Allergies   Allergen Reactions    Oseltamivir Anaphylaxis and Rash    Biaxin [Clarithromycin]      Muscle breakdown    Bromaxefed Dm Rf Cough    Clarithromycin Other (See Comments)     Weakness    Robafen Dm Cgh-Chest [Dextromethorphan-Guaifenesin] Difficulty breathing    Guaifenesin Rash    Metformin Rash     Reported breaking out with a skin reaction       Past Medical History:   Diagnosis Date    CAD (coronary artery disease)     Premature artery disease noted    Class 3 severe obesity due to excess calories with serious comorbidity and body mass index (BMI) of 45.0 to 49.9 in adult (H) 02/18/2010    Bariatric surgery consult 9/7/21 Stephania Youssef PA-C Starting BMI 46 and wt 337 lbs     DDD (degenerative disc disease), cervical     History of MI (myocardial infarction) 07/17/2020    Hyperlipidemia LDL goal <40     Hypertension goal BP (blood pressure) < 140/90     Long QT interval syndrome     MEDICAL HISTORY OF -     History of rhabdomyolosis, from strenuous exercise    Mild persistent asthma     Obstructive sleep apnea     Primary osteoarthritis of left knee     Sleep apnea syndrome     cpap    Type 2 diabetes, HbA1c goal < 7% (H)     Vitamin D deficiency         Patient Active Problem List   Diagnosis    Low back pain    Obstructive sleep apnea    Class 3 severe obesity due to excess calories with serious comorbidity and body mass index (BMI) of 45.0 to 49.9 in adult (H)    Hypertension goal BP (blood pressure) < 140/90    Hyperlipidemia LDL goal <40    CAD (coronary artery disease)    Moderate persistent asthma    Long QT interval syndrome    Type 2 diabetes mellitus with hyperglycemia, without long-term current use of insulin (H)    Moderate major depression (H)    Primary osteoarthritis of left knee    Chronic, continuous use of opioids       Past Surgical History:   Procedure Laterality Date    ANGIOGRAM      ANGIOPLASTY  2001    Coronary artery  angioplasty//associated with mild AMI    DISCECTOMY, FUSION CERVICAL ANTERIOR THREE+ LEVELS, COMBINED N/A 7/16/2021    Procedure: Cervical 3 to Cervical 7 Anterior Cervical Discectomy and Fusion;  Surgeon: Jaquan De Leon MD;  Location: [facility] OR    NASAL SINUS SURGERY      THYROIDECTOMY Right 7/16/2021    Procedure: RIGHT THYROIDECTOMY;  Surgeon: Moshe Hernandez DO;  Location: [facility] OR    TONSILLECTOMY  2005    With uvuloectomy       Family History   Problem Relation Age of Onset    Diabetes Mother     Hypertension Mother     Glaucoma Mother     Diabetes Father     Diabetes Sister     Glaucoma Other     Macular Degeneration No family hx of        Reviewed past medical, surgical, and family history with patient found on new patient intake packet located in EMR Media tab.     SOCIAL HX: nonsmoker, sometimes alcohol use, years ago heavy drinking, cannabis user    Oswestry (LEANDRO) Questionnaire:        5/5/2022     2:00 PM   OSWESTRY DISABILITY INDEX   Count 10   Sum 37   Oswestry Score (%) 74 %       Neck Disability Index:      10/12/2021    10:00 AM   Neck Disability Index (  Barron H. and Fernando C. 1991. All rights reserved.; used with permission)   SECTION 1 - PAIN INTENSITY 2   SECTION 2 - PERSONAL CARE 3   SECTION 3 - LIFTING 5   SECTION 4 - READING 4   SECTION 5 - HEADACHES 2   SECTION 6 - CONCENTRATION 3   SECTION 7 - WORK 4   SECTION 8 - DRIVING 5   SECTION 9 - SLEEPING 4   SECTION 10 - RECREATION 5   Count 10   Sum 37   Raw Score: /50 37   Neck Disability Index Score: (%) 74 %          PHQ-2 Score:       4/27/2022     2:15 PM 4/6/2022    12:28 PM   PHQ-2 ( 1999 [employer])   Q1: Little interest or pleasure in doing things 1  0   Q2: Feeling down, depressed or hopeless 1  0   PHQ-2 Score 2 0   PHQ-2 Total Score (12-17 Years)- Positive if 3 or more points; Administer PHQ-A if positive  0   Q1: Little interest or pleasure in doing things Several days    Q2: Feeling down, depressed or hopeless Several days    PHQ-2 Score  2        Patient-reported          ROS: positive for weight gain, headache, difficulty swallowing, changes in vision, eye pain, chest pain, palpitations, feet/leg swelling, SOB, cough, wheezing, loss of bladder control, joint pain, muscle pain, muscle fatigue, sciatica, itching, imbalance, falls, insomnia, excessive tiredness, anxiety, depression. Specifically negative for bowel/bladder dysfunction, balance changes, headache, dizziness, foot drop, fevers, chills, appetite changes, nausea/vomiting, unexplained weight loss. Otherwise 13 systems reviewed are negative. Please see the patient's intake questionnaire from today for details.    OBJECTIVE:  BP (!) 170/82   Pulse 77   SpO2 95%     PHYSICAL EXAMINATION:  --CONSTITUTIONAL: Vital signs as above. No acute distress. The patient is well nourished and well groomed.  --PSYCHIATRIC: The patient is awake, alert, oriented to person, place, and time, and answering questions appropriately with clear speech. Appropriate mood and affect   --HEENT: Sclera are non-injected. Extraocular muscles are intact. Moist oral mucosa.  --SKIN: Skin over the face, bilateral upper extremities, and posterior torso is clean, dry, intact without rashes.  --RESPIRATORY: Normal rhythm and effort. No abnormal accessory muscle breathing patterns noted.     --NEUROLOGIC: CN III-XII are grossly intact.   --GROSS MOTOR: Easily arises from a seated position.   Gait with limp dt hip/back pain. Did not attempt tandem    --UPPER EXTREMITY MOTOR TESTING:  Shoulder abduction: right 5/5, left 5/5  Triceps: right 5/5 left 5/5  Biceps:right 5/5  left 5/5,   Wrist flexion: right 5/5  left 5/5,   Wrist extension: right 5/5  left 5/5,   Hand :  right 5/5  left 5/5,   Intrinsics: right 5/5  left 4+/5,   Extensors: right 5/5  left 5/5,     --LOWER EXTREMITY MOTOR TESTING  Hip flexion: right 5/5  left 5/5,   Quads:right 5/5  left 5/5,   Hamstrings: right 5/5  left 5/5,   Dorsiflexion: right 5/5  left 5/5,    Plantarflexion: right 5/5  left 5/5,   EHL: right 4/5  left 4/5,     REFLEXES: 0/4 symmetric triceps, biceps, brachioradialis bilaterally. 0/4 symmetric patellar, achilles reflex bilaterally.  Negative Clonus, Babinski, and Gonzales's bilaterally.      SENSATION: decreased sensation throughout the left upper extremity, left torso, as well as left thigh. Otherwise intact throughout the R upper and lower extremities is intact to light touch.       --VASCULAR: Warm upper and lower limbs bilaterally. No swelling or color change noted.    --CERVICAL SPINE: Inspection reveals no evidence of deformity or swelling. Range of motion is limited in cervical flexion, extension, lateral rotation, head tilt. No point tenderness to palpation of cervical spine. No tenderness to palpation of traps, scaps, or paraspinal musculature.    Does have lower lumbar point tenderness to palp    Negative straight leg raises jackie      RESULTS:   Prior medical records from [facility] Everywhere were reviewed today.         IMAGING:  Spine imaging was personally reviewed and interpreted today. The images were shown to the patient and the findings were explained using a spine model.     MRI OF THE CERVICAL SPINE WITHOUT CONTRAST   5/16/2022 1:27 PM      HISTORY: Cervical fusion. Extremity paresthesias.     TECHNIQUE: Multiplanar, multisequence MRI images of the cervical spine  were acquired without contrast.     COMPARISON: Cervical spine MRI 6/7/2021.     FINDINGS: Normal vertebral body heights. Interval C3-C7 anterior  cervical discectomy and fusion. Straightened lordosis. T2 prolongation  in the left hemicord at C4-5. It was present on the prior exam.  Interval right thyroidectomy. Normal left hemithyroid.     C2-3: Normal disc height. Mild left uncinate spurring. Normal facets.  No spinal canal or neural foraminal stenosis.     C3-4: Anterior cervical discectomy and fusion. Mild bilateral facet  arthropathy. Decompressed spinal  canal. No neural foraminal stenosis.     C4-5: Anterior cervical discectomy and fusion. Mild bilateral facet  arthropathy. Improved canal patency. No neural foraminal stenosis.     C5-6: Anterior cervical discectomy and fusion. Mild posterior  osteophytic spurring. Improved canal patency. No neural foraminal  stenosis.     C6-7: Anterior cervical discectomy and fusion. Mild left uncinate  spurring. Mild bilateral facet arthropathy. Decompressed spinal canal.  Mild bilateral neural foraminal stenosis.     C7-T1: Mild to moderate disc height loss. Mild to moderate  circumferential disc osteophyte complex. Mild bilateral facet  arthropathy. No spinal canal or right neural foraminal stenosis.  Moderate to severe left neural foraminal stenosis.                                                                      IMPRESSION:  1.  Interval C3-C7 anterior cervical discectomy and fusion. Patency of  the spinal canal has improved. No high-grade spinal canal stenosis.  2.  Unchanged T2 prolongation in the left hemicord at C4-5.  3.  Unchanged moderate to severe left C7-T1 neural foraminal stenosis.  4.  Interval right thyroidectomy.      WALI TAYLOR MD         SYSTEM ID:  [id number]      This note was dictated using voice recognition software. Any grammatical or context distortions are unintentional and inherent to the software.       Vickie Andersen [facility]  O. [phone]

## 2024-11-11 NOTE — LETTER
11/11/2024      Archie Wallace  [address]  [place] [address]      Dear Colleague,    Thank you for referring your patient, Archie Wallace, to the [facility]. Please see a copy of my visit note below.    ASSESSMENT: Archie Wallace is a 49 year old male presents for consultation at the request of PCP Agustin Parekh, who presents today for new patient evaluation of :    -osteoarthritis of spine with radiculopathy, cervical region  -numbness and tingling  -radiculopathy affecting upper extremity    Patient endorses progressively worsening left side numbness. He has dec reflexes, decreased sensation throughout the left upper extremity, left torso, as well as left thigh on exa. Mike EHL weakness, which I suspect is unrelated. Recommend checking cervical fusion hardware with CT and also a cervical MRI. Given his symptoms, I would suggest neurosurgery follow up appt prior to conservative care. Gabapentin and naproxen sent to his pharmacy today. Did discuss his htn and recommended stopping naproxen if it worsens his bp. He will check this at home.          5/5/2022     2:00 PM   OSWESTRY DISABILITY INDEX   Count 10   Sum 37   Oswestry Score (%) 74 %            Diagnoses and all orders for this visit:  Left sided numbness  -     CT Cervical Spine w/o Contrast; Future  -     MR Cervical Spine w/o & w Contrast; Future  Osteoarthritis of spine with radiculopathy, cervical region  -     Spine  Referral  Numbness and tingling  -     Spine  Referral  Radiculopathy affecting upper extremity (left)  -     Spine  Referral  -     naproxen (NAPROSYN) 500 MG tablet; Take 1 tablet (500 mg) by mouth 2 times daily (with meals).  -     gabapentin (NEURONTIN) 300 MG capsule; 300mg po at bedtime x 3 days, then twice daily x 3 days, then three times daily ongoing  -     LORazepam (ATIVAN) 1 MG tablet; Take 1 tab po 60mins prior to scan, take 2nd tab 20mins prior  to scan. Repeat these instructions for 2nd scan IF SCANS ARE ON DIFFERENT DAYS. Do not take all 4 tabs on the same day. Do not drive  -     CT Cervical Spine w/o Contrast; Future  -     MR Cervical Spine w/o & w Contrast; Future  S/P cervical spinal fusion  -     CT Cervical Spine w/o Contrast; Future  -     MR Cervical Spine w/o & w Contrast; Future       PLAN:  Reviewed spine anatomy and disease process. Discussed diagnosis and treatment options with the patient today. A shared decision making model was used. The patient's values and choices were respected. The following represents what was discussed and decided upon by the provider and the patient.     -DIAGNOSTIC TESTS:  Images were personally reviewed and interpreted and explained to patient today using spine model.   --Cervical CT without contrast ordered today   -MRI cervical spine without contrast ordered today    -PHYSICAL THERAPY:    -Has already completed a course of PT since his surgery  -would consider an additional course depending on results    -MEDICATIONS:    -start gabapentin  -start naproxen (if bp no higher than current. they will check regularly at home)  -ativan 1mg sent for pre-imaging sedation. 4 tabs sent since expectation is 2 separate radiology appts  Discussed multiple medication options today with patient. Discussed risks, side effects, and proper use of medications. Patient verbalized understanding.    -INTERVENTIONS:    -Did not discuss    -PATIENT EDUCATION: Total time of 40 minutes, on the day of service, spent with the patient, reviewing the chart, placing orders, and documenting.   -Today we also discussed the issues related to the pros and cons of the current treatment plan.    -FOLLOW-UP:  we will call with results of imaging    Advised patient to call the Spine Center if symptoms worsen or if they develop red flag symptoms such as numbness, weakness, severe pain uncontrolled by current pain med regimen, or any new or worsening  problems controlling bladder and bowel function.   ______________________________________________________________________    SUBJECTIVE:   Archie Wallace  is a 49 year old male on asa 81mg with hx C3-7 ACDF by Dr Marie in 7/16/21, type 2 diabetes, htn, hyperlipidemia, cad, asthma, long QT interval syndrome, right thyroidectomy, who presents today for new patient evaluation of osteoarthritis of spine with radiculopathy, cervical region, numbness and tingling, radiculopathy affecting upper extremity -    In the last 2-3 mos, patient has experienced progressively worsening numbness, tingling, and pain of his left arm and leg, and torso. Worse in the middle 3 digits of his hand. Left leg numbness usually stops mid-shin, but sometimes goes down into the ankle. He reports weakness of his left arm and leg in the last few mos as well. His pain is an 8/10 today, 10 at worst, 6 at best. Worse with walking, bending, lifting, sleeping. Nothing makes it better. It is constant.  His balance is off, and legs will give out. He has fallen a few times.      He does report that he had this before his surgery, but it had gotten better after surgery and just started getting worse in the last few mos.    He did a course of PT after his surgery and he has tried doing some of these exercises but these have not helped    He has tried taking some ibuprofen and tylenol, and robaxin. None of these are helpful.    He did have a C7-T1 IL DONTE in 6/28/22 with Dr Hutchins  Last cervical MRI May 2022    -Treatment to Date:     -Medications:  Ibuprofen  Tylenol  robaxin    Current Outpatient Medications   Medication Sig Dispense Refill     albuterol (PROAIR HFA) 108 (90 Base) MCG/ACT inhaler Inhale 2 puffs into the lungs every 6 hours as needed 1 Inhaler 5     gabapentin (NEURONTIN) 300 MG capsule 300mg po at bedtime x 3 days, then twice daily x 3 days, then three times daily ongoing 90 capsule 2     LORazepam (ATIVAN) 1 MG tablet Take 1 tab po 60mins  prior to scan, take 2nd tab 20mins prior to scan. Repeat these instructions for 2nd scan IF SCANS ARE ON DIFFERENT DAYS. Do not take all 4 tabs on the same day. Do not drive 4 tablet 0     naproxen (NAPROSYN) 500 MG tablet Take 1 tablet (500 mg) by mouth 2 times daily (with meals). 60 tablet 0     amLODIPine (NORVASC) 10 MG tablet Take 1 tablet (10 mg) by mouth daily 90 tablet 2     aspirin 81 MG EC tablet Take 1 tablet (81 mg) by mouth daily 90 tablet 3     augmented betamethasone dipropionate (DIPROLENE-AF) 0.05 % external ointment Apply topically 2 times daily To hand and foot rash. Once itching is gone then stop. Decrease use after 1 month. (Patient not taking: Reported on 12/29/2023) 50 g 1     cloNIDine (CATAPRES) 0.2 MG tablet Take 1 tablet (0.2 mg) by mouth 2 times daily +++NEEDS APPOINTMENT FOR FURTHER REFILLS+++ 60 tablet 0     DULERA 100-5 MCG/ACT inhaler INHALER 2 PUFFS BY MOUTH INTO THE LUNGS TWICE DAILY (Patient not taking: Reported on 10/14/2024) 13 g 0     hydrocortisone 2.5 % cream Apply topically 2 times daily When rash is flared only then stop (Patient not taking: Reported on 12/29/2023) 30 g 2     insulin pen needle (31G X 5 MM) 31G X 5 MM miscellaneous Use 1 pen needles daily or as directed. (Patient not taking: Reported on 12/29/2023) 100 each 0     Insulin Pen Needle (PEN NEEDLES) 32G X 4 MM MISC 1 each daily (Patient not taking: Reported on 12/29/2023) 100 each 0     ketoconazole (NIZORAL) 2 % external cream Apply to rash twice daily to rash in abdominal fold until rash resolves. Then use daily to prevent flares. (Patient not taking: Reported on 12/29/2023) 60 g 11     losartan (COZAAR) 100 MG tablet Take 1 tablet (100 mg) by mouth daily 90 tablet 2     spironolactone (ALDACTONE) 25 MG tablet Take 1 tablet (25 mg) by mouth daily. 30 tablet 2     terbinafine (LAMISIL) 250 MG tablet Take 1 tablet (250 mg) by mouth daily (Patient not taking: Reported on 10/14/2024) 42 tablet 0     Current  Facility-Administered Medications   Medication Dose Route Frequency Provider Last Rate Last Admin     betamethasone acet & sod phos (CELESTONE) injection 6 mg  6 mg   Moise Cannon MD   6 mg at 05/10/22 1427     ropivacaine (NAROPIN) injection 4 mL  4 mL   Moise Cannon MD   4 mL at 05/10/22 1427       Allergies   Allergen Reactions     Oseltamivir Anaphylaxis and Rash     Biaxin [Clarithromycin]      Muscle breakdown     Bromaxefed Dm Rf Cough     Clarithromycin Other (See Comments)     Weakness     Robafen Dm Cgh-Chest [Dextromethorphan-Guaifenesin] Difficulty breathing     Guaifenesin Rash     Metformin Rash     Reported breaking out with a skin reaction       Past Medical History:   Diagnosis Date     CAD (coronary artery disease)     Premature artery disease noted     Class 3 severe obesity due to excess calories with serious comorbidity and body mass index (BMI) of 45.0 to 49.9 in adult (H) 02/18/2010    Bariatric surgery consult 9/7/21 Stephania Youssef PA-C Starting BMI 46 and wt 337 lbs      DDD (degenerative disc disease), cervical      History of MI (myocardial infarction) 07/17/2020     Hyperlipidemia LDL goal <40      Hypertension goal BP (blood pressure) < 140/90      Long QT interval syndrome      MEDICAL HISTORY OF -     History of rhabdomyolosis, from strenuous exercise     Mild persistent asthma      Obstructive sleep apnea      Primary osteoarthritis of left knee      Sleep apnea syndrome     cpap     Type 2 diabetes, HbA1c goal < 7% (H)      Vitamin D deficiency         Patient Active Problem List   Diagnosis     Low back pain     Obstructive sleep apnea     Class 3 severe obesity due to excess calories with serious comorbidity and body mass index (BMI) of 45.0 to 49.9 in adult (H)     Hypertension goal BP (blood pressure) < 140/90     Hyperlipidemia LDL goal <40     CAD (coronary artery disease)     Moderate persistent asthma     Long QT interval syndrome     Type 2 diabetes mellitus  with hyperglycemia, without long-term current use of insulin (H)     Moderate major depression (H)     Primary osteoarthritis of left knee     Chronic, continuous use of opioids       Past Surgical History:   Procedure Laterality Date     ANGIOGRAM       ANGIOPLASTY  2001    Coronary artery angioplasty//associated with mild AMI     DISCECTOMY, FUSION CERVICAL ANTERIOR THREE+ LEVELS, COMBINED N/A 7/16/2021    Procedure: Cervical 3 to Cervical 7 Anterior Cervical Discectomy and Fusion;  Surgeon: Jaquan De Leon MD;  Location: [facility] OR     NASAL SINUS SURGERY       THYROIDECTOMY Right 7/16/2021    Procedure: RIGHT THYROIDECTOMY;  Surgeon: Moshe Hernandez DO;  Location: [facility] OR     TONSILLECTOMY  2005    With uvuloectomy       Family History   Problem Relation Age of Onset     Diabetes Mother      Hypertension Mother      Glaucoma Mother      Diabetes Father      Diabetes Sister      Glaucoma Other      Macular Degeneration No family hx of        Reviewed past medical, surgical, and family history with patient found on new patient intake packet located in EMR Media tab.     SOCIAL HX: nonsmoker, sometimes alcohol use, years ago heavy drinking, cannabis user    Oswestry (LEANDRO) Questionnaire:        5/5/2022     2:00 PM   OSWESTRY DISABILITY INDEX   Count 10   Sum 37   Oswestry Score (%) 74 %       Neck Disability Index:      10/12/2021    10:00 AM   Neck Disability Index (  Barron H. and Fernando C. 1991. All rights reserved.; used with permission)   SECTION 1 - PAIN INTENSITY 2   SECTION 2 - PERSONAL CARE 3   SECTION 3 - LIFTING 5   SECTION 4 - READING 4   SECTION 5 - HEADACHES 2   SECTION 6 - CONCENTRATION 3   SECTION 7 - WORK 4   SECTION 8 - DRIVING 5   SECTION 9 - SLEEPING 4   SECTION 10 - RECREATION 5   Count 10   Sum 37   Raw Score: /50 37   Neck Disability Index Score: (%) 74 %          PHQ-2 Score:       4/27/2022     2:15 PM 4/6/2022    12:28 PM   PHQ-2 ( 1999 [employer])   Q1: Little interest or pleasure in doing  things 1  0   Q2: Feeling down, depressed or hopeless 1  0   PHQ-2 Score 2 0   PHQ-2 Total Score (12-17 Years)- Positive if 3 or more points; Administer PHQ-A if positive  0   Q1: Little interest or pleasure in doing things Several days    Q2: Feeling down, depressed or hopeless Several days    PHQ-2 Score 2        Patient-reported          ROS: positive for weight gain, headache, difficulty swallowing, changes in vision, eye pain, chest pain, palpitations, feet/leg swelling, SOB, cough, wheezing, loss of bladder control, joint pain, muscle pain, muscle fatigue, sciatica, itching, imbalance, falls, insomnia, excessive tiredness, anxiety, depression. Specifically negative for bowel/bladder dysfunction, balance changes, headache, dizziness, foot drop, fevers, chills, appetite changes, nausea/vomiting, unexplained weight loss. Otherwise 13 systems reviewed are negative. Please see the patient's intake questionnaire from today for details.    OBJECTIVE:  BP (!) 170/82   Pulse 77   SpO2 95%     PHYSICAL EXAMINATION:  --CONSTITUTIONAL: Vital signs as above. No acute distress. The patient is well nourished and well groomed.  --PSYCHIATRIC: The patient is awake, alert, oriented to person, place, and time, and answering questions appropriately with clear speech. Appropriate mood and affect   --HEENT: Sclera are non-injected. Extraocular muscles are intact. Moist oral mucosa.  --SKIN: Skin over the face, bilateral upper extremities, and posterior torso is clean, dry, intact without rashes.  --RESPIRATORY: Normal rhythm and effort. No abnormal accessory muscle breathing patterns noted.     --NEUROLOGIC: CN III-XII are grossly intact.   --GROSS MOTOR: Easily arises from a seated position.   Gait with limp dt hip/back pain. Did not attempt tandem    --UPPER EXTREMITY MOTOR TESTING:  Shoulder abduction: right 5/5, left 5/5  Triceps: right 5/5 left 5/5  Biceps:right 5/5  left 5/5,   Wrist flexion: right 5/5  left 5/5,   Wrist  extension: right 5/5  left 5/5,   Hand :  right 5/5  left 5/5,   Intrinsics: right 5/5  left 4+/5,   Extensors: right 5/5  left 5/5,     --LOWER EXTREMITY MOTOR TESTING  Hip flexion: right 5/5  left 5/5,   Quads:right 5/5  left 5/5,   Hamstrings: right 5/5  left 5/5,   Dorsiflexion: right 5/5  left 5/5,   Plantarflexion: right 5/5  left 5/5,   EHL: right 4/5  left 4/5,     REFLEXES: 0/4 symmetric triceps, biceps, brachioradialis bilaterally. 0/4 symmetric patellar, achilles reflex bilaterally.  Negative Clonus, Babinski, and Gonzales's bilaterally.      SENSATION: decreased sensation throughout the left upper extremity, left torso, as well as left thigh. Otherwise intact throughout the R upper and lower extremities is intact to light touch.       --VASCULAR: Warm upper and lower limbs bilaterally. No swelling or color change noted.    --CERVICAL SPINE: Inspection reveals no evidence of deformity or swelling. Range of motion is limited in cervical flexion, extension, lateral rotation, head tilt. No point tenderness to palpation of cervical spine. No tenderness to palpation of traps, scaps, or paraspinal musculature.    Does have lower lumbar point tenderness to palp    Negative straight leg raises jackie      RESULTS:   Prior medical records from [facility] and Care Everywhere were reviewed today.         IMAGING:  Spine imaging was personally reviewed and interpreted today. The images were shown to the patient and the findings were explained using a spine model.     MRI OF THE CERVICAL SPINE WITHOUT CONTRAST   5/16/2022 1:27 PM      HISTORY: Cervical fusion. Extremity paresthesias.     TECHNIQUE: Multiplanar, multisequence MRI images of the cervical spine  were acquired without contrast.     COMPARISON: Cervical spine MRI 6/7/2021.     FINDINGS: Normal vertebral body heights. Interval C3-C7 anterior  cervical discectomy and fusion. Straightened lordosis. T2 prolongation  in the left hemicord at C4-5. It was  present on the prior exam.  Interval right thyroidectomy. Normal left hemithyroid.     C2-3: Normal disc height. Mild left uncinate spurring. Normal facets.  No spinal canal or neural foraminal stenosis.     C3-4: Anterior cervical discectomy and fusion. Mild bilateral facet  arthropathy. Decompressed spinal canal. No neural foraminal stenosis.     C4-5: Anterior cervical discectomy and fusion. Mild bilateral facet  arthropathy. Improved canal patency. No neural foraminal stenosis.     C5-6: Anterior cervical discectomy and fusion. Mild posterior  osteophytic spurring. Improved canal patency. No neural foraminal  stenosis.     C6-7: Anterior cervical discectomy and fusion. Mild left uncinate  spurring. Mild bilateral facet arthropathy. Decompressed spinal canal.  Mild bilateral neural foraminal stenosis.     C7-T1: Mild to moderate disc height loss. Mild to moderate  circumferential disc osteophyte complex. Mild bilateral facet  arthropathy. No spinal canal or right neural foraminal stenosis.  Moderate to severe left neural foraminal stenosis.                                                                      IMPRESSION:  1.  Interval C3-C7 anterior cervical discectomy and fusion. Patency of  the spinal canal has improved. No high-grade spinal canal stenosis.  2.  Unchanged T2 prolongation in the left hemicord at C4-5.  3.  Unchanged moderate to severe left C7-T1 neural foraminal stenosis.  4.  Interval right thyroidectomy.      WALI TAYLOR MD         SYSTEM ID:  [id number]      This note was dictated using voice recognition software. Any grammatical or context distortions are unintentional and inherent to the software.       Vickie LE-C  [facility]  O. [phone]      Again, thank you for allowing me to participate in the care of your patient.        Sincerely,        CECE Kay CNP

## 2024-11-30 ENCOUNTER — MYC REFILL (OUTPATIENT)
Dept: DERMATOLOGY | Facility: CLINIC | Age: 49
End: 2024-11-30
Payer: COMMERCIAL

## 2024-11-30 DIAGNOSIS — L30.4 INTERTRIGO: ICD-10-CM

## 2024-11-30 DIAGNOSIS — L30.1 DYSHIDROTIC ECZEMA: ICD-10-CM

## 2024-12-02 RX ORDER — KETOCONAZOLE 20 MG/G
CREAM TOPICAL
Qty: 60 G | Refills: 11 | OUTPATIENT
Start: 2024-12-02

## 2024-12-02 RX ORDER — BETAMETHASONE DIPROPIONATE 0.5 MG/G
OINTMENT, AUGMENTED TOPICAL 2 TIMES DAILY
Qty: 50 G | Refills: 1 | OUTPATIENT
Start: 2024-12-02

## 2024-12-02 NOTE — TELEPHONE ENCOUNTER
Rx denied.  Notified via my chart needs to schedule an appt.    Jeanine DIOP RN  [facility] [place]  [phone]

## 2024-12-02 NOTE — TELEPHONE ENCOUNTER
Rx denied.  Pt needs to be seen for refills.  Sent pt a my chart message to schedule an appt.    Jeanine DIOP RN  [facility] [place]  [phone]

## 2024-12-07 ENCOUNTER — HOSPITAL ENCOUNTER (OUTPATIENT)
Dept: CT IMAGING | Facility: HOSPITAL | Age: 49
Discharge: HOME | End: 2024-12-07
Attending: NURSE PRACTITIONER | Admitting: NURSE PRACTITIONER
Payer: COMMERCIAL

## 2024-12-07 DIAGNOSIS — Z98.1 S/P CERVICAL SPINAL FUSION: ICD-10-CM

## 2024-12-07 DIAGNOSIS — R20.0 LEFT SIDED NUMBNESS: ICD-10-CM

## 2024-12-07 DIAGNOSIS — M54.10 RADICULOPATHY AFFECTING UPPER EXTREMITY: ICD-10-CM

## 2024-12-07 PROCEDURE — 72125 CT NECK SPINE W/O DYE: CPT

## 2024-12-09 DIAGNOSIS — Z98.1 S/P CERVICAL SPINAL FUSION: Primary | ICD-10-CM

## 2024-12-09 DIAGNOSIS — M48.02 CERVICAL STENOSIS OF SPINAL CANAL: ICD-10-CM

## 2024-12-09 DIAGNOSIS — M54.12 CERVICAL RADICULOPATHY: ICD-10-CM

## 2024-12-20 ENCOUNTER — TELEPHONE (OUTPATIENT)
Dept: NEUROSURGERY | Facility: CLINIC | Age: 49
End: 2024-12-20
Payer: COMMERCIAL

## 2024-12-20 DIAGNOSIS — F40.240 CLAUSTROPHOBIA: Primary | ICD-10-CM

## 2024-12-20 NOTE — TELEPHONE ENCOUNTER
Patient was scheduled for MRI so that appointment with Dr. De Leon could be met.  Patient has requested oral sedation for the MRI.

## 2024-12-23 RX ORDER — DIAZEPAM 5 MG/1
TABLET ORAL
Qty: 2 TABLET | Refills: 0 | Status: SHIPPED | OUTPATIENT
Start: 2024-12-23

## 2025-01-09 NOTE — CONFIDENTIAL NOTE
NEUROSURGERY - NEW PREVISIT PLANNING    Referring Provider: Heaven Ocmapo, CNP    OVN CT comment   Reason For Visit: Z98.1 (ICD-10-CM) - S/P cervical spinal fusion   M54.12 (ICD-10-CM) - Cervical radiculopathy   M48.02 (ICD-10-CM) - Cervical stenosis of spinal canal   Additional Information:  Severe cervical spinal stenosis       IMAGING STATUS/LOCATION DATE/TYPE   MRI *scheduled 01/13/2025  Cervical  [facility]   CT PACS 12/07/2024  Cervical  [facility]   XRAY PACS 04/27/2024  Cervical  [facility]   NOTES STATUS/LOCATION DATE/TYPE   Other specialist OVN: N/A    EMG N/A    INJECTION Imaging 06/28/2022, cervical ILESI   PHYSICAL THERAPY Encounters 2021, 1 visit   SURGERY Encounters 07/16/2021, ACDF

## 2025-01-13 ENCOUNTER — ANCILLARY PROCEDURE (OUTPATIENT)
Dept: MRI IMAGING | Facility: CLINIC | Age: 50
End: 2025-01-13
Attending: NURSE PRACTITIONER
Payer: COMMERCIAL

## 2025-01-13 ENCOUNTER — TELEPHONE (OUTPATIENT)
Dept: PHYSICAL MEDICINE AND REHAB | Facility: CLINIC | Age: 50
End: 2025-01-13

## 2025-01-13 DIAGNOSIS — R20.0 LEFT SIDED NUMBNESS: ICD-10-CM

## 2025-01-13 DIAGNOSIS — M48.02 CERVICAL STENOSIS OF SPINAL CANAL: ICD-10-CM

## 2025-01-13 DIAGNOSIS — G37.9 DEMYELINATING LESION (H): Primary | ICD-10-CM

## 2025-01-13 DIAGNOSIS — Z98.1 S/P CERVICAL SPINAL FUSION: ICD-10-CM

## 2025-01-13 DIAGNOSIS — M54.10 RADICULOPATHY AFFECTING UPPER EXTREMITY: ICD-10-CM

## 2025-01-13 PROCEDURE — 72156 MRI NECK SPINE W/O & W/DYE: CPT | Mod: TC | Performed by: RADIOLOGY

## 2025-01-13 PROCEDURE — A9585 GADOBUTROL INJECTION: HCPCS | Performed by: RADIOLOGY

## 2025-01-13 RX ORDER — GADOBUTROL 604.72 MG/ML
14 INJECTION INTRAVENOUS ONCE
Status: COMPLETED | OUTPATIENT
Start: 2025-01-13 | End: 2025-01-13

## 2025-01-13 RX ORDER — LORAZEPAM 1 MG/1
TABLET ORAL
Qty: 2 TABLET | Refills: 0 | Status: SHIPPED | OUTPATIENT
Start: 2025-01-13

## 2025-01-13 RX ADMIN — GADOBUTROL 14 ML: 604.72 INJECTION INTRAVENOUS at 10:13

## 2025-01-13 NOTE — TELEPHONE ENCOUNTER
Patient returned call. Results given and explained. Informed him that PSP and radiologist are recommending further imaging of brain to look for any correlating areas to what seen in neck. Stated understanding. Informed he should also see neurology. He will be contacted to schedule the MRI and neurology consultation once orders are received.

## 2025-01-13 NOTE — TELEPHONE ENCOUNTER
Phone call to patient to inform medication was sent in for him to have the MRI completed. Left message to return call.

## 2025-01-13 NOTE — TELEPHONE ENCOUNTER
----- Message from Vickie Andersen sent at 1/13/2025  1:08 PM CST -----  Please let Archie know that his MRI and CT are showing some calcification of the ligaments causing narrowing around the spinal cord. I would suggest following up with Dr De Leon's team given he has left side body numbness. There are a few areas within the cord that look like either chronic damage to the cord, versus something like a demyelinating lesion associated with a neurologic syndrome like MS. The radiologist recommends we order a brain MRI to look for any additional areas in the brain to correlate, and I would suggest an appt with neurology

## 2025-01-13 NOTE — TELEPHONE ENCOUNTER
Patient returned call. Informed of the prescription sent in for him to take pre MRI. Stated understanding.     Contact information provided for scheduling the MRI and with neurology.

## 2025-01-14 NOTE — TELEPHONE ENCOUNTER
Records Requested     January 14, 2025 11:10 AM   [id number]   Facility  [facility]   Outcome 11:12 am Sent request for imaging to be pushed to PACS. -JAXON Giles on 1/15/2025 at 10:25 AM Imaging resolved into PACS. -JAXON     RECORDS RECEIVED FROM: Care Everywhere   REASON FOR VISIT: G37.9 (ICD-10-CM) - Demyelinating lesion (H)  M48.02 (ICD-10-CM) - Cervical stenosis of spinal canal    PROVIDER: Forrest Ram DO   DATE OF APPT: 1/31/25 @ 2:15 pm    NOTES (FOR ALL VISITS) STATUS DETAILS   OFFICE NOTE from referring provider Internal 1/13/25 (Phone Note), 11/11/24 Vickie Andersen APRN CNP @[facility]     OFFICE NOTE from other specialist Internal 10/14/24 Agustin Parekh MD @[facility]     DISCHARGE REPORT from the ER Care Everywhere 9/5/24 Elmer Gardner MD  @[facility] ED     MEDICATION LIST Internal    IMAGING  (FOR ALL VISITS)     MRI (HEAD, NECK, SPINE) PACS [facility]  Scheduled 1/24/25 MR Brain  1/13/25 MR Cervical Spine    [facility]  9/5/24 MR Head Brain     CT (HEAD, NECK, SPINE) PACS [facility]  12/7/24 CT Cervical Spine    [facility]  9/5/24 CTA Head Neck Carotid  9/5/24 CT Head

## 2025-01-16 ENCOUNTER — PRE VISIT (OUTPATIENT)
Dept: NEUROSURGERY | Facility: CLINIC | Age: 50
End: 2025-01-16

## 2025-01-16 ENCOUNTER — OFFICE VISIT (OUTPATIENT)
Dept: NEUROSURGERY | Facility: CLINIC | Age: 50
End: 2025-01-16
Attending: NURSE PRACTITIONER
Payer: COMMERCIAL

## 2025-01-16 VITALS — DIASTOLIC BLOOD PRESSURE: 108 MMHG | SYSTOLIC BLOOD PRESSURE: 186 MMHG | OXYGEN SATURATION: 97 % | HEART RATE: 67 BPM

## 2025-01-16 DIAGNOSIS — M54.12 CERVICAL RADICULOPATHY: ICD-10-CM

## 2025-01-16 DIAGNOSIS — F32.A DEPRESSION, UNSPECIFIED DEPRESSION TYPE: Primary | ICD-10-CM

## 2025-01-16 DIAGNOSIS — M48.02 CERVICAL STENOSIS OF SPINAL CANAL: ICD-10-CM

## 2025-01-16 DIAGNOSIS — Z98.1 S/P CERVICAL SPINAL FUSION: ICD-10-CM

## 2025-01-16 RX ORDER — ACETAMINOPHEN 325 MG/1
325-650 TABLET ORAL EVERY 6 HOURS PRN
COMMUNITY

## 2025-01-16 ASSESSMENT — PATIENT HEALTH QUESTIONNAIRE - PHQ9
10. IF YOU CHECKED OFF ANY PROBLEMS, HOW DIFFICULT HAVE THESE PROBLEMS MADE IT FOR YOU TO DO YOUR WORK, TAKE CARE OF THINGS AT HOME, OR GET ALONG WITH OTHER PEOPLE: VERY DIFFICULT
SUM OF ALL RESPONSES TO PHQ QUESTIONS 1-9: 15
SUM OF ALL RESPONSES TO PHQ QUESTIONS 1-9: 15

## 2025-01-16 ASSESSMENT — PAIN SCALES - GENERAL: PAINLEVEL_OUTOF10: EXTREME PAIN (8)

## 2025-01-16 NOTE — PATIENT INSTRUCTIONS
Order placed for a C7-T1 epidural steroid injection. [facility] will call to schedule your injection, however if you don't hear from them within 48 hours, call [phone]. If symptoms continue 2 weeks after injections, call our clinic and talk to a nurse.    Order placed for physical therapy with [facility] They will call you to schedule within a few days. If you have not heard from them, please call [phone].  Please call our clinic if symptoms persist after your course of physical therapy (approximately 4 weeks).     [facility]  [facility] - [address] [address]  Telephone:  [phone]        Fax:  [phone]

## 2025-01-16 NOTE — PROGRESS NOTES
Depression Screening Follow-up        1/16/2025     9:59 AM   PHQ   PHQ-9 Total Score 15    Q9: Thoughts of better off dead/self-harm past 2 weeks Not at all       Patient-reported         1/16/2025     9:59 AM   Last PHQ-9   1.  Little interest or pleasure in doing things 1   2.  Feeling down, depressed, or hopeless 3   3.  Trouble falling or staying asleep, or sleeping too much 2   4.  Feeling tired or having little energy 1   5.  Poor appetite or overeating 2   6.  Feeling bad about yourself 3   7.  Trouble concentrating 3   8.  Moving slowly or restless 0   Q9: Thoughts of better off dead/self-harm past 2 weeks 0   PHQ-9 Total Score 15        Patient-reported         Does the patient currently have a mental health provider?  No  Follow Up Actions Taken  Mental Health Referral pended/placed per protocol. Patient interested in seeing a mental health provider. Denies taking depression/anxiety medication    Pam Quintero RN

## 2025-01-16 NOTE — PROGRESS NOTES
49 year old male with history of C3-7 ACDF with me in 2021 presents with severe left arm pain, feeling of left-sided weakness.  He notes over a year of progressive symptoms.  Worst pain is radiating to the left shoulder, forearm, and hand with feeling of hand  weakness.  He notes continued chronic left hemibody numbness and weakness.  Recent CTA of the neck showed good fusion from the prior ACDF.  MR cervical, personally reviewed, with postop changes C3-7 and improvement of stenosis, chronic myelomalacia, C7-T1 progressive disc degeneration and severe left foraminal stenosis.       Past Medical History:   Diagnosis Date    CAD (coronary artery disease)     Premature artery disease noted    Class 3 severe obesity due to excess calories with serious comorbidity and body mass index (BMI) of 45.0 to 49.9 in adult (H) 02/18/2010    Bariatric surgery consult 9/7/21 Stephania Youssef PA-C Starting BMI 46 and wt 337 lbs     DDD (degenerative disc disease), cervical     History of MI (myocardial infarction) 07/17/2020    Hyperlipidemia LDL goal <40     Hypertension goal BP (blood pressure) < 140/90     Long QT interval syndrome     MEDICAL HISTORY OF -     History of rhabdomyolosis, from strenuous exercise    Mild persistent asthma     Obstructive sleep apnea     Primary osteoarthritis of left knee     Sleep apnea syndrome     cpap    Type 2 diabetes, HbA1c goal < 7% (H)     Vitamin D deficiency      Past Surgical History:   Procedure Laterality Date    ANGIOGRAM      ANGIOPLASTY  2001    Coronary artery angioplasty//associated with mild AMI    DISCECTOMY, FUSION CERVICAL ANTERIOR THREE+ LEVELS, COMBINED N/A 7/16/2021    Procedure: Cervical 3 to Cervical 7 Anterior Cervical Discectomy and Fusion;  Surgeon: Jaquan De Leon MD;  Location:  OR    NASAL SINUS SURGERY      THYROIDECTOMY Right 7/16/2021    Procedure: RIGHT THYROIDECTOMY;  Surgeon: Moshe Hernandez DO;  Location:  OR    TONSILLECTOMY  2005    With  uvuloectomy         Physical Exam  BP (!) 186/108 (BP Location: Right arm, Patient Position: Sitting, Cuff Size: Adult Regular)   Pulse 67   SpO2 97%     Mental status:  Alert and Oriented x 3, speech is fluent.  HEENT:  PERRL.  EOM s intact.  Visual fields full to gross exam  Cranial nerves:  II-XII intact.   Motor:    Shoulder Abduction:  Right:  5/5   Left:  5/5  Biceps:                      Right:  5/5   Left:  5/5  Triceps:                     Right:  5/5   Left:  5/5  Interosseus :             Right:  5/5   Left:  5/5  Hip Flexion:               Right: 5/5  Left:  5/5  Quadriceps:              Right:  5/5  Left:  5/5  Hamstrings:              Right:  5/5  Left:  5/5  Gastroc Soleus:        Right:  5/5  Left:  5/5  Tib/Ant:                      Right:  5/5  Left:  5/5  EHL:                          Right:  5/5  Left:  5/5  Sensation:  Intact  Reflexes:  Negative Gonzales's.  Smooth tandem walking.      A/P:  49 year old male with history of C3-7 ACDF with me in 2021 presents with severe left arm pain, feeling of left-sided weakness    I had a discussion with the patient, reviewing the history, symptoms, and imaging   We will start a course of cervical PT  We will order cervical DONTE

## 2025-01-16 NOTE — PROGRESS NOTES
If patient sends disability paperwork into our office. Dr. De Leon approved to fill this out for him. Patient hasn't worked in years.

## 2025-01-16 NOTE — LETTER
1/16/2025      Archie Wallace  [address]  [place] [address]      Dear Colleague,    Thank you for referring your patient, Archie Wallace, to the [facility]. Please see a copy of my visit note below.    If patient sends disability paperwork into our office. Dr. De Leon approved to fill this out for him. Patient hasn't worked in years.    Depression Screening Follow-up        1/16/2025     9:59 AM   PHQ   PHQ-9 Total Score 15    Q9: Thoughts of better off dead/self-harm past 2 weeks Not at all       Patient-reported         1/16/2025     9:59 AM   Last PHQ-9   1.  Little interest or pleasure in doing things 1   2.  Feeling down, depressed, or hopeless 3   3.  Trouble falling or staying asleep, or sleeping too much 2   4.  Feeling tired or having little energy 1   5.  Poor appetite or overeating 2   6.  Feeling bad about yourself 3   7.  Trouble concentrating 3   8.  Moving slowly or restless 0   Q9: Thoughts of better off dead/self-harm past 2 weeks 0   PHQ-9 Total Score 15        Patient-reported         Does the patient currently have a mental health provider?  No  Follow Up Actions Taken  Mental Health Referral pended/placed per protocol. Patient interested in seeing a mental health provider. Denies taking depression/anxiety medication    Pam Quintero, RN        49 year old male with history of C3-7 ACDF with me in 2021 presents with severe left arm pain, feeling of left-sided weakness.  He notes over a year of progressive symptoms.  Worst pain is radiating to the left shoulder, forearm, and hand with feeling of hand  weakness.  He notes continued chronic left hemibody numbness and weakness.  Recent CTA of the neck showed good fusion from the prior ACDF.  MR cervical, personally reviewed, with postop changes C3-7 and improvement of stenosis, chronic myelomalacia, C7-T1 progressive disc degeneration and severe left foraminal stenosis.       Past Medical  History:   Diagnosis Date     CAD (coronary artery disease)     Premature artery disease noted     Class 3 severe obesity due to excess calories with serious comorbidity and body mass index (BMI) of 45.0 to 49.9 in adult (H) 02/18/2010    Bariatric surgery consult 9/7/21 Stephania Youssef PA-C Starting BMI 46 and wt 337 lbs      DDD (degenerative disc disease), cervical      History of MI (myocardial infarction) 07/17/2020     Hyperlipidemia LDL goal <40      Hypertension goal BP (blood pressure) < 140/90      Long QT interval syndrome      MEDICAL HISTORY OF -     History of rhabdomyolosis, from strenuous exercise     Mild persistent asthma      Obstructive sleep apnea      Primary osteoarthritis of left knee      Sleep apnea syndrome     cpap     Type 2 diabetes, HbA1c goal < 7% (H)      Vitamin D deficiency      Past Surgical History:   Procedure Laterality Date     ANGIOGRAM       ANGIOPLASTY  2001    Coronary artery angioplasty//associated with mild AMI     DISCECTOMY, FUSION CERVICAL ANTERIOR THREE+ LEVELS, COMBINED N/A 7/16/2021    Procedure: Cervical 3 to Cervical 7 Anterior Cervical Discectomy and Fusion;  Surgeon: Jaquan De Leon MD;  Location:  OR     NASAL SINUS SURGERY       THYROIDECTOMY Right 7/16/2021    Procedure: RIGHT THYROIDECTOMY;  Surgeon: Moshe Hernandez DO;  Location: [facility] OR     TONSILLECTOMY  2005    With uvuloectomy         Physical Exam  BP (!) 186/108 (BP Location: Right arm, Patient Position: Sitting, Cuff Size: Adult Regular)   Pulse 67   SpO2 97%     Mental status:  Alert and Oriented x 3, speech is fluent.  HEENT:  PERRL.  EOM s intact.  Visual fields full to gross exam  Cranial nerves:  II-XII intact.   Motor:    Shoulder Abduction:  Right:  5/5   Left:  5/5  Biceps:                      Right:  5/5   Left:  5/5  Triceps:                     Right:  5/5   Left:  5/5  Interosseus :             Right:  5/5   Left:  5/5  Hip Flexion:               Right: 5/5  Left:  5/5  Quadriceps:               Right:  5/5  Left:  5/5  Hamstrings:              Right:  5/5  Left:  5/5  Gastroc Soleus:        Right:  5/5  Left:  5/5  Tib/Ant:                      Right:  5/5  Left:  5/5  EHL:                          Right:  5/5  Left:  5/5  Sensation:  Intact  Reflexes:  Negative Gonzales's.  Smooth tandem walking.      A/P:  49 year old male with history of C3-7 ACDF with me in 2021 presents with severe left arm pain, feeling of left-sided weakness    I had a discussion with the patient, reviewing the history, symptoms, and imaging   We will start a course of cervical PT  We will order cervical DONTE               Again, thank you for allowing me to participate in the care of your patient.        Sincerely,        Jaquan De Leon MD    Electronically signed

## 2025-01-21 ENCOUNTER — TELEPHONE (OUTPATIENT)
Dept: PALLIATIVE MEDICINE | Facility: CLINIC | Age: 50
End: 2025-01-21
Payer: COMMERCIAL

## 2025-01-21 DIAGNOSIS — M54.12 CERVICAL RADICULOPATHY: Primary | ICD-10-CM

## 2025-01-21 NOTE — TELEPHONE ENCOUNTER
"Screening Questions for Radiology Injections:    Injection to be done at which interventional clinic site? [facility]    If choosing [facility] for location, please inform patient:  \"[facility] is a Hospital based clinic. Before your visit, you should check with your insurance about how it covers the charges for facility services in a hospital-based clinic.     Procedure ordered by Haley    Procedure ordered? C7-T1 DONTE   Transforaminal Cervical DONTE - Send to [facility] - No [facility] Site providers perform this procedure    What insurance would patient like us to bill for this procedure? BC  IF SCHEDULING IN [place] PAIN OR SPINE PLEASE SCHEDULE AT LEAST 7-10 BUSINESS DAYS OUT SO A PA CAN BE OBTAINED  Worker's comp or MVA (motor vehicle accident) -Any injection DO NOT SCHEDULE and route to Lucio Purdy.    [employer] insurance - For ALL INJECTIONS DO NOT SCHEDULE and route to Palma Dias.     ALL BCBS, Hum[employer] and [employer] CIGNA - DO NOT SCHEDULE and route to Palma Dias  MEDICA- ALL INJECTIONS- route to Palma Dias    Is patient scheduled at [facility]? no   If YES, route [facility] CARE NAVIGATION POOL [[phone]]    Is an  needed? No     Patient has a  home? (Review Grid) YES: ok    Any chance of pregnancy? NO   If YES, do NOT schedule and route to RN pool  - Dr. White route to PM&R Nurse  [[id number]]      Is patient actively being treated for cancer or immunocompromised? No  If YES, do NOT schedule and route to RN pool/ Dr. White's Team    Does the patient have a bleeding or clotting disorder? No   If YES, okay to schedule AND route to RN nurse / Dr. White's Team   (For any patients with platelet count <100, RN must forward to provider)    Is patient taking any Blood Thinners OR Antiplatelet medication?  No   If hold needed, do NOT schedule, route to RN pool/ Dr. White's Team  Examples:   Blood Thinners: " (Coumadin, Warfarin, Jantoven, Pradaxa, Xarelto, Eliquis, Edoxaban, Enoxaparin, Lovenox, Heparin, Arixtra, Fondaparinux or Fragmin)  Antiplatelet Medications: (Plavix, Brilinta or Effient)     Is patient taking any aspirin products (includes Excedrin and Fiorinal)? Yes - Pt takes 81mg daily; instructed to hold 7 day(s) prior to procedure.    If yes route to RN emiliano/ Dr. White's Team - Do not schedule    Is patient taking any GLP-1 Antagonist (hold needed for sedation patients only) No   (semaglutide (Ozempic, Wegovy), dulaglutide (Trulicity), exenatide ER (Bydureon), tirzepatide (Mounjaro), Liraglutide (Saxenda, Victoza), semaglutide (Rybelsus), Terzepatide (Zepbound)  If YES, okay to schedule AND route to RN nurse / Dr. White's Team      Any allergies to contrast dye, iodine, shellfish, or numbing and steroid medications? No  If YES, schedule and add allergy information to appointment notes AND route to the RN emiliano/ Dr. White's Team  If DONTE and Contrast Dye / Iodine Allergy? DO NOT SCHEDULE, route to RN pool/ Dr. White's Team  Allergies: Oseltamivir, Biaxin [clarithromycin], Bromaxefed dm rf, Clarithromycin, Robafen dm cgh-chest [dextromethorphan-guaifenesin], Guaifenesin, and Metformin     Does patient have an active infection or treated for one within the past week? No  Is patient currently taking any antibiotics or steroid medications?  No   For patients on chronic, preventative, or prophylactic antibiotics, procedures may be scheduled.   For patients on antibiotics for active or recent infection, schedule 4 days after completed.  For patients on steroid medications, schedule 4 days after completed.     Has the patient had a flu shot or any other vaccinations within the past 7 days? No  If yes, explain that for the vaccine to work best they need to:     wait 1 week before and 1 week after getting any Vaccine  wait 1 week before and 2 weeks after getting any Covid Vaccine   If patient has concerns about the  timing, send to RN pool/ Dr. White's Team    Does patient have an MRI/CT?  YES: mri Include Date and Check Procedure Scheduling Grid to see if required.  Was the MRI/CT done within the last 3 years?  Yes   If no route to RN Pool/ Dr. Carrascos Team  If yes, where was the MRI/CT done? [facility]   Refer to PACS Transmissions list for approved external locations and route to RN Pool High Priority/ Dr. Bhandari Team  If MRI was not done at approved external location do NOT schedule and route to RN pool/ Dr. Carrascos Team    If patient has an imaging disc, the injection MAY be scheduled but patient must bring disc to appt or appt will be cancelled.    Is patient able to transfer to a procedure table with minimal or no assistance? Yes   If no, do NOT schedule and route to RN Pool/ Dr. Carrascos Team    Procedure Specific Instructions:  If celiac plexus block, informed patient NPO for 6 hours and that it is okay to take medications with sips of water, especially blood pressure medications Not Applicable       If this is for a cervical procedure, informed patient that aspirin needs to be held for 6 days.   yes    Sedation, If Sedation is ordered for any procedure, patient must be NPO for 6 hours prior to procedure Not Applicable    If IV needed:  Do not schedule procedures requiring IV placement in the first appointment of the day or first appointment after lunch. Do NOT schedule at 0745, 0815 or 1245. ok  Instructed patient to arrive 30 minutes early for IV start if required. (Check Procedure Scheduling Grid)  YES: ok    Reminders:  If you are started on any steroids or antibiotics between now and your appointment, you must contact us because the procedure may need to be cancelled.  Yes    As a reminder, receiving steroids can decrease your body's ability to fight infection.   Would you still like to move forward with scheduling the injection?  Yes    IV Sedation is not provided for procedures. If oral anti-anxiety  medication is needed, the patient should request this from their referring provider.    Instruct patient to arrive as directed prior to the scheduled appointment time:  If IV needed 30 minutes before appointment time     For patients 85 or older we recommend having an adult stay w/ them for the remainder of the day.     If the patient is Diabetic, remind them to bring their glucometer.    Dr. Lomas Pt's - Imaging Orders Needed   Please send all injections to RN Pool NO   Red Flags? NO    Does the patient have any questions?  NO  Lyla Purdy  [facility]

## 2025-01-21 NOTE — TELEPHONE ENCOUNTER
Provider Response to 2nd Level Triage Request    I have reviewed the RN documentation. My recommendation is:   I approve the hold for ASA for 6 days Provider Response to 2nd Level Triage Request

## 2025-01-21 NOTE — TELEPHONE ENCOUNTER
Patient is requesting to schedule an injection with the [facility].     This would require the patient to hold:               ASA 81mg/day     Hold 6 days prior to procedure, restart 12 hours after procedure    We are requesting your approval to hold the medication for this time frame.    Please keep call open and route back to the PAIN NURSE [[id number]] pool, Pt will be scheduled by Pain clinic after hold approved.      Routed to primary care provider    Nessa RN-BSN  [facility]   [phone]

## 2025-01-22 NOTE — TELEPHONE ENCOUNTER
I have attempted to contact this patient by phone with the following results: left message to return my call to schedule this procedure C7-T1 DONTE, I will continue to try later.    Dominique Alonso      [facility]  Pain [facility]

## 2025-01-24 ENCOUNTER — ANCILLARY PROCEDURE (OUTPATIENT)
Dept: MRI IMAGING | Facility: CLINIC | Age: 50
End: 2025-01-24
Attending: NURSE PRACTITIONER
Payer: COMMERCIAL

## 2025-01-24 DIAGNOSIS — G37.9 DEMYELINATING LESION (H): ICD-10-CM

## 2025-01-24 PROCEDURE — 70553 MRI BRAIN STEM W/O & W/DYE: CPT | Mod: TC | Performed by: RADIOLOGY

## 2025-01-24 PROCEDURE — A9585 GADOBUTROL INJECTION: HCPCS | Mod: JW | Performed by: RADIOLOGY

## 2025-01-24 RX ORDER — GADOBUTROL 604.72 MG/ML
14 INJECTION INTRAVENOUS ONCE
Status: COMPLETED | OUTPATIENT
Start: 2025-01-24 | End: 2025-01-24

## 2025-01-24 RX ADMIN — GADOBUTROL 14 ML: 604.72 INJECTION INTRAVENOUS at 11:43

## 2025-01-27 ENCOUNTER — THERAPY VISIT (OUTPATIENT)
Dept: PHYSICAL THERAPY | Facility: CLINIC | Age: 50
End: 2025-01-27
Attending: NEUROLOGICAL SURGERY
Payer: COMMERCIAL

## 2025-01-27 DIAGNOSIS — M54.12 CERVICAL RADICULOPATHY: ICD-10-CM

## 2025-01-27 NOTE — PROGRESS NOTES
PHYSICAL THERAPY EVALUATION  Type of Visit: Evaluation       Fall Risk Screen:  Fall screen completed by: PT  Have you fallen 2 or more times in the past year?: Yes  Have you fallen and had an injury in the past year?: No  Is patient a fall risk?: Yes  Fall screen comments: not tested this date due to time constraints    Subjective     Pt reports 4 months relief after surgery but has slowly been progressing since then.  Patient has been in significant pain in the last year.  Patient has not been able to work (P[employer] job) due to pain and symptoms. Pt reports increasing arm and leg on the left side.  Patient reports dropping many objects.  Pt reports radiating symptoms in arm to fingers and all the way to toes.  Patient denies being able to get comfortable, patient reports waking frequently at night and very disjointed sleep.       Presenting condition or subjective complaint:    Date of onset: 01/27/24    Relevant medical history:     Dates & types of surgery:      Prior diagnostic imaging/testing results: MRI    Cervical Spine: MPRESSION:  1.  Redemonstrated postoperative changes status post ACDF spanning C3-C7.  2.  Residual multilevel posterior endplate osteophytic ridging contributing to moderate multilevel spinal canal stenosis.  3.  Degenerative findings, as described, resulting in varying degrees of neural foraminal stenosis. At C7-T1, there is severe left neural foraminal stenosis that appears somewhat progressed. Correlate for possible left C8 radiculopathy symptoms.  4.  Multiple nonspecific T2 hyperintense foci in the cervical spinal cord, as described. The spinal cord is challenging to evaluate due to motion artifacts. There is a couple of possible new or more conspicuous T2 hyperintense lesions in the cord, as   described. Differential considerations would include areas of nondescript myelomalacia, although other possibilities such as demyelinating disease are not excluded. No abnormal postcontrast  enhancement of the spinal cord is identified. Recommend clinical   correlation. Consider MRI of the brain, as warranted.  Prior therapy history for the same diagnosis, illness or injury: Yes        Living Environment  Social support: With a significant other or spouse   Type of home: Apartment/condo   Stairs to enter the home: Yes 16 Is there a railing: Yes     Ramp: No   Stairs inside the home: No       Help at home: Self Cares (home health aide/personal care attendant, family, etc)  Equipment owned: Straight Cane; Power wheelchair or scooter     Employment: No    Hobbies/Interests: Fishing , bowling, and now i dont do anything    Patient goals for therapy: Everyday activities    Pain assessment:  Neck: Highest pain 10/10 R side to upper shoulder, L side under shoulder blade from thoracic spine.  Average pain 8/10, lowest pain 6/10.  Patient reports radiating symptoms intermittent but present 90% of the day 8/10 and will wake him up.  Pt reports radiating symptoms through L LE 75% of the day and rated 7/10. Pt reports tripping on feet regularly     Objective   CERVICAL SPINE EVALUATION  PAIN:   INTEGUMENTARY (edema, incisions):   POSTURE:  sitting with (B) forward shoulder, often tipping head to the left  GAIT:   Weightbearing Status: WBAT  Assistive Device(s): Cane (single end)  Gait Deviations: Antalgic  Stride length decreased  Jacqueline decreased    ROM:  most limitation from pain with SB and ROT , functional limitations noted appropriate with post-surgical status    MYOTOMES:    Left Right   C1-2 (Neck Flexion) 4, + (mild)    C3 (Neck Side Bend)  3+, +++ (severe) 4, ++ (mod)   C4 (Shrug) 4 4   C5 (Deltoid) 3+, ++ (mod) 4+   C6 (Biceps) 4+ 4+   C7 (Triceps) 4- 4+   C8 (Thumb Ext) 4- 4+   T1 (Intrinsics) 4- 4+     DERMATOMES:    Left Right   C1 Hypo (light touch) Normal (light touch)   C2 Hypo (light touch) Normal (light touch)   C3 Hypo (light touch) Normal (light touch)   C4 Hypo (light touch) Normal (light  touch)   C5 Hypo (light touch) Normal (light touch)   C6 Hypo (light touch) Normal (light touch)   C7 Hypo (light touch) Normal (light touch)   C8 Hypo (light touch) Normal (light touch)   T1 Hypo (light touch) Normal (light touch)     NEURAL TENSION:  unable to tolerate testing positions- significant irritability  FLEXIBILITY:  limited cervical mm mobility    SPECIAL TESTS:  all cervical testing to irritable at this time  PALPATION:  Sign TTP and banding noted to (STEPHEN) SHERLYN murray   SPINAL SEGMENTAL CONCLUSIONS:       Assessment & Plan   CLINICAL IMPRESSIONS  Medical Diagnosis: Cervical radiculopathy    Treatment Diagnosis: Cervical radiculopathy   Impression/Assessment: Patient is a 49 year old male with cervical radiculopathy complaints.  The following significant findings have been identified: Pain, Decreased ROM/flexibility, Decreased joint mobility, Decreased strength, Impaired muscle performance, Decreased activity tolerance, and Impaired posture. These impairments interfere with their ability to perform self care tasks, work tasks, recreational activities, and household chores as compared to previous level of function.     Clinical Decision Making (Complexity):  Clinical Presentation: Evolving/Changing  Clinical Presentation Rationale: based on medical and personal factors listed in PT evaluation  Clinical Decision Making (Complexity): Moderate complexity    PLAN OF CARE  Treatment Interventions:  Modalities:  Modalities as indicated  Interventions: Gait Training, Manual Therapy, Neuromuscular Re-education, Therapeutic Activity, Therapeutic Exercise    Long Term Goals     PT Goal 1  Goal Identifier: 1  Goal Description: The patient will demonstrate neutral neck position with sitting/standing without cues in order to demonstrate improved neck positioning for current symptoms  Target Date: 04/26/25  PT Goal 2  Goal Identifier: 2  Goal Description: The patient will report decreased severity of average radiating  symptoms to 5/10 in order to demonstrate decreased nerve irritability with ADLs  Target Date: 04/26/25  PT Goal 3  Goal Identifier: 3  Goal Description: The patient will report 50% decrease in dropping items in order to demonstrate improved tolerance of IADLs  Target Date: 04/26/25      Frequency of Treatment: 8  Duration of Treatment: 90 days    Recommended Referrals to Other Professionals:  None indicated  Education Assessment:   Learner/Method: Patient;Listening;Demonstration;No Barriers to Learning    Risks and benefits of evaluation/treatment have been explained.   Patient/Family/caregiver agrees with Plan of Care.     Evaluation Time:     PT Eval, Low Complexity Minutes (75485): 30       Signing Clinician: Rose Campos, PT        [facility]                                                                                   OUTPATIENT PHYSICAL THERAPY      PLAN OF TREATMENT FOR OUTPATIENT REHABILITATION   Patient's Last Name, First Name, CRISTALArchie Rodriguez YOB: 1975   Provider's Name   [facility]   Medical Record No.  [id number]     Onset Date: 01/27/24  Start of Care Date: 01/27/25     Medical Diagnosis:  Cervical radiculopathy      PT Treatment Diagnosis:  Cervical radiculopathy Plan of Treatment  Frequency/Duration: 8/ 90 days    Certification date from 01/27/25 to 04/26/25         See note for plan of treatment details and functional goals     Rose Campos, PT                         I CERTIFY THE NEED FOR THESE SERVICES FURNISHED UNDER        THIS PLAN OF TREATMENT AND WHILE UNDER MY CARE     (Physician attestation of this document indicates review and certification of the therapy plan).              Referring Provider:  Jaquan De Leon    Initial Assessment  See Epic Evaluation- Start of Care Date: 01/27/25

## 2025-01-31 ENCOUNTER — PRE VISIT (OUTPATIENT)
Dept: NEUROLOGY | Facility: CLINIC | Age: 50
End: 2025-01-31

## 2025-02-02 ENCOUNTER — HEALTH MAINTENANCE LETTER (OUTPATIENT)
Age: 50
End: 2025-02-02

## 2025-02-03 ENCOUNTER — THERAPY VISIT (OUTPATIENT)
Dept: PHYSICAL THERAPY | Facility: CLINIC | Age: 50
End: 2025-02-03
Payer: COMMERCIAL

## 2025-02-03 DIAGNOSIS — M54.12 CERVICAL RADICULOPATHY: Primary | ICD-10-CM

## 2025-02-04 ENCOUNTER — RADIOLOGY INJECTION OFFICE VISIT (OUTPATIENT)
Dept: PALLIATIVE MEDICINE | Facility: CLINIC | Age: 50
End: 2025-02-04
Attending: NEUROLOGICAL SURGERY
Payer: COMMERCIAL

## 2025-02-04 VITALS — OXYGEN SATURATION: 99 % | HEART RATE: 78 BPM | DIASTOLIC BLOOD PRESSURE: 110 MMHG | SYSTOLIC BLOOD PRESSURE: 176 MMHG

## 2025-02-04 DIAGNOSIS — M54.12 CERVICAL RADICULOPATHY: ICD-10-CM

## 2025-02-04 PROCEDURE — 62321 NJX INTERLAMINAR CRV/THRC: CPT | Performed by: PAIN MEDICINE

## 2025-02-04 RX ORDER — DEXAMETHASONE SODIUM PHOSPHATE 10 MG/ML
10 INJECTION, SOLUTION INTRAMUSCULAR; INTRAVENOUS ONCE
Status: COMPLETED | OUTPATIENT
Start: 2025-02-04 | End: 2025-02-04

## 2025-02-04 RX ADMIN — DEXAMETHASONE SODIUM PHOSPHATE 10 MG: 10 INJECTION, SOLUTION INTRAMUSCULAR; INTRAVENOUS at 12:22

## 2025-02-04 ASSESSMENT — PAIN SCALES - GENERAL
PAINLEVEL_OUTOF10: SEVERE PAIN (9)
PAINLEVEL_OUTOF10: SEVERE PAIN (8)

## 2025-02-04 NOTE — PROGRESS NOTES
[facility] - Procedure Note    Date of Visit: 2/4/2025    Procedure performed: C7-T1 interlaminar epidural steroid injection with fluoroscopic guidance  Diagnosis: Cervical radiculitis/radiculopathy  : Moshe Hutchins MD  Anesthesia: none    Indications: Archie Wallace is a 49 year old male who is seen for cervical epidural steroid injection. The patient describes neck pain rad to his lue. The patient has been exhibiting symptoms consistent with cervical intraspinal inflammation and radiculopathy. Symptoms have been persistent, disabling, and intermittently severe. The patient reports minimal improvement with conservative treatment, including meds/pt/surgery     Cervical MRI reviewed    Allergies:      Allergies   Allergen Reactions    Oseltamivir Anaphylaxis and Rash    Biaxin [Clarithromycin]      Muscle breakdown    Bromaxefed Dm Rf Cough    Clarithromycin Other (See Comments)     Weakness    Robafen Dm Cgh-Chest [Dextromethorphan-Guaifenesin] Difficulty breathing    Guaifenesin Rash    Metformin Rash     Reported breaking out with a skin reaction        Vitals:  BP (!) 176/110   Pulse 78   SpO2 99%     Review of Systems: The patient denies recent fever, chills, illness, use of antibiotics or anticoagulants. All other 10-point review of systems negative.     Procedure: The procedure and risks were explained, and informed written consent was obtained from the patient. Risks include but are not limited to: infection, bleeding, increased pain, and damage to soft tissue, nerve, muscle, and vasculature structures. After getting informed consent, patient was brought into the procedure suite and was placed in a prone position on the procedure table. A Pause for the Cause was performed. Patient was prepped and draped in sterile fashion.     The C7-T1 interspace was identified with use of fluoroscopy in AP view. A 25-gauge, 1.5 inch needle was used to anesthetize the skin and subcutaneous  tissue entry site with a total of 2 ml of 1% lidocaine. Under fluoroscopic visualization, a 22-gauge, 3.5 inch Tuohy epidural needle was slowly advanced towards the epidural space a few millimeters left of midline. The latter part of the needle advancement was guided with fluoroscopy in the lateral view. The epidural space was identified using loss of resistance technique. After negative aspiration for heme and cerebrospinal fluid, a total of 1 mL of Omnipaque was injected to confirm needle placement. 9 mL of contrast was wasted. Epidurogram confirmed spread within the posterior epidural space. 2 ml of  NS,1 ml 10mg/ml of dexamethasone, and 1 ml of preservative free 0.25% bupivacaine was injected. The needle was removed.  Images were saved to PACS.    The patient tolerated the procedure well, and there was no evidence of procedural complications. No new sensory or motor deficits were noted following the procedure. The patient was stable and able to ambulate on discharge home. Post-procedure instructions were provided.     Pre-procedure pain score: 8/10 in the neck, 8/10 in the arm  Post-procedure pain score: 9/10 in the neck, 9/10 in the arm    Assessment/Plan: Archie Wallace is a 49 year old male s/p cervical interlaminar epidural steroid injection today for cervical spondylosis and radiculitis/radiculopathy.     Following today's procedure, the patient was advised to contact the [facility] for any of the following:   Fever, chills, or night sweats   New onset of pain, numbness, or weakness   Any questions/concerns regarding the procedure  If unable to contact the Pain Center, the patient was instructed to go to a local Emergency Room for any complications.     Follow-up with provider in 2 weeks for post-procedure evaluation.    Moshe Hutchins MD   Pain Management    [facility]

## 2025-02-04 NOTE — NURSING NOTE
Pre-procedure Intake  If YES to any questions or NO to having a   Please complete laminated checklist and leave on the computer keyboard for Provider, verbally inform provider if able.    For SCS Trial, RFA's or any sedation procedure:  Have you been fasting? NA  If yes, for how long?     Are you taking any any blood thinners such as Coumadin, Warfarin, Jantoven, Pradaxa Xarelto, Eliquis, Edoxaban, Enoxaparin, Lovenox, Heparin, Arixtra, Fondaparinux, or Fragmin? OR Antiplatelet medication such as Plavix, Brilinta, or Effient?   No   If yes, when did you take your last dose?     Do you take aspirin?  Yes   If cervical procedure, have you held aspirin for 6 days?   Yes    Is the Pt taking any GLP-1 Antagonist (hold needed for sedation patients only)  (semaglutide (Ozempic, Wegovy), dulaglutide (Trulicity), exenatide ER (Bydureon), tirzepatide (Mounjaro), Liraglutide (Saxenda, Victoza), semaglutide (Rybelsus)     No   If yes, when did you take your last dose?     Do you have any allergies to contrast dye, iodine, steroid and/or numbing medications?  NO    Are you currently taking antibiotics or have an active infection?  NO    Have you had a fever/elevated temperature within the past week? NO    Are you currently taking oral steroids? NO    Do you have a ? Yes    Are you pregnant or breastfeeding?  Not Applicable    Have you received any vaccinations in the last week? NO    Notify provider and RNs if systolic BP >170, diastolic BP >100, P >100 or O2 sats < 90%

## 2025-02-04 NOTE — PATIENT INSTRUCTIONS
[facility]   Procedure Discharge Instructions    Today you saw:    Dr. Moshe Hutchins,         You had a(n):  Cervical epidural steroid injection    Medications used for cervical epidural: Lidocaine, Omnipaque, Dexamethasone, Bupividcaine, normal saline        If you were holding your blood thinning medication, please restart taking it: 24 hours  Be cautious with activities. Numbness and/or weakness in the upper extremities may occur for up to 6-8 hours after the procedure due to effect of the local anesthetic  Do not drive for 6 hours. The effect of the local anesthetic could slow your reflexes.   You may resume your regular activities after 24 hours  Avoid strenuous activity for the first 24 hours  You may shower, however avoid swimming, tub baths or hot tubs for 24 hours following your procedure  You may have a mild to moderate increase in pain for several days following the injection.  It may take up to 14 days for the steroid medication to start working although you may feel the effect as early as a few days after the procedure.     You may use ice packs for 10-15 minutes, 3 to 4 times a day at the injection site for comfort  Do not use heat to painful areas for 6 to 8 hours. This will give the local anesthetic time to wear off and prevent you from accidentally burning your skin.   Unless you have been directed to avoid the use of anti-inflammatory medications (NSAIDS), you may use medications such as ibuprofen, Aleve or Tylenol for pain control if needed.   If you have diabetes, check your blood sugar more frequently than usual as your blood sugar may be higher than normal for 10-14 days following a steroid injection. Contact your doctor who manages your diabetes if your blood sugar is higher than usual  Possible side effects of steroids that you may experience include flushing, elevated blood pressure, increased appetite, mild headaches and restlessness.  All of these symptoms  will get better with time.  If you experience any of the following, call the Pain Clinic during work hours (Mon-Friday 8-4:30 pm) at [phone] or the Provider Line after hours at [phone]:  -Fever over 100 degree F  -Swelling, bleeding, redness, drainage, warmth at the injection site  -Progressive weakness or numbness in your arms  -Loss of bowel or bladder function  -Unusual headache not relieved by Tylenol or other pain reliever  -Unusual new onset of pain that is not improving

## 2025-02-10 ENCOUNTER — THERAPY VISIT (OUTPATIENT)
Dept: PHYSICAL THERAPY | Facility: CLINIC | Age: 50
End: 2025-02-10
Payer: COMMERCIAL

## 2025-02-10 DIAGNOSIS — M54.12 CERVICAL RADICULOPATHY: Primary | ICD-10-CM

## 2025-02-18 ENCOUNTER — THERAPY VISIT (OUTPATIENT)
Dept: PHYSICAL THERAPY | Facility: CLINIC | Age: 50
End: 2025-02-18
Payer: COMMERCIAL

## 2025-02-18 DIAGNOSIS — M54.12 CERVICAL RADICULOPATHY: ICD-10-CM

## 2025-02-18 DIAGNOSIS — G89.29 CHRONIC PAIN: Primary | ICD-10-CM

## 2025-02-18 PROCEDURE — 97112 NEUROMUSCULAR REEDUCATION: CPT | Mod: GP | Performed by: PHYSICAL THERAPIST

## 2025-02-18 PROCEDURE — 97162 PT EVAL MOD COMPLEX 30 MIN: CPT | Mod: GP | Performed by: PHYSICAL THERAPIST

## 2025-02-18 PROCEDURE — 97110 THERAPEUTIC EXERCISES: CPT | Mod: GP | Performed by: PHYSICAL THERAPIST

## 2025-02-18 NOTE — PROGRESS NOTES
PHYSICAL THERAPY EVALUATION  Type of Visit: Evaluation        Fall Risk Screen:  Fall screen completed by: PT  Have you fallen 2 or more times in the past year?: Yes  Have you fallen and had an injury in the past year?: Yes  Is patient a fall risk?: Yes  Fall screen comments: not tested this date due to time constraints    Subjective         Presenting condition or subjective complaint: Pain, Neck pain with L arm radiating sxs. Also c/o LBP, mid back pain  Date of onset: 02/14/25 (Pain PT order date)    Relevant medical history: Arthritis; Asthma; Bladder or bowel problems; Chest pain; Cold or hot arm or leg; Depression; Diabetes; Hearing problems; Heart problems; High blood pressure; Implanted device; Neck injury; Numbness or tingling in perianal area; Overweight; Pain at night or rest; Significant weakness; Sleep disorder like apnea; Thyroid problems; Vision problems   Dates & types of surgery: In file    49 year old male with history of C3-7 ACDF in 2021 presents with severe left arm pain, feeling of left-sided weakness.  He notes over a year of progressive symptoms.  Worst pain is radiating to the left shoulder, forearm, and hand with feeling of hand  weakness.  He notes continued chronic left hemibody numbness and weakness.  Recent CTA of the neck showed good fusion from the prior ACDF.  MR cervical, personally reviewed, with postop changes C3-7 and improvement of stenosis, chronic myelomalacia, C7-T1 progressive disc degeneration and severe left foraminal stenosis.     Prior diagnostic imaging/testing results: MRI; CT scan; X-ray     Prior therapy history for the same diagnosis, illness or injury: Yes In my chart    Living Environment  Social support: With a significant other or spouse   Type of home: Apartment/condo; 1 level   Stairs to enter the home: Yes 14 Is there a railing: Yes     Ramp: No   Stairs inside the home: No       Help at home: Home management tasks (cooking, cleaning); Medication and/or  finances  Equipment owned: Straight Cane; Power wheelchair or scooter     Employment: No    Hobbies/Interests: Fishing , bowling, and now i dont do anything    Patient goals for therapy: Work    Pain assessment: Pain present, current range 8-10/10     Objective   CERVICAL SPINE EVALUATION  POSTURE: Sitting Posture: Rounded shoulders, Forward head, hyperactive UT L>R  GAIT:   Assistive Device(s): Cane (single end)  Gait Deviations: Antalgic  Min arm swing, trunk rotation, decr hip ext, toe off B  BALANCE/PROPRIOCEPTION: pt declines single leg stance attempt.  States can not stand on one leg.  ROM:  neck flex 10%, ext unable, R rotation 25%, L rotation 50%, L shldr elevation 70, R shldr elevation 110  STRENGTH:     weak scapular stabilzers grossly 4-/5    Assessment & Plan   CLINICAL IMPRESSIONS  Medical Diagnosis: Cervical radiculopathy    Treatment Diagnosis: Cervical radiculopathy, chronic pain syndrome   Impression/Assessment: Patient is a 49 year old male with chronic nec pain with L radiculopathy, also c/o mid, low back pain complaints.  The following significant findings have been identified: Pain, Decreased ROM/flexibility, Decreased strength, Impaired gait, Impaired muscle performance, Decreased activity tolerance, and Impaired posture. These impairments interfere with their ability to perform self care tasks, recreational activities, household chores, household mobility, and community mobility as compared to previous level of function.     Clinical Decision Making (Complexity):  Clinical Presentation: Evolving/Changing  Clinical Presentation Rationale: based on medical and personal factors listed in PT evaluation  Clinical Decision Making (Complexity): Moderate complexity    PLAN OF CARE  Treatment Interventions:  Interventions: Manual Therapy, Neuromuscular Re-education, Therapeutic Activity, Therapeutic Exercise, Self-Care/Home Management    Long Term Goals     PT Goal 1  Goal Identifier: sitting  Goal  Description: Able to consistently sit unsupported for 15 minutes (current 2 moinutes)  Rationale: to maximize safety and independence with performance of ADLs and functional tasks;to maximize safety and independence within the home;to maximize safety and independence within the community;to maximize safety and independence with self cares  Target Date: 06/18/25  PT Goal 2  Goal Identifier: Fatigue  Goal Description: Improve FACIT score to 18 (initial score 6)  Rationale: to maximize safety and independence with self cares;to maximize safety and independence with transportation;to maximize safety and independence within the community;to maximize safety and independence within the home;to maximize safety and independence with performance of ADLs and functional tasks  Target Date: 06/18/25      Frequency of Treatment: 1x wk x 4 wks, 2x month x 3 months  Duration of Treatment: 4 months    Education Assessment:        Risks and benefits of evaluation/treatment have been explained.   Patient/Family/caregiver agrees with Plan of Care.     Evaluation Time:     PT Eval, Moderate Complexity Minutes (02583): 30     Signing Clinician: Gabino Mar, PT        [facility]                                                                                   OUTPATIENT PHYSICAL THERAPY      PLAN OF TREATMENT FOR OUTPATIENT REHABILITATION   Patient's Last Name, First Name, Archie Queen YOB: 1975   Provider's Name   [facility]   Medical Record No.  [id number]     Onset Date: 02/14/25 (Pain PT order date)  Start of Care Date: 02/18/25     Medical Diagnosis:  Cervical radiculopathy      PT Treatment Diagnosis:  Cervical radiculopathy, chronic pain syndrome Plan of Treatment  Frequency/Duration: 1x wk x 4 wks, 2x month x 3 months/ 4 months    Certification date from 02/18/25 to 05/18/25         See note for plan of treatment details and functional goals      Gabino Mar, PT                         I CERTIFY THE NEED FOR THESE SERVICES FURNISHED UNDER        THIS PLAN OF TREATMENT AND WHILE UNDER MY CARE     (Physician attestation of this document indicates review and certification of the therapy plan).              Referring Provider:  Jaquan De Leon    Initial Assessment  See Epic Evaluation- Start of Care Date: 02/18/25

## 2025-02-26 ENCOUNTER — OFFICE VISIT (OUTPATIENT)
Dept: FAMILY MEDICINE | Facility: CLINIC | Age: 50
End: 2025-02-26
Payer: COMMERCIAL

## 2025-02-26 VITALS
BODY MASS INDEX: 45.1 KG/M2 | DIASTOLIC BLOOD PRESSURE: 94 MMHG | OXYGEN SATURATION: 97 % | HEIGHT: 70 IN | HEART RATE: 93 BPM | SYSTOLIC BLOOD PRESSURE: 165 MMHG | RESPIRATION RATE: 19 BRPM | TEMPERATURE: 98.2 F | WEIGHT: 315 LBS

## 2025-02-26 DIAGNOSIS — I10 HTN, GOAL BELOW 140/90: ICD-10-CM

## 2025-02-26 DIAGNOSIS — E66.813 CLASS 3 SEVERE OBESITY DUE TO EXCESS CALORIES WITH SERIOUS COMORBIDITY AND BODY MASS INDEX (BMI) OF 45.0 TO 49.9 IN ADULT (H): ICD-10-CM

## 2025-02-26 DIAGNOSIS — J45.50 SEVERE PERSISTENT ASTHMA, UNSPECIFIED WHETHER COMPLICATED (H): ICD-10-CM

## 2025-02-26 DIAGNOSIS — E66.01 CLASS 3 SEVERE OBESITY DUE TO EXCESS CALORIES WITH SERIOUS COMORBIDITY AND BODY MASS INDEX (BMI) OF 45.0 TO 49.9 IN ADULT (H): ICD-10-CM

## 2025-02-26 DIAGNOSIS — Z12.11 SCREEN FOR COLON CANCER: ICD-10-CM

## 2025-02-26 DIAGNOSIS — Z11.59 NEED FOR HEPATITIS C SCREENING TEST: ICD-10-CM

## 2025-02-26 DIAGNOSIS — M54.2 ACUTE NECK PAIN: Primary | ICD-10-CM

## 2025-02-26 DIAGNOSIS — E11.59 TYPE 2 DIABETES MELLITUS WITH OTHER CIRCULATORY COMPLICATION, WITHOUT LONG-TERM CURRENT USE OF INSULIN (H): ICD-10-CM

## 2025-02-26 PROCEDURE — 99214 OFFICE O/P EST MOD 30 MIN: CPT | Performed by: FAMILY MEDICINE

## 2025-02-26 RX ORDER — OXYCODONE AND ACETAMINOPHEN 5; 325 MG/1; MG/1
1 TABLET ORAL EVERY 8 HOURS PRN
Qty: 30 TABLET | Refills: 0 | Status: SHIPPED | OUTPATIENT
Start: 2025-02-26 | End: 2025-03-08

## 2025-02-26 RX ORDER — OXYCODONE AND ACETAMINOPHEN 5; 325 MG/1; MG/1
1 TABLET ORAL EVERY 8 HOURS PRN
Qty: 12 TABLET | Refills: 0 | Status: SHIPPED | OUTPATIENT
Start: 2025-02-26 | End: 2025-02-26

## 2025-02-26 ASSESSMENT — PAIN SCALES - GENERAL: PAINLEVEL_OUTOF10: SEVERE PAIN (10)

## 2025-02-26 ASSESSMENT — ANXIETY QUESTIONNAIRES
GAD7 TOTAL SCORE: 18
7. FEELING AFRAID AS IF SOMETHING AWFUL MIGHT HAPPEN: MORE THAN HALF THE DAYS
4. TROUBLE RELAXING: NEARLY EVERY DAY
IF YOU CHECKED OFF ANY PROBLEMS ON THIS QUESTIONNAIRE, HOW DIFFICULT HAVE THESE PROBLEMS MADE IT FOR YOU TO DO YOUR WORK, TAKE CARE OF THINGS AT HOME, OR GET ALONG WITH OTHER PEOPLE: VERY DIFFICULT
3. WORRYING TOO MUCH ABOUT DIFFERENT THINGS: NEARLY EVERY DAY
GAD7 TOTAL SCORE: 18
5. BEING SO RESTLESS THAT IT IS HARD TO SIT STILL: NEARLY EVERY DAY
6. BECOMING EASILY ANNOYED OR IRRITABLE: NEARLY EVERY DAY
GAD7 TOTAL SCORE: INCOMPLETE
2. NOT BEING ABLE TO STOP OR CONTROL WORRYING: NEARLY EVERY DAY
1. FEELING NERVOUS, ANXIOUS, OR ON EDGE: SEVERAL DAYS

## 2025-02-26 ASSESSMENT — ASTHMA QUESTIONNAIRES
ACT_TOTALSCORE: 13
QUESTION_3 LAST FOUR WEEKS HOW OFTEN DID YOUR ASTHMA SYMPTOMS (WHEEZING, COUGHING, SHORTNESS OF BREATH, CHEST TIGHTNESS OR PAIN) WAKE YOU UP AT NIGHT OR EARLIER THAN USUAL IN THE MORNING: TWO OR THREE NIGHTS A WEEK
ACT_TOTALSCORE: 13
QUESTION_1 LAST FOUR WEEKS HOW MUCH OF THE TIME DID YOUR ASTHMA KEEP YOU FROM GETTING AS MUCH DONE AT WORK, SCHOOL OR AT HOME: NONE OF THE TIME
QUESTION_5 LAST FOUR WEEKS HOW WOULD YOU RATE YOUR ASTHMA CONTROL: POORLY CONTROLLED
QUESTION_2 LAST FOUR WEEKS HOW OFTEN HAVE YOU HAD SHORTNESS OF BREATH: MORE THAN ONCE A DAY
QUESTION_4 LAST FOUR WEEKS HOW OFTEN HAVE YOU USED YOUR RESCUE INHALER OR NEBULIZER MEDICATION (SUCH AS ALBUTEROL): TWO OR THREE TIMES PER WEEK

## 2025-02-26 ASSESSMENT — PATIENT HEALTH QUESTIONNAIRE - PHQ9: SUM OF ALL RESPONSES TO PHQ QUESTIONS 1-9: 24

## 2025-02-26 NOTE — PROGRESS NOTES
Assessment & Plan     Acute neck pain   Has been having neck pain but has had a flare up that has had a hard time to do things; follows with pain clinic; seen on 2/4/25 had an epidural injection for cervical radiculitis/radiculopathy. Will do a short course of percocet for acute pain, planning not do refill but will follow up with pain management.                                                                - oxyCODONE-acetaminophen (PERCOCET) 5-325 MG tablet  Dispense: 30 tablet; Refill: 0    HTN, goal below 140/90   BP not at goal, likely from pain, taking medications regularly.     Severe persistent asthma, unspecified whether complicated (H)   - apparently well controlled, reports not using Dulera    Type 2 diabetes mellitus with other circulatory complication, without long-term current use of insulin (H)   Not well controlled, in a lot of pain tioday, will follow for diabetes check and A1c recheck.  - Hemoglobin A1c  - Albumin Random Urine Quantitative with Creat Ratio  - Adult Eye  Referral    Class 3 severe obesity due to excess calories with serious comorbidity and body mass index (BMI) of 45.0 to 49.9 in adult (H)   -  Counseled to make better food choices, exercise as tolerated, and lose weight.     Screen for colon cancer  Need for hepatitis C screening test   - will address during physical    See Patient Instructions    Caleb Almanza is a 49 year old, presenting for the following health issues:  Recheck Medication and Hypertension  2/26: pain medication (following with pain clinic)      Asthma: same    Albuterol   Dulera    HTN last ov 10/24 added aldactone   Aldactone 25 mg   Clonidine 0.2 mg twice daily   Amlodipine 10   Losartan 100 mg     Pain med: 10/24; referred to spine specialist for cervalgia   Naproxen 500 mg twice daily.   Gabapentin 300 mg three times daily    Mid back/Neck/shoulder pain    Follows with pain clinic    Hx of cord compression at C4-7               - likely  resulting in C5 left hemicord T2 prolongation  Ongoing cervicalgia              - Concern for C8 radiculopathy on left, possibly more     The patient expresses pain in a region of the shoulder that could relate to a left sided C8 radiculopathy, and it may be of benefit to obtain an EMG.  This may also allow him to define possibly old radiculopathy of the same region, if not several levels superior.  If the EMG is negative, the shoulder pain, arm pain, and leg pain may relate to his old carlos-cord lesion or possibly spasticity that is ill defined.     2/4/25: Had an injection got relief for 1 days       2/26/2025     2:01 PM   Additional Questions   Roomed by eris epps   Accompanied by self     History of Present Illness       Reason for visit:  Medication follow up blood pressure check   He is taking medications regularly.       Hypertension Follow-up   BP uncontrolled due to pain  Do you check your blood pressure regularly outside of the clinic? No   Are you following a low salt diet? Yes  Are your blood pressures ever more than 140 on the top number (systolic) OR more   than 90 on the bottom number (diastolic), for example 140/90? N/A    Asthma Follow-Up    Was ACT completed today?  Yes        2/26/2025     1:57 PM   ACT Total Scores   ACT TOTAL SCORE (Goal Greater than or Equal to 20) 13    In the past 12 months, how many times did you visit the emergency room for your asthma without being admitted to the hospital? 0    In the past 12 months, how many times were you hospitalized overnight because of your asthma? 0        Proxy-reported     How many days per week do you miss taking your asthma controller medication?  Was prescribed Dulera but not using  Please describe any recent triggers for your asthma: None  Have you had any Emergency Room Visits, Urgent Care Visits, or Hospital Admissions since your last office visit?  No    Diabetes Follow-up  Last A1c was 8.4 on 10/14/2024  How often are you checking your blood  "sugar? A few times a month  What time of day are you checking your blood sugars (select all that apply)?  Before meals  Have you had any blood sugars above 200?  No  Have you had any blood sugars below 70?  No  What symptoms do you notice when your blood sugar is low?  None  What concerns do you have today about your diabetes? None   Do you have any of these symptoms? (Select all that apply)  No numbness or tingling in feet.  No redness, sores or blisters on feet.  No complaints of excessive thirst.  No reports of blurry vision.  No significant changes to weight.  Have you had a diabetic eye exam in the last 12 months? No        BP Readings from Last 2 Encounters:   02/26/25 (!) 165/94   02/04/25 (!) 176/110     Hemoglobin A1C (%)   Date Value   10/14/2024 8.4 (H)   12/29/2023 6.9 (H)   06/02/2021 6.7 (H)   12/03/2020 5.8 (H)     LDL Cholesterol Calculated (mg/dL)   Date Value   10/14/2024 100 (H)   12/29/2023 120 (H)   07/17/2020 104 (H)   04/21/2020 85     LDL Cholesterol Direct (mg/dL)   Date Value   06/02/2021 99       Review of Systems   ENT, endocrine, pulmonary, cardiac, gastrointestinal, genitourinary, musculoskeletal, integument and psychiatric systems are negative, except as otherwise noted.      Objective    BP (!) 165/94   Pulse 93   Temp 98.2  F (36.8  C) (Temporal)   Resp 19   Ht 1.79 m (5' 10.47\")   Wt (!) 161.9 kg (357 lb)   SpO2 97%   BMI 50.54 kg/m    Body mass index is 50.54 kg/m .  Physical Exam   GENERAL: alert, appears to be in a lot of distress  RESP: lungs clear to auscultation - no rales, rhonchi or wheezes  CV: regular rate and rhythm, no murmur, click or rub, no peripheral edema  ABDOMEN: soft, nontender, obese, no masses and bowel sounds normal  MS: no gross musculoskeletal defects noted, no edema  NEURO: Normal strength and tone, mentation intact and speech normal  PSYCH: mentation appears normal, affect in discomfort      Signed Electronically by: Agustin Parekh MD    "

## 2025-03-11 ENCOUNTER — THERAPY VISIT (OUTPATIENT)
Dept: PHYSICAL THERAPY | Facility: CLINIC | Age: 50
End: 2025-03-11
Payer: COMMERCIAL

## 2025-03-11 DIAGNOSIS — G89.29 CHRONIC PAIN: Primary | ICD-10-CM

## 2025-03-11 PROCEDURE — 97112 NEUROMUSCULAR REEDUCATION: CPT | Mod: GP | Performed by: PHYSICAL THERAPIST

## 2025-03-11 PROCEDURE — 97110 THERAPEUTIC EXERCISES: CPT | Mod: GP | Performed by: PHYSICAL THERAPIST

## 2025-03-24 ENCOUNTER — TELEPHONE (OUTPATIENT)
Dept: FAMILY MEDICINE | Facility: CLINIC | Age: 50
End: 2025-03-24

## 2025-03-24 NOTE — TELEPHONE ENCOUNTER
Prior Authorization Specialty Medication Request    Medication/Dose: tirzepatide-Weight Management (ZEPBOUND) 2.5 MG/0.5ML prefilled pen  Diagnosis and ICD code (if different than what is on RX):    Class 3 severe obesity with serious comorbidity and body mass index (BMI) of 50.0 to 59.9 in adult, unspecified obesity type (H) [E66.813, E66.01, Z68.43]        New/renewal/insurance change PA/secondary ins. PA:  Previously Tried and Failed:      Important Lab Values:   Rationale:     Insurance   Primary: [place]   Insurance ID:  [id number]         Clinic Information  Preferred routing pool for dept communication: [facility] pool

## 2025-03-26 NOTE — TELEPHONE ENCOUNTER
[facility] Prior Authorization Team  Phone: [phone]    PRIOR AUTHORIZATION DENIED    Medication: ZEPBOUND 2.5 MG/0.5ML SC SOAJ  Insurance Company: [employer] - Phone [phone] Fax [phone]  Denial Date: 3/26/2025  Denial Reason(s):         Appeal Information:         Patient Notified: NO- Unfortunately, we cannot call the patient with denials because we do not know what next steps the MD will take nor can we give medical advice, please notify the patient of what they are to expect for the continuation of their therapy from the provider.

## 2025-03-26 NOTE — TELEPHONE ENCOUNTER
[facility]ail Pharmacy Prior Authorization Team  Phone: [phone]    PA Initiation    Medication: ZEPBOUND 2.5 MG/0.5ML SC SOAJ  Insurance Company: "[employer]," - Phone [phone] Fax [phone]  Pharmacy Filling the Rx:    Filling Pharmacy Phone:    Filling Pharmacy Fax:    Start Date: 3/26/2025    INITIATED EPA

## 2025-03-28 ENCOUNTER — ANCILLARY PROCEDURE (OUTPATIENT)
Dept: ULTRASOUND IMAGING | Facility: CLINIC | Age: 50
End: 2025-03-28
Attending: FAMILY MEDICINE
Payer: COMMERCIAL

## 2025-03-28 DIAGNOSIS — I16.0 ASYMPTOMATIC HYPERTENSIVE URGENCY: ICD-10-CM

## 2025-03-28 PROCEDURE — 93975 VASCULAR STUDY: CPT | Performed by: RADIOLOGY

## 2025-04-15 ENCOUNTER — TELEPHONE (OUTPATIENT)
Dept: NEUROSURGERY | Facility: CLINIC | Age: 50
End: 2025-04-15
Payer: COMMERCIAL

## 2025-04-15 DIAGNOSIS — Z98.1 S/P CERVICAL SPINAL FUSION: Primary | ICD-10-CM

## 2025-04-15 DIAGNOSIS — M54.12 CERVICAL RADICULOPATHY: ICD-10-CM

## 2025-04-15 NOTE — TELEPHONE ENCOUNTER
Health Call Center    Phone Message    May a detailed message be left on voicemail: yes     Reason for Call: Pt wants to let Dr. De Leon know that he has had injection and done PT and neither have helped.  He is requesting to speak with a nurse to discuss what is recommended for follow up/next steps.  Please call him back to discuss.

## 2025-04-16 NOTE — TELEPHONE ENCOUNTER
Returned call to patient.   Last seen in clinic 1/16/25 with c/o severe left arm pain, left sided weakness.   Tried PT and Cervical DONTE with no improvement. Reports maybe a day of symptom improvement with injection.  Left sided symptoms the same, and now reporting onset of right sided neck pain radiating to shoulder. No n/t, no weakness on that side.    Last MRI 1/13/25.    Routed to Provider.

## 2025-04-30 ENCOUNTER — VIRTUAL VISIT (OUTPATIENT)
Dept: ENDOCRINOLOGY | Facility: CLINIC | Age: 50
End: 2025-04-30
Payer: COMMERCIAL

## 2025-04-30 VITALS — WEIGHT: 315 LBS | HEIGHT: 71 IN | BODY MASS INDEX: 44.1 KG/M2

## 2025-04-30 DIAGNOSIS — E11.59 TYPE 2 DIABETES MELLITUS WITH OTHER CIRCULATORY COMPLICATION, WITHOUT LONG-TERM CURRENT USE OF INSULIN (H): Primary | ICD-10-CM

## 2025-04-30 DIAGNOSIS — E66.813 CLASS 3 SEVERE OBESITY DUE TO EXCESS CALORIES WITH SERIOUS COMORBIDITY AND BODY MASS INDEX (BMI) OF 45.0 TO 49.9 IN ADULT (H): ICD-10-CM

## 2025-04-30 PROCEDURE — 98005 SYNCH AUDIO-VIDEO EST LOW 20: CPT | Performed by: PHYSICIAN ASSISTANT

## 2025-04-30 ASSESSMENT — PAIN SCALES - GENERAL: PAINLEVEL_OUTOF10: SEVERE PAIN (10)

## 2025-04-30 NOTE — PROGRESS NOTES
Pre-Bariatric Surgery Note    Agustin Parekh    Date: 2025     RE: Archie Wallace    MR#: [id number]   : 1975   Date of Visit: 2025    REASON FOR VISIT: Preoperative evaluation for possible weight loss surgery    Dear Agustin Mendoza,    I had the pleasure of seeing your patient, Archie Wallace, in my preoperative bariatric clinic.    As you know, he has morbid obesity and is considering weight loss surgery to treat obesity in association with his medical conditions of obesity.  Starting weight 354 lbs.     Assessment & Plan   Problem List Items Addressed This Visit          Endocrine Diagnoses    Class 3 severe obesity due to excess calories with serious comorbidity and body mass index (BMI) of 45.0 to 49.9 in adult (H)    Relevant Medications    tirzepatide (MOUNJARO) 5 MG/0.5ML SOAJ auto-injector pen    tirzepatide (MOUNJARO) 2.5 MG/0.5ML SOAJ auto-injector pen    Other Relevant Orders    Adult Mental Health  Referral    Adult Mental Health  Referral    Adult Diabetes Education  Referral    Type 2 diabetes mellitus with other circulatory complication, without long-term current use of insulin (H) - Primary    Relevant Medications    tirzepatide (MOUNJARO) 5 MG/0.5ML SOAJ auto-injector pen    tirzepatide (MOUNJARO) 2.5 MG/0.5ML SOAJ auto-injector pen      Weight mgmt follow up:    Interested in sleeve gastrectomy.    Last seen [id number] lost from highest 389 to 289 lbs  Lost grandchild  and started regaining weight  Wants to restart GLP1 and asking about mounjaro. No hx of pancreatitis.  No hx of MEN2 or MTC    AOM:  -tried topiramate stopped due to brain fog   -tried ozempic and was helpful but no longer covered by insurance  -tried saxenda after ozempic and tolerated    T2DM:  Fingersticks  Recently A1C 8.4  Metformin in the past but stopped due to skin rash issues. ?allergy    Plan:    Need to lose 35 lbs from 354 lbs.  Need update psych eval, call  to schedule  Schedule to set up RN/RD [employer] classes  6 months RD visits    DM educator referral  Therapist referral  Sleep clearance Uses CPAP reguarly  Letter of support from primary care provider    Follow up:  MTM 6 weeks   Stephania 3-4 months return MWM ok to use new soren       Most recent weights:  Wt Readings from Last 4 Encounters:   04/30/25 (!) 160.6 kg (354 lb)   03/21/25 (!) 162.4 kg (358 lb)   02/26/25 (!) 161.9 kg (357 lb)   01/31/25 (!) 164.2 kg (362 lb)         ROS    Past Medical History:   Diagnosis Date    CAD (coronary artery disease)     Premature artery disease noted    Class 3 severe obesity due to excess calories with serious comorbidity and body mass index (BMI) of 45.0 to 49.9 in adult (H) 02/18/2010    Bariatric surgery consult 9/7/21 Stephania Youssef PA-C Starting BMI 46 and wt 337 lbs     DDD (degenerative disc disease), cervical     History of MI (myocardial infarction) 07/17/2020    Hyperlipidemia LDL goal <40     Hypertension goal BP (blood pressure) < 140/90     Long QT interval syndrome     MEDICAL HISTORY OF -     History of rhabdomyolosis, from strenuous exercise    Mild persistent asthma     Obstructive sleep apnea     Primary osteoarthritis of left knee     Sleep apnea syndrome     cpap    Type 2 diabetes, HbA1c goal < 7% (H)     Vitamin D deficiency        Past Surgical History:   Procedure Laterality Date    ANGIOGRAM      ANGIOPLASTY  2001    Coronary artery angioplasty//associated with mild AMI    DISCECTOMY, FUSION CERVICAL ANTERIOR THREE+ LEVELS, COMBINED N/A 7/16/2021    Procedure: Cervical 3 to Cervical 7 Anterior Cervical Discectomy and Fusion;  Surgeon: Jaquan De Leon MD;  Location:  OR    NASAL SINUS SURGERY      THYROIDECTOMY Right 7/16/2021    Procedure: RIGHT THYROIDECTOMY;  Surgeon: Moshe Hernandez DO;  Location:  OR    TONSILLECTOMY  2005    With uvuloectomy       Current Outpatient Medications   Medication Sig Dispense Refill    tirzepatide (MOUNJARO) 2.5  MG/0.5ML SOAJ auto-injector pen Inject 0.5 mLs (2.5 mg) subcutaneously once a week. 2 mL 0    tirzepatide (MOUNJARO) 5 MG/0.5ML SOAJ auto-injector pen Inject 0.5 mLs (5 mg) subcutaneously once a week. 2 mL 3    albuterol (PROAIR HFA) 108 (90 Base) MCG/ACT inhaler Inhale 2 puffs into the lungs every 6 hours as needed 1 Inhaler 5    amLODIPine (NORVASC) 10 MG tablet Take 1 tablet (10 mg) by mouth daily. 90 tablet 2    aspirin 81 MG EC tablet Take 1 tablet (81 mg) by mouth daily 90 tablet 3    augmented betamethasone dipropionate (DIPROLENE AF) 0.05 % external cream Apply topically 2 times daily as needed (flares of hand rash. maximum 1 month consistent use). 45 g 0    augmented betamethasone dipropionate (DIPROLENE-AF) 0.05 % external ointment Apply topically 2 times daily. To hand and foot rash only. Once itching is gone then stop. Maximum 1 month 45 g 0    cloNIDine (CATAPRES) 0.2 MG tablet Take 1 tablet (0.2 mg) by mouth 2 times daily. 180 tablet 1    DULERA 100-5 MCG/ACT inhaler INHALER 2 PUFFS BY MOUTH INTO THE LUNGS TWICE DAILY 13 g 0    gabapentin (NEURONTIN) 300 MG capsule 300mg po at bedtime x 3 days, then twice daily x 3 days, then three times daily ongoing 90 capsule 2    hydrocortisone 2.5 % cream Apply topically 2 times daily. When rash in skin folds is flared only then stop 30 g 0    Insulin Pen Needle (PEN NEEDLES) 32G X 4 MM MISC 1 each daily 100 each 0    ketoconazole (NIZORAL) 2 % external cream Apply to rash twice daily to rash in abdominal fold until rash resolves. Then use daily to prevent flares. 60 g 0    labetalol (NORMODYNE) 200 MG tablet Take 1 tablet (200 mg) by mouth 2 times daily. 60 tablet 1    losartan (COZAAR) 100 MG tablet Take 1 tablet (100 mg) by mouth daily. 90 tablet 2    naproxen (NAPROSYN) 500 MG tablet Take 1 tablet (500 mg) by mouth 2 times daily (with meals). 60 tablet 0    spironolactone (ALDACTONE) 25 MG tablet Take 1 tablet (25 mg) by mouth daily. 90 tablet 1     spironolactone (ALDACTONE) 25 MG tablet Take 1 tablet (25 mg) by mouth daily. 30 tablet 2    tirzepatide-Weight Management (ZEPBOUND) 2.5 MG/0.5ML prefilled pen Inject 0.5 mLs (2.5 mg) subcutaneously every 7 days. 2 mL 1     No current facility-administered medications for this visit.       Allergies   Allergen Reactions    Oseltamivir Anaphylaxis and Rash    Biaxin [Clarithromycin]      Muscle breakdown    Bromaxefed Dm Rf Cough    Clarithromycin Other (See Comments)     Weakness    Robafen Dm Cgh-Chest [Dextromethorphan-Guaifenesin] Difficulty breathing    Guaifenesin Rash    Metformin Rash     Reported breaking out with a skin reaction       Office Visit on 10/14/2024   Component Date Value Ref Range Status    Estimated Average Glucose 10/14/2024 194 (H)  <117 mg/dL Final    Hemoglobin A1C 10/14/2024 8.4 (H)  0.0 - 5.6 % Final    Normal <5.7%   Prediabetes 5.7-6.4%    Diabetes 6.5% or higher     Note: Adopted from ADA consensus guidelines.    Cholesterol 10/14/2024 173  <200 mg/dL Final    Triglycerides 10/14/2024 63  <150 mg/dL Final    Direct Measure HDL 10/14/2024 60  >=40 mg/dL Final    LDL Cholesterol Calculated 10/14/2024 100 (H)  <100 mg/dL Final    Non HDL Cholesterol 10/14/2024 113  <130 mg/dL Final    Patient Fasting > 8hrs? 10/14/2024 Yes   Final    Sodium 10/14/2024 140  135 - 145 mmol/L Final    Potassium 10/14/2024 3.8  3.4 - 5.3 mmol/L Final    Carbon Dioxide (CO2) 10/14/2024 28  22 - 29 mmol/L Final    Anion Gap 10/14/2024 11  7 - 15 mmol/L Final    Urea Nitrogen 10/14/2024 10.3  6.0 - 20.0 mg/dL Final    Creatinine 10/14/2024 0.78  0.67 - 1.17 mg/dL Final    GFR Estimate 10/14/2024 >90  >60 mL/min/1.73m2 Final    eGFR calculated using 2021 CKD-EPI equation.    Calcium 10/14/2024 9.3  8.8 - 10.4 mg/dL Final    Reference intervals for this test were updated on 7/16/2024 to reflect our healthy population more accurately. There may be differences in the flagging of prior results with similar values  performed with this method. Those prior results can be interpreted in the context of the updated reference intervals.    Chloride 10/14/2024 101  98 - 107 mmol/L Final    Glucose 10/14/2024 184 (H)  70 - 99 mg/dL Final    Alkaline Phosphatase 10/14/2024 52  40 - 150 U/L Final    AST 10/14/2024 18  0 - 45 U/L Final    ALT 10/14/2024 26  0 - 70 U/L Final    Protein Total 10/14/2024 7.5  6.4 - 8.3 g/dL Final    Albumin 10/14/2024 4.0  3.5 - 5.2 g/dL Final    Bilirubin Total 10/14/2024 0.5  <=1.2 mg/dL Final    Patient Fasting > 8hrs? 10/14/2024 Yes   Final    Prostate Specific Antigen Screen 10/14/2024 0.33  0.00 - 3.50 ng/mL Final    Aldosterone 10/14/2024 7.7  0.0 - 31.0 ng/dL Final    For screening of primary aldosteronism a positive test result is defined by most experts as: Plasma renin activity (PRA) <1.0 ng/mL/hour AND plasma aldosterone concentration (PAC) >=10 ng/dL OR aldosterone to renin ratio >=20 with PAC >=10 ng/dL    Renin Activity 10/14/2024 0.8  ng/mL/hr Final    Comment: INTERPRETIVE INFORMATION: Renin Activity    Adult, Normal sodium diet:    Supine ................. 0.2-1.6 ng/mL/hr    Upright ................ 0.5-4.0 ng/mL/hr    Children, Normal sodium diet, Supine:    [place] (1-7 days) ..... 2.0-35.0 ng/mL/hr    [place]ord blood ............. 4.0-32.0 ng/mL/hr    1-12 mos ............... 2.4-37.0 ng/mL/hr    13 mos-3 yrs ........... 1.7-11.2 ng/mL/hr    4-5 yrs ................ 1.0- 6.5 ng/mL/hr    6-10 yrs ............... 0.5- 5.9 ng/mL/hr    11-15 yrs .............. 0.5- 3.3 ng/mL/hr    Children, normal sodium diet, Upright:    0-3 yrs ................ Not Available    4-5 yrs ................ Less than or equal to 15 ng/mL/hr    6-10 yrs ............... Less than or equal to 17 ng/mL/hr    11-15 yrs .............. Less than or equal to 16 ng/mL/hr    Plasma renin activity measures enzyme ability to convert   angiotensinogen to angiotensin I and is limited by the   availability of angiotensinogen.  "Plasma renin activity is   not an accurate indicator                            of enzyme activity when   angiotensinogen is decreased.    This test was developed and its performance characteristics   determined by [facility]. It has not been cleared or   approved by the US Food and Drug Administration. This test   was performed in a CLIA certified laboratory and is   intended for clinical purposes.  Performed By: [facility]  [address] [place] [id number]  : Moshe Mcdermott MD, PhD  CLIA Number: [id number]    Aldosterone Renin Ratio 10/14/2024 9.6  0.0 - 25.0 Final    For screening of primary aldosteronism a positive test result is defined by most experts as: Plasma renin activity (PRA) <1.0 ng/mL/hour AND plasma aldosterone concentration (PAC) >=10 ng/dL OR aldosterone to renin ratio >=20 with PAC >=10 ng/dL       PHYSICAL EXAM:  Objective    Ht 1.803 m (5' 10.98\")   Wt (!) 160.6 kg (354 lb)   BMI 49.40 kg/m             Physical Exam   GENERAL: alert and no distress  EYES: Eyes grossly normal to inspection.  No discharge or erythema, or obvious scleral/conjunctival abnormalities.  RESP: No audible wheeze, cough, or visible cyanosis.    SKIN: Visible skin clear. No significant rash, abnormal pigmentation or lesions.  NEURO: Cranial nerves grossly intact.  Mentation and speech appropriate for age.  PSYCH: Appropriate affect, tone, and pace of words    Sleeve Gastrectomy: Risks and Side Effects    The complications or risks of surgery include but are not limited to: death, heart attack, infection in the surgical site (wound infection), abdomen (abscess), bladder (urinary tract infection), lungs (pneumonia), clots in legs (deep vein thrombosis) or lungs (pulmonary emboli),  injury to the bowels or other organs, bowel obstruction, hernia at the incision and gastrointestional bleeding.    More specific risks related to vertical sleeve gastrectomy were detailed at the " bariatric informational seminar and include the following: leak at the vertical sleeve staple line, leak at the anastomoses,  nausea, vomiting, and dehydration for several months,  adhesions causing bowel obstruction, rapid weight loss causing a higher rate of gallstone formation during the first 6 months after surgery, decreased absorption of vitamins and protein because of the reduced stomach size, weight regain if inappropriate food intake occurs, stricture, injury to other organs, hernia,  and ulcers.       Side effects of bariatric surgery include but are not limited to: abdominal pain, cramping, bloating, constipation, nausea, vomiting, diarrhea, difficulty swallowing,  dehydration, hair loss, excess skin, protein, iron and vitamin deficiencies, heartburn, transfer of addictions, increased anxiety and worsening depression.       I emphasized exercise and activity behavior along with appropriate food choice as the main foundation for weight loss with surgery providing surgical reinforcement of the appropriate behavior set.        Review of general surgery weight loss process    1. Complete preoperative requirements, including weight loss.  Final weight check to confirm MANDATORY weight loss requirement must be documented on a clinic scale.    2. Discuss prior authorization with .    3. History and physical evaluation by PCP of PAC clinic within 30 days of surgery date, preoperative class, and weight check (weigh-in visit) to be scheduled by patient.  Pre-anesthesia clinic for risk evaluation to be scheduled by anesthesia clinic.    4. We cannot guarantee that patient will qualify for surgery unless all preoperative requirements are met, prior authorization from primary insurance company is granted, and insurance changes do not occur.    5. It is possible for patients to regain all weight after weight loss surgery unless they follow guidelines prescribed by our bariatric center.    6. All  patients with gastrointestinal complaints after weight loss surgery must have complaints conveyed to the bariatric team for appropriate treatment.    7. Vitamin deficiencies may develop post-bariatric surgery and annual laboratory testing should be performed.    8. Persistent nausea/vomiting after bariatric surgery entails risk of thiamine deficiency and should be treated early.  Vitamin B12 deficiency may develop, especially after gastric bypass surgery and must be recognized.        If you have any questions about our plans please don't hesitate to contact me.    Sincerely,    Stephania Youssef PA-C      20 minutes spent by me on the date of the encounter doing chart review, history and exam, documentation and further activities per the note  Virtual Visit Details    Type of service:  Video Visit   Video Start Time: 905  Video End Time:932    Originating Location (pt. Location): Home    Distant Location (provider location):  Off-site  Platform used for Video Visit: [employer]

## 2025-04-30 NOTE — NURSING NOTE
Current patient location: [address] [place] [address]   [facility] [id number]    Is the patient currently in the state of [place]? YES    Visit mode:VIDEO    If the visit is dropped, the patient can be reconnected by: VIDEO VISIT: Text to cell phone:   Telephone Information:   Mobile [phone]       Will anyone else be joining the visit? NO  (If patient encounters technical issues they should call [phone]    Are changes needed to the allergy or medication list? Pt stated no changes to allergies and Pt stated no med changes    Are refills needed on medications prescribed by this physician? NO    Reason for visit: RECHECK    Ruth STARR   WDL

## 2025-04-30 NOTE — LETTER
2025       RE: Archie Wallace  [address]  [place] [address]     Dear Colleague,    Thank you for referring your patient, Archie Wallace, to the [facility] [place] at [facility]. Please see a copy of my visit note below.      Pre-Bariatric Surgery Note    Agustin Parekh    Date: 2025     RE: Archie Wallace    MR#: [id number]   : 1975   Date of Visit: 2025    REASON FOR VISIT: Preoperative evaluation for possible weight loss surgery    Dear Agustin Mendoza,    I had the pleasure of seeing your patient, Archie Wallace, in my preoperative bariatric clinic.    As you know, he has morbid obesity and is considering weight loss surgery to treat obesity in association with his medical conditions of obesity.  Starting weight 354 lbs.     Assessment & Plan  Problem List Items Addressed This Visit          Endocrine Diagnoses    Class 3 severe obesity due to excess calories with serious comorbidity and body mass index (BMI) of 45.0 to 49.9 in adult (H)    Relevant Medications    tirzepatide (MOUNJARO) 5 MG/0.5ML SOAJ auto-injector pen    tirzepatide (MOUNJARO) 2.5 MG/0.5ML SOAJ auto-injector pen    Other Relevant Orders    Adult Mental Health  Referral    Adult Mental Health  Referral    Adult Diabetes Education  Referral    Type 2 diabetes mellitus with other circulatory complication, without long-term current use of insulin (H) - Primary    Relevant Medications    tirzepatide (MOUNJARO) 5 MG/0.5ML SOAJ auto-injector pen    tirzepatide (MOUNJARO) 2.5 MG/0.5ML SOAJ auto-injector pen      Weight mgmt follow up:    Interested in sleeve gastrectomy.    Last seen 3437-9020 lost from highest 389 to 289 lbs  Lost grandchild  and started regaining weight  Wants to restart GLP1 and asking about mounjaro. No hx of pancreatitis.  No hx of MEN2 or MTC    AOM:  -tried  topiramate stopped due to brain fog   -tried ozempic and was helpful but no longer covered by insurance  -tried saxenda after ozempic and tolerated    T2DM:  Fingersticks  Recently A1C 8.4  Metformin in the past but stopped due to skin rash issues. ?allergy    Plan:    Need to lose 35 lbs from 354 lbs.  Need update psych eval, call to schedule  Schedule to set up RN/RD [employer] [place] classes  6 months RD visits    DM educator referral  Therapist referral  Sleep clearance Uses CPAP reguarly  Letter of support from primary care provider    Follow up:  MTM 6 weeks   Stephania 3-4 months return MWM ok to use new blanca       Most recent weights:  Wt Readings from Last 4 Encounters:   04/30/25 (!) 160.6 kg (354 lb)   03/21/25 (!) 162.4 kg (358 lb)   02/26/25 (!) 161.9 kg (357 lb)   01/31/25 (!) 164.2 kg (362 lb)         ROS    Past Medical History:   Diagnosis Date     CAD (coronary artery disease)     Premature artery disease noted     Class 3 severe obesity due to excess calories with serious comorbidity and body mass index (BMI) of 45.0 to 49.9 in adult (H) 02/18/2010    Bariatric surgery consult 9/7/21 Stephania Youssef PA-C Starting BMI 46 and wt 337 lbs      DDD (degenerative disc disease), cervical      History of MI (myocardial infarction) 07/17/2020     Hyperlipidemia LDL goal <40      Hypertension goal BP (blood pressure) < 140/90      Long QT interval syndrome      MEDICAL HISTORY OF -     History of rhabdomyolosis, from strenuous exercise     Mild persistent asthma      Obstructive sleep apnea      Primary osteoarthritis of left knee      Sleep apnea syndrome     cpap     Type 2 diabetes, HbA1c goal < 7% (H)      Vitamin D deficiency        Past Surgical History:   Procedure Laterality Date     ANGIOGRAM       ANGIOPLASTY  2001    Coronary artery angioplasty//associated with mild AMI     DISCECTOMY, FUSION CERVICAL ANTERIOR THREE+ LEVELS, COMBINED N/A 7/16/2021    Procedure: Cervical 3 to Cervical 7 Anterior Cervical  Discectomy and Fusion;  Surgeon: Jaquan De Leon MD;  Location: [facility] OR     NASAL SINUS SURGERY       THYROIDECTOMY Right 7/16/2021    Procedure: RIGHT THYROIDECTOMY;  Surgeon: Moshe Hernandez DO;  Location: [facility] OR     TONSILLECTOMY  2005    With uvuloectomy       Current Outpatient Medications   Medication Sig Dispense Refill     tirzepatide (MOUNJARO) 2.5 MG/0.5ML SOAJ auto-injector pen Inject 0.5 mLs (2.5 mg) subcutaneously once a week. 2 mL 0     tirzepatide (MOUNJARO) 5 MG/0.5ML SOAJ auto-injector pen Inject 0.5 mLs (5 mg) subcutaneously once a week. 2 mL 3     albuterol (PROAIR HFA) 108 (90 Base) MCG/ACT inhaler Inhale 2 puffs into the lungs every 6 hours as needed 1 Inhaler 5     amLODIPine (NORVASC) 10 MG tablet Take 1 tablet (10 mg) by mouth daily. 90 tablet 2     aspirin 81 MG EC tablet Take 1 tablet (81 mg) by mouth daily 90 tablet 3     augmented betamethasone dipropionate (DIPROLENE AF) 0.05 % external cream Apply topically 2 times daily as needed (flares of hand rash. maximum 1 month consistent use). 45 g 0     augmented betamethasone dipropionate (DIPROLENE-AF) 0.05 % external ointment Apply topically 2 times daily. To hand and foot rash only. Once itching is gone then stop. Maximum 1 month 45 g 0     cloNIDine (CATAPRES) 0.2 MG tablet Take 1 tablet (0.2 mg) by mouth 2 times daily. 180 tablet 1     DULERA 100-5 MCG/ACT inhaler INHALER 2 PUFFS BY MOUTH INTO THE LUNGS TWICE DAILY 13 g 0     gabapentin (NEURONTIN) 300 MG capsule 300mg po at bedtime x 3 days, then twice daily x 3 days, then three times daily ongoing 90 capsule 2     hydrocortisone 2.5 % cream Apply topically 2 times daily. When rash in skin folds is flared only then stop 30 g 0     Insulin Pen Needle (PEN NEEDLES) 32G X 4 MM MISC 1 each daily 100 each 0     ketoconazole (NIZORAL) 2 % external cream Apply to rash twice daily to rash in abdominal fold until rash resolves. Then use daily to prevent flares. 60 g 0     labetalol (NORMODYNE)  200 MG tablet Take 1 tablet (200 mg) by mouth 2 times daily. 60 tablet 1     losartan (COZAAR) 100 MG tablet Take 1 tablet (100 mg) by mouth daily. 90 tablet 2     naproxen (NAPROSYN) 500 MG tablet Take 1 tablet (500 mg) by mouth 2 times daily (with meals). 60 tablet 0     spironolactone (ALDACTONE) 25 MG tablet Take 1 tablet (25 mg) by mouth daily. 90 tablet 1     spironolactone (ALDACTONE) 25 MG tablet Take 1 tablet (25 mg) by mouth daily. 30 tablet 2     tirzepatide-Weight Management (ZEPBOUND) 2.5 MG/0.5ML prefilled pen Inject 0.5 mLs (2.5 mg) subcutaneously every 7 days. 2 mL 1     No current facility-administered medications for this visit.       Allergies   Allergen Reactions     Oseltamivir Anaphylaxis and Rash     Biaxin [Clarithromycin]      Muscle breakdown     Bromaxefed Dm Rf Cough     Clarithromycin Other (See Comments)     Weakness     Robafen Dm Cgh-Chest [Dextromethorphan-Guaifenesin] Difficulty breathing     Guaifenesin Rash     Metformin Rash     Reported breaking out with a skin reaction       Office Visit on 10/14/2024   Component Date Value Ref Range Status     Estimated Average Glucose 10/14/2024 194 (H)  <117 mg/dL Final     Hemoglobin A1C 10/14/2024 8.4 (H)  0.0 - 5.6 % Final    Normal <5.7%   Prediabetes 5.7-6.4%    Diabetes 6.5% or higher     Note: Adopted from ADA consensus guidelines.     Cholesterol 10/14/2024 173  <200 mg/dL Final     Triglycerides 10/14/2024 63  <150 mg/dL Final     Direct Measure HDL 10/14/2024 60  >=40 mg/dL Final     LDL Cholesterol Calculated 10/14/2024 100 (H)  <100 mg/dL Final     Non HDL Cholesterol 10/14/2024 113  <130 mg/dL Final     Patient Fasting > 8hrs? 10/14/2024 Yes   Final     Sodium 10/14/2024 140  135 - 145 mmol/L Final     Potassium 10/14/2024 3.8  3.4 - 5.3 mmol/L Final     Carbon Dioxide (CO2) 10/14/2024 28  22 - 29 mmol/L Final     Anion Gap 10/14/2024 11  7 - 15 mmol/L Final     Urea Nitrogen 10/14/2024 10.3  6.0 - 20.0 mg/dL Final      Creatinine 10/14/2024 0.78  0.67 - 1.17 mg/dL Final     GFR Estimate 10/14/2024 >90  >60 mL/min/1.73m2 Final    eGFR calculated using  CKD-EPI equation.     Calcium 10/14/2024 9.3  8.8 - 10.4 mg/dL Final    Reference intervals for this test were updated on 2024 to reflect our healthy population more accurately. There may be differences in the flagging of prior results with similar values performed with this method. Those prior results can be interpreted in the context of the updated reference intervals.     Chloride 10/14/2024 101  98 - 107 mmol/L Final     Glucose 10/14/2024 184 (H)  70 - 99 mg/dL Final     Alkaline Phosphatase 10/14/2024 52  40 - 150 U/L Final     AST 10/14/2024 18  0 - 45 U/L Final     ALT 10/14/2024 26  0 - 70 U/L Final     Protein Total 10/14/2024 7.5  6.4 - 8.3 g/dL Final     Albumin 10/14/2024 4.0  3.5 - 5.2 g/dL Final     Bilirubin Total 10/14/2024 0.5  <=1.2 mg/dL Final     Patient Fasting > 8hrs? 10/14/2024 Yes   Final     Prostate Specific Antigen Screen 10/14/2024 0.33  0.00 - 3.50 ng/mL Final     Aldosterone 10/14/2024 7.7  0.0 - 31.0 ng/dL Final    For screening of primary aldosteronism a positive test result is defined by most experts as: Plasma renin activity (PRA) <1.0 ng/mL/hour AND plasma aldosterone concentration (PAC) >=10 ng/dL OR aldosterone to renin ratio >=20 with PAC >=10 ng/dL     Renin Activity 10/14/2024 0.8  ng/mL/hr Final    Comment: INTERPRETIVE INFORMATION: Renin Activity    Adult, Normal sodium diet:    Supine ................. 0.2-1.6 ng/mL/hr    Upright ................ 0.5-4.0 ng/mL/hr    Children, Normal sodium diet, Supine:     (1-7 days) ..... 2.0-35.0 ng/mL/hr    Cord blood ............. 4.0-32.0 ng/mL/hr    1-12 mos ............... 2.4-37.0 ng/mL/hr    13 mos-3 yrs ........... 1.7-11.2 ng/mL/hr    4-5 yrs ................ 1.0- 6.5 ng/mL/hr    6-10 yrs ............... 0.5- 5.9 ng/mL/hr    11-15 yrs .............. 0.5- 3.3 ng/mL/hr    Children,  "normal sodium diet, Upright:    0-3 yrs ................ Not Available    4-5 yrs ................ Less than or equal to 15 ng/mL/hr    6-10 yrs ............... Less than or equal to 17 ng/mL/hr    11-15 yrs .............. Less than or equal to 16 ng/mL/hr    Plasma renin activity measures enzyme ability to convert   angiotensinogen to angiotensin I and is limited by the   availability of angiotensinogen. Plasma renin activity is   not an accurate indicator                            of enzyme activity when   angiotensinogen is decreased.    This test was developed and its performance characteristics   determined by [facility]. It has not been cleared or   approved by the US Food and Drug Administration. This test   was performed in a CLIA certified laboratory and is   intended for clinical purposes.  Performed By: [facility]  [address] [place] [id number]  : Moshe Mcdermott MD, PhD  IA Number: [id number]     Aldosterone Renin Ratio 10/14/2024 9.6  0.0 - 25.0 Final    For screening of primary aldosteronism a positive test result is defined by most experts as: Plasma renin activity (PRA) <1.0 ng/mL/hour AND plasma aldosterone concentration (PAC) >=10 ng/dL OR aldosterone to renin ratio >=20 with PAC >=10 ng/dL       PHYSICAL EXAM:  Objective   Ht 1.803 m (5' 10.98\")   Wt (!) 160.6 kg (354 lb)   BMI 49.40 kg/m             Physical Exam   GENERAL: alert and no distress  EYES: Eyes grossly normal to inspection.  No discharge or erythema, or obvious scleral/conjunctival abnormalities.  RESP: No audible wheeze, cough, or visible cyanosis.    SKIN: Visible skin clear. No significant rash, abnormal pigmentation or lesions.  NEURO: Cranial nerves grossly intact.  Mentation and speech appropriate for age.  PSYCH: Appropriate affect, tone, and pace of words    Sleeve Gastrectomy: Risks and Side Effects    The complications or risks of surgery include but are not limited to: " death, heart attack, infection in the surgical site (wound infection), abdomen (abscess), bladder (urinary tract infection), lungs (pneumonia), clots in legs (deep vein thrombosis) or lungs (pulmonary emboli),  injury to the bowels or other organs, bowel obstruction, hernia at the incision and gastrointestional bleeding.    More specific risks related to vertical sleeve gastrectomy were detailed at the bariatric informational seminar and include the following: leak at the vertical sleeve staple line, leak at the anastomoses,  nausea, vomiting, and dehydration for several months,  adhesions causing bowel obstruction, rapid weight loss causing a higher rate of gallstone formation during the first 6 months after surgery, decreased absorption of vitamins and protein because of the reduced stomach size, weight regain if inappropriate food intake occurs, stricture, injury to other organs, hernia,  and ulcers.       Side effects of bariatric surgery include but are not limited to: abdominal pain, cramping, bloating, constipation, nausea, vomiting, diarrhea, difficulty swallowing,  dehydration, hair loss, excess skin, protein, iron and vitamin deficiencies, heartburn, transfer of addictions, increased anxiety and worsening depression.       I emphasized exercise and activity behavior along with appropriate food choice as the main foundation for weight loss with surgery providing surgical reinforcement of the appropriate behavior set.        Review of general surgery weight loss process    1. Complete preoperative requirements, including weight loss.  Final weight check to confirm MANDATORY weight loss requirement must be documented on a clinic scale.    2. Discuss prior authorization with .    3. History and physical evaluation by PCP of PAC clinic within 30 days of surgery date, preoperative class, and weight check (weigh-in visit) to be scheduled by patient.  Pre-anesthesia clinic for risk evaluation  to be scheduled by anesthesia clinic.    4. We cannot guarantee that patient will qualify for surgery unless all preoperative requirements are met, prior authorization from primary insurance company is granted, and insurance changes do not occur.    5. It is possible for patients to regain all weight after weight loss surgery unless they follow guidelines prescribed by our bariatric center.    6. All patients with gastrointestinal complaints after weight loss surgery must have complaints conveyed to the bariatric team for appropriate treatment.    7. Vitamin deficiencies may develop post-bariatric surgery and annual laboratory testing should be performed.    8. Persistent nausea/vomiting after bariatric surgery entails risk of thiamine deficiency and should be treated early.  Vitamin B12 deficiency may develop, especially after gastric bypass surgery and must be recognized.        If you have any questions about our plans please don't hesitate to contact me.    Sincerely,    Stephania Youssef PA-C      20 minutes spent by me on the date of the encounter doing chart review, history and exam, documentation and further activities per the note  Virtual Visit Details    Type of service:  Video Visit   Video Start Time: 905  Video End Time:932    Originating Location (pt. Location): Home    Distant Location (provider location):  Off-site  Platform used for Video Visit: Well        Again, thank you for allowing me to participate in the care of your patient.      Sincerely,    Stephania Youssef PA-C

## 2025-05-05 NOTE — NURSING NOTE
"Archie Wallace's goals for this visit include:   Chief Complaint   Patient presents with     Follow Up     ROMEO from Dr. Rocha       PCP: No Ref-Primary, Physician    Referring Provider:  No referring provider defined for this encounter.      Initial BP (!) 173/100 (BP Location: Left arm, Patient Position: Sitting, Cuff Size: Adult Large)   Pulse 88   Wt (!) 157.9 kg (348 lb)   SpO2 98%   BMI 48.20 kg/m   Estimated body mass index is 48.2 kg/m  as calculated from the following:    Height as of 10/12/21: 1.81 m (5' 11.25\").    Weight as of this encounter: 157.9 kg (348 lb).    Medication Reconciliation: complete    Do you need any medication refills at today's visit? none      TREY Villa  [facility]   " Health Care Proxy (HCP)

## 2025-05-06 ENCOUNTER — TELEPHONE (OUTPATIENT)
Dept: NEUROSURGERY | Facility: CLINIC | Age: 50
End: 2025-05-06
Payer: COMMERCIAL

## 2025-05-06 NOTE — TELEPHONE ENCOUNTER
[facility] Call Center    Phone Message    May a detailed message be left on voicemail: yes     Reason for Call: Pt said that he needs to have an open MRI.  He is requesting that the order be sent to [facility] [place].  Please call Pt to confirm once this is done.

## 2025-05-06 NOTE — TELEPHONE ENCOUNTER
Faxed  MRI order to [facility]  May 6, 2025 to fax number [phone]    Right Fax confirmed at 3:10 PM    Traci Bazan    Updated via myc because busy line x2

## 2025-05-08 ENCOUNTER — PATIENT OUTREACH (OUTPATIENT)
Dept: CARE COORDINATION | Facility: CLINIC | Age: 50
End: 2025-05-08
Payer: COMMERCIAL

## 2025-05-10 ENCOUNTER — HEALTH MAINTENANCE LETTER (OUTPATIENT)
Age: 50
End: 2025-05-10

## 2025-05-12 ENCOUNTER — PATIENT OUTREACH (OUTPATIENT)
Dept: CARE COORDINATION | Facility: CLINIC | Age: 50
End: 2025-05-12
Payer: COMMERCIAL

## 2025-05-13 ENCOUNTER — TELEPHONE (OUTPATIENT)
Dept: ENDOCRINOLOGY | Facility: CLINIC | Age: 50
End: 2025-05-13
Payer: COMMERCIAL

## 2025-05-13 NOTE — TELEPHONE ENCOUNTER
schedule [facility] RN and RD classes   MTM 6 weeks   Stephania 3-4 mo ok to use new soren     Left Voicemail (1st Attempt) and Sent Mychart (1st Attempt) for the patient to call back and schedule the following:    Appointment type: Return Bariatric  Appointment mode: In Person or Virtual Visit  Provider: Stephania Youssef PA-C  Return date: Approx. 3-4 mo nbs ok  Specialty phone number: [phone], Direct    Additional Notes:     Appointment type: New MTM  Appointment mode: Virtual Visit  Provider: Next availabel MTM provider  Return date: Approx. 6 weeks  Specialty phone number: [phone]    Additional Notes:     Appointment type: RN Soren Class  Appointment mode: Virtual Visit  Provider: Pam Hernandez  Return date: Approx. Next available  Specialty phone number: [phone]    Additional Notes:     Appointment type: Group Soren Nutrition Class  Appointment mode: Virtual Visit  Provider: KEY provider  Return date: Approx. Next avaialbe  Specialty phone number: [phone]    Additional Notes:

## 2025-05-14 ENCOUNTER — OFFICE VISIT (OUTPATIENT)
Dept: DERMATOLOGY | Facility: CLINIC | Age: 50
End: 2025-05-14
Payer: COMMERCIAL

## 2025-05-14 DIAGNOSIS — L30.4 INTERTRIGO: ICD-10-CM

## 2025-05-14 DIAGNOSIS — L30.1 DYSHIDROTIC ECZEMA: ICD-10-CM

## 2025-05-14 DIAGNOSIS — L30.8 OTHER ECZEMA: Primary | ICD-10-CM

## 2025-05-14 PROCEDURE — 99213 OFFICE O/P EST LOW 20 MIN: CPT | Performed by: PHYSICIAN ASSISTANT

## 2025-05-14 RX ORDER — TRIAMCINOLONE ACETONIDE 1 MG/G
CREAM TOPICAL 2 TIMES DAILY
Qty: 80 G | Refills: 3 | Status: SHIPPED | OUTPATIENT
Start: 2025-05-14

## 2025-05-14 RX ORDER — HYDROCORTISONE 25 MG/G
CREAM TOPICAL 2 TIMES DAILY
Qty: 30 G | Refills: 1 | Status: SHIPPED | OUTPATIENT
Start: 2025-05-14

## 2025-05-14 RX ORDER — KETOCONAZOLE 20 MG/G
CREAM TOPICAL
Qty: 60 G | Refills: 5 | Status: SHIPPED | OUTPATIENT
Start: 2025-05-14

## 2025-05-14 RX ORDER — BETAMETHASONE DIPROPIONATE 0.5 MG/G
OINTMENT, AUGMENTED TOPICAL 2 TIMES DAILY
Qty: 45 G | Refills: 3 | Status: SHIPPED | OUTPATIENT
Start: 2025-05-14

## 2025-05-14 NOTE — PROGRESS NOTES
[facility] Dermatology Note  Encounter Date: May 14, 2025  Office Visit     Reviewed patients past medical history and pertinent chart review prior to patients visit today.     Dermatology Problem List:  1.  Eczema   - Betamethasone 0.05% ointment for feet, triamcinolone 0.1% cream for shins   - KOH negative 1/12/2023  2.  Intertrigo, lower abdomen fold   - Hydrocortisone 2.5% cream and ketoconazole 2% cream    ____________________________________________    CC: Derm Problem    HPI:  Mr. Archie Wallace is a(n) 49 year old male who presents today as a return patient for medication refills.  The patient was last seen on 3/23/2023.  He has a history of eczema that currently affects the shins and feet.  The patient uses betamethasone 0.5% ointment and triamcinolone 0.1% cream sparingly and as needed for flares.  He is also requesting a refill of ketoconazole 2% cream and hydrocortisone 2.5% cream for intertrigo involving the lower abdominal fold.  The patient states these conditions are well controlled when using the topical medications.    Patient is otherwise feeling well, without additional skin concerns.    Medications:  Current Outpatient Medications   Medication Sig Dispense Refill    albuterol (PROAIR HFA) 108 (90 Base) MCG/ACT inhaler Inhale 2 puffs into the lungs every 6 hours as needed 1 Inhaler 5    amLODIPine (NORVASC) 10 MG tablet Take 1 tablet (10 mg) by mouth daily. 90 tablet 2    aspirin 81 MG EC tablet Take 1 tablet (81 mg) by mouth daily 90 tablet 3    augmented betamethasone dipropionate (DIPROLENE AF) 0.05 % external cream Apply topically 2 times daily as needed (flares of hand rash. maximum 1 month consistent use). 45 g 0    augmented betamethasone dipropionate (DIPROLENE-AF) 0.05 % external ointment Apply topically 2 times daily. Apply to affected areas on feet for 2-3 weeks, then as needed for flares. 45 g 3    cloNIDine (CATAPRES) 0.2 MG tablet Take 1 tablet (0.2 mg) by mouth 2  times daily. 180 tablet 1    DULERA 100-5 MCG/ACT inhaler INHALER 2 PUFFS BY MOUTH INTO THE LUNGS TWICE DAILY 13 g 0    gabapentin (NEURONTIN) 300 MG capsule 300mg po at bedtime x 3 days, then twice daily x 3 days, then three times daily ongoing 90 capsule 2    hydrocortisone 2.5 % cream Apply topically 2 times daily. When rash in skin folds is flared only then stop 30 g 1    Insulin Pen Needle (PEN NEEDLES) 32G X 4 MM MISC 1 each daily 100 each 0    ketoconazole (NIZORAL) 2 % external cream Apply to rash twice daily to rash in abdominal fold until rash resolves. Then use daily to prevent flares. 60 g 5    labetalol (NORMODYNE) 200 MG tablet Take 1 tablet (200 mg) by mouth 2 times daily. 60 tablet 1    losartan (COZAAR) 100 MG tablet Take 1 tablet (100 mg) by mouth daily. 90 tablet 2    naproxen (NAPROSYN) 500 MG tablet Take 1 tablet (500 mg) by mouth 2 times daily (with meals). 60 tablet 0    spironolactone (ALDACTONE) 25 MG tablet Take 1 tablet (25 mg) by mouth daily. 90 tablet 1    spironolactone (ALDACTONE) 25 MG tablet Take 1 tablet (25 mg) by mouth daily. 30 tablet 2    tirzepatide (MOUNJARO) 2.5 MG/0.5ML SOAJ auto-injector pen Inject 0.5 mLs (2.5 mg) subcutaneously once a week. 2 mL 0    triamcinolone (KENALOG) 0.1 % external cream Apply topically 2 times daily. Apply to affected areas on legs for 2-3 weeks, then as needed for flares. 80 g 3    tirzepatide (MOUNJARO) 5 MG/0.5ML SOAJ auto-injector pen Inject 0.5 mLs (5 mg) subcutaneously once a week. 2 mL 3    tirzepatide-Weight Management (ZEPBOUND) 2.5 MG/0.5ML prefilled pen Inject 0.5 mLs (2.5 mg) subcutaneously every 7 days. 2 mL 1     No current facility-administered medications for this visit.      Past Medical History:   Patient Active Problem List   Diagnosis    Low back pain    Obstructive sleep apnea    Class 3 severe obesity due to excess calories with serious comorbidity and body mass index (BMI) of 45.0 to 49.9 in adult (H)    Type 2 diabetes  mellitus with other circulatory complication, without long-term current use of insulin (H)    Hypertension goal BP (blood pressure) < 140/90    Hyperlipidemia LDL goal <40    CAD (coronary artery disease)    Moderate persistent asthma    Long QT interval syndrome    Type 2 diabetes mellitus with hyperglycemia, without long-term current use of insulin (H)    Moderate major depression (H)    Primary osteoarthritis of left knee    Chronic, continuous use of opioids    Cervical radiculopathy    Chronic pain    Severe persistent asthma, unspecified whether complicated (H)     Past Medical History:   Diagnosis Date    CAD (coronary artery disease)     Premature artery disease noted    Class 3 severe obesity due to excess calories with serious comorbidity and body mass index (BMI) of 45.0 to 49.9 in adult (H) 02/18/2010    Bariatric surgery consult 9/7/21 Stephania Youssef PA-C Starting BMI 46 and wt 337 lbs     DDD (degenerative disc disease), cervical     History of MI (myocardial infarction) 07/17/2020    Hyperlipidemia LDL goal <40     Hypertension goal BP (blood pressure) < 140/90     Long QT interval syndrome     MEDICAL HISTORY OF -     History of rhabdomyolosis, from strenuous exercise    Mild persistent asthma     Obstructive sleep apnea     Primary osteoarthritis of left knee     Sleep apnea syndrome     cpap    Type 2 diabetes, HbA1c goal < 7% (H)     Vitamin D deficiency        ____________________________________________     Physical Exam:  Vitals: There were no vitals taken for this visit.   SKIN: The exam included bilateral feet, shins, and abdomen.  - Coleman V.  - Lower abdominal fold, postinflammatory hyperpigmentation  - Bilateral shins, pink to hyperpigmented dry papules  - Bilateral dorsal feet, hyperpigmented, dry, flaking patches    - No other lesions of concern on areas examined.     _________________________________________    Assessment & Plan:   # Intertrigo, abdominal fold  Keeping the affected  area clean, cool, and as dry as possible can be beneficial.  I have refilled ketoconazole 2% cream for the patient to apply to the affected area twice daily as often as needed.  I have also refilled hydrocortisone 2.5% cream for him to use twice daily for 2 to 3 weeks at a time for itching and inflammation.  Hydrocortisone, a topical steroid, should not be applied daily long-term due to risk of atrophy.    # Eczema, feet and shins  I have refilled triamcinolone 0.1% cream for the shins and betamethasone 0.05% ointment for the feet.  These topical steroids can be applied to the affected areas twice daily for 2 to 3 weeks, then as needed for flares.  Importance of diligent moisturization also discussed.    We also briefly discussed Dupixent.  Potential side effects of injection site reaction, red eyes, and increased eye infections mention.  The patient states he would like to think about Dupixent, and may reach out to me if he would like to start the medication.    Follow-up: Yearly if well-controlled for refills, sooner if needed    All risks, benefits and alternatives were discussed with patient.  Patient is in agreement and understands the assessment and plan.  All questions were answered.    Yamilex Martines PA-C  [facility]ermatology    CC No referring provider defined for this encounter. on close of this encounter.

## 2025-05-14 NOTE — LETTER
5/14/2025      Archie Wallace  [address]  [place] [address]      Dear Colleague,    Thank you for referring your patient, Archie Wallace, to the [facility]. Please see a copy of my visit note below.    [facility] Dermatology Note  Encounter Date: May 14, 2025  Office Visit     Reviewed patients past medical history and pertinent chart review prior to patients visit today.     Dermatology Problem List:  1.  Eczema   - Betamethasone 0.05% ointment for feet, triamcinolone 0.1% cream for shins   - KOH negative 1/12/2023  2.  Intertrigo, lower abdomen fold   - Hydrocortisone 2.5% cream and ketoconazole 2% cream    ____________________________________________    CC: Derm Problem    HPI:  Mr. Archie Wallace is a(n) 49 year old male who presents today as a return patient for medication refills.  The patient was last seen on 3/23/2023.  He has a history of eczema that currently affects the shins and feet.  The patient uses betamethasone 0.5% ointment and triamcinolone 0.1% cream sparingly and as needed for flares.  He is also requesting a refill of ketoconazole 2% cream and hydrocortisone 2.5% cream for intertrigo involving the lower abdominal fold.  The patient states these conditions are well controlled when using the topical medications.    Patient is otherwise feeling well, without additional skin concerns.    Medications:  Current Outpatient Medications   Medication Sig Dispense Refill     albuterol (PROAIR HFA) 108 (90 Base) MCG/ACT inhaler Inhale 2 puffs into the lungs every 6 hours as needed 1 Inhaler 5     amLODIPine (NORVASC) 10 MG tablet Take 1 tablet (10 mg) by mouth daily. 90 tablet 2     aspirin 81 MG EC tablet Take 1 tablet (81 mg) by mouth daily 90 tablet 3     augmented betamethasone dipropionate (DIPROLENE AF) 0.05 % external cream Apply topically 2 times daily as needed (flares of hand rash. maximum 1 month consistent use). 45 g 0     augmented  betamethasone dipropionate (DIPROLENE-AF) 0.05 % external ointment Apply topically 2 times daily. Apply to affected areas on feet for 2-3 weeks, then as needed for flares. 45 g 3     cloNIDine (CATAPRES) 0.2 MG tablet Take 1 tablet (0.2 mg) by mouth 2 times daily. 180 tablet 1     DULERA 100-5 MCG/ACT inhaler INHALER 2 PUFFS BY MOUTH INTO THE LUNGS TWICE DAILY 13 g 0     gabapentin (NEURONTIN) 300 MG capsule 300mg po at bedtime x 3 days, then twice daily x 3 days, then three times daily ongoing 90 capsule 2     hydrocortisone 2.5 % cream Apply topically 2 times daily. When rash in skin folds is flared only then stop 30 g 1     Insulin Pen Needle (PEN NEEDLES) 32G X 4 MM MISC 1 each daily 100 each 0     ketoconazole (NIZORAL) 2 % external cream Apply to rash twice daily to rash in abdominal fold until rash resolves. Then use daily to prevent flares. 60 g 5     labetalol (NORMODYNE) 200 MG tablet Take 1 tablet (200 mg) by mouth 2 times daily. 60 tablet 1     losartan (COZAAR) 100 MG tablet Take 1 tablet (100 mg) by mouth daily. 90 tablet 2     naproxen (NAPROSYN) 500 MG tablet Take 1 tablet (500 mg) by mouth 2 times daily (with meals). 60 tablet 0     spironolactone (ALDACTONE) 25 MG tablet Take 1 tablet (25 mg) by mouth daily. 90 tablet 1     spironolactone (ALDACTONE) 25 MG tablet Take 1 tablet (25 mg) by mouth daily. 30 tablet 2     tirzepatide (MOUNJARO) 2.5 MG/0.5ML SOAJ auto-injector pen Inject 0.5 mLs (2.5 mg) subcutaneously once a week. 2 mL 0     triamcinolone (KENALOG) 0.1 % external cream Apply topically 2 times daily. Apply to affected areas on legs for 2-3 weeks, then as needed for flares. 80 g 3     tirzepatide (MOUNJARO) 5 MG/0.5ML SOAJ auto-injector pen Inject 0.5 mLs (5 mg) subcutaneously once a week. 2 mL 3     tirzepatide-Weight Management (ZEPBOUND) 2.5 MG/0.5ML prefilled pen Inject 0.5 mLs (2.5 mg) subcutaneously every 7 days. 2 mL 1     No current facility-administered medications for this visit.       Past Medical History:   Patient Active Problem List   Diagnosis     Low back pain     Obstructive sleep apnea     Class 3 severe obesity due to excess calories with serious comorbidity and body mass index (BMI) of 45.0 to 49.9 in adult (H)     Type 2 diabetes mellitus with other circulatory complication, without long-term current use of insulin (H)     Hypertension goal BP (blood pressure) < 140/90     Hyperlipidemia LDL goal <40     CAD (coronary artery disease)     Moderate persistent asthma     Long QT interval syndrome     Type 2 diabetes mellitus with hyperglycemia, without long-term current use of insulin (H)     Moderate major depression (H)     Primary osteoarthritis of left knee     Chronic, continuous use of opioids     Cervical radiculopathy     Chronic pain     Severe persistent asthma, unspecified whether complicated (H)     Past Medical History:   Diagnosis Date     CAD (coronary artery disease)     Premature artery disease noted     Class 3 severe obesity due to excess calories with serious comorbidity and body mass index (BMI) of 45.0 to 49.9 in adult (H) 02/18/2010    Bariatric surgery consult 9/7/21 Stephania Youssef PA-C Starting BMI 46 and wt 337 lbs      DDD (degenerative disc disease), cervical      History of MI (myocardial infarction) 07/17/2020     Hyperlipidemia LDL goal <40      Hypertension goal BP (blood pressure) < 140/90      Long QT interval syndrome      MEDICAL HISTORY OF -     History of rhabdomyolosis, from strenuous exercise     Mild persistent asthma      Obstructive sleep apnea      Primary osteoarthritis of left knee      Sleep apnea syndrome     cpap     Type 2 diabetes, HbA1c goal < 7% (H)      Vitamin D deficiency        ____________________________________________     Physical Exam:  Vitals: There were no vitals taken for this visit.   SKIN: The exam included bilateral feet, shins, and abdomen.  - Coleman V.  - Lower abdominal fold, postinflammatory  hyperpigmentation  - Bilateral shins, pink to hyperpigmented dry papules  - Bilateral dorsal feet, hyperpigmented, dry, flaking patches    - No other lesions of concern on areas examined.     _________________________________________    Assessment & Plan:   # Intertrigo, abdominal fold  Keeping the affected area clean, cool, and as dry as possible can be beneficial.  I have refilled ketoconazole 2% cream for the patient to apply to the affected area twice daily as often as needed.  I have also refilled hydrocortisone 2.5% cream for him to use twice daily for 2 to 3 weeks at a time for itching and inflammation.  Hydrocortisone, a topical steroid, should not be applied daily long-term due to risk of atrophy.    # Eczema, feet and shins  I have refilled triamcinolone 0.1% cream for the shins and betamethasone 0.05% ointment for the feet.  These topical steroids can be applied to the affected areas twice daily for 2 to 3 weeks, then as needed for flares.  Importance of diligent moisturization also discussed.    We also briefly discussed Dupixent.  Potential side effects of injection site reaction, red eyes, and increased eye infections mention.  The patient states he would like to think about Dupixent, and may reach out to me if he would like to start the medication.    Follow-up: Yearly if well-controlled for refills, sooner if needed    All risks, benefits and alternatives were discussed with patient.  Patient is in agreement and understands the assessment and plan.  All questions were answered.    Yamilex Martines PA-C  [facility]at[facility]     No referring provider defined for this encounter. on close of this encounter.    Again, thank you for allowing me to participate in the care of your patient.        Sincerely,        Yamilex Martines PA-C    Electronically signed

## 2025-05-20 ENCOUNTER — VIRTUAL VISIT (OUTPATIENT)
Dept: ENDOCRINOLOGY | Facility: CLINIC | Age: 50
End: 2025-05-20
Payer: COMMERCIAL

## 2025-05-20 VITALS — HEIGHT: 72 IN | BODY MASS INDEX: 48.01 KG/M2

## 2025-05-20 DIAGNOSIS — E66.813 CLASS 3 SEVERE OBESITY DUE TO EXCESS CALORIES WITH SERIOUS COMORBIDITY AND BODY MASS INDEX (BMI) OF 45.0 TO 49.9 IN ADULT (H): Primary | ICD-10-CM

## 2025-05-20 DIAGNOSIS — E11.59 TYPE 2 DIABETES MELLITUS WITH OTHER CIRCULATORY COMPLICATION, WITHOUT LONG-TERM CURRENT USE OF INSULIN (H): ICD-10-CM

## 2025-05-20 PROCEDURE — 99207 PR NO CHARGE NURSE ONLY: CPT | Mod: 95

## 2025-05-20 NOTE — LETTER
"    May 20, 2025   Re: Archie Wallace   Address: [address] [place] [address]   [place] [address]   : 1975       Dear Primary Care Provider,     Thank you for coordinating with us to evaluate Archie for possible bariatric surgery.     It is important to us that the primary care provider is aware of their patients' desire to have weight loss surgery and is supportive of the patient having surgery. If the patient meets criteria and clearances for surgery, we will submit to the insurance company for insurance approval and coordinate the needed appointments with our department. If you have any questions, feel free to contact us via our [facility] [facility] at [phone]. The letter of support can be faxed to the number below or routed via [facility] bigtincan to our team.       Sample letter:     \"Dear Weight Loss Surgery Clinic,     I am the primary care provider for (patient name) and I support (him/her/they) having weight loss surgery.     Sincerely,     (provider name)\"     Sincerely,     Comprehensive Weight Management Team     [facility]   [facility]   [address], [place], [address]     Ph: [phone]   Fax: [phone]       "

## 2025-05-20 NOTE — LETTER
May 20, 2025   Re: Archie Wallace   Address: [address] [place] [address]   [facility] [address]   : 1975       Dear Cardiologist,     Thank you for taking the time to see Archie for a pre-operative cardiac clearance evaluation for bariatric surgery. Please assist us with the following during your visit:     Initial patient evaluation   Arrange and expedite necessary testing.   Guide and facilitate follow-up treatment.   Provide a letter stating that the patient is cleared to proceed with bariatric surgery from a cardiac standpoint.   Indicate what treatment is needed pre-surgery (if any), during inpatient hospitalization and after surgery.     The evaluation must confirm that the patient is stable from a cardiac standpoint to proceed with the surgery. If you have any questions, feel free to contact us via our [facility] at [phone]. Please fax the evaluation to the number below.     Sincerely,     Comprehensive Weight Management Team     [facility]   [facility]   [address], [address], [place], [place], [address]     Ph: [phone]   Fax: [phone]

## 2025-05-20 NOTE — LETTER
May 20, 2025   Re: Archie Wallace   Address: [address] [place] [address]   [place] [address]   : 1975       Dear Mental Health Provider,     Thank you for taking the time to see Archie for a preoperative psychological evaluation for bariatric surgery. The following elements are required by our program for screening patients for bariatric surgery:     1. Intake Assessment   Identifying data: reason for pursuing surgery.   History of Present Illness: weight loss history, typical eating patterns, exercise, and leisure time activities, coping skills.   Psychiatric History: If a mental health condition is present, documented verification is required noting that the condition is it under successful treatment.   Medical History: allergies, emergencies, head traumas, etc.   Family History   Social and Development History: abuse/neglect, level of functioning, education, employment.   Substance Abuse and Addictions: prescription and OTC meds/herbs, alcohol and related substances, nicotine, caffeine/pop, gambling, shopping. Absence of active substance use disorder.   Mental Status: appearance, behavior, mood, and affect, thought content and thought process. Is patient able to provide an informed consent?   Social Network: marriage, children, friends, current living situation. Family and social supports have been assessed, and strategies to strengthen those supports have been recommended.   Legal History       2. MMPI   3. Summary of current findings: emotional stability, conclusions, and recommendations.   4. Follow-up visit to discuss the results of the MMPI or other tests, conclusions, and recommendations.     The evaluation must confirm that the patient is emotionally stable to proceed with the surgery, cognitively capable of understanding the risks and realistic goals of the surgical procedure and prepared to comply with the long-term aftercare and behavioral changes expected after surgery.     We feel it is  important for our patients to establish a relationship with a mental health provider prior to surgery since the changes they experience postoperatively can be overwhelming. Therefore, having an established relationship is essential to help them cope effectively with these life-altering changes.     If you have any questions, feel free to contact us via our [employer] [facility] at [phone]. Please fax the evaluation to the number below.     Sincerely,     Comprehensive Weight Management Team     [facility]   [facility]   [address], [place], [place], [address]     Ph: [phone]   Fax: [phone]

## 2025-05-20 NOTE — PROGRESS NOTES
New Bariatric Patient Class    Archie Wallace attended the New Consult WLS class today.     Patient's current comorbidities include:  Patient Active Problem List   Diagnosis    Low back pain    Obstructive sleep apnea    Class 3 severe obesity due to excess calories with serious comorbidity and body mass index (BMI) of 45.0 to 49.9 in adult (H)    Type 2 diabetes mellitus with other circulatory complication, without long-term current use of insulin (H)    Hypertension goal BP (blood pressure) < 140/90    Hyperlipidemia LDL goal <40    CAD (coronary artery disease)    Moderate persistent asthma    Long QT interval syndrome    Type 2 diabetes mellitus with hyperglycemia, without long-term current use of insulin (H)    Moderate major depression (H)    Primary osteoarthritis of left knee    Chronic, continuous use of opioids    Cervical radiculopathy    Chronic pain    Severe persistent asthma, unspecified whether complicated (H)       Current Outpatient Medications   Medication Sig Dispense Refill    albuterol (PROAIR HFA) 108 (90 Base) MCG/ACT inhaler Inhale 2 puffs into the lungs every 6 hours as needed 1 Inhaler 5    amLODIPine (NORVASC) 10 MG tablet Take 1 tablet (10 mg) by mouth daily. 90 tablet 2    aspirin 81 MG EC tablet Take 1 tablet (81 mg) by mouth daily 90 tablet 3    augmented betamethasone dipropionate (DIPROLENE AF) 0.05 % external cream Apply topically 2 times daily as needed (flares of hand rash. maximum 1 month consistent use). 45 g 0    augmented betamethasone dipropionate (DIPROLENE-AF) 0.05 % external ointment Apply topically 2 times daily. Apply to affected areas on feet for 2-3 weeks, then as needed for flares. 45 g 3    cloNIDine (CATAPRES) 0.2 MG tablet Take 1 tablet (0.2 mg) by mouth 2 times daily. 180 tablet 1    DULERA 100-5 MCG/ACT inhaler INHALER 2 PUFFS BY MOUTH INTO THE LUNGS TWICE DAILY 13 g 0    gabapentin (NEURONTIN) 300 MG capsule 300mg po at bedtime x 3 days, then twice daily x 3  days, then three times daily ongoing 90 capsule 2    hydrocortisone 2.5 % cream Apply topically 2 times daily. When rash in skin folds is flared only then stop 30 g 1    Insulin Pen Needle (PEN NEEDLES) 32G X 4 MM MISC 1 each daily 100 each 0    ketoconazole (NIZORAL) 2 % external cream Apply to rash twice daily to rash in abdominal fold until rash resolves. Then use daily to prevent flares. 60 g 5    labetalol (NORMODYNE) 200 MG tablet Take 1 tablet (200 mg) by mouth 2 times daily. 60 tablet 1    losartan (COZAAR) 100 MG tablet Take 1 tablet (100 mg) by mouth daily. 90 tablet 2    naproxen (NAPROSYN) 500 MG tablet Take 1 tablet (500 mg) by mouth 2 times daily (with meals). 60 tablet 0    spironolactone (ALDACTONE) 25 MG tablet Take 1 tablet (25 mg) by mouth daily. 90 tablet 1    spironolactone (ALDACTONE) 25 MG tablet Take 1 tablet (25 mg) by mouth daily. 30 tablet 2    tirzepatide (MOUNJARO) 2.5 MG/0.5ML SOAJ auto-injector pen Inject 0.5 mLs (2.5 mg) subcutaneously once a week. 2 mL 0    tirzepatide (MOUNJARO) 5 MG/0.5ML SOAJ auto-injector pen Inject 0.5 mLs (5 mg) subcutaneously once a week. 2 mL 3    tirzepatide-Weight Management (ZEPBOUND) 2.5 MG/0.5ML prefilled pen Inject 0.5 mLs (2.5 mg) subcutaneously every 7 days. 2 mL 1    triamcinolone (KENALOG) 0.1 % external cream Apply topically 2 times daily. Apply to affected areas on legs for 2-3 weeks, then as needed for flares. 80 g 3       The following items were discussed during class:      In-Clinic Weight:  Patient to schedule an in-person weight if initial visit was not in person.  Patient can schedule with  clinic to have a weight on Body Composition Scale or have in person weight at PCP.  Weight must be documented in chart.    Labs: Patient reminded to schedule an appointment to have initial labs done if not already done.    Clearances: Discussed process to obtain clearance letters.  Notified that patient is responsible for   appointments and any required specialty testing.  Patient has been given a list of providers to contact for Psych Clearance.  Informed that Psych Clearance is to be done by someone other than current psychiatrist/psychologist/therapist.    Dietician visits: Discussed with patient to schedule all required preop dietician visits.  Informed that missed appointments can delay receiving a surgery date and insurance company may require patient to start back at first visit.      Nutrition/Intake Modification: Discussed nutrition changes to start making now that will help with postop success.  Decrease portion sizes.  Read labels and portion out food according to the portion on the container/box.  Use measuring cups and counting to determine correct portions to put on plate.    Take time to read the nutrition information for recipes.  Look for hidden carbohydrates that may be sabotaging weight loss.  Instructed to work on getting in at least 60 grams of protein daily - roughly 20 grams per meal.  Instructed to decrease/eliminate carbs in the form of chips, crackers, pasta, rice, pancakes, waffles, bagels, and white potatoes.  Replace carbohydrates with healthy vegetables.  Increase fluid intake/water intake to at least 64 ounces each day.  Fluids are to be caffeine-free/carbonation-free/calorie-free.  Instructed to take at least 30 minutes to eat meals.  Instructed to be mindful of your meal when eating.  Chew food well.  Savor the flavors and textures and be mindful of current mood.    Mental Health:  Take a look at past and current relationship with food.  Instructed to work with a therapist/counselor regarding emotional eating and triggers and discuss your mental health as it relates to food and food intake..  Patients who do not have a current support system are reminded how important a strong support system is during the preop and postop process for surgery.  Instructed to look at how food prep will be different after  surgery  Instructed to look at how patient will put healthy food boundaries in place preop and postop.   Instructed to attend at least one bariatric support group preoperatively.  Discussed positivity and positive self-talk.  Discussed journaling and mindfulness activities to help with emotional eating.    Activity:  Discussed the importance of exercise preoperatively as it relates to weight loss, healthy muscles & bones, and decreases chances of blood clots.  Encouraged to start slow and have small goals.  Discussed ways to exercise if patient has mobility issues, start with chair-based exercises and work your way up to standing exercises.  Patient directed where to find chair-based exercise videos.    8. Support Group Information  Patient provided the link for Support Group Information: [email or web address]    9.  Follow-up Appointments    Discussed follow-up appointment schedule:  Preop  Every three months, or as directed, with JENNYFER (PA or NP)  Surgeon Meet and Greet within 6 months from surgery.  Usually about 1-2 months after initial visit with JENNYFER  Dietician visits monthly until surgery  MTM Pharmacist, if designated by JENNYFER  Specialty Appointments as designated by Task List  Preop Nurse visit, once a surgery date is assigned  Postop  1 week postop, in person.  If the patient lives far away, must have vital signs and weigh in at PCP before 1 week appt.  31+ days postop  3 months postop with labs  6 months postop  12 months postop with labs  Annually with labs    All questions answered.  Patient instructed to contact the office for any questions and to notify office of any updates/clearances completed.

## 2025-05-20 NOTE — LETTER
May 20, 2025   Re: Archie Wallace   Address: [address] [place] [address]   Former[facility] [id number]   : 1975       Dear Sleep Medicine Provider,     Thank you for taking the time to see Archie for a pre-operative clearance evaluation for bariatric surgery. This patient is referred for clearance because they have known BRONWYN, have symptoms of BRONWYN or have a BMI > 50 per our program protocol.     Please assist us with the following during your evaluation of our mutual patient:     Initial patient evaluation   Arrange and expedite necessary testing.   Guide and facilitate follow-up treatment.   Provide a letter stating that the patient is cleared to proceed with bariatric surgery from a sleep medicine standpoint.   Indicate what treatment is needed pre-surgery (if any), during inpatient hospitalization and after surgery.     The evaluation must confirm that the patient is stable from a sleep medicine standpoint to proceed with the surgery. If you have any questions, feel free to contact us via our [facility] at [phone]. Please fax the evaluation to the number below.     Sincerely,     Comprehensive Weight Management Team     [facility]   [place] [id number] [facility], [address], [place], [place], [address]     Ph: [phone]   Fax: [phone]

## 2025-05-20 NOTE — PATIENT INSTRUCTIONS
Archie Wallace,    Thank you for taking time to attend the New Bariatric class.  Below are items to be mindful of as you work through the process as you wait for your surgery date.    In-Clinic Weight:  If you have not had an official in-clinic weight, please call [phone] to schedule a nurse visit for an in-clinic weight.  If you live a distance from the Clinic and Surgery Center, you can have the weight done with your PCP.  On the day of your weight, weigh at home in the same clothes you will weigh in at the clinic.  That way we can get a better correlation of the scales.    Labs: if you have not had your initial labs done yet, please schedule an appointment with a [facility] lab.  If you need to have your labs faxed to an outside lab, contact our office.    Clearances: work on getting scheduled for your Psych clearance and contact your other providers for other clearances/letters of support that are needed.  As you get your clearances, you are welcome to send a [email or web address] message and we will keep an eye out for the reports/letters.  Our fax is: [phone].  Electronic copies can always be uploaded into [email or web address] and sent as an attachment to a [email or web address] message.    Dietician visits: attend your dietician visits monthly.  Missed appointments can delay your surgery date.  All patients are required to attend monthly dietician visits until their surgery.    Nutrition/Intake Modification:  Decrease portion sizes.  Read labels and portion out food according to the portion on the container.    Take time to read the nutrition information for recipes.  You will be surprised where hidden carbohydrates can be.  Make sure you are getting in at least 60 grams of protein daily - roughly 20 grams per meal.  Decrease/eliminate carbs in the form of chips, crackers, pasta, rice, pancakes, waffles, bagels, and white potatoes.  Replace with healthy vegetables.  Increase water intake to at least 64 ounces each day.  Take at least 30  minutes to eat your meal.  Be mindful of your meal when eating.  Chew your food well.  Savor the flavors and textures and be mindful of your mood.  Switch to caffeine and carbonation free beverages.    Mental Health:  Take a look at your past and current relationship with food.  Work with a therapist/psychiatrist/counselor/psychologist to discuss your mental health as it relates to food and food intake.  Attend a support group.    Activity:  Get active!  It is important to be mobile after surgery.  It helps with weight loss, healthy muscles & bones, and decreases chances of blood clots.  Start slow and have small goals.  If you currently have mobility issues, start with chair-based exercises and work your way up to standing exercises.    8. Support Group Information  Please click the following link for Support Group Information and Times: [email or web address]    9.  Your Task List:    Bariatric Task List    Fax:  Please fax all paperwork to: [phone] -     Status:  Is patient a candidate for bariatric surgery?:  patient is a candidate for bariatric surgery -     Cleared to schedule surgeon consult?:  cleared to schedule surgeon consult - Call [phone] to schedule visit with Dr Kavon Mata. bks   Status:  surgery evaluation in process -     Surgeon: Adolfo -     Tentative surgery month/year: To be determined. Call [facility] at [phone] when 327lbs or less. -        Insurance: Insurance:  [employer] [place] - Call [phone] if insurance has changed the Call Center will update your chart.bks    Contact insurance to discuss coverage: Needed -       [employer]: PCP Recommendation and Medical Clearance:    -     HP Referral:    -      Advanced beneficiary notification (ABN) for Medicare patients for RD visits   and surgery:   -      Weight history:   -     Other:    -        Patient Info: Initial Weight:  354    Date of Initial Weight/Height:   4/30/25   Goal  Weight (lbs):  319     Required Weight Loss:  35   Surgery Type:  sleeve gastrectomy -     Multidisciplinary Meeting:    -        Dietician Visits:  Structured weight loss required by insurance?:  structured weight loss required - Need 6 visits within a year of surgery. s  Dietician Visit 1:  Needed - Data deleted  Dietician Visit 2:  Needed - Data deleted  Dietician Visit 3:  Needed - Data deleted  Dietician Visit 4:  Needed - Data deleted  Dietician Visit 5:  Needed - Data deleted  Dietician Visit 6:  Needed - Data deleted  Dietician Visit additional:  Needed - Monthly until surgery for weight loss and to review postop diet teaching. - AS  Clearance from dietician to see surgeon?:    -    Dietician Notes:  3/7/22, 4/12/22, 5/12/22, 6/13/22, 7/14/22, Call [phone] to schedule. [facility] -      Psychological Evaluation:  Lab Work:  Complete Blood Count:  Completed - 5/13/25 - AS  Comprehensive Metabolic Panel:  Needed - Ordered 5/20/25 - AS  Vitamin D:  Needed - Ordered 5/20/25 - AS  PTH:  Needed - Ordered 5/20/25 - AS  Hgb A1c:  Needed - Ordered 5/20/25 - AS   Lipids: Needed - Ordered 5/20/25 - AS   TSH ([facility], [facility], [place]): Needed - Ordered 5/20/25 - AS    Ferritin:   -      Folate:   -      Testosterone, Total and Free:   -    Thiamine:   -    Vitamin A: Needed - Ordered 5/20/25 - AS  Vitamin B12: Needed - Ordered 5/20/25 - AS  Zinc:   -    C-peptide:   -    H. pylori:    -    MRSA (2 swabs, minimum 48 hours apart):   -    Nicotine Testing:    -    Recheck Vitamin D:   -    Other:    -      Consults/ Clearance  Sleep Medicine:  Needed - Letter sent to pt 5/20/25 - AS  Cardiac:  Needed - Letter sent to pt 5/20/25 - AS  Medical Weight Management: Completed - started ozempic with Stephania 9/7/21, call [phone] for follow-up visits. [facility]    PCP:  Establish care with PCP:  Completed -    Follow up with PCP:    -    PCP letter of support:  Needed - Letter sent to pt 5/20/25 - AS    Health Maintenance:  Colonoscopy(> 50  "yrs or family hx):    -     Mammogram (> 40 yrs or family hx):    -     Pap Smear (women):   -     Other:   -     Other:    -       Stopping Smoking/ Alcohol Use/Cannabis Use:  Quit tobacco use (3 months smoke free)?:    -     Quit date:    -     Quit alcohol use:   -     Quit date:   -     Other: Quit THC -     Quit date:   - 2022     Patient Education:   Information Session:  Completed -    Attended New Consult Class?:   -    Given \"Making your decision\" handout?:  Yes - 21  Given \"A Roadmap to you Weight Loss Surgery\" handout?: Yes - 21  Given \"Epic Care Companion\" information?: Yes - 21  Attended support group?:  Needed -    Support plan in place?:  Needed -    Research consents signed?:  research consent not signed -    Avoid NSAIDS/ Alternate Plan for Pain:   -      Additional Surgery Requirements:  Final Tasks:   Before surgery online preop class:  Needed -    After surgery online class:  Needed -    Nurse visit for information:  Needed - Pre-op Nurse Class. bks  Weight Check: Needed -    History and Physical: Pre-Assessment Clinic (PAC) -   Needed -    Final labs per clinic: Needed -    See MTM Pharmacist for medication review and plan for after surgery (if DM, transplant hx, greater than 10 meds): Needed -    Chest xray per clinic:   -    Electrocardiogram (ECG) per clinic:   -    Schedule postop appointments: Needed -    Other:   -   -      Notes:      Please feel free to reach out if you have any questions.    Sincerely,    Pam Hernandez RN, BSN  RN Care Coordinator  General and Bariatric Surgery   [facility]    Phone: [phone]  Fax: [phone]  [phone]        "

## 2025-05-21 ENCOUNTER — OFFICE VISIT (OUTPATIENT)
Dept: FAMILY MEDICINE | Facility: CLINIC | Age: 50
End: 2025-05-21
Payer: COMMERCIAL

## 2025-05-21 ENCOUNTER — VIRTUAL VISIT (OUTPATIENT)
Dept: BEHAVIORAL HEALTH | Facility: CLINIC | Age: 50
End: 2025-05-21
Attending: PHYSICIAN ASSISTANT
Payer: COMMERCIAL

## 2025-05-21 VITALS
HEIGHT: 72 IN | TEMPERATURE: 97.7 F | SYSTOLIC BLOOD PRESSURE: 154 MMHG | OXYGEN SATURATION: 96 % | BODY MASS INDEX: 42.66 KG/M2 | WEIGHT: 315 LBS | HEART RATE: 81 BPM | DIASTOLIC BLOOD PRESSURE: 98 MMHG | RESPIRATION RATE: 18 BRPM

## 2025-05-21 DIAGNOSIS — F11.90 CHRONIC, CONTINUOUS USE OF OPIOIDS: ICD-10-CM

## 2025-05-21 DIAGNOSIS — I10 HTN, GOAL BELOW 140/90: ICD-10-CM

## 2025-05-21 DIAGNOSIS — E66.813 CLASS 3 SEVERE OBESITY DUE TO EXCESS CALORIES WITH SERIOUS COMORBIDITY AND BODY MASS INDEX (BMI) OF 45.0 TO 49.9 IN ADULT (H): ICD-10-CM

## 2025-05-21 DIAGNOSIS — J20.9 ACUTE BRONCHITIS WITH SYMPTOMS > 10 DAYS: Primary | ICD-10-CM

## 2025-05-21 DIAGNOSIS — J45.40 MODERATE PERSISTENT ASTHMA WITHOUT COMPLICATION: ICD-10-CM

## 2025-05-21 DIAGNOSIS — F32.1 MODERATE MAJOR DEPRESSION (H): Primary | ICD-10-CM

## 2025-05-21 PROCEDURE — 99214 OFFICE O/P EST MOD 30 MIN: CPT | Performed by: FAMILY MEDICINE

## 2025-05-21 PROCEDURE — G2211 COMPLEX E/M VISIT ADD ON: HCPCS | Performed by: FAMILY MEDICINE

## 2025-05-21 PROCEDURE — 90834 PSYTX W PT 45 MINUTES: CPT | Mod: 95

## 2025-05-21 RX ORDER — DOXYCYCLINE 100 MG/1
100 CAPSULE ORAL 2 TIMES DAILY
Qty: 14 CAPSULE | Refills: 0 | Status: SHIPPED | OUTPATIENT
Start: 2025-05-21 | End: 2025-05-28

## 2025-05-21 RX ORDER — MOMETASONE FUROATE AND FORMOTEROL FUMARATE DIHYDRATE 100; 5 UG/1; UG/1
2 AEROSOL RESPIRATORY (INHALATION) 2 TIMES DAILY
Qty: 13 G | Refills: 2 | Status: SHIPPED | OUTPATIENT
Start: 2025-05-21

## 2025-05-21 RX ORDER — ALBUTEROL SULFATE 90 UG/1
2 INHALANT RESPIRATORY (INHALATION) EVERY 6 HOURS PRN
Qty: 18 G | Refills: 1 | Status: SHIPPED | OUTPATIENT
Start: 2025-05-21

## 2025-05-21 RX ORDER — PREDNISONE 20 MG/1
TABLET ORAL
Qty: 20 TABLET | Refills: 0 | Status: SHIPPED | OUTPATIENT
Start: 2025-05-21

## 2025-05-21 ASSESSMENT — ANXIETY QUESTIONNAIRES
GAD7 TOTAL SCORE: 8
4. TROUBLE RELAXING: NEARLY EVERY DAY
IF YOU CHECKED OFF ANY PROBLEMS ON THIS QUESTIONNAIRE, HOW DIFFICULT HAVE THESE PROBLEMS MADE IT FOR YOU TO DO YOUR WORK, TAKE CARE OF THINGS AT HOME, OR GET ALONG WITH OTHER PEOPLE: SOMEWHAT DIFFICULT
8. IF YOU CHECKED OFF ANY PROBLEMS, HOW DIFFICULT HAVE THESE MADE IT FOR YOU TO DO YOUR WORK, TAKE CARE OF THINGS AT HOME, OR GET ALONG WITH OTHER PEOPLE?: SOMEWHAT DIFFICULT
7. FEELING AFRAID AS IF SOMETHING AWFUL MIGHT HAPPEN: NOT AT ALL
3. WORRYING TOO MUCH ABOUT DIFFERENT THINGS: SEVERAL DAYS
7. FEELING AFRAID AS IF SOMETHING AWFUL MIGHT HAPPEN: NOT AT ALL
6. BECOMING EASILY ANNOYED OR IRRITABLE: NOT AT ALL
GAD7 TOTAL SCORE: 8
GAD7 TOTAL SCORE: 8
5. BEING SO RESTLESS THAT IT IS HARD TO SIT STILL: MORE THAN HALF THE DAYS
2. NOT BEING ABLE TO STOP OR CONTROL WORRYING: SEVERAL DAYS
1. FEELING NERVOUS, ANXIOUS, OR ON EDGE: SEVERAL DAYS

## 2025-05-21 ASSESSMENT — ASTHMA QUESTIONNAIRES
HOSPITALIZATION_OVERNIGHT_LAST_YEAR_TOTAL: TWO
ACT_TOTALSCORE: 8
QUESTION_1 LAST FOUR WEEKS HOW MUCH OF THE TIME DID YOUR ASTHMA KEEP YOU FROM GETTING AS MUCH DONE AT WORK, SCHOOL OR AT HOME: MOST OF THE TIME
QUESTION_3 LAST FOUR WEEKS HOW OFTEN DID YOUR ASTHMA SYMPTOMS (WHEEZING, COUGHING, SHORTNESS OF BREATH, CHEST TIGHTNESS OR PAIN) WAKE YOU UP AT NIGHT OR EARLIER THAN USUAL IN THE MORNING: FOUR OR MORE NIGHTS A WEEK
QUESTION_4 LAST FOUR WEEKS HOW OFTEN HAVE YOU USED YOUR RESCUE INHALER OR NEBULIZER MEDICATION (SUCH AS ALBUTEROL): ONE OR TWO TIMES PER DAY
EMERGENCY_ROOM_LAST_YEAR_TOTAL: TWO
QUESTION_5 LAST FOUR WEEKS HOW WOULD YOU RATE YOUR ASTHMA CONTROL: POORLY CONTROLLED
QUESTION_2 LAST FOUR WEEKS HOW OFTEN HAVE YOU HAD SHORTNESS OF BREATH: MORE THAN ONCE A DAY

## 2025-05-21 ASSESSMENT — PATIENT HEALTH QUESTIONNAIRE - PHQ9: SUM OF ALL RESPONSES TO PHQ QUESTIONS 1-9: 16
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
## 2025-05-21 NOTE — PROGRESS NOTES
Archie Wallace is a 49 y.o. male with a history of poorly controlled high blood pressure, diabetes and coronary artery diseases who presents to the emergency department for evaluation of chest pain that started this morning at 4 AM. He arrives here vitally stable. Physical exam is unremarkable. EKG obtained and shows sinus rhythm without any ST changes to suggest ischemia. Labs obtained and unremarkable with a mildly elevated D-dimer. A CT chest PE was obtained and this shows no evidence of PE or other acute process in the chest. Initial troponin was 12 and this remains stable on recheck. Given his nonischemic EKG and stable troponins, I have a low suspicion for ACS. The etiology of his chest pain is not entirely clear but given his reassuring workup here, I felt the patient could safely discharge home with close outpatient follow up. Patient was comfortable with the plan. Indications for return were reviewed.     Disposition   The patient was discharged.    Diagnosis   ENCOUNTER DIAGNOSES   ICD-10-CM   1. Chest pain, unspecified type R07.9

## 2025-05-21 NOTE — PROGRESS NOTES
"   Assessment & Plan     Acute bronchitis with symptoms > 10 days  Symptoms consistent with acute bronchitis, given the development for a while discussed treatment with prednisone and antibiotic.  If symptoms worsen he will return or go to the ER  - predniSONE (DELTASONE) 20 MG tablet  Dispense: 20 tablet; Refill: 0    Moderate persistent asthma without complication  The infection is having is worsening his asthma, will do a steroid taper along with the albuterol on Dulera  - albuterol (PROAIR HFA) 108 (90 Base) MCG/ACT inhaler  Dispense: 18 g; Refill: 1  - mometasone-formoterol (DULERA) 100-5 MCG/ACT inhaler  Dispense: 13 g; Refill: 2    HTN, goal below 140/90   -Labetalol uncontrolled blood pressure; though noted some low diastolic readings I did cut down the dose for the evening to half a pill.  Discussed doing the full pill unless noting low blood pressure    Chronic, continuous use of opioids    - not on an opioid at this time      MED REC REQUIRED  Post Medication Reconciliation Status: patient was not discharged from an inpatient facility or TCU  BMI  Estimated body mass index is 48.68 kg/m  as calculated from the following:    Height as of this encounter: 1.829 m (6' 0.01\").    Weight as of this encounter: 162.8 kg (359 lb).   Weight management plan: Discussed healthy diet and exercise guidelines      Follow-up       Caleb Almanza is a 49 year old, presenting for the following health issues:  Hospital F/U  ER evaluation  Archie Wallace is a 49 y.o. male with a history of poorly controlled high blood pressure, diabetes and coronary artery diseases who presents to the emergency department for evaluation of chest pain that started this morning at 4 AM. He arrives here vitally stable. Physical exam is unremarkable. EKG obtained and shows sinus rhythm without any ST changes to suggest ischemia. Labs obtained and unremarkable with a mildly elevated D-dimer. A CT chest PE was obtained and this shows no evidence of " PE or other acute process in the chest. Initial troponin was 12 and this remains stable on recheck. Given his nonischemic EKG and stable troponins, I have a low suspicion for ACS. The etiology of his chest pain is not entirely clear but given his reassuring workup here, I felt the patient could safely discharge home with close outpatient follow up. Patient was comfortable with the plan. Indications for return were reviewed.     Disposition   The patient was discharged.    Diagnosis   ENCOUNTER DIAGNOSES   ICD-10-CM   1. Chest pain, unspecified type R07.9       5/21/2025     9:04 AM   Additional Questions   Roomed by Kiana AVALOS        ED/UC Followup:    Facility:  [facility]    Date of visit: 05/13/2025  Reason for visit: chest pain, shortness of breath   Current Status: getting better  Acute Illness  Acute illness concerns: cold   Onset/Duration: 05/12/2025  Symptoms:  Fever: YES  Chills/Sweats: YES  Headache (location?): YES  Sinus Pressure: YES  Conjunctivitis:  YES  Ear Pain: YES: both  Rhinorrhea: YES  Congestion: YES  Sore Throat: YES  Cough: YES-productive of green sputum  Wheeze: YES  Decreased Appetite: YES  Nausea: YES  Vomiting: No  Diarrhea: YES  Dysuria/Freq.: YES  Dysuria or Hematuria: No  Fatigue/Achiness: YES  Sick/Strep Exposure: No  Therapies tried and outcome: None    05/13/2025 9:48 AM CDT   1.  Negative for pulmonary embolism.    2.  Clear lungs.    3.  No other acute process identified in the chest.    4.  Asymmetrically larger left lobe of the thyroid gland relative to the right, which could be due to right thyroid atrophy or postsurgical change. No lymphadenopathy.      Imaging Results - CT CHEST PE STUDY (05/13/2025 9:30 AM CDT)  Narrative   05/13/2025 9:48 AM CDT   For Patients: As a result of the 21st Century Cures Act, medical imaging exams and procedure reports are released immediately into your electronic medical record. You may view this report before your referring provider. If you  "have questions, please contact your health care provider.    EXAM: CT CHEST PE STUDY  LOCATION: [facility]  DATE: 5/13/2025    INDICATION: Chest pain.  COMPARISON: None.  TECHNIQUE: CT chest pulmonary angiogram during arterial phase injection of IV contrast. Multiplanar reformats and MIP reconstructions were performed. Dose reduction techniques were used.  CONTRAST: Omnipaque 350 60 mL.    FINDINGS:  ANGIOGRAM CHEST: Pulmonary arteries are normal caliber and negative for pulmonary emboli. Thoracic aorta is negative for dissection. No CT evidence of right heart strain.    LUNGS AND PLEURA: Normal.    MEDIASTINUM/AXILLAE: Normal heart size. No pericardial effusion. No lymphadenopathy. Unremarkable CT appearance of the esophagus. Asymmetric thyroid gland, with the left lobe larger than the right; this may be due to right thyroid atrophy or prior surgical change. No supraclavicular lymphadenopathy.    CORONARY ARTERY CALCIFICATION: None.    UPPER ABDOMEN: Normal.    MUSCULOSKELETAL: Surgical changes of low cervical spinal fusion without complication. Hypertrophic endplate changes in the spine. Minimal degenerative joint space narrowing and spurring in both shoulders.          Review of Systems  Constitutional, HEENT, cardiovascular, pulmonary, gi and gu systems are negative, except as otherwise noted.      Objective    BP (!) 154/116   Pulse 81   Temp 97.7  F (36.5  C) (Temporal)   Resp 18   Ht 1.829 m (6' 0.01\")   Wt (!) 162.8 kg (359 lb)   SpO2 96%   BMI 48.68 kg/m    Body mass index is 48.68 kg/m .  Physical Exam   GENERAL: alert and no distress  NECK: no adenopathy, no asymmetry, masses, or scars  RESP:-Having deep productive cough-occasional wheezing  CV: regular rate and rhythm, no murmur, click or rub, no peripheral edema  MS: no gross musculoskeletal defects noted, no edema    Signed Electronically by: Agustin Parekh MD    Answers submitted by the patient for this visit:  Patient Health " Questionnaire (Submitted on 5/21/2025)  PHQ9 TOTAL SCORE: Incomplete

## 2025-06-06 ENCOUNTER — VIRTUAL VISIT (OUTPATIENT)
Dept: PSYCHOLOGY | Facility: CLINIC | Age: 50
End: 2025-06-06
Payer: COMMERCIAL

## 2025-06-06 DIAGNOSIS — F33.0 MILD EPISODE OF RECURRENT MAJOR DEPRESSIVE DISORDER: Primary | ICD-10-CM

## 2025-06-06 PROCEDURE — 90791 PSYCH DIAGNOSTIC EVALUATION: CPT | Mod: 95

## 2025-06-06 ASSESSMENT — ANXIETY QUESTIONNAIRES
IF YOU CHECKED OFF ANY PROBLEMS ON THIS QUESTIONNAIRE, HOW DIFFICULT HAVE THESE PROBLEMS MADE IT FOR YOU TO DO YOUR WORK, TAKE CARE OF THINGS AT HOME, OR GET ALONG WITH OTHER PEOPLE: SOMEWHAT DIFFICULT
2. NOT BEING ABLE TO STOP OR CONTROL WORRYING: SEVERAL DAYS
6. BECOMING EASILY ANNOYED OR IRRITABLE: MORE THAN HALF THE DAYS
1. FEELING NERVOUS, ANXIOUS, OR ON EDGE: SEVERAL DAYS
GAD7 TOTAL SCORE: 12
5. BEING SO RESTLESS THAT IT IS HARD TO SIT STILL: NEARLY EVERY DAY
GAD7 TOTAL SCORE: 12
7. FEELING AFRAID AS IF SOMETHING AWFUL MIGHT HAPPEN: NOT AT ALL
3. WORRYING TOO MUCH ABOUT DIFFERENT THINGS: MORE THAN HALF THE DAYS

## 2025-06-06 ASSESSMENT — PATIENT HEALTH QUESTIONNAIRE - PHQ9
5. POOR APPETITE OR OVEREATING: NEARLY EVERY DAY
SUM OF ALL RESPONSES TO PHQ QUESTIONS 1-9: 16

## 2025-06-06 ASSESSMENT — COLUMBIA-SUICIDE SEVERITY RATING SCALE - C-SSRS
TOTAL  NUMBER OF ABORTED OR SELF INTERRUPTED ATTEMPTS SINCE LAST CONTACT: NO
SUICIDE, SINCE LAST CONTACT: NO
1. SINCE LAST CONTACT, HAVE YOU WISHED YOU WERE DEAD OR WISHED YOU COULD GO TO SLEEP AND NOT WAKE UP?: NO
ATTEMPT SINCE LAST CONTACT: NO
2. HAVE YOU ACTUALLY HAD ANY THOUGHTS OF KILLING YOURSELF?: NO
6. HAVE YOU EVER DONE ANYTHING, STARTED TO DO ANYTHING, OR PREPARED TO DO ANYTHING TO END YOUR LIFE?: NO
TOTAL  NUMBER OF INTERRUPTED ATTEMPTS SINCE LAST CONTACT: NO

## 2025-06-06 NOTE — Clinical Note
Hi all,  On the day 1 appointment, I wanted to make sure you all were aware this patient is using cannabis daily. He's also reporting visual hallucinations (ghosts). Do you have any concerns with him passing bariatric evaluation for surgery? Would the visual hallucinations prohibit him passing? Thank you, David Torres, MS

## 2025-06-06 NOTE — Clinical Note
Brian Bennett,  I wanted to have you take a peek at this and ask if there was a SmartPhrase for sending this to his team before I go ahead and do so. Thanks, David

## 2025-06-10 NOTE — PROGRESS NOTES
"[facility]           Progress Note    Patient Name: Archie Wallace   Date: 6/13/2025          Service Type: Individual for Pre-Bariatric Surgical Psychological Evaluation 2      Session Start Time: 1:32 pm  Session End Time: 2:24 pm     Session Length: 52 minutes    Session #: Pre-Bariatric Surgical Psychological Evaluation 2    Attendees: Client attended alone    Service Modality:  Video Visit:      Provider verified identity through the following two step process.  Patient provided:  Patient is known previously to provider    Telemedicine Visit: The patient's condition can be safely assessed and treated via synchronous audio and visual telemedicine encounter.      Reason for Telemedicine Visit: Patient convenience (e.g. access to timely appointments / distance to available provider)    Originating Site (Patient Location): Patient's home    Distant Site (Provider Location): Provider Remote Setting- Home Office    Consent:  The patient/guardian has verbally consented to: the potential risks and benefits of telemedicine (video visit) versus in person care; bill my insurance or make self-payment for services provided; and responsibility for payment of non-covered services.     Patient would like the video invitation sent by:  My Chart    Mode of Communication:  [facility]    Distant Location (Provider):  Off-site    As the provider I attest to compliance with applicable laws and regulations related to telemedicine.      DATA  Interactive Complexity: No  Crisis: No       Progress Since Last Session (Related to Symptoms / Goals / Homework):   Symptoms: As a follow up from last session, clinician asked about client's experiences with ghosts. He reported he has seen ghosts since he was a kid. He reported he rarely hears voices, but will sometimes hear someone calling his name when he was home alone. He reported this used to happen \"a lot more\" but that he \"hasn't heard it in a " "while.\" He reported once hearing a voice telling him to get up out of bed at night when he was home alone, and he realized he had left the stove on. He stated he will tell the ghosts to \"get on out of here [and] get away from me.\" He reported even if a ghost might be his mom, grandmother, or granddaughter, he will say \"get out of here. You're dead. Stay dead.\" He stated this doesn't cause much distress for him, and that he can turn on the lights and go to sleep fairly easily.   Given these symptoms, client was given the following provisional diagnosis:   298.8  (F27) Other spec. Schizophrenia Spectrum--due to persistent visual hallucinations    Homework: Did not complete MMPI      Episode of Care Goals: Need to practice strategies for weight management (I.e. no liquids at dinner)        Current / Ongoing Stressors and Concerns:   Health/chronic pain Concerns   Difficulty with insomnia and energy     Treatment Objective(s) Addressed in This Session:   Completed binge eating scale and questions about weight management history and concerns and benefits of bariatric surgery.     Client was referred for an evaluation by the [facility].       History of Presenting Concern:  Client reports that these problem(s) began 15 years ago.  Client reports they have attempted to lose weight in the past through an exercise program and dieting.  The client reports they believe the surgery will benefit them by \"take me off the yo-yo effect that I'm on. My thyroid surgery made me gain weight uncontrollably. I've put in a lot of work to lose weight and then I gain it back even quicker.\"  Client has attempted to resolve these concerns in the past through scheduled eating patterns. Client reports that other professional(s) are involved in providing support / services. These include the Weight Management team and therapist Yunier Mahan.     Current status:   Patient reported weighing 370 pounds when " "they began working with the [facility]. Patient reported that their presurgical weight loss requirement is to weigh 340 pounds. Patient reported that they current weight is 350 pounds (as of weighing himself 3 weeks ago).     Historical Struggles include: He reported nothing stands out in this category. He reported he can \"walk by somebody eating a sandwich and gain weight.\" He stated preferred foods are fish and garsia.     Diets and exercise: The client reported that he has had the most success with buying pre-made salads and eating a lot of lettuce. Today he reported eating an egg, a pancake, and two sausages for breakfast, and skipping lunch. He stated last night he had half of a 6 inch [employer] sub for dinner. He stated he'll typically make fish or chicken for dinner. He stated he'll eat fried or baked fish and broccoli or cauliflower bites. He reported he'll eat a meal about once a day and add a bunch of stuff to the plate and \"take a bite here a bite there,\" trying to train himself that \"everything on the plate doesn't have to be ate.\"    Patient reported that their current exercise routine includes going to gym 4 days a week with his daughter from 4-5am. He stated he has leg day, arm day, ab day, and cardio. He stated they have not been to the gym in about a week and a half because his daughter has been staying out late with friends. He stated he's been trying to do the \"lazy man workout\" while seated in a chair, including \"invisible weight lifting\" and touching toes. He stated the last time he did it was about a week ago, but that he usually does it every day. He stated he will do it again tomorrow.     Type of Surgery: The client reported that he would like to have the gastric bypass surgery.  Client has not yet talked with his team about his plans.     Risks of Surgery: The client reported that he understands the risks of surgery to be not coming out of anesthesia, " "bleeding out, and blood clots.     Lifestyle Changes: The client reported that lifestyle changes that he needs to make include , discontinuing liquids with meals, chewing food to applesauce consistency (\"I'm working on it, it's a work in progress though\"), taking vitamins and supplements (not doing it yet), exercising, continuing with follow-up visits, and attending support groups. He reported he is doing well with portioning his eating, not eating everything on his plate, and using smaller plates.     Support Post-Surgery: The client stated that his wife and daughter are both home and live with him; therefore, they will be present to help her after surgery. He reported he has told family and a couple friends. He stated family has told him he \"doesn't need\" the surgery because he \"is the way he is.\" He reported family asked wife if she was \"starving\" him.     The client reported that current stressors include unemployment and health concerns. He reported that his coping skills include talking with wife, deep breathing, watching a show, or petting his dogs.     Current Goals with Nutritionist: The client stated he believes he met with a nutritionist in a group setting, but does not remember his goals with them.    Assessment of eating disorder symptoms indicates that patient has not met criteria for anorexia nervosa or bulimia nervosa.  Patient reported they have not used laxatives, have not purged, and have not used excessive exercise as a means of weight control.  Patient does not meet criteria for binge eating disorder.  Patient reported he usually does not eat until uncomfortably full.  Patient reported they do occasionally feel disgusted, depressed, or guilty after eating.  Client reported he hasn't overeaten in \"maybe a year.\"    Intervention:   Motivational Interviewing    MI Intervention: Co-Developed Goal: improve exercise routine     Change Talk Expressed by the Patient: Ability to change    Provider " Response to Change Talk: E - Evoked more info from patient about behavior change    Solution Focused: Small steps towards goals  CBT: coping skills, homework    Assessments completed prior to visit:  The following assessments were completed by patient for this visit:  PHQ9:       2/26/2025     1:57 PM 3/21/2025    11:33 AM 5/21/2025     9:01 AM 5/21/2025     9:11 AM 5/30/2025    12:50 PM 6/6/2025     2:55 PM 6/11/2025     2:30 PM   PHQ-9 SCORE   PHQ-9 Total Score MyChart  16 (Moderately severe depression) Incomplete  9 (Mild depression)  10 (Moderate depression)   PHQ-9 Total Score 24 16   16 9  16 10        Patient-reported     GAD7:       6/4/2021     7:21 AM 12/29/2023     8:14 AM 2/26/2025     1:55 PM 2/26/2025     1:58 PM 5/21/2025    10:38 AM 6/6/2025     2:55 PM 6/11/2025     2:31 PM   MEGAN-7 SCORE   Total Score  13 (moderate anxiety) Incomplete  8 (mild anxiety)  6 (mild anxiety)   Total Score 16 13  18 8  12 6        Patient-reported         ASSESSMENT: Current Emotional / Mental Status (status of significant symptoms):   Risk status (Self / Other harm or suicidal ideation)   Patient denies current fears or concerns for personal safety.   Patient denies current or recent suicidal ideation or behaviors.   Patient denies current or recent homicidal ideation or behaviors.   Patient denies current or recent self injurious behavior or ideation.   Patient denies other safety concerns.   Patient reports there has been no change in risk factors since their last session.     Patient reports there has been no change in protective factors since their last session.     Recommended that patient call 911 or go to the local ED should there be a change in any of these risk factors     Appearance:   Appropriate    Eye Contact:   Good    Psychomotor Behavior: Normal    Attitude:   Cooperative    Orientation:   All   Speech    Rate / Production: Normal/ Responsive Normal     Volume:  Normal  "   Mood:    Euthymic   Affect:    Appropriate    Thought Content:  Clear    Thought Form:  Coherent  Logical    Insight:    Fair      Medication Review:   No changes to current psychiatric medication(s)     Medication Compliance:   Yes     Changes in Health Issues:   None reported     Chemical Use Review:   Substance Use: Chemical use reviewed, no active concerns identified      Tobacco Use: No current tobacco use.        Diagnosis:  1. Mild episode of recurrent major depressive disorder    2. 298.8  (F28) Other spec. Schizophrenia Spectrum--due to persistent visual hallucinations (Provisional)      Collateral Reports Completed:   Not Applicable        Preliminary Treatment Plan:    Client will return for follow-up session next month.  Client will continue with scheduled weight loss surgery clinic appointments.  He is aware that he may contact writer in the meantime as needed.      Client will work on the following homework assignment: Practice chewing food to the consistency of applesauce at least one meal per day, Attend weight loss support group at least once before follow-up session, and eating protein within 30 min of waking, doing \"lazy man workout\" 3 times a week.     I plan to see the client for his scheduled follow-up appointment to discuss his progress and any struggles with her goals.     David Torres     6/10/2025             "

## 2025-06-11 ENCOUNTER — VIRTUAL VISIT (OUTPATIENT)
Dept: BEHAVIORAL HEALTH | Facility: CLINIC | Age: 50
End: 2025-06-11
Payer: COMMERCIAL

## 2025-06-11 DIAGNOSIS — F33.0 MILD EPISODE OF RECURRENT MAJOR DEPRESSIVE DISORDER: Primary | ICD-10-CM

## 2025-06-11 PROCEDURE — 90791 PSYCH DIAGNOSTIC EVALUATION: CPT | Mod: 95

## 2025-06-11 ASSESSMENT — ANXIETY QUESTIONNAIRES
IF YOU CHECKED OFF ANY PROBLEMS ON THIS QUESTIONNAIRE, HOW DIFFICULT HAVE THESE PROBLEMS MADE IT FOR YOU TO DO YOUR WORK, TAKE CARE OF THINGS AT HOME, OR GET ALONG WITH OTHER PEOPLE: SOMEWHAT DIFFICULT
7. FEELING AFRAID AS IF SOMETHING AWFUL MIGHT HAPPEN: NOT AT ALL
4. TROUBLE RELAXING: MORE THAN HALF THE DAYS
8. IF YOU CHECKED OFF ANY PROBLEMS, HOW DIFFICULT HAVE THESE MADE IT FOR YOU TO DO YOUR WORK, TAKE CARE OF THINGS AT HOME, OR GET ALONG WITH OTHER PEOPLE?: SOMEWHAT DIFFICULT
2. NOT BEING ABLE TO STOP OR CONTROL WORRYING: SEVERAL DAYS
1. FEELING NERVOUS, ANXIOUS, OR ON EDGE: NOT AT ALL
7. FEELING AFRAID AS IF SOMETHING AWFUL MIGHT HAPPEN: NOT AT ALL
GAD7 TOTAL SCORE: 6
6. BECOMING EASILY ANNOYED OR IRRITABLE: SEVERAL DAYS
3. WORRYING TOO MUCH ABOUT DIFFERENT THINGS: SEVERAL DAYS
GAD7 TOTAL SCORE: 6
5. BEING SO RESTLESS THAT IT IS HARD TO SIT STILL: SEVERAL DAYS
GAD7 TOTAL SCORE: 6

## 2025-06-11 ASSESSMENT — PATIENT HEALTH QUESTIONNAIRE - PHQ9
SUM OF ALL RESPONSES TO PHQ QUESTIONS 1-9: 10
SUM OF ALL RESPONSES TO PHQ QUESTIONS 1-9: 10
10. IF YOU CHECKED OFF ANY PROBLEMS, HOW DIFFICULT HAVE THESE PROBLEMS MADE IT FOR YOU TO DO YOUR WORK, TAKE CARE OF THINGS AT HOME, OR GET ALONG WITH OTHER PEOPLE: SOMEWHAT DIFFICULT

## 2025-06-11 ASSESSMENT — COLUMBIA-SUICIDE SEVERITY RATING SCALE - C-SSRS
ATTEMPT SINCE LAST CONTACT: NO
2. HAVE YOU ACTUALLY HAD ANY THOUGHTS OF KILLING YOURSELF?: NO
1. SINCE LAST CONTACT, HAVE YOU WISHED YOU WERE DEAD OR WISHED YOU COULD GO TO SLEEP AND NOT WAKE UP?: NO

## 2025-06-11 NOTE — PROGRESS NOTES
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 concerns will be addressed by discussing patient's experiences through the lens of cultural humility.     3. Initial Treatment will focus on:    Depression     4. Resources/Service Plan:    services are not indicated.   Modifications to assist communication are not indicated.   Additional disability accommodations are not indicated.      5. Collaboration:   Collaboration / coordination of treatment will be initiated with the following  support professionals: none.      6.  Referrals:   The following referral(s) will be initiated: none.       A Release of Information has been obtained for the following: none.    7. SHARRON:    SHARRON:  Discussed the general effects of drugs and alcohol on health and well-being.     8. Records:   These were not available for review at time of assessment.   Information in this assessment was obtained from the medical record and  provided by patient who is a fair historian.    Patient will have open access to their mental health medical record.    9.   Interactive Complexity: No    10. Safety Plan:   [facility] Safety Plan      Creation Date: 5/21/25 Last Update Date: 5/21/25      Step 1: Warning signs:    Warning Signs    Thinking about overdosing on pills    Worrying about my pain      Step 2: Internal coping strategies  - Things I can do to take my mind off my problems without contacting another person:    Strategies    Deep breathing    Watch a show    Pet my dogs      Step 3: People and social settings that provide distraction:    Name Contact Information    Fiance in cell phone         Step 4: People whom I can ask for help during a crisis:    Name Contact Information    Fiance in cell phone      Step 5: Professionals or agencies I can contact during a crisis:    Clinician/Agency Name Phone Emergency Contact    [facility]        Suicide Prevention Lifeline Phone: Call or Text 647  Crisis Text Line: Text HOME to 144041     Step 6: Making the environment safer (plan for lethal means safety):   Keep my medications in a secure place.  Do not try to obtain access to firearms.     Optional: What is most important to me and worth living for?:   My family     [facility] Safety Plan. Dodie Romero and Tariq Boss. Used with permission of the authors.          Provider Name/ Credentials:  MELODIE Ceo, [facility]   June 11, 2025

## 2025-06-13 ENCOUNTER — DOCUMENTATION ONLY (OUTPATIENT)
Dept: PSYCHOLOGY | Facility: CLINIC | Age: 50
End: 2025-06-13

## 2025-06-13 ENCOUNTER — VIRTUAL VISIT (OUTPATIENT)
Dept: PSYCHOLOGY | Facility: CLINIC | Age: 50
End: 2025-06-13
Payer: COMMERCIAL

## 2025-06-13 DIAGNOSIS — F20.89 OTHER SCHIZOPHRENIA (H): ICD-10-CM

## 2025-06-13 DIAGNOSIS — F33.0 MILD EPISODE OF RECURRENT MAJOR DEPRESSIVE DISORDER: Primary | ICD-10-CM

## 2025-06-13 PROCEDURE — 90834 PSYTX W PT 45 MINUTES: CPT | Mod: 95

## 2025-06-13 NOTE — PROGRESS NOTES
"    [facility]        PATIENT'S NAME:    Archie Wallace   PREFERRED NAME: Archie  PRONOUNS:      he/him  MRN:   [id number]  :   1975  ADDRESS: [address] [place] [id number]  DATE OF SERVICE: 2025       Pre-Bariatric Surgical Psychological Evaluation    Identifying Information:  Patient is a 49 year old,  and Black individual.  Patient was referred for an assessment by [facility].   Patient attended the session alone.     Chief Complaint:   The reason for seeking services at this time is: \"weight loss\". He reported he lost over 100 lbs, but gained the weight back when he became depressed after his granddaughter passed away. The problem(s) began \"roughly 15 years ago.\"      Patient has attempted to resolve these concerns in the past through changing eating habits. He reported he will lose weight and then gain it back.          Weight Management Related Information:   History of Presenting Concern:   Client reports that these problem(s) began 15 years ago.  Client reports they have attempted to lose weight in the past through an exercise program and dieting.  The client reports they believe the surgery will benefit them by \"take me off the yo-yo effect that I'm on. My thyroid surgery made me gain weight uncontrollably. I've put in a lot of work to lose weight and then I gain it back even quicker.\"  Client has attempted to resolve these concerns in the past through scheduled eating patterns. Client reports that other professional(s) are involved in providing support / services. These include the Weight Management team and therapist Yunier Mahan.      Current status:   Patient reported weighing 370 pounds when they began working with the [facility]. Patient reported that their presurgical weight loss requirement is to weigh 340 pounds. Patient reported that they current weight " "is 350 pounds (as of weighing himself 3 weeks ago).      Historical Struggles include: He reported nothing stands out in this category. He reported he can \"walk by somebody eating a sandwich and gain weight.\" He stated preferred foods are fish and garsia.      Diets and exercise: The client reported that he has had the most success with buying pre-made salads and eating a lot of lettuce. Today he reported eating an egg, a pancake, and two sausages for breakfast, and skipping lunch. He stated last night he had half of a 6 inch [employer] sub for dinner. He stated he'll typically make fish or chicken for dinner. He stated he'll eat fried or baked fish and broccoli or cauliflower bites. He reported he'll eat a meal about once a day and add a bunch of stuff to the plate and \"take a bite here a bite there,\" trying to train himself that \"everything on the plate doesn't have to be ate.\"     Patient reported that their current exercise routine includes going to gym 4 days a week with his daughter from 4-5am. He stated he has leg day, arm day, ab day, and cardio. He stated they have not been to the gym in about a week and a half because his daughter has been staying out late with friends. He stated he's been trying to do the \"lazy man workout\" while seated in a chair, including \"invisible weight lifting\" and touching toes. He stated the last time he did it was about a week ago, but that he usually does it every day. He stated he will do it again tomorrow.      Type of Surgery: The client reported that he would like to have the gastric bypass surgery.  Client has not yet talked with his team about his plans.      Risks of Surgery: The client reported that he understands the risks of surgery to be not coming out of anesthesia, bleeding out, and blood clots.      Lifestyle Changes: The client reported that lifestyle changes that he needs to make include , discontinuing liquids with meals, chewing food to applesauce consistency " "(\"I'm working on it, it's a work in progress though\"), taking vitamins and supplements (not doing it yet), exercising, continuing with follow-up visits, and attending support groups. He reported he is doing well with portioning his eating, not eating everything on his plate, and using smaller plates.      Support Post-Surgery: The client stated that his wife and daughter are both home and live with him; therefore, they will be present to help her after surgery. He reported he has told family and a couple friends. He stated family has told him he \"doesn't need\" the surgery because he \"is the way he is.\" He reported family asked wife if she was \"starving\" him.      The client reported that current stressors include unemployment and health concerns. He reported that his coping skills include talking with wife, deep breathing, watching a show, or petting his dogs.      Current Goals with Nutritionist: The client stated he believes he met with a nutritionist in a group setting, but does not remember his goals with them.     Social/Family History:  Patient reported they grew up in [place].  They were raised by biological mother; stepfather; grandmother.  Parents  / .  Patient reported that their childhood was overall good. He stated he grew up with 6 siblings and cousins at home.  Patient described their current relationships with family of origin as \"good.\"      The patient describes their cultural background as other.  Cultural influences and impact on patient's life structure, values, norms, and healthcare: Discrimination.  He reported medical discrimination. He stated doctors have not allowed him pain medication because \"they say I'll abuse them.\" He stated he is waiting to hear back from disability assistance.     Contextual influences on patient's health include: Health- Seeking Factors history of surgeries, chronic pain.    These factors will be addressed in the Preliminary Treatment plan. Patient " "identified their preferred language to be English. Patient reported they does not need the assistance of an  or other support involved in therapy.      Patient reported had no significant delays in developmental tasks.   Patient's highest education level was some high school but no degree  .  Patient identified the following learning problems: none reported.  Modifications will not be used to assist communication in therapy. Patient reports they are  able to understand written materials.     Patient reported the following relationship history: he was  for 26 years. He was  in 2019. Patient's current relationship status is with colleen for past 6 years.  He reported they are thinking about getting  next year. Patient identified their sexual orientation as heterosexual.  Patient reported having 4 child(mayo). Patient identified partner; friends; other as part of their support system.  Patient identified the quality of these relationships as good.     Patient's current living/housing situation involves staying with someone.  The immediate members of family and household include mother-in-law, Merly cates, Paloma,john  and they report that housing is mostly stable. He reported colleen is taking care of the bills right now. He reported he doesn't like to be dependent on someone else.     Patient is currently unemployed.  Patient reports their finances are obtained through family; county assistance; partner. Patient does identify finances as a current stressor.  He reported he was a \"\" previously.     Patient reported that they have not been involved with the legal system. Patient does not report being under probation/ parole/ jurisdiction.      Patient's Strengths and Limitations:  Patient identified the following strengths or resources that will help them succeed in treatment: being handy, easy to please sense of humor and strong social skills. Things that may interfere with the " patient's success in treatment include: physical health concerns.      Assessments:    The following assessments were completed by patient for this visit:  PHQ9:       3/21/2025    11:33 AM 5/21/2025     9:01 AM 5/21/2025     9:11 AM 5/30/2025    12:50 PM 6/6/2025     2:55 PM 6/11/2025     2:30 PM 7/14/2025    12:20 PM   PHQ-9 SCORE   PHQ-9 Total Score MyChart 16 (Moderately severe depression) Incomplete  9 (Mild depression)  10 (Moderate depression) 13 (Moderate depression)   PHQ-9 Total Score 16   16 9  16 10  13        Patient-reported     GAD7:       6/4/2021     7:21 AM 12/29/2023     8:14 AM 2/26/2025     1:55 PM 2/26/2025     1:58 PM 5/21/2025    10:38 AM 6/6/2025     2:55 PM 6/11/2025     2:31 PM   MEGAN-7 SCORE   Total Score  13 (moderate anxiety) Incomplete  8 (mild anxiety)  6 (mild anxiety)   Total Score 16 13  18 8  12 6        Patient-reported        CAGE-AID:        5/21/2025    11:13 AM   CAGE-AID Total Score   Total Score 0    Total Score MyChart 0 (A total score of 2 or greater is considered clinically significant)       Patient-reported      PROMIS 10-Global Health (only subscores and total score):        6/17/2021    11:00 AM 8/31/2021     1:00 PM 10/12/2021    10:00 AM 5/5/2022     2:00 PM 1/16/2025    10:19 AM 5/21/2025    11:13 AM 5/30/2025    12:55 PM   PROMIS-10 Scores Only   Global Mental Health Score 13 11 10 5 7  6  8    Global Physical Health Score 8 8 9 6 6  7  6    PROMIS TOTAL - SUBSCORES 21 19 19 11 13  13  14        Patient-reported      [place] Suicide Severity Rating Scale (Lifetime/Recent)       4/29/2020     2:11 PM 5/21/2025    12:05 PM   [place] Suicide Severity Rating (Lifetime/Recent)   Q1 Wish to be Dead (Lifetime) Yes      Comments About 1 year ago after a break up.      Q2 Non-Specific Active Suicidal Thoughts (Lifetime) Yes      Non-Specific Active Suicidal Thought Description (Lifetime) About 1 year ago after a break up. He took some pills.      Most Severe Ideation  "Rating (Lifetime) 4      Most Severe Ideation Description (Lifetime) About 1 year ago after a break up. He took pills 2 times and they didn't work. He didn't see areason to live.      Frequency (Lifetime) 1      Duration (Lifetime) 2      Controllability (Lifetime) 2      Protective Factors  (Lifetime) 4      Reasons for Ideation (Lifetime) 3      RETIRED: 1. Wish to be Dead (Recent) No      RETIRED: 2. Non-Specific Active Suicidal Thoughts (Recent) No      RETIRED: 3. Active Suicidal Ideation with any Methods (Not Plan) Without Intent to Act (Recent) No      RETIRE: 4. Active Suicidal Ideation with Some Intent to Act, Without Specific Plan (Lifetime) Yes      RETIRE: Active Suicidal Ideation with Some Intent to Act, Without Specific Plan Description (Lifetime) About 1 year ago after a break up. He took pills 2 times and they didn't work.      4. Active Suicidal Ideation with Some Intent to Act, Without Specific Plan (Recent) No      RETIRE: 5. Active Suicidal Ideation with Specific Plan and Intent (Lifetime) Yes      RETIRE: Active Suicidal Ideation with Specific Plan and Intent Description (Lifetime) About 1 year ago after a break up. He took pills 2 times and they didn't work.     RETIRED: 5. Active Suicidal Ideation with Specific Plan and Intent (Recent) No      Most Severe Ideation Rating (Past Month) NA      Frequency (Past Month) NA      Duration (Past Month) NA      Controllability (Past Month) NA      Protective Factors (Past Month) NA      Reasons for Ideation (Past Month) 0      Actual Attempt (Lifetime) Yes      Actual Attempt Description (Lifetime) About 1 year ago after a break up. He \"took pills 2 times and they didn't work.\"      Total Number of Actual Attempts (Lifetime) 2      Actual Attempt (Past 3 Months) No      Has subject engaged in non-suicidal self-injurious behavior? (Lifetime) No      Has subject engaged in non-suicidal self-injurious behavior? (Past 3 Months) No      Interrupted Attempts " "(Lifetime) No      Interrupted Attempts (Past 3 Months) No     Aborted or Self-Interrupted Attempt (Lifetime) No     Aborted or Self-Interrupted Attempt (Past 3 Months) No      Preparatory Acts or Behavior (Lifetime) No      Preparatory Acts or Behavior (Past 3 Months) No      Most Recent Attempt Actual Lethality Code 0      Most Recent Attempt Potential Lethality Code 0      Most Lethal Attempt Actual Lethality Code 0      Most Lethal Attemplt Potential Lethality Code 0      Initial/First Attempt Actual Lethality Code 0      Initial/First Attempt Potential Lethality Code 0      1. Wish to be Dead (Lifetime)   Y   1. Wish to be Dead (Past 1 Month)   N   2. Non-Specific Active Suicidal Thoughts (Lifetime)   Y   2. Non-Specific Active Suicidal Thoughts (Past 1 Month)   N   3. Active Suicidal Ideation with any Methods (Not Plan) Without Intent to Act (Lifetime)   Y   3. Active Suicidal Ideation with any Methods (Not Plan) Without Intent to Act (Past 1 Month)   N   4. Active Suicidal Ideation with Some Intent to Act, Without Specific Plan (Lifetime)   Y   4. Active Suicidal Ideation with Some Intent to Act, Without Specific Plan (Past 1 Month)   N   5. Active Suicidal Ideation with Specific Plan and Intent (Lifetime)   Y   5. Active Suicidal Ideation with Specific Plan and Intent (Past 1 Month)   N   Most Severe Ideation Rating (Lifetime)   5   Frequency (Lifetime)   3   Duration (Lifetime)   3   Controllability (Lifetime)   4   Deterrents (Lifetime)   3   Reasons for Ideation (Lifetime)   5   Reasons for Ideation (Past 1 Month)   0   Actual Attempt (Lifetime)   Y   Total Number of Actual Attempts (Lifetime)   2   Actual Attempt Description (Lifetime)   \"One time in high school I tried to overdose on pills because my girlfriend's mom woulnd't let us see each other and I couldn't go on without her.  It didn't work and I just woke up and threw up a lot.  Another time when I was 16 I had a toothache and I couldn't go on " "with the pain anymore so I tried to shoot myself but the gun wouldn't work.  That's the last time I've thought of killing myself or hurting myself.  I would never do that now.  I think I was just a kid going through a phase.\"   Actual Attempt (Past 3 Months)   N   Has subject engaged in non-suicidal self-injurious behavior? (Lifetime)   N   Calculated C-SSRS Risk Score (Lifetime/Recent)   Moderate Risk       Data saved with a previous flowsheet row definition      [place] Suicide Severity Rating Scale (Short Version)       7/16/2021     6:48 AM   [place] Suicide Severity Rating (Short Version)   Over the past 2 weeks have you felt down, depressed, or hopeless? no   Over the past 2 weeks have you had thoughts of killing yourself? no   Have you ever attempted to kill yourself? no         Personal and Family Medical History:  Patient does not report a family history of mental health concerns.  Patient reports family history includes Diabetes in his father, mother, and sister; Glaucoma in his mother and another family member; Hypertension in his mother.    Review of Medical Record is notable for:   Severe persistent asthma, unspecified whether complicated (H)  Chronic pain  Cervical radiculopathy  Primary osteoarthritis of left knee  Type 2 diabetes mellitus with hyperglycemia, without long-term current use of insulin (H)  Moderate major depression (H)  Long QT interval syndrome  Moderate persistent asthma  Hyperlipidemia LDL goal <40  CAD (coronary artery disease)  Hypertension goal BP (blood pressure) < 140/90  Type 2 diabetes mellitus with other circulatory complication, without long-term current use of insulin (H)  Class 3 severe obesity due to excess calories with serious comorbidity and body mass index (BMI) of 45.0 to 49.9 in adult (H)  Low back pain  Obstructive sleep apnea      Patient does not report Mental Health Diagnosis or Treatment. He is seeing KIMI Coe for individual therapy.     Patient " "has had a physical exam to rule out medical causes for current symptoms.  Date of last physical exam was within the past year. Client was encouraged to follow up with PCP if symptoms were to develop. The patient has a [facility] Primary Care Provider, who is named Agustin Parekh.  Patient reports the following current medical concerns: chronic pain, had half of his thyroid removed (which he reported led his metabolism to slow). Patient reports pain concerns including \"my whole left side goes numb,\" radiating pain from neck down arm.  Patient may want help addressing pain concerns. He reported he had neck fusion surgery and thyroid surgery at the same time in 2021. He stated he has had chronic pain since then. He stated Dr. De Leon, neurologist, is his pain provider. He reported he doesn't feel anything has helped much with pain. There are significant appetite / nutritional concerns / weight changes.  He reported past instances of binge eating several years ago. He denied any eating issues now. Patient does not report a history of head injury / trauma / cognitive impairment.  He reported he gets about 4 hours of sleep each night. He reported sleep difficulty due to sleep apnea and chronic pain. He reported having narcolepsy.      Patient reports current meds as:   Outpatient Medications Marked as Taking for current encounter          Current Outpatient Medications   Medication Sig Dispense Refill    albuterol (PROAIR HFA) 108 (90 Base) MCG/ACT inhaler Inhale 2 puffs into the lungs every 6 hours as needed for cough. 18 g 1    amLODIPine (NORVASC) 10 MG tablet Take 1 tablet (10 mg) by mouth daily. 90 tablet 2    aspirin 81 MG EC tablet Take 1 tablet (81 mg) by mouth daily 90 tablet 3    augmented betamethasone dipropionate (DIPROLENE AF) 0.05 % external cream Apply topically 2 times daily as needed (flares of hand rash. maximum 1 month consistent use). 45 g 0    augmented betamethasone dipropionate (DIPROLENE-AF) " 0.05 % external ointment Apply topically 2 times daily. Apply to affected areas on feet for 2-3 weeks, then as needed for flares. 45 g 3    cloNIDine (CATAPRES) 0.2 MG tablet Take 1 tablet (0.2 mg) by mouth 2 times daily. 180 tablet 1    gabapentin (NEURONTIN) 300 MG capsule 300mg po at bedtime x 3 days, then twice daily x 3 days, then three times daily ongoing 90 capsule 2    hydrocortisone 2.5 % cream Apply topically 2 times daily. When rash in skin folds is flared only then stop 30 g 1    Insulin Pen Needle (PEN NEEDLES) 32G X 4 MM MISC 1 each daily 100 each 0    ketoconazole (NIZORAL) 2 % external cream Apply to rash twice daily to rash in abdominal fold until rash resolves. Then use daily to prevent flares. 60 g 5    labetalol (NORMODYNE) 200 MG tablet Take 1 tablet (200 mg) by mouth 2 times daily. 60 tablet 1    losartan (COZAAR) 100 MG tablet Take 1 tablet (100 mg) by mouth daily. 90 tablet 2    mometasone-formoterol (DULERA) 100-5 MCG/ACT inhaler Inhale 2 puffs into the lungs 2 times daily. 13 g 2    naproxen (NAPROSYN) 500 MG tablet Take 1 tablet (500 mg) by mouth 2 times daily (with meals). 60 tablet 0    predniSONE (DELTASONE) 20 MG tablet Take 3 tabs by mouth daily x 3 days, then 2 tabs daily x 3 days, then 1 tab daily x 3 days, then 1/2 tab daily x 3 days. 20 tablet 0    spironolactone (ALDACTONE) 25 MG tablet Take 1 tablet (25 mg) by mouth daily. 90 tablet 1    spironolactone (ALDACTONE) 25 MG tablet Take 1 tablet (25 mg) by mouth daily. 30 tablet 2    tirzepatide (MOUNJARO) 2.5 MG/0.5ML SOAJ auto-injector pen Inject 0.5 mLs (2.5 mg) subcutaneously once a week. 2 mL 0    tirzepatide (MOUNJARO) 5 MG/0.5ML SOAJ auto-injector pen Inject 0.5 mLs (5 mg) subcutaneously once a week. 2 mL 3    tirzepatide-Weight Management (ZEPBOUND) 2.5 MG/0.5ML prefilled pen Inject 0.5 mLs (2.5 mg) subcutaneously every 7 days. 2 mL 1    triamcinolone (KENALOG) 0.1 % external cream Apply topically 2 times daily. Apply to  affected areas on legs for 2-3 weeks, then as needed for flares. 80 g 3      No current facility-administered medications for this visit.            Medication Adherence:  Patient reports taking.     Patient Allergies:    Allergies         Allergies   Allergen Reactions    Oseltamivir Anaphylaxis and Rash    Biaxin [Clarithromycin]         Muscle breakdown    Bromaxefed Dm Rf Cough    Clarithromycin Other (See Comments)       Weakness    Robafen Dm Cgh-Chest [Dextromethorphan-Guaifenesin] Difficulty breathing    Guaifenesin Rash    Metformin Rash       Reported breaking out with a skin reaction            Medical History:    Past Medical History        Past Medical History:   Diagnosis Date    CAD (coronary artery disease)       Premature artery disease noted    Class 3 severe obesity due to excess calories with serious comorbidity and body mass index (BMI) of 45.0 to 49.9 in adult (H) 02/18/2010     Bariatric surgery consult 9/7/21 Stephania Youssef PA-C Starting BMI 46 and wt 337 lbs     DDD (degenerative disc disease), cervical      History of MI (myocardial infarction) 07/17/2020    Hyperlipidemia LDL goal <40      Hypertension goal BP (blood pressure) < 140/90      Long QT interval syndrome      MEDICAL HISTORY OF -       History of rhabdomyolosis, from strenuous exercise    Mild persistent asthma      Obstructive sleep apnea      Primary osteoarthritis of left knee      Sleep apnea syndrome       cpap    Type 2 diabetes, HbA1c goal < 7% (H)      Vitamin D deficiency                 Current Mental Status Exam:   Appearance:                Appropriate    Eye Contact:               Good   Psychomotor:              Normal       Gait / station:           not observed  Attitude / Demeanor:   Interested Friendly  Speech      Rate / Production:   Normal/ Responsive      Volume:                   Normal  volume      Language:               intact  Mood:                          Euthymic  Affect:                           "Appropriate    Thought Content:        Clear   Thought Process:        Coherent  Logical       Associations:           No loosening of associations  Insight:                         Good   Judgment:                   Intact   Orientation:                 All  Attention/concentration:          Good     Substance Use:   Patient did not report a family history of substance use concerns; see medical history section for details.  Patient has not received chemical dependency treatment in the past.  Patient has not ever been to detox.       Patient is not currently receiving any chemical dependency treatment.               Substance History of use Age of first use Date of last use       Pattern and duration of use (include amounts and frequency)   Alcohol never used      REPORTS SUBSTANCE USE: reports using substance 2 times per year and has 3 beer or Viper at a time.   Patient reports heaviest use is current use.   Cannabis    currently use 14 05/20/25 REPORTS SUBSTANCE USE: reports using substance 2 times per day and has 1 gram (if smoking) or 30-50mg edibles at a time.   Patient reports heaviest use is current use.       Amphetamines    never used   REPORTS SUBSTANCE USE: N/A   Cocaine/crack     never used       REPORTS SUBSTANCE USE: N/A   Hallucinogens never used         REPORTS SUBSTANCE USE: N/A   Inhalants never used         REPORTS SUBSTANCE USE: N/A   Heroin never used         REPORTS SUBSTANCE USE: N/A   Other Opiates used in the past 19 03/25/25 Not currently using. Used for a month and half for chronic pain.    Benzodiazepine    never used   REPORTS SUBSTANCE USE: N/A   Barbiturates never used   REPORTS SUBSTANCE USE: N/A   Over the counter meds used in the past 6 05/20/25 REPORTS SUBSTANCE USE: N/A   Caffeine used in the past 18  REPORTS SUBSTANCE USE: reports using substance 3 times per week and has \"a good swallow or two\" of an energy drink at a time.   Patient reports heaviest use was a few years ago " "on a diet. He reported a \"monster will last me 3 days.\"   Nicotine  never used   REPORTS SUBSTANCE USE: N/A   Other substances not listed above:  Identify:  never used   REPORTS SUBSTANCE USE: N/A      Patient reported the following problems as a result of their substance use: no problems, not applicable.       Substance Use: No symptoms     Based on the CAGE score of 0 and clinical interview there  are not indications of drug or alcohol abuse.     Significant Losses / Trauma / Abuse / Neglect Issues:   Patient did not  serve in the .  There are indications or report of significant loss, trauma, abuse or neglect issues related to: death of infant granddaughter, major medical concerns, racial discrimination.  Patient has not been a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

## 2025-06-27 ENCOUNTER — VIRTUAL VISIT (OUTPATIENT)
Dept: PSYCHOLOGY | Facility: CLINIC | Age: 50
End: 2025-06-27
Payer: COMMERCIAL

## 2025-06-27 DIAGNOSIS — F33.0 MILD EPISODE OF RECURRENT MAJOR DEPRESSIVE DISORDER: Primary | ICD-10-CM

## 2025-06-27 PROCEDURE — 90832 PSYTX W PT 30 MINUTES: CPT | Mod: 95

## 2025-06-27 NOTE — PROGRESS NOTES
[facility]               Progress Note     Patient Name: Archie Wallace Date: 6/27/2025          Service Type: Individual for Pre-Bariatric Surgical Psychological Evaluation One Month Follow Up      Session Start Time: 12:50 pm  Session End Time: 1:22 pm      Session Length: 32 minutes     Session #: Bariatric One Month Follow Up for Pre-Bariatric Surgical Psychological Evaluation       Attendees: Client      Service Modality:  Video Visit:        Provider verified identity through the following two step process.  Patient provided:  Patient previously known to provider    Telemedicine Visit: The patient's condition can be safely assessed and treated via synchronous audio and visual telemedicine encounter.      Reason for Telemedicine Visit: Patient has requested telehealth visit    Originating Site (Patient Location): Patient's home    Distant Site (Provider Location): Home office    Consent:  The patient/guardian has verbally consented to: the potential risks and benefits of telemedicine (video visit) versus in person care; bill my insurance or make self-payment for services provided; and responsibility for payment of non-covered services.     Patient would like the video invitation sent by:  My Chart    Mode of Communication:  Video Conference via [facility]    Distant Location (Provider):  Off-site    As the provider I attest to compliance with applicable laws and regulations related to telemedicine.     DATA  Interactive Complexity: No  Crisis: No        Progress Since Last Session (Related to Symptoms / Goals / Homework):   Symptoms: Improved    Homework: Partially completed      Current / Ongoing Stressors and Concerns:   Weight management   Behavioral changes re: prep for bariatric surgery      Treatment Objective(s) Addressed in This Session:   Reviewed Progress with homework and critical MMPI-3 items        Intervention:   Motivational Interviewing: behavioral changes related to  bariatric surgery  Completed one-month follow up for pre-bariatric psychological assessment.    Reviewed Patient's progress with homework over the past month.       Reviewed results of the MMPI-3 evaluation.      Patient reported weighing 360 pounds when they began working with the [facility]. Patient reported that their presurgical weight loss requirement is to lose 35 pounds. Patient reported that they do not know their current weight, but that they weighted 342 at their last weigh in.      Patient reported that they have made progress with focusing on eating more than one meal a day, eating more protein (I.e. egg for breakfast), chewing food to applesauce consistency, and taking walks.     Client reported difficulties changing his habits to have regular meals. He stated his medication makes him feel full, which also makes this harder. He stated in the next two weeks, he will try to have at least 2 meals a day (breakfast and dinner). He reported eating a scrambled egg for breakfast twice this week. He stated he struggles to eat within 30 minutes of waking due to waking up around 4 or 5am and not feeling hungry. Therapist encouraged him to try and have something small.     Client reported he doesn't drink carbonated beverages and only has a very small amount of caffeine. He reported he had an uvalectomy which made his throat smaller, which will make it difficult to avoid drinking liquid at meal times. Clinician encouraged him to try watery foods such as fruits. He reported he will try this at least twice in the next 2 weeks.     Patient and clinician discussed incorporating more healthy protein into his diet. Patient reported he does not like to eat turkey garsia or egg whites, and has mixed feelings on protein bars. He reported eggs, garsia, and sausage are preferred sources of protein he could eat in the morning.     Patient and provider reviewed answers on the MMPI. He  "reported his aberrant experiences this week included seeing things in the corners of his eyes and \"feeling like something was tugging at the sheets\" while in bed with his wife. He stated him and his wife joke that he has a \"backwards brain.\" He stated he views this statement as funny.     Patient will work on the following homework assignment in the next two weeks: having at least two meals a day (breakfast and dinner), taking vitamins, trying no liquids with meals, continuing to establish routine exercise (walking 4-5x a week) and continuing to practice chewing food to applesauce consistency.        ASSESSMENT: Current Emotional / Mental Status (status of significant symptoms):   Risk status (Self / Other harm or suicidal ideation)   Patient denies current fears or concerns for personal safety.   Patient denies current or recent suicidal ideation or behaviors.   Patient denies current or recent homicidal ideation or behaviors.   Patient denies current or recent self injurious behavior or ideation.   Patient denies other safety concerns.   Patient reports there has been no change in risk factors since their last session.     Patient reports there has been no change in protective factors since their last session.     Recommended that patient call 911 or go to the local ED should there be a change in any of these risk factors     Appearance:   Appropriate    Eye Contact:   Good    Psychomotor Behavior: Normal    Attitude:   Cooperative    Orientation:   All   Speech    Rate / Production: Normal     Volume:  Normal    Mood:    Euthymic   Affect:    Appropriate    Thought Content:  Clear    Thought Form:  Coherent  Logical    Insight:    Good      Medication Review:   No current psychiatric medications prescribed     Medication Compliance:   NA     Changes in Health Issues:   None reported     Chemical Use Review:   Substance Use: Patient reported he will decrease cannabis use and get a prescription to manage his " chronic pain.     Tobacco Use: No current tobacco use.      Diagnosis:  1. Mild episode of recurrent major depressive disorder          Collateral Reports Completed:   N/a       PLAN: (Client Tasks / Therapist Tasks / Other)  Patient will work on homework over the next two weeks and meet with provider again on 7/14.      David Torres  6/27/2025

## 2025-06-29 DIAGNOSIS — I16.0 ASYMPTOMATIC HYPERTENSIVE URGENCY: ICD-10-CM

## 2025-06-30 DIAGNOSIS — I16.0 ASYMPTOMATIC HYPERTENSIVE URGENCY: ICD-10-CM

## 2025-06-30 RX ORDER — LABETALOL 200 MG/1
200 TABLET, FILM COATED ORAL 2 TIMES DAILY
Qty: 60 TABLET | Refills: 2 | Status: SHIPPED | OUTPATIENT
Start: 2025-06-30

## 2025-06-30 RX ORDER — LABETALOL 200 MG/1
200 TABLET, FILM COATED ORAL 2 TIMES DAILY
Qty: 60 TABLET | Refills: 2 | Status: SHIPPED | OUTPATIENT
Start: 2025-06-30 | End: 2025-06-30

## 2025-07-14 ENCOUNTER — VIRTUAL VISIT (OUTPATIENT)
Dept: PSYCHOLOGY | Facility: CLINIC | Age: 50
End: 2025-07-14
Payer: COMMERCIAL

## 2025-07-14 DIAGNOSIS — F33.0 MILD EPISODE OF RECURRENT MAJOR DEPRESSIVE DISORDER: Primary | ICD-10-CM

## 2025-07-14 PROCEDURE — 96131 PSYCL TST EVAL PHYS/QHP EA: CPT | Mod: 95

## 2025-07-14 PROCEDURE — 96130 PSYCL TST EVAL PHYS/QHP 1ST: CPT | Mod: 95

## 2025-07-14 PROCEDURE — 90832 PSYTX W PT 30 MINUTES: CPT | Mod: 95

## 2025-07-14 ASSESSMENT — PATIENT HEALTH QUESTIONNAIRE - PHQ9
SUM OF ALL RESPONSES TO PHQ QUESTIONS 1-9: 13
10. IF YOU CHECKED OFF ANY PROBLEMS, HOW DIFFICULT HAVE THESE PROBLEMS MADE IT FOR YOU TO DO YOUR WORK, TAKE CARE OF THINGS AT HOME, OR GET ALONG WITH OTHER PEOPLE: SOMEWHAT DIFFICULT
SUM OF ALL RESPONSES TO PHQ QUESTIONS 1-9: 13

## 2025-07-14 NOTE — PROGRESS NOTES
[facility]               Progress Note     Patient Name: Archie Wallace Date: 7/14/2025          Service Type: Individual for Pre-Bariatric Surgical Psychological Evaluation One Month Follow Up      Session Start Time: 12:31 pm  Session End Time: 1:00 pm      Session Length: 29 minutes     Session #: Bariatric One Month Follow Up for Pre-Bariatric Surgical Psychological Evaluation       Attendees: Client attended alone      Service Modality:  Video Visit:      Provider verified identity through the following two step process.  Patient provided:  Patient is known previously to provider    Telemedicine Visit: The patient's condition can be safely assessed and treated via synchronous audio and visual telemedicine encounter.      Reason for Telemedicine Visit: Patient convenience (e.g. access to timely appointments / distance to available provider)    Originating Site (Patient Location): Patient's home    Distant Site (Provider Location): Provider Remote Setting- Home Office    Consent:  The patient/guardian has verbally consented to: the potential risks and benefits of telemedicine (video visit) versus in person care; bill my insurance or make self-payment for services provided; and responsibility for payment of non-covered services.     Patient would like the video invitation sent by:  My Chart    Mode of Communication:  [facility]    Distant Location (Provider):  Off-site    As the provider I attest to compliance with applicable laws and regulations related to telemedicine.       DATA  Interactive Complexity: No  Crisis: No        Progress Since Last Session (Related to Symptoms / Goals / Homework):   Symptoms: Improved    Homework: Partially completed      Current / Ongoing Stressors and Concerns:   Weight management   Lifestyle changes re: bariatric surgery preparation      Treatment Objective(s) Addressed in This Session:     Reviewed Progress with homework and assessed for  "readiness for bariatric surgery.        Intervention:   Completed one-month follow up for pre-bariatric psychological assessment. Reviewed Patient's progress with homework over the past month.        Discussed changes that may occur in relationships following weight loss surgery and how to manage these changes. Talked specifically about potential reactions from loved ones who are having their own struggles with weight loss or body acceptance. Emphasized that Patient's job is to stay focused on his health and well-being and making decisions that are good for them; he can allow others to struggle with their own reactions without feeling compelled to make changes in order to make them more comfortable.        Patient reported weighing 360 pounds when they began working with the [facility]. Patient reported that their presurgical weight loss requirement is to weigh 325 pounds. Patient reported that their current weight ranges between 342 and 353 pounds, depending on the day. Patient reported they are unsure as to why their weight fluctuates between weigh-ins.    Patient reported that they have made progress with focusing on eating breakfast and dinner, chewing food to applesauce consistency (30-40 chews before swallowing, he reported being consistent with this but that it \"makes my jaw tired\"), incorporating more protein in his diet (fish) and exercising daily (walking 4 blocks outside).     He reported he still struggles with eating breakfast within his first hour of waking, but has tried to do so at least three times a week. He reported he has not yet started taking daily vitamins or tried excluding liquids from his meals. 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 Provider encouraged him to ask his team more about how to proceed with this requirement.     He reported he missed his last bariatric support group meeting due to " "technical difficulties, but that he has the Zoom link information and plans to attend the next one. We reviewed and discussed the bariatric relationships handout with provider. He reported his wife is the \"only person who's opinion I care about\" and that she is very supportive.    Motivational Interviewing: lifestyle changes related to bariatric surgery  Solution Focused: weight loss, diet changes, exercise    Assessments completed prior to visit:  The following assessments were completed by patient for this visit:      PHQ9:       3/21/2025    11:33 AM 5/21/2025     9:01 AM 5/21/2025     9:11 AM 5/30/2025    12:50 PM 6/6/2025     2:55 PM 6/11/2025     2:30 PM 7/14/2025    12:20 PM   PHQ-9 SCORE   PHQ-9 Total Score MyChart 16 (Moderately severe depression) Incomplete  9 (Mild depression)  10 (Moderate depression) 13 (Moderate depression)   PHQ-9 Total Score 16   16 9  16 10  13        Patient-reported         GAD7:       6/4/2021     7:21 AM 12/29/2023     8:14 AM 2/26/2025     1:55 PM 2/26/2025     1:58 PM 5/21/2025    10:38 AM 6/6/2025     2:55 PM 6/11/2025     2:31 PM   MEGAN-7 SCORE   Total Score  13 (moderate anxiety) Incomplete  8 (mild anxiety)  6 (mild anxiety)   Total Score 16 13  18 8  12 6        Patient-reported             ASSESSMENT: Current Emotional / Mental Status (status of significant symptoms):   Risk status (Self / Other harm or suicidal ideation)   Patient denies current fears or concerns for personal safety.   Patient denies current or recent suicidal ideation or behaviors.   Patient denies current or recent homicidal ideation or behaviors.   Patient denies current or recent self injurious behavior or ideation.   Patient denies other safety concerns.   Patient reports there has been no change in risk factors since their last session.     Patient reports there has been no change in protective factors since their last session.     Recommended that patient call 911 or go to the local ED should " there be a change in any of these risk factors     Appearance:   Appropriate    Eye Contact:   Good    Psychomotor Behavior: Restless    Attitude:   Cooperative    Orientation:   All   Speech    Rate / Production: Normal/ Responsive Normal     Volume:  Normal    Mood:    Euthymic   Affect:    Appropriate    Thought Content:  Clear    Thought Form:  Coherent  Logical    Insight:    Good      Medication Review:   No current psychiatric medications prescribed     Medication Compliance:   NA     Changes in Health Issues:   None reported     Chemical Use Review:   Substance Use: decrease in cannabis .  Patient reports frequency of use was daily, but is now less often.  Patient and provider discussed checking in with his team regarding requirements for cannabis abstinence prior to surgery.        Tobacco Use: No current tobacco use.      Diagnosis:  1. Mild episode of recurrent major depressive disorder     2. 298.8  (F28) Other spec. Schizophrenia Spectrum--due to persistent visual hallucinations (Provisional)      Collateral Reports Completed:   Note routed to [facility] team      Summary and Final Recommendation:    From a psychological standpoint, Patient IS cleared to continue in the process for pursuing bariatric surgery.     To summarize the 3 primary conditions being evaluated in the psychological assessment:     1. Patient is prepared to comply with ongoing aftercare and lifestyle  changes after surgery--condition satisfied     Patient has made some meaningful initial changes to  eating and activity his habits. he reported an understanding of  the need for ongoing changes to these lifestyle habits. he expressed their willingness to maintain changes to eating and activity  habits after surgery.       Patient reported he has worked on consistent exercise (walking daily), chewing food to applesauce consistency, eating breakfast and incorporating more protein into his  diet.        2. Patient is emotionally stable to proceed with surgery--condition satisfied    Patient's mental health history is notable for depression and provisional Schizophrenia Spectrum--due to persistent visual hallucinations.  The patient's mental health symptoms appear to be well managed at this time with individual therapy.    Patient was referred to the [facility] due to his report of recurrent psychotic symptoms.        3. Patient is cognitively capable of understanding the risks of the procedure--condition satisfied     Patient has been able to demonstrate that he understands the risks of surgery compared to the risks of not having surgery.  Patient reported that they understand that potential risks of bariatric surgery include death, blood clots, dumping syndrome, and bleeding issues.     With respect to the risk factors, patient would like to continue with the bariatric surgery process.        PLAN: (Client Tasks / Therapist Tasks / Other)    Patient has completed psychological assessment required for bariatric surgery  Complete report will be forwarded to the weight loss surgery clinic for review.      Recommend standard post-surgical follow up, with sessions 1 and 3 months postsurgery, to assess client's functioning and adjustment post-surgical lifestyle. Please call [phone] to schedule any follow up appointments with my supervisor, Dr. Lemons.    Patient was encouraged to attend a weight loss surgery support group, starting before surgery and continuing afterwards, for additional accountability and support.    Patient was recommended to reach out to the [facility] program for management of psychosis. Please call [facility] at [phone] to schedule.      David Torres  7/14/2025    Psychological Testing   Sample Billing/Services Summary       Testing Evaluation Services Base: 29428  (1st 60 mins) Add-on: 91905  (each addtl 60 mins)   Record Review and Clarify Referral Question    6.6.25 2:20pm-2:30pm 10 minutes   Clinical Decision Making/Battery Modification   6.13.25 1:30pm-1:40pm 10 minutes   Integration/Report Generation   MMPI 6.26.25 2:30pm-2:45pm  BES 6.26.25 2:45pm-3:00pm  Final Report 7.14.25 11:00am-12:00pm   15 minutes  15 minutes  60 minutes   Post-Service Work   7.14.25 1:00pm-1:20pm 20 minutes   Total Time: 130 minutes (2 hours, 10 minutes)   Total Units: 1 1           Diagnosis(es): (ICD-10)   Mild episode of recurrent major depressive disorder [F33.0]  298.8  (F28) Other spec. Schizophrenia Spectrum--due to persistent visual hallucinations (Provisional)

## 2025-07-14 NOTE — Clinical Note
Greetings,  The patient has met all three criteria.  Areas of note  include visual hallucinations, cannabis use for chronic pain, and difficulty swallowing (and therefore difficulty with the no liquids rule) due to a past uvalectomy.  Their full report is available for your review.    Please let me know if you have any questions.  David Torres

## 2025-07-27 ENCOUNTER — MYC REFILL (OUTPATIENT)
Dept: FAMILY MEDICINE | Facility: CLINIC | Age: 50
End: 2025-07-27
Payer: COMMERCIAL

## 2025-07-27 ENCOUNTER — MYC REFILL (OUTPATIENT)
Dept: ENDOCRINOLOGY | Facility: CLINIC | Age: 50
End: 2025-07-27
Payer: COMMERCIAL

## 2025-07-27 ENCOUNTER — MYC REFILL (OUTPATIENT)
Dept: PHYSICAL MEDICINE AND REHAB | Facility: CLINIC | Age: 50
End: 2025-07-27
Payer: COMMERCIAL

## 2025-07-27 ENCOUNTER — MYC REFILL (OUTPATIENT)
Dept: DERMATOLOGY | Facility: CLINIC | Age: 50
End: 2025-07-27
Payer: COMMERCIAL

## 2025-07-27 DIAGNOSIS — E11.65 TYPE 2 DIABETES MELLITUS WITH HYPERGLYCEMIA, WITHOUT LONG-TERM CURRENT USE OF INSULIN (H): ICD-10-CM

## 2025-07-27 DIAGNOSIS — M54.10 RADICULOPATHY AFFECTING UPPER EXTREMITY: ICD-10-CM

## 2025-07-27 DIAGNOSIS — E11.59 TYPE 2 DIABETES MELLITUS WITH OTHER CIRCULATORY COMPLICATION, WITHOUT LONG-TERM CURRENT USE OF INSULIN (H): ICD-10-CM

## 2025-07-27 DIAGNOSIS — L30.8 OTHER ECZEMA: ICD-10-CM

## 2025-07-27 DIAGNOSIS — J45.40 MODERATE PERSISTENT ASTHMA WITHOUT COMPLICATION: ICD-10-CM

## 2025-07-27 DIAGNOSIS — I16.0 ASYMPTOMATIC HYPERTENSIVE URGENCY: ICD-10-CM

## 2025-07-27 DIAGNOSIS — L30.1 DYSHIDROTIC ECZEMA: ICD-10-CM

## 2025-07-27 DIAGNOSIS — I10 HYPERTENSION GOAL BP (BLOOD PRESSURE) < 140/90: ICD-10-CM

## 2025-07-27 RX ORDER — GABAPENTIN 300 MG/1
CAPSULE ORAL
Qty: 90 CAPSULE | Refills: 2 | Status: CANCELLED | OUTPATIENT
Start: 2025-07-27

## 2025-07-28 RX ORDER — AMLODIPINE BESYLATE 10 MG/1
10 TABLET ORAL DAILY
Qty: 90 TABLET | Refills: 1 | Status: SHIPPED | OUTPATIENT
Start: 2025-07-28

## 2025-07-28 RX ORDER — ALBUTEROL SULFATE 90 UG/1
2 INHALANT RESPIRATORY (INHALATION) EVERY 6 HOURS PRN
Qty: 18 G | Refills: 1 | Status: SHIPPED | OUTPATIENT
Start: 2025-07-28

## 2025-07-28 RX ORDER — LABETALOL 200 MG/1
200 TABLET, FILM COATED ORAL 2 TIMES DAILY
Qty: 60 TABLET | Refills: 2 | Status: SHIPPED | OUTPATIENT
Start: 2025-07-28

## 2025-07-28 RX ORDER — CLONIDINE HYDROCHLORIDE 0.2 MG/1
0.2 TABLET ORAL 2 TIMES DAILY
Qty: 180 TABLET | Refills: 1 | Status: SHIPPED | OUTPATIENT
Start: 2025-07-28

## 2025-07-28 RX ORDER — SPIRONOLACTONE 25 MG/1
25 TABLET ORAL DAILY
Qty: 90 TABLET | Refills: 1 | Status: SHIPPED | OUTPATIENT
Start: 2025-07-28

## 2025-07-28 RX ORDER — ASPIRIN 81 MG/1
81 TABLET ORAL DAILY
Qty: 90 TABLET | Refills: 3 | Status: SHIPPED | OUTPATIENT
Start: 2025-07-28

## 2025-07-28 RX ORDER — LOSARTAN POTASSIUM 100 MG/1
100 TABLET ORAL DAILY
Qty: 90 TABLET | Refills: 1 | Status: SHIPPED | OUTPATIENT
Start: 2025-07-28

## 2025-07-28 RX ORDER — MOMETASONE FUROATE AND FORMOTEROL FUMARATE DIHYDRATE 100; 5 UG/1; UG/1
2 AEROSOL RESPIRATORY (INHALATION) 2 TIMES DAILY
Qty: 13 G | Refills: 2 | Status: SHIPPED | OUTPATIENT
Start: 2025-07-28

## 2025-07-28 NOTE — TELEPHONE ENCOUNTER
PSP: Vickie Andersen, CECE, CNP  LOV: 11/11/24 new consult  no follow up appointments noted at this time

## 2025-07-30 RX ORDER — TRIAMCINOLONE ACETONIDE 1 MG/G
CREAM TOPICAL 2 TIMES DAILY
Qty: 80 G | Refills: 3 | OUTPATIENT
Start: 2025-07-30

## 2025-07-30 RX ORDER — BETAMETHASONE DIPROPIONATE 0.5 MG/G
OINTMENT, AUGMENTED TOPICAL 2 TIMES DAILY
Qty: 45 G | Refills: 3 | OUTPATIENT
Start: 2025-07-30

## 2025-07-30 NOTE — TELEPHONE ENCOUNTER
Phone call to patient to explain PSP has received request for refill. She does want to see him again before refill to discuss response and continued pain. Stated understanding. Transferred to scheduling to make appointment.

## 2025-07-30 NOTE — TELEPHONE ENCOUNTER
Spoke with patient and asked if he picked up all his refills through pharmacy, patient was unsure and thought he may have some left. He will contact his pharmacy for refills and I told him to call us back if he is out and needs further refills sent.     Denied current refill request as patient should have fills on file at pharmacy (ordered may 2025 with 3 refills)       Joyce AZAR RN BSN  [facility]  [phone]

## 2025-08-05 ENCOUNTER — PATIENT OUTREACH (OUTPATIENT)
Dept: CARE COORDINATION | Facility: CLINIC | Age: 50
End: 2025-08-05
Payer: COMMERCIAL

## 2025-08-11 ENCOUNTER — CARE COORDINATION (OUTPATIENT)
Dept: ENDOCRINOLOGY | Facility: CLINIC | Age: 50
End: 2025-08-11
Payer: COMMERCIAL

## 2025-08-13 ASSESSMENT — PATIENT HEALTH QUESTIONNAIRE - PHQ9
10. IF YOU CHECKED OFF ANY PROBLEMS, HOW DIFFICULT HAVE THESE PROBLEMS MADE IT FOR YOU TO DO YOUR WORK, TAKE CARE OF THINGS AT HOME, OR GET ALONG WITH OTHER PEOPLE: NOT DIFFICULT AT ALL
SUM OF ALL RESPONSES TO PHQ QUESTIONS 1-9: 6
SUM OF ALL RESPONSES TO PHQ QUESTIONS 1-9: 6

## 2025-08-14 ENCOUNTER — VIRTUAL VISIT (OUTPATIENT)
Dept: BEHAVIORAL HEALTH | Facility: CLINIC | Age: 50
End: 2025-08-14
Payer: COMMERCIAL

## 2025-08-14 DIAGNOSIS — F33.0 MILD EPISODE OF RECURRENT MAJOR DEPRESSIVE DISORDER: Primary | ICD-10-CM

## 2025-08-14 ASSESSMENT — ANXIETY QUESTIONNAIRES
1. FEELING NERVOUS, ANXIOUS, OR ON EDGE: NOT AT ALL
3. WORRYING TOO MUCH ABOUT DIFFERENT THINGS: SEVERAL DAYS
8. IF YOU CHECKED OFF ANY PROBLEMS, HOW DIFFICULT HAVE THESE MADE IT FOR YOU TO DO YOUR WORK, TAKE CARE OF THINGS AT HOME, OR GET ALONG WITH OTHER PEOPLE?: SOMEWHAT DIFFICULT
IF YOU CHECKED OFF ANY PROBLEMS ON THIS QUESTIONNAIRE, HOW DIFFICULT HAVE THESE PROBLEMS MADE IT FOR YOU TO DO YOUR WORK, TAKE CARE OF THINGS AT HOME, OR GET ALONG WITH OTHER PEOPLE: SOMEWHAT DIFFICULT
4. TROUBLE RELAXING: SEVERAL DAYS
5. BEING SO RESTLESS THAT IT IS HARD TO SIT STILL: NOT AT ALL
GAD7 TOTAL SCORE: 3
GAD7 TOTAL SCORE: 3
7. FEELING AFRAID AS IF SOMETHING AWFUL MIGHT HAPPEN: NOT AT ALL
2. NOT BEING ABLE TO STOP OR CONTROL WORRYING: SEVERAL DAYS
6. BECOMING EASILY ANNOYED OR IRRITABLE: NOT AT ALL
7. FEELING AFRAID AS IF SOMETHING AWFUL MIGHT HAPPEN: NOT AT ALL
GAD7 TOTAL SCORE: 3

## 2025-08-23 ENCOUNTER — HEALTH MAINTENANCE LETTER (OUTPATIENT)
Age: 50
End: 2025-08-23

## (undated) DEVICE — DRSG GAUZE 4X4" 2187

## (undated) DEVICE — MANIFOLD NEPTUNE 4 PORT 700-20

## (undated) DEVICE — STRAP POSITIONING VELCRO 13' CERVICAL HARNESS 920877

## (undated) DEVICE — LINEN TOWEL PACK X5 5464

## (undated) DEVICE — PIN DISTRACTION ANCHOR FOR SCR 14MM MDS9091414

## (undated) DEVICE — WIPES FOLEY CARE SURESTEP PROVON DFC100

## (undated) DEVICE — DRAIN JACKSON PRATT 07MM FLAT SU130-1310

## (undated) DEVICE — SYR EAR BULB 3OZ 0035830

## (undated) DEVICE — SU SILK 2-0 TIE 24" SA75H

## (undated) DEVICE — TOOL DISSECT MIDAS MR8 14CM MATCH HEAD 3MM MR8-14MH30

## (undated) DEVICE — SU SILK 2-0 SH 30" K833H

## (undated) DEVICE — SPONGE COTTONOID 1/2X3" 80-1407

## (undated) DEVICE — PACK SPINE SM CUSTOM SNE15SSFSK

## (undated) DEVICE — DRAPE COVER C-ARM SEAMLESS SNAP-KAP 03-KP26 LATEX FREE

## (undated) DEVICE — GLOVE PROTEXIS W/NEU-THERA 7.0  2D73TE70

## (undated) DEVICE — SOL WATER IRRIG 1000ML BOTTLE 2F7114

## (undated) DEVICE — SPONGE SURGIFOAM 100 1974

## (undated) DEVICE — GLOVE PROTEXIS W/NEU-THERA 7.5  2D73TE75

## (undated) DEVICE — PROBE NEUROSIGN MAGSTIM BIPOLAR 3601-00-TE

## (undated) DEVICE — GLOVE PROTEXIS BLUE W/NEU-THERA 8.0  2D73EB80

## (undated) DEVICE — DRILL BIT MEDT 13MM SGL USE 7080513

## (undated) DEVICE — SU VICRYL 3-0 TIE 12X18" J904T

## (undated) DEVICE — DRAPE SHEET REV FOLD 3/4 9349

## (undated) DEVICE — ESU ELEC BLADE 2.75" COATED/INSULATED E1455

## (undated) DEVICE — ESU GROUND PAD UNIVERSAL W/O CORD

## (undated) DEVICE — NDL SPINAL 18GA 3.5" 405184

## (undated) DEVICE — SU MONOCRYL 4-0 PS-2 18" UND Y496G

## (undated) DEVICE — SUCTION FRAZIER 12FR W/OBTURATOR 33120

## (undated) DEVICE — CATH TRAY FOLEY COUDE SURESTEP 16FR W/URNE MTR STLK A304716A

## (undated) DEVICE — RX SURGIFLO HEMOSTATIC MATRIX W/THROMBIN 8ML 2994

## (undated) DEVICE — SPONGE RAY-TEC 4X8" 7318

## (undated) DEVICE — ESU HOLSTER PLASTIC DISP E2400

## (undated) DEVICE — GLOVE PROTEXIS BLUE W/NEU-THERA 7.5  2D73EB75

## (undated) DEVICE — DRAIN JACKSON PRATT RESERVOIR 100ML SU130-1305

## (undated) DEVICE — SU VICRYL 2-0 TIE 12X18" J905T

## (undated) DEVICE — DRSG STERI STRIP 1/2X4" R1547

## (undated) DEVICE — TUBING SUCTION SOFT 20'X3/16" 0036570

## (undated) DEVICE — ADH LIQUID MASTISOL TOPICAL VIAL 2-3ML 0523-48

## (undated) DEVICE — SU VICRYL 3-0 SH CR 8X18" J774

## (undated) DEVICE — DRAPE MAYO STAND 23X54 8337

## (undated) DEVICE — GLOVE LINER COTTON MED 32-2076

## (undated) DEVICE — DRAPE MICROSCOPE LEICA 54X150" AR8033650

## (undated) RX ORDER — FENTANYL CITRATE 50 UG/ML
INJECTION, SOLUTION INTRAMUSCULAR; INTRAVENOUS
Status: DISPENSED
Start: 2021-07-16

## (undated) RX ORDER — HYDROMORPHONE HYDROCHLORIDE 1 MG/ML
INJECTION, SOLUTION INTRAMUSCULAR; INTRAVENOUS; SUBCUTANEOUS
Status: DISPENSED
Start: 2021-07-16

## (undated) RX ORDER — GINSENG 100 MG
CAPSULE ORAL
Status: DISPENSED
Start: 2021-07-16

## (undated) RX ORDER — BUPIVACAINE HYDROCHLORIDE AND EPINEPHRINE 2.5; 5 MG/ML; UG/ML
INJECTION, SOLUTION EPIDURAL; INFILTRATION; INTRACAUDAL; PERINEURAL
Status: DISPENSED
Start: 2021-07-16

## (undated) RX ORDER — ACETAMINOPHEN 325 MG/1
TABLET ORAL
Status: DISPENSED
Start: 2021-07-16

## (undated) RX ORDER — PROPOFOL 10 MG/ML
INJECTION, EMULSION INTRAVENOUS
Status: DISPENSED
Start: 2021-07-16

## (undated) RX ORDER — CEFAZOLIN SODIUM 1 G/3ML
INJECTION, POWDER, FOR SOLUTION INTRAMUSCULAR; INTRAVENOUS
Status: DISPENSED
Start: 2021-07-16

## (undated) RX ORDER — ONDANSETRON 2 MG/ML
INJECTION INTRAMUSCULAR; INTRAVENOUS
Status: DISPENSED
Start: 2021-07-16

## (undated) RX ORDER — GABAPENTIN 300 MG/1
CAPSULE ORAL
Status: DISPENSED
Start: 2021-07-16

## (undated) RX ORDER — HYDROMORPHONE HCL IN WATER/PF 6 MG/30 ML
PATIENT CONTROLLED ANALGESIA SYRINGE INTRAVENOUS
Status: DISPENSED
Start: 2021-07-16

## (undated) RX ORDER — LIDOCAINE HYDROCHLORIDE 40 MG/ML
INJECTION, SOLUTION RETROBULBAR
Status: DISPENSED
Start: 2021-07-16

## (undated) RX ORDER — GLYCOPYRROLATE 0.2 MG/ML
INJECTION, SOLUTION INTRAMUSCULAR; INTRAVENOUS
Status: DISPENSED
Start: 2021-07-16

## (undated) RX ORDER — CEFAZOLIN SODIUM IN 0.9 % NACL 3 G/100 ML
INTRAVENOUS SOLUTION, PIGGYBACK (ML) INTRAVENOUS
Status: DISPENSED
Start: 2021-07-16